# Patient Record
Sex: MALE | Race: WHITE | NOT HISPANIC OR LATINO | Employment: OTHER | URBAN - NONMETROPOLITAN AREA
[De-identification: names, ages, dates, MRNs, and addresses within clinical notes are randomized per-mention and may not be internally consistent; named-entity substitution may affect disease eponyms.]

---

## 2017-09-29 DIAGNOSIS — Z00.00 HEALTH CARE MAINTENANCE: ICD-10-CM

## 2017-09-29 DIAGNOSIS — E78.5 HYPERLIPIDEMIA WITH TARGET LDL LESS THAN 100: ICD-10-CM

## 2017-09-29 DIAGNOSIS — I10 ESSENTIAL HYPERTENSION WITH GOAL BLOOD PRESSURE LESS THAN 140/90: ICD-10-CM

## 2017-09-29 NOTE — TELEPHONE ENCOUNTER
Lisinopril      Last Written Prescription Date: 10/17/16  Last Fill Quantity: 90, # refills: 3  Last Office Visit with [facility], [facility] or  Health prescribing provider: 10/17/16       Potassium   Date Value Ref Range Status   10/17/2016 4.3 3.4 - 5.3 mmol/L Final     Creatinine   Date Value Ref Range Status   10/17/2016 1.12 0.66 - 1.25 mg/dL Final     BP Readings from Last 3 Encounters:   10/17/16 123/80   04/22/15 128/76   04/14/15 134/68     Floic acid      Last Written Prescription Date: 10/17/16  Last Fill Quantity: 90,  # refills: 3   Last Office Visit with [facility], [facility] or  Health prescribing provider: 10/17/16                                             Simvastatin     Last Written Prescription Date: 10/17/16  Last Fill Quantity: 90, # refills: 3  Last Office Visit with [facility], [facility] or  Health prescribing provider: 10/17/16       Lab Results   Component Value Date    CHOL 141 10/17/2016     Lab Results   Component Value Date    HDL 56 10/17/2016     Lab Results   Component Value Date    LDL 65 10/17/2016     Lab Results   Component Value Date    TRIG 99 10/17/2016     Lab Results   Component Value Date    CHOLHDLRATIO 3.2 03/05/2014     Metoprolol      Last Written Prescription Date: 10/17/16  Last Fill Quantity: 90, # refills: 3    Last Office Visit with [facility], [facility] or  Health prescribing provider:  10/17/16   Future Office Visit:        BP Readings from Last 3 Encounters:   10/17/16 123/80   04/22/15 128/76   04/14/15 134/68

## 2017-09-29 NOTE — LETTER
[address] [facility] [address]  Phone: [phone]    October 2, 2017       Kostas Yadiel  [address] [facility] [address]            Dear Kostas:    We are concerned about your health care.  We recently provided you with medication refills.    You are due for a follow-up with your primary care provider. This must be completed in order to continue refilling your medications. Please schedule an appointment. Scheduling telephone number: [phone].         Thank You,  NAVA Holguin

## 2017-10-02 RX ORDER — SIMVASTATIN 20 MG
TABLET ORAL
Qty: 30 TABLET | Refills: 0 | Status: SHIPPED | OUTPATIENT
Start: 2017-10-02 | End: 2017-10-02

## 2017-10-02 RX ORDER — FOLIC ACID 1 MG/1
TABLET ORAL
Qty: 30 TABLET | Refills: 0 | Status: SHIPPED | OUTPATIENT
Start: 2017-10-02 | End: 2017-10-02

## 2017-10-02 RX ORDER — LISINOPRIL 5 MG/1
TABLET ORAL
Qty: 30 TABLET | Refills: 0 | Status: SHIPPED | OUTPATIENT
Start: 2017-10-02 | End: 2017-10-02

## 2017-10-02 RX ORDER — LISINOPRIL 5 MG/1
TABLET ORAL
Qty: 30 TABLET | Refills: 0 | Status: SHIPPED | OUTPATIENT
Start: 2017-10-02 | End: 2017-11-05

## 2017-10-02 RX ORDER — METOPROLOL SUCCINATE 50 MG/1
TABLET, EXTENDED RELEASE ORAL
Qty: 30 TABLET | Refills: 0 | Status: SHIPPED | OUTPATIENT
Start: 2017-10-02 | End: 2017-11-05

## 2017-10-02 RX ORDER — SIMVASTATIN 20 MG
TABLET ORAL
Qty: 30 TABLET | Refills: 0 | Status: SHIPPED | OUTPATIENT
Start: 2017-10-02 | End: 2017-11-05

## 2017-10-02 RX ORDER — FOLIC ACID 1 MG/1
TABLET ORAL
Qty: 30 TABLET | Refills: 0 | Status: SHIPPED | OUTPATIENT
Start: 2017-10-02 | End: 2017-11-05

## 2017-10-02 NOTE — TELEPHONE ENCOUNTER
30 day supply of med refill request given. Letter sent to patient, pt due for follow-up with PCP.

## 2017-11-05 DIAGNOSIS — I10 ESSENTIAL HYPERTENSION WITH GOAL BLOOD PRESSURE LESS THAN 140/90: ICD-10-CM

## 2017-11-05 DIAGNOSIS — Z00.00 HEALTH CARE MAINTENANCE: ICD-10-CM

## 2017-11-05 DIAGNOSIS — E78.5 HYPERLIPIDEMIA WITH TARGET LDL LESS THAN 100: ICD-10-CM

## 2017-11-05 NOTE — LETTER
[address] [address]  Phone: [phone]    November 8, 2017       Kostas Cotto  [address] [facility] [address]            Dear Kostas:    We are concerned about your health care.  We recently provided you with medication refills.    You are due for a follow-up with your primary care provider and a lab tests. This must be completed to continue refilling your medications. Please schedule an appointment as soon as possible.         Thank You,  NAVA Holguin  [address] [id number] [phone]

## 2017-11-06 NOTE — TELEPHONE ENCOUNTER
metoprolol      Last Written Prescription Date: 10/02/2017  Last Fill Quantity: 30,  # refills: 0   Last Office Visit with [facility], [facility] or [facility] prescribing provider: 10/17/2016              Folic acid      Last Written Prescription Date: 10/02/2017  Last Fill Quantity: 30,  # refills: 0   Last Office Visit with [facility], [facility] or [facility] prescribing provider: 10/17/2016               Zocor      Last Written Prescription Date: 10/02/2017  Last Fill Quantity: 30,  # refills: 0   Last Office Visit with [facility], [facility] or  Health prescribing provider: 10/17/2016              Lisinopril       Last Written Prescription Date: 10/02/2017  Last Fill Quantity: 30,  # refills: 0   Last Office Visit with [facility], [facility] or  Health prescribing provider: 10/17/2016                              Next 5 appointments (look out 90 days)     Nov 16, 2017 10:45 AM CST   (Arrive by 10:30 AM)   SHORT with NAVA De León   [facility] ([facility] )    [facility] [place] [address]   [phone]

## 2017-11-08 RX ORDER — LISINOPRIL 5 MG/1
TABLET ORAL
Qty: 30 TABLET | Refills: 0 | Status: SHIPPED | OUTPATIENT
Start: 2017-11-08 | End: 2017-12-04

## 2017-11-08 RX ORDER — SIMVASTATIN 20 MG
TABLET ORAL
Qty: 30 TABLET | Refills: 0 | Status: SHIPPED | OUTPATIENT
Start: 2017-11-08 | End: 2017-12-04

## 2017-11-08 RX ORDER — FOLIC ACID 1 MG/1
TABLET ORAL
Qty: 30 TABLET | Refills: 0 | Status: SHIPPED | OUTPATIENT
Start: 2017-11-08 | End: 2017-12-04

## 2017-11-08 RX ORDER — METOPROLOL SUCCINATE 50 MG/1
TABLET, EXTENDED RELEASE ORAL
Qty: 30 TABLET | Refills: 0 | Status: SHIPPED | OUTPATIENT
Start: 2017-11-08 | End: 2017-12-04

## 2017-12-04 DIAGNOSIS — E78.5 HYPERLIPIDEMIA WITH TARGET LDL LESS THAN 100: ICD-10-CM

## 2017-12-04 DIAGNOSIS — I10 ESSENTIAL HYPERTENSION WITH GOAL BLOOD PRESSURE LESS THAN 140/90: ICD-10-CM

## 2017-12-04 DIAGNOSIS — Z00.00 HEALTH CARE MAINTENANCE: ICD-10-CM

## 2017-12-05 NOTE — TELEPHONE ENCOUNTER
simvistatin 20mg  Last office visit: 10/17/17  Last refill: 11/9/17 refills 0    metorolol succ 50mg ER  Last office visit: 10/17/17  Last refill: 11/9/17 refills 0    Lisinopril 5 mg   Last office visit: 10/17/17  Last refill: 11/9/17 refills 0    Folic acid 1mg  Last office visit: 10/17/17  Last refill: 11/9/17 refills 0    Thank you.

## 2017-12-06 NOTE — TELEPHONE ENCOUNTER
Last office visit 10/17/16.  Patient cancelled 11/16/17 appointment. Patient has appointment scheduled for December 2017. Please advise.

## 2017-12-07 RX ORDER — LISINOPRIL 5 MG/1
TABLET ORAL
Qty: 30 TABLET | Refills: 0 | Status: SHIPPED | OUTPATIENT
Start: 2017-12-07 | End: 2018-01-03

## 2017-12-07 RX ORDER — SIMVASTATIN 20 MG
TABLET ORAL
Qty: 30 TABLET | Refills: 0 | Status: SHIPPED | OUTPATIENT
Start: 2017-12-07 | End: 2018-01-03

## 2017-12-07 RX ORDER — METOPROLOL SUCCINATE 50 MG/1
TABLET, EXTENDED RELEASE ORAL
Qty: 30 TABLET | Refills: 0 | Status: SHIPPED | OUTPATIENT
Start: 2017-12-07 | End: 2018-01-03

## 2017-12-07 RX ORDER — FOLIC ACID 1 MG/1
TABLET ORAL
Qty: 30 TABLET | Refills: 0 | Status: SHIPPED | OUTPATIENT
Start: 2017-12-07 | End: 2018-01-03

## 2018-01-03 DIAGNOSIS — Z00.00 HEALTH CARE MAINTENANCE: ICD-10-CM

## 2018-01-03 DIAGNOSIS — I10 ESSENTIAL HYPERTENSION WITH GOAL BLOOD PRESSURE LESS THAN 140/90: ICD-10-CM

## 2018-01-03 DIAGNOSIS — E78.5 HYPERLIPIDEMIA WITH TARGET LDL LESS THAN 100: ICD-10-CM

## 2018-01-04 NOTE — TELEPHONE ENCOUNTER
zocor      Last Written Prescription Date: 12/7/17  Last Fill Quantity: 30,  # refills: 0   Last Office Visit with [facility], [facility] or [facility] prescribing provider: 10/17/16    metoprolol     Last Written Prescription Date: 12/7/17  Last Fill Quantity: 30,  # refills: 0   Last Office Visit with [facility], [facility] or [facility] prescribing provider: 10/17/16    Folic acid      Last Written Prescription Date: 12/7/17  Last Fill Quantity: 30,  # refills: 0   Last Office Visit with [facility], [facility] or [facility] prescribing provider: 10/17/16                                         Next 5 appointments (look out 90 days)     Jan 24, 2018 10:45 AM CST   (Arrive by 10:30 AM)   Office Visit with NAVA De León   [facility] [place] ([facility] [place] )    [address]  [place] [address]   [phone]                      lisinopril  Last Written Prescription Date: 12/7/17  Last Fill Quantity: 30,  # refills: 0   Last Office Visit with [facility], [facility] or [facility] prescribing provider: 10/17/16                                         Next 5 appointments (look out 90 days)     Jan 24, 2018 10:45 AM CST   (Arrive by 10:30 AM)   Office Visit with NAVA De León   [facility] [place] ([facility] [place] )    [address]  [place] [address]   [phone]                                                           Next 5 appointments (look out 90 days)     Jan 24, 2018 10:45 AM CST   (Arrive by 10:30 AM)   Office Visit with NAVA De León   [facility] [place] ([facility] [place] )    [address]  [place] [address]   [phone]                                                           Next 5 appointments (look out 90 days)     Jan 24, 2018 10:45 AM CST   (Arrive by 10:30 AM)   Office Visit with NAVA De León   [facility] [place] ([facility] [place] )    [address]  [place] [address]   [phone]

## 2018-01-05 RX ORDER — METOPROLOL SUCCINATE 50 MG/1
TABLET, EXTENDED RELEASE ORAL
Qty: 30 TABLET | Refills: 0 | Status: SHIPPED | OUTPATIENT
Start: 2018-01-05 | End: 2018-01-18

## 2018-01-05 RX ORDER — SIMVASTATIN 20 MG
TABLET ORAL
Qty: 30 TABLET | Refills: 0 | Status: SHIPPED | OUTPATIENT
Start: 2018-01-05 | End: 2018-01-18

## 2018-01-05 RX ORDER — FOLIC ACID 1 MG/1
TABLET ORAL
Qty: 30 TABLET | Refills: 0 | Status: SHIPPED | OUTPATIENT
Start: 2018-01-05 | End: 2018-01-18

## 2018-01-05 RX ORDER — LISINOPRIL 5 MG/1
TABLET ORAL
Qty: 30 TABLET | Refills: 0 | Status: SHIPPED | OUTPATIENT
Start: 2018-01-05 | End: 2018-02-02

## 2018-01-08 ENCOUNTER — APPOINTMENT (OUTPATIENT)
Dept: CT IMAGING | Facility: HOSPITAL | Age: 73
DRG: 069 | End: 2018-01-08
Attending: FAMILY MEDICINE
Payer: COMMERCIAL

## 2018-01-08 ENCOUNTER — APPOINTMENT (OUTPATIENT)
Dept: ULTRASOUND IMAGING | Facility: HOSPITAL | Age: 73
DRG: 069 | End: 2018-01-08
Attending: INTERNAL MEDICINE
Payer: COMMERCIAL

## 2018-01-08 ENCOUNTER — APPOINTMENT (OUTPATIENT)
Dept: MRI IMAGING | Facility: HOSPITAL | Age: 73
DRG: 069 | End: 2018-01-08
Attending: INTERNAL MEDICINE
Payer: COMMERCIAL

## 2018-01-08 ENCOUNTER — APPOINTMENT (OUTPATIENT)
Dept: SPEECH THERAPY | Facility: HOSPITAL | Age: 73
DRG: 069 | End: 2018-01-08
Attending: INTERNAL MEDICINE
Payer: COMMERCIAL

## 2018-01-08 ENCOUNTER — HOSPITAL ENCOUNTER (INPATIENT)
Facility: HOSPITAL | Age: 73
LOS: 3 days | Discharge: HOME HEALTH | DRG: 069 | End: 2018-01-11
Attending: FAMILY MEDICINE | Admitting: INTERNAL MEDICINE
Payer: COMMERCIAL

## 2018-01-08 DIAGNOSIS — I48.91 NEW ONSET ATRIAL FIBRILLATION (H): ICD-10-CM

## 2018-01-08 DIAGNOSIS — G45.9 TRANSIENT CEREBRAL ISCHEMIA, UNSPECIFIED TYPE: ICD-10-CM

## 2018-01-08 DIAGNOSIS — R33.8 BENIGN PROSTATIC HYPERPLASIA WITH URINARY RETENTION: Primary | ICD-10-CM

## 2018-01-08 DIAGNOSIS — N40.1 BENIGN PROSTATIC HYPERPLASIA WITH URINARY RETENTION: Primary | ICD-10-CM

## 2018-01-08 PROBLEM — I63.9 CVA (CEREBRAL VASCULAR ACCIDENT) (H): Status: ACTIVE | Noted: 2018-01-08

## 2018-01-08 LAB
ANION GAP SERPL CALCULATED.3IONS-SCNC: 10 MMOL/L (ref 3–14)
APTT PPP: 25 SEC (ref 24–37)
BASOPHILS # BLD AUTO: 0 10E9/L (ref 0–0.2)
BASOPHILS NFR BLD AUTO: 0.4 %
BUN SERPL-MCNC: 30 MG/DL (ref 7–30)
CALCIUM SERPL-MCNC: 9.2 MG/DL (ref 8.5–10.1)
CHLORIDE SERPL-SCNC: 101 MMOL/L (ref 94–109)
CO2 SERPL-SCNC: 26 MMOL/L (ref 20–32)
CREAT SERPL-MCNC: 1.17 MG/DL (ref 0.66–1.25)
DIFFERENTIAL METHOD BLD: NORMAL
EOSINOPHIL # BLD AUTO: 0.1 10E9/L (ref 0–0.7)
EOSINOPHIL NFR BLD AUTO: 0.5 %
ERYTHROCYTE [DISTWIDTH] IN BLOOD BY AUTOMATED COUNT: 12.3 % (ref 10–15)
EST. AVERAGE GLUCOSE BLD GHB EST-MCNC: 114 MG/DL
GFR SERPL CREATININE-BSD FRML MDRD: 62 ML/MIN/1.7M2
GLUCOSE BLDC GLUCOMTR-MCNC: 124 MG/DL (ref 70–99)
GLUCOSE BLDC GLUCOMTR-MCNC: 146 MG/DL (ref 70–99)
GLUCOSE SERPL-MCNC: 142 MG/DL (ref 70–99)
HBA1C MFR BLD: 5.6 % (ref 4.3–6)
HCT VFR BLD AUTO: 44.8 % (ref 40–53)
HGB BLD-MCNC: 15 G/DL (ref 13.3–17.7)
IMM GRANULOCYTES # BLD: 0 10E9/L (ref 0–0.4)
IMM GRANULOCYTES NFR BLD: 0.2 %
INR PPP: 1.12 (ref 0.8–1.2)
LYMPHOCYTES # BLD AUTO: 1.3 10E9/L (ref 0.8–5.3)
LYMPHOCYTES NFR BLD AUTO: 12.3 %
MCH RBC QN AUTO: 31.4 PG (ref 26.5–33)
MCHC RBC AUTO-ENTMCNC: 33.5 G/DL (ref 31.5–36.5)
MCV RBC AUTO: 94 FL (ref 78–100)
MONOCYTES # BLD AUTO: 0.8 10E9/L (ref 0–1.3)
MONOCYTES NFR BLD AUTO: 8 %
NEUTROPHILS # BLD AUTO: 8.3 10E9/L (ref 1.6–8.3)
NEUTROPHILS NFR BLD AUTO: 78.6 %
NRBC # BLD AUTO: 0 10*3/UL
NRBC BLD AUTO-RTO: 0 /100
PLATELET # BLD AUTO: 255 10E9/L (ref 150–450)
POTASSIUM SERPL-SCNC: 4.3 MMOL/L (ref 3.4–5.3)
RBC # BLD AUTO: 4.78 10E12/L (ref 4.4–5.9)
SODIUM SERPL-SCNC: 137 MMOL/L (ref 133–144)
TROPONIN I SERPL-MCNC: 0.04 UG/L (ref 0–0.04)
WBC # BLD AUTO: 10.5 10E9/L (ref 4–11)

## 2018-01-08 PROCEDURE — 25000128 H RX IP 250 OP 636: Performed by: FAMILY MEDICINE

## 2018-01-08 PROCEDURE — 99284 EMERGENCY DEPT VISIT MOD MDM: CPT | Performed by: FAMILY MEDICINE

## 2018-01-08 PROCEDURE — 93010 ELECTROCARDIOGRAM REPORT: CPT | Performed by: INTERNAL MEDICINE

## 2018-01-08 PROCEDURE — 25000128 H RX IP 250 OP 636: Performed by: RADIOLOGY

## 2018-01-08 PROCEDURE — 40000786 ZZHCL STATISTIC ACTIVE MRSA SURVEILLANCE CULTURE: Performed by: INTERNAL MEDICINE

## 2018-01-08 PROCEDURE — 00000146 ZZHCL STATISTIC GLUCOSE BY METER IP

## 2018-01-08 PROCEDURE — 70553 MRI BRAIN STEM W/O & W/DYE: CPT | Mod: TC

## 2018-01-08 PROCEDURE — 70450 CT HEAD/BRAIN W/O DYE: CPT | Mod: TC

## 2018-01-08 PROCEDURE — 99223 1ST HOSP IP/OBS HIGH 75: CPT | Performed by: INTERNAL MEDICINE

## 2018-01-08 PROCEDURE — 85730 THROMBOPLASTIN TIME PARTIAL: CPT | Performed by: FAMILY MEDICINE

## 2018-01-08 PROCEDURE — 93880 EXTRACRANIAL BILAT STUDY: CPT | Mod: TC

## 2018-01-08 PROCEDURE — 12000000 ZZH R&B MED SURG/OB

## 2018-01-08 PROCEDURE — A9585 GADOBUTROL INJECTION: HCPCS | Performed by: RADIOLOGY

## 2018-01-08 PROCEDURE — 40000225 ZZH STATISTIC SLP WARD VISIT

## 2018-01-08 PROCEDURE — 25000128 H RX IP 250 OP 636: Performed by: INTERNAL MEDICINE

## 2018-01-08 PROCEDURE — 92610 EVALUATE SWALLOWING FUNCTION: CPT | Mod: GN

## 2018-01-08 PROCEDURE — 84484 ASSAY OF TROPONIN QUANT: CPT | Performed by: FAMILY MEDICINE

## 2018-01-08 PROCEDURE — 83036 HEMOGLOBIN GLYCOSYLATED A1C: CPT | Performed by: INTERNAL MEDICINE

## 2018-01-08 PROCEDURE — 80048 BASIC METABOLIC PNL TOTAL CA: CPT | Performed by: FAMILY MEDICINE

## 2018-01-08 PROCEDURE — 85610 PROTHROMBIN TIME: CPT | Performed by: FAMILY MEDICINE

## 2018-01-08 PROCEDURE — 25000132 ZZH RX MED GY IP 250 OP 250 PS 637: Performed by: INTERNAL MEDICINE

## 2018-01-08 PROCEDURE — 85025 COMPLETE CBC W/AUTO DIFF WBC: CPT | Performed by: FAMILY MEDICINE

## 2018-01-08 PROCEDURE — 40000788 ZZHCL STATISTIC ESTIMATED AVERAGE GLUCOSE: Performed by: INTERNAL MEDICINE

## 2018-01-08 PROCEDURE — 99285 EMERGENCY DEPT VISIT HI MDM: CPT | Mod: 25

## 2018-01-08 PROCEDURE — 93005 ELECTROCARDIOGRAM TRACING: CPT

## 2018-01-08 RX ORDER — SODIUM CHLORIDE 9 MG/ML
INJECTION, SOLUTION INTRAVENOUS CONTINUOUS
Status: DISCONTINUED | OUTPATIENT
Start: 2018-01-08 | End: 2018-01-10

## 2018-01-08 RX ORDER — ACETAMINOPHEN 650 MG/1
650 SUPPOSITORY RECTAL EVERY 4 HOURS PRN
Status: DISCONTINUED | OUTPATIENT
Start: 2018-01-08 | End: 2018-01-11 | Stop reason: HOSPADM

## 2018-01-08 RX ORDER — SIMVASTATIN 40 MG
40 TABLET ORAL AT BEDTIME
Status: DISCONTINUED | OUTPATIENT
Start: 2018-01-08 | End: 2018-01-11 | Stop reason: HOSPADM

## 2018-01-08 RX ORDER — ASPIRIN 325 MG
325 TABLET ORAL DAILY
Status: DISCONTINUED | OUTPATIENT
Start: 2018-01-09 | End: 2018-01-11 | Stop reason: HOSPADM

## 2018-01-08 RX ORDER — BISACODYL 10 MG
10 SUPPOSITORY, RECTAL RECTAL DAILY PRN
Status: DISCONTINUED | OUTPATIENT
Start: 2018-01-08 | End: 2018-01-11 | Stop reason: HOSPADM

## 2018-01-08 RX ORDER — ACETAMINOPHEN 325 MG/1
650 TABLET ORAL EVERY 4 HOURS PRN
Status: DISCONTINUED | OUTPATIENT
Start: 2018-01-08 | End: 2018-01-11 | Stop reason: HOSPADM

## 2018-01-08 RX ORDER — GADOBUTROL 604.72 MG/ML
7.5 INJECTION INTRAVENOUS ONCE
Status: COMPLETED | OUTPATIENT
Start: 2018-01-08 | End: 2018-01-08

## 2018-01-08 RX ORDER — ONDANSETRON 4 MG/1
4 TABLET, ORALLY DISINTEGRATING ORAL EVERY 6 HOURS PRN
Status: DISCONTINUED | OUTPATIENT
Start: 2018-01-08 | End: 2018-01-11 | Stop reason: HOSPADM

## 2018-01-08 RX ORDER — ONDANSETRON 2 MG/ML
4 INJECTION INTRAMUSCULAR; INTRAVENOUS EVERY 6 HOURS PRN
Status: DISCONTINUED | OUTPATIENT
Start: 2018-01-08 | End: 2018-01-11 | Stop reason: HOSPADM

## 2018-01-08 RX ADMIN — SODIUM CHLORIDE, PRESERVATIVE FREE: 5 INJECTION INTRAVENOUS at 22:02

## 2018-01-08 RX ADMIN — ENOXAPARIN SODIUM 80 MG: 80 INJECTION SUBCUTANEOUS at 17:00

## 2018-01-08 RX ADMIN — GADOBUTROL 7.5 ML: 604.72 INJECTION INTRAVENOUS at 17:43

## 2018-01-08 RX ADMIN — SODIUM CHLORIDE 500 ML: 9 INJECTION, SOLUTION INTRAVENOUS at 13:07

## 2018-01-08 RX ADMIN — SODIUM CHLORIDE, PRESERVATIVE FREE: 5 INJECTION INTRAVENOUS at 15:17

## 2018-01-08 RX ADMIN — RANITIDINE 150 MG: 150 TABLET ORAL at 17:00

## 2018-01-08 ASSESSMENT — ENCOUNTER SYMPTOMS
FACIAL ASYMMETRY: 0
FEVER: 0
ABDOMINAL PAIN: 0
DYSURIA: 0
HEADACHES: 0
VOMITING: 0
DIAPHORESIS: 0
ACTIVITY CHANGE: 1
SHORTNESS OF BREATH: 0
CONSTIPATION: 0
PSYCHIATRIC NEGATIVE: 1
SPEECH DIFFICULTY: 1
FATIGUE: 0
WEAKNESS: 1
MUSCULOSKELETAL NEGATIVE: 1
DIARRHEA: 0
LIGHT-HEADEDNESS: 0
NAUSEA: 0

## 2018-01-08 ASSESSMENT — VISUAL ACUITY: OU: BASELINE

## 2018-01-08 NOTE — PLAN OF CARE
Face to face report given with opportunity to observe patient.    Report given to Zi Glass   1/8/2018  3:30 PM

## 2018-01-08 NOTE — IP AVS SNAPSHOT
MRN:[id number]                      After Visit Summary   1/8/2018    oKstas Cotto    MRN: [id number]           Thank you!     Thank you for choosing [facility] for your care. Our goal is always to provide you with excellent care. Hearing back from our patients is one way we can continue to improve our services. Please take a few minutes to complete the written survey that you may receive in the mail after you visit with us. Thank you!        Patient Information     Date Of Birth          1945        Designated Caregiver       Most Recent Value    Caregiver    Will someone help with your care after discharge? yes    Name of designated caregiver Pari    Phone number of caregiver [phone]    Caregiver address [address] [facility]       About your hospital stay     You were admitted on:  January 8, 2018 You last received care in the:  [facility]    You were discharged on:  January 11, 2018        Reason for your hospital stay       New onset atrial fibrillation                  Who to Call     For medical emergencies, please call 911.  For non-urgent questions about your medical care, please call your primary care provider or clinic, [phone]          Attending Provider     Provider Specialty    Ernestina Jon MD Emergency Medicine    [facility]    Titus Owens MD Internal Medicine       Primary Care Provider Office Phone # Fax #    NAVA De León [phone]      After Care Instructions     Activity       Your activity upon discharge: activity as tolerated            Diet       Follow this diet upon discharge: Orders Placed This Encounter      Regular Diet Adult Thin Liquids (water, ice chips, juice, milk, gelatin, ice cream, etc)                  Follow-up Appointments     Follow-up and recommended labs and tests        Follow up with primary care provider, Halley Hidalgo, within 5 days to evaluate  "medication change.  The following labs/tests are recommended: INR on 1/15/2018.                  Your next 10 appointments already scheduled     Jan 18, 2018 11:15 AM CST   (Arrive by 11:00 AM)   Office Visit with NAVA De León   [facility]    [address]  [place] [address]   [phone]            Jan 24, 2018 10:45 AM CST   (Arrive by 10:30 AM)   Office Visit with NAVA De León   [facility]    [address]  [place] [address]   [phone]              Additional Services     Physical Therapy Referral       *This therapy referral will be filtered to a centralized scheduling office at [facility] and the patient will receive a call to schedule an appointment at a [facility] location most convenient for them. *     [facility] provides Physical Therapy evaluation and treatment and many specialty services across the [facility] system.  If requesting a specialty program, please choose from the list below.    If you have not heard from the scheduling office within 2 business days, please call [phone] for all locations, with the exception of [place], please call [phone].  Treatment: Evaluation & Treatment  Special Instructions/Modalities: Mild gait instability. Use walker.  Special Programs: Balance/Vestibular    Please be aware that coverage of these services is subject to the terms and limitations of your health insurance plan.  Call member services at your health plan with any benefit or coverage questions.      **Note to Provider:  If you are referring outside of [facility] for the therapy appointment, please list the name of the location in the \"special instructions\" above, print the referral and give to the patient to schedule the appointment.                  Further instructions from your care team       Please stop into the [facility] to " have your INR checked on 1/15/18. An appointment to follow up with Halley Hidalgo has been made for 18. If unable to schedule please call [phone] to reschedule.    MRI Contrast Discharge Instructions    The IV contrast you received today will pass out of your body in your  urine. This will happen in the next 24 hours. You will not feel this process.  Your urine will not change color.    Drink at least 4 extra glasses of water or juice today (unless your doctor  has restricted your fluids). This reduces the stress on your kidneys.  You may take your regular medicines.    If you are on dialysis: It is best to have dialysis today.    If you have a reaction: Most reactions happen right away. If you have  any new symptoms after leaving the hospital (such as hives or swelling),  call your hospital at the correct number below. Or call your family doctor.  If you have breathing distress or wheezing, call 911.    Special instructions:     I have read and understand the above information.    Signature:______________________________________ Date:___[birth year]________    Staff:__________________________________________ Date:___________     Time:__________    [facility] Departments:    ___[facility]: [phone]  ___[facility]: [phone]  ___[place]: [phone] ___[facility]: [phone]  ___[facility]: [phone]  ___[facility]: [phone]  ___[phone]  ___[facility]: [phone]  ___[phone]    Warfarin Instruction     You have started taking a medicine called warfarin. This is a blood-thinning medicine (anticoagulant). It helps prevent and treat blood clots.      Before leaving the hospital, make sure you know how much to take and how long to take it.      You will need regular blood tests to make sure your blood is clotting safely. It is very important to see your doctor for regular blood tests.    Talk to your doctor before taking any new medicine (this includes  "over-the-counter drugs and herbal products). Many medicines can interact with warfarin. This may cause more bleeding or too much clotting.     Eating a lot of vitamin K--found in green, leafy vegetables--can change the way warfarin works in your body. Do NOT avoid these foods. Instead, try to eat the same amount each day.     Bleeding is the most common side-effect of warfarin. You may notice bleeding gums, a bloody nose, bruises and bleeding longer when you cut yourself. See a doctor at once if:   o You cough up blood  o You find blood in your stool (poop)  o You have a deep cut, or a cut that bleeds longer than 10 minutes   o You have a bad cut, hard fall, accident or hit your head (go to urgent care or the emergency room).    For women who can get pregnant: This medicine can harm an unborn baby. Be very careful not to get pregnant while taking this medicine. If you think you might be pregnant, call your doctor right away.    For more information, read \"Guide to Warfarin Therapy,  the booklet you received in the hospital.        Pending Results     Date and Time Order Name Status Description    1/8/2018 1405 Echocardiogram - bubble study In process             Statement of Approval     Ordered          01/11/18 1016  I have reviewed and agree with all the recommendations and orders detailed in this document.  EFFECTIVE NOW     Approved and electronically signed by:  Titus Owens MD             Admission Information     Date & Time Provider Department Dept. Phone    1/8/2018 Titus Owens MD [facility] [phone]      Your Vitals Were     Blood Pressure Pulse Temperature Respirations Height Weight    145/93 (BP Location: Right arm) 74 97.8  F (36.6  C) (Tympanic) 16 1.829 m (6') 82.4 kg (181 lb 10.5 oz)    Pulse Oximetry BMI (Body Mass Index)                95% 24.64 kg/m2          MyChart Information     [facility] lets you send messages to your doctor, view your test results, renew your " "prescriptions, schedule appointments and more. To sign up, go to [email or web address] . Click on \"Log in\" on the left side of the screen, which will take you to the Welcome page. Then click on \"Sign up Now\" on the right side of the page.     You will be asked to enter the access code listed below, as well as some personal information. Please follow the directions to create your username and password.     Your access code is: [email or web address]  Expires: 4/10/2018 12:47 PM     Your access code will  in 90 days. If you need help or a new code, please call your [facility] clinic or [phone].        Care EveryWhere ID     This is your Care EveryWhere ID. This could be used by other organizations to access your [facility] medical records  [id number]        Equal Access to Services     PEDRO WEAVER : Una Moctezuma, cipriano blount, yo sy, [place] guzman . So [facility] [phone].    ATENCIÓN: Si habla español, tiene a rahman disposición servicios gratuitos de asistencia lingüística. Llfranki al [phone].    We comply with applicable federal civil rights laws and [place] laws. We do not discriminate on the basis of race, color, national origin, age, disability, sex, sexual orientation, or gender identity.               Review of your medicines      START taking        Dose / Directions    enoxaparin 80 MG/0.8ML injection   Commonly known as:  LOVENOX   Used for:  New onset atrial fibrillation (H)        Dose:  1 mg/kg   Inject 0.82 mLs (82 mg) Subcutaneous every 12 hours for 5 days   Quantity:  8.2 mL   Refills:  0       finasteride 5 MG tablet   Commonly known as:  PROSCAR   Used for:  Benign prostatic hyperplasia with urinary retention        Dose:  5 mg   Take 1 tablet (5 mg) by mouth daily   Quantity:  30 tablet   Refills:  3       tamsulosin 0.4 MG capsule   Commonly known as:  FLOMAX   Used for:  Benign prostatic hyperplasia with urinary retention        " Dose:  0.4 mg   Take 1 capsule (0.4 mg) by mouth daily   Quantity:  30 capsule   Refills:  3       warfarin 5 MG tablet   Commonly known as:  COUMADIN   Used for:  New onset atrial fibrillation (H)        Dose:  5 mg   Take 1 tablet (5 mg) by mouth daily   Quantity:  30 tablet   Refills:  0         CONTINUE these medicines which have NOT CHANGED        Dose / Directions    aspirin 325 MG tablet        Dose:  1 tablet   Take 1 tablet by mouth daily   Refills:  0       calcium 500 + D 500-400 MG-UNIT Tabs per tablet   Generic drug:  calcium carbonate-vitamin D        2 times daily Take one tablet by oral route every day   Refills:  0       folic acid 1 MG tablet   Commonly known as:  FOLVITE   Used for:  Health care maintenance        TAKE 1 TABLET BY MOUTH DAILY   Quantity:  30 tablet   Refills:  0       lisinopril 5 MG tablet   Commonly known as:  PRINIVIL/ZESTRIL   Used for:  Essential hypertension with goal blood pressure less than 140/90        TAKE 1 TABLET BY MOUTH DAILY   Quantity:  30 tablet   Refills:  0       metoprolol succinate 50 MG 24 hr tablet   Commonly known as:  TOPROL-XL   Used for:  Essential hypertension with goal blood pressure less than 140/90        TAKE 1 TABLET (50 MG) BY MOUTH DAILY   Quantity:  30 tablet   Refills:  0       simvastatin 20 MG tablet   Commonly known as:  ZOCOR   Used for:  Hyperlipidemia with target LDL less than 100        TAKE 1 TABLET BY MOUTH EVERY EVENING - GENERIC FOR ZOCOR   Quantity:  30 tablet   Refills:  0       ZANTAC PO        Dose:  75 mg   Take 75 mg by mouth daily   Refills:  0            Where to get your medicines      These medications were sent to [id number] - [place] - [address]  [address] [place] [address]     Phone:  [phone]     enoxaparin 80 MG/0.8ML injection    finasteride 5 MG tablet    tamsulosin 0.4 MG capsule    warfarin 5 MG tablet                Protect others around you: Learn how to safely use, store and  throw away your medicines at www.disposemymeds.org.             Medication List: This is a list of all your medications and when to take them. Check marks below indicate your daily home schedule. Keep this list as a reference.      Medications           Morning Afternoon Evening Bedtime As Needed    aspirin 325 MG tablet   Take 1 tablet by mouth daily   Last time this was given:  325 mg on 1/11/2018  9:08 AM                                calcium 500 + D 500-400 MG-UNIT Tabs per tablet   2 times daily Take one tablet by oral route every day   Generic drug:  calcium carbonate-vitamin D                                enoxaparin 80 MG/0.8ML injection   Commonly known as:  LOVENOX   Inject 0.82 mLs (82 mg) Subcutaneous every 12 hours for 5 days   Last time this was given:  80 mg on 1/11/2018  5:29 AM                                finasteride 5 MG tablet   Commonly known as:  PROSCAR   Take 1 tablet (5 mg) by mouth daily   Last time this was given:  5 mg on 1/11/2018  9:08 AM                                folic acid 1 MG tablet   Commonly known as:  FOLVITE   TAKE 1 TABLET BY MOUTH DAILY                                lisinopril 5 MG tablet   Commonly known as:  PRINIVIL/ZESTRIL   TAKE 1 TABLET BY MOUTH DAILY                                metoprolol succinate 50 MG 24 hr tablet   Commonly known as:  TOPROL-XL   TAKE 1 TABLET (50 MG) BY MOUTH DAILY                                simvastatin 20 MG tablet   Commonly known as:  ZOCOR   TAKE 1 TABLET BY MOUTH EVERY EVENING - GENERIC FOR ZOCOR   Last time this was given:  40 mg on 1/10/2018  8:50 PM                                tamsulosin 0.4 MG capsule   Commonly known as:  FLOMAX   Take 1 capsule (0.4 mg) by mouth daily   Last time this was given:  0.4 mg on 1/11/2018  9:08 AM                                warfarin 5 MG tablet   Commonly known as:  COUMADIN   Take 1 tablet (5 mg) by mouth daily   Last time this was given:  5 mg on 1/10/2018  5:04 PM                                 ZANTAC PO   Take 75 mg by mouth daily                                          More Information        Enoxaparin injection  Brand Name: Lovenox  What is this medicine?  ENOXAPARIN (ee nox a PA rin) is used after knee, hip, or abdominal surgeries to prevent blood clotting. It is also used to treat existing blood clots in the lungs or in the veins.  How should I use this medicine?  This medicine is for injection under the skin. It is usually given by a health-care professional. You or a family member may be trained on how to give the injections. If you are to give yourself injections, make sure you understand how to use the syringe, measure the dose if necessary, and give the injection. To avoid bruising, do not rub the site where this medicine has been injected. Do not take your medicine more often than directed. Do not stop taking except on the advice of your doctor or health care professional.  Make sure you receive a puncture-resistant container to dispose of the needles and syringes once you have finished with them. Do not reuse these items. Return the container to your doctor or health care professional for proper disposal.  Talk to your pediatrician regarding the use of this medicine in children. Special care may be needed.  What side effects may I notice from receiving this medicine?  Side effects that you should report to your doctor or health care professional as soon as possible:    allergic reactions like skin rash, itching or hives, swelling of the face, lips, or tongue    feeling faint or lightheaded, falls    signs and symptoms of bleeding such as bloody or black, tarry stools; red or dark-brown urine; spitting up blood or brown material that looks like coffee grounds; red spots on the skin; unusual bruising or bleeding from the eye, gums, or nose  Side effects that usually do not require medical attention (report to your doctor or health care professional if they continue or are  bothersome):    pain, redness, or irritation at site where injected  What may interact with this medicine?    aspirin and aspirin-like medicines    certain medicines that treat or prevent blood clots    dipyridamole    NSAIDs, medicines for pain and inflammation, like ibuprofen or naproxen  What if I miss a dose?  If you miss a dose, take it as soon as you can. If it is almost time for your next dose, take only that dose. Do not take double or extra doses.  Where should I keep my medicine?  Keep out of the reach of children.  Store at room temperature between 15 and 30 degrees C (59 and 86 degrees F). Do not freeze. If your injections have been specially prepared, you may need to store them in the refrigerator. Ask your pharmacist. Throw away any unused medicine after the expiration date.  What should I tell my health care provider before I take this medicine?  They need to know if you have any of these conditions:    bleeding disorders, hemorrhage, or hemophilia    infection of the heart or heart valves    kidney or liver disease    previous stroke    prosthetic heart valve    recent surgery or delivery of a baby    ulcer in the stomach or intestine, diverticulitis, or other bowel disease    an unusual or allergic reaction to enoxaparin, heparin, pork or pork products, other medicines, foods, dyes, or preservatives    pregnant or trying to get pregnant    breast-feeding  What should I watch for while using this medicine?  Visit your doctor or health care professional for regular checks on your progress. Your condition will be monitored carefully while you are receiving this medicine.  Notify your doctor or health care professional and seek emergency treatment if you develop breathing problems; changes in vision; chest pain; severe, sudden headache; pain, swelling, warmth in the leg; trouble speaking; sudden numbness or weakness of the face, arm, or leg. These can be signs that your condition has gotten worse.  If  you are going to have surgery, tell your doctor or health care professional that you are taking this medicine.  Do not stop taking this medicine without first talking to your doctor. Be sure to refill your prescription before you run out of medicine.  Avoid sports and activities that might cause injury while you are using this medicine. Severe falls or injuries can cause unseen bleeding. Be careful when using sharp tools or knives. Consider using an electric razor. Take special care brushing or flossing your teeth. Report any injuries, bruising, or red spots on the skin to your doctor or health care professional.  NOTE:This sheet is a summary. It may not cover all possible information. If you have questions about this medicine, talk to your doctor, pharmacist, or health care provider. Copyright  2017 [employer]                Effects of a Stroke on the Brain and Body  When blood supply is cut off from the brain, cells start to die from lack of oxygen. Within minutes, skills such as reasoning, speech, and some degree of arm, leg, or facial movement may be lost. The type of skills and the amount of loss depend on which part of the brain was affected, and how much tissue was damaged.      The brain is the body s control center, which handles communication, motor, sensory, and processing functions.    The cerebellum controls the body s coordination and balance.    The brain stem links the brain and the spinal cord. It also handles basic body functions.    The spinal cord carries messages between the brain and the body.  A stroke causes lost skills  Each part of the brain has a role related to a function in the body. Damage to any part of the brain limits its ability to carry out its role. This results in lost skills or changes in function, and even in personality. Some parts of the brain and its correlating functions that may be affected by stroke are as follows:    The front of the brain houses reasoning and the ability  to control emotions. Personality resides here.    The left side of the brain controls the right side of the body. It also handles speech, language, reading, and writing.    The right side of the brain controls the left side of the body.    The back of the brain controls vision.    The brain stem handles breathing and swallowing.  Date Last Reviewed: 6/1/2017 2000-2017 [employer]. [address] [place] [address]. All rights reserved. This information is not intended as a substitute for professional medical care. Always follow your healthcare professional's instructions.                Finasteride (Proscar) tablets  Brand Name: Proscar  What is this medicine?  FINASTERIDE (fi SHARI laurence veronica) is used to treat benign prostatic hyperplasia (BPH) in men. This is a condition that causes you to have an enlarged prostate. This medicine helps to control your symptoms, decrease urinary retention, and reduces your risk of needing surgery. When used in combination with certain other medicines, this drug can slow down the progression of your disease.  How should I use this medicine?  Take this medicine by mouth with a glass of water. Follow the directions on the prescription label. You can take this medicine with or without food. Take your doses at regular intervals. Do not take your medicine more often than directed. Do not stop taking except on the advice of your doctor or health care professional.  Talk to your pediatrician regarding the use of this medicine in children. Special care may be needed.  What side effects may I notice from receiving this medicine?  Side effects that you should report to your doctor or health care professional as soon as possible:    any signs of an allergic reaction like rash, itching, hives or swelling of the lips or face    changes in breast like lumps, pain or fluids leaking from the nipple    pain in the testicles  Side effects that usually do not require medical  attention (report to your doctor or health care professional if they continue or are bothersome):    sexual difficulties like decreased sexual desire or ability to get an erection    small amount of semen released during sex  What may interact with this medicine?    saw palmetto or other dietary supplements  What if I miss a dose?  If you miss a dose, take it as soon as you can. If it is almost time for your next dose, take only that dose. Do not take double or extra doses.  Where should I keep my medicine?  Keep out of the reach of children.  Store at room temperature below 30 degrees C (86 degrees F). Protect from light. Keep container tightly closed. Throw away any unused medicine after the expiration date.  What should I tell my health care provider before I take this medicine?  They need to know if you have any of these conditions:    liver disease    an unusual or allergic reaction to finasteride, other medicines, foods, dyes, or preservatives    pregnant or trying to get pregnant    breast-feeding  What should I watch for while using this medicine?  Do not donate blood while you are taking this medicine. This will prevent giving this medicine to a pregnant female through a blood transfusion. Ask your doctor or health care professional when it is safe to donate blood after you stop taking this medicine.  Women who are pregnant or may get pregnant must not handle broken or crushed finasteride tablets. The active ingredient could harm the unborn baby. If a pregnant woman comes into contact with broken or crushed tablets she should check with her doctor or health care professional. Exposure to whole tablets is not expected to cause harm as long as they are not swallowed.  Contact your doctor or health care professional if your symptoms do not start to get better. You may need to take this medicine for 6 to 12 months to get the best results.  This medicine can interfere with PSA laboratory tests for prostate  cancer. If you are scheduled to have a lab test for prostate cancer, tell your doctor or health care professional that you are taking this medicine.  This medicine may increase your risk of getting some cancers, like breast cancer. Talk with your doctor.  NOTE:This sheet is a summary. It may not cover all possible information. If you have questions about this medicine, talk to your doctor, pharmacist, or health care provider. Copyright  2017 [employer]                Tamsulosin capsules  Brand Name: Flomax  What is this medicine?  TAMSULOSIN (ramirez KIMBERLI lisa sin) is used to treat enlargement of the prostate gland in men, a condition called benign prostatic hyperplasia or BPH. It is not for use in women. It works by relaxing muscles in the prostate and bladder neck. This improves urine flow and reduces BPH symptoms.  How should I use this medicine?  Take this medicine by mouth about 30 minutes after the same meal every day. Follow the directions on the prescription label. Swallow the capsules whole with a glass of water. Do not crush, chew, or open capsules. Do not take your medicine more often than directed. Do not stop taking your medicine unless your doctor tells you to.  Talk to your pediatrician regarding the use of this medicine in children. Special care may be needed.  What side effects may I notice from receiving this medicine?  Side effects that you should report to your doctor or health care professional as soon as possible:    allergic reactions like skin rash or itching, hives, swelling of the lips, mouth, tongue, or throat    breathing problems    change in vision    feeling faint or lightheaded    irregular heartbeat    prolonged or painful erection    weakness  Side effects that usually do not require medical attention (report to your doctor or health care professional if they continue or are bothersome):    back pain    change in sex drive or performance    constipation, nausea or  vomiting    cough    drowsy    runny or stuffy nose    trouble sleeping  What may interact with this medicine?    cimetidine    fluoxetine    ketoconazole    medicines for erectile disfunction like sildenafil, tadalafil, vardenafil    medicines for high blood pressure    other alpha-blockers like alfuzosin, doxazosin, phentolamine, phenoxybenzamine, prazosin, terazosin    warfarin  What if I miss a dose?  If you miss a dose, take it as soon as you can. If it is almost time for your next dose, take only that dose. Do not take double or extra doses. If you stop taking your medicine for several days or more, ask your doctor or health care professional what dose you should start back on.  Where should I keep my medicine?  Keep out of the reach of children.  Store at room temperature between 15 and 30 degrees C (59 and 86 degrees F). Throw away any unused medicine after the expiration date.  What should I tell my health care provider before I take this medicine?  They need to know if you have any of the following conditions:    advanced kidney disease    advanced liver disease    low blood pressure    prostate cancer    an unusual or allergic reaction to tamsulosin, sulfa drugs, other medicines, foods, dyes, or preservatives    pregnant or trying to get pregnant    breast-feeding  What should I watch for while using this medicine?  Visit your doctor or health care professional for regular check ups. You will need lab work done before you start this medicine and regularly while you are taking it. Check your blood pressure as directed. Ask your health care professional what your blood pressure should be, and when you should contact him or her.  This medicine may make you feel dizzy or lightheaded. This is more likely to happen after the first dose, after an increase in dose, or during hot weather or exercise. Drinking alcohol and taking some medicines can make this worse. Do not drive, use machinery, or do anything that  needs mental alertness until you know how this medicine affects you. Do not sit or stand up quickly. If you begin to feel dizzy, sit down until you feel better. These effects can decrease once your body adjusts to the medicine.  Contact your doctor or health care professional right away if you have an erection that lasts longer than 4 hours or if it becomes painful. This may be a sign of a serious problem and must be treated right away to prevent permanent damage.  If you are thinking of having cataract surgery, tell your eye surgeon that you have taken this medicine.  NOTE:This sheet is a summary. It may not cover all possible information. If you have questions about this medicine, talk to your doctor, pharmacist, or health care provider. Copyright  2017 [employer]                Warfarin tablets  Brand Names: Coumadin, Jantoven  What is this medicine?  WARFARIN (WAR far in) is an anticoagulant. It is used to treat or prevent clots in the veins, arteries, lungs, or heart.  How should I use this medicine?  Take this medicine by mouth with a glass of water. Follow the directions on the prescription label. You can take this medicine with or without food. Take your medicine at the same time each day. Do not take it more often than directed. Do not stop taking except on your doctor's advice. Stopping this medicine may increase your risk of a blood clot. Be sure to refill your prescription before you run out of medicine.  If your doctor or healthcare professional calls to change your dose, write down the dose and any other instructions. Always read the dose and instructions back to him or her to make sure you understand them. Tell your doctor or healthcare professional what strength of tablets you have on hand. Ask how many tablets you should take to equal your new dose. Write the date on the new instructions and keep them near your medicine. If you are told to stop taking your medicine until your next blood test, call  your doctor or healthcare professional if you do not hear anything within 24 hours of the test to find out your new dose or when to restart your prior dose.  A special MedGuide will be given to you by the pharmacist with each prescription and refill. Be sure to read this information carefully each time.  Talk to your pediatrician regarding the use of this medicine in children. Special care may be needed.  What side effects may I notice from receiving this medicine?  Side effects that you should report to your doctor or health care professional as soon as possible:    allergic reactions like skin rash, itching or hives, swelling of the face, lips, or tongue    breathing problems    chest pain    dizziness    headache    heavy menstrual bleeding or vaginal bleeding    pain in the lower back or side    painful, blue or purple toes    painful skin ulcers that do not go away    signs and symptoms of bleeding such as bloody or black, tarry stools; red or dark-brown urine; spitting up blood or brown material that looks like coffee grounds; red spots on the skin; unusual bruising or bleeding from the eye, gums, or nose    stomach pain    unusually weak or tired  Side effects that usually do not require medical attention (report to your doctor or health care professional if they continue or are bothersome):    diarrhea    hair loss  What may interact with this medicine?  Do not take this medicine with any of the following medications:    agents that prevent or dissolve blood clots    aspirin or other salicylates    danshen    dextrothyroxine    mifepristone    [place] Wort    red yeast rice  This medicine may also interact with the following medications:    acetaminophen    agents that lower cholesterol    alcohol    allopurinol    amiodarone    antibiotics or medicines for treating bacterial, fungal or viral infections    azathioprine    barbiturate medicines for inducing sleep or treating seizures    certain medicines  for diabetes    certain medicines for heart rhythm problems    certain medicines for high blood pressure    chloral hydrate    cisapride    disulfiram    female hormones, including contraceptive or birth control pills    general anesthetics    herbal or dietary products like garlic, ginkgo, ginseng, green tea, or kava kava    influenza virus vaccine    male hormones    medicines for mental depression or psychosis    medicines for some types of cancer    medicines for stomach problems    methylphenidate    NSAIDs, medicines for pain and inflammation, like ibuprofen or naproxen    propoxyphene    quinidine, quinine    raloxifene    seizure or epilepsy medicine like carbamazepine, phenytoin, and valproic acid    steroids like cortisone and prednisone    tamoxifen    thyroid medicine    tramadol    vitamin c, vitamin e, and vitamin K    zafirlukast    zileuton  What if I miss a dose?  It is important not to miss a dose. If you miss a dose, call your healthcare provider. Take the dose as soon as possible on the same day. If it is almost time for your next dose, take only that dose. Do not take double or extra doses to make up for a missed dose.  Where should I keep my medicine?  Keep out of the reach of children.  Store at room temperature between 15 and 30 degrees C (59 and 86 degrees F). Protect from light. Throw away any unused medicine after the expiration date. Do not flush down the toilet.  What should I tell my health care provider before I take this medicine?  They need to know if you have any of these conditions:    alcoholism    anemia    bleeding disorders    cancer    diabetes    heart disease    high blood pressure    history of bleeding in the gastrointestinal tract    history of stroke or other brain injury or disease    kidney or liver disease    protein C deficiency    protein S deficiency    psychosis or dementia    recent injury, recent or planned surgery or procedure    an unusual or allergic reaction  to warfarin, other medicines, foods, dyes, or preservatives    pregnant or trying to get pregnant    breast-feeding  What should I watch for while using this medicine?  Visit your doctor or health care professional for regular checks on your progress. You will need to have a blood test called a PT/INR regularly. The PT/INR blood test is done to make sure you are getting the right dose of this medicine. It is important to not miss your appointment for the blood tests. When you first start taking this medicine, these tests are done often. Once the correct dose is determined and you take your medicine properly, these tests can be done less often.  Notify your doctor or health care professional and seek emergency treatment if you develop breathing problems; changes in vision; chest pain; severe, sudden headache; pain, swelling, warmth in the leg; trouble speaking; sudden numbness or weakness of the face, arm or leg. These can be signs that your condition has gotten worse.  While you are taking this medicine, carry an identification card with your name, the name and dose of medicine(s) being used, and the name and phone number of your doctor or health care professional or person to contact in an emergency.  Do not start taking or stop taking any medicines or over-the-counter medicines except on the advice of your doctor or health care professional.  You should discuss your diet with your doctor or health care professional. Do not make major changes in your diet. Vitamin K can affect how well this medicine works. Many foods contain vitamin K. It is important to eat a consistent amount of foods with vitamin K. Other foods with vitamin K that you should eat in consistent amounts are asparagus, basil, beef or pork liver, black eyed peas, broccoli, brussel sprouts, cabbage, chickpeas, cucumber with peel, green onions, green tea, okra, parsley, peas, thyme, and green leafy vegetables like beet greens, vicenta greens, endive,  kale, mustard greens, spinach, turnip greens, watercress, or certain lettuces like green leaf or nanda.  This medicine can cause birth defects or bleeding in an unborn child. Women of childbearing age should use effective birth control while taking this medicine. If a woman becomes pregnant while taking this medicine, she should discuss the potential risks and her options with her health care professional.  Avoid sports and activities that might cause injury while you are using this medicine. Severe falls or injuries can cause unseen bleeding. Be careful when using sharp tools or knives. Consider using an electric razor. Take special care brushing or flossing your teeth. Report any injuries, bruising, or red spots on the skin to your doctor or health care professional.  If you have an illness that causes vomiting, diarrhea, or fever for more than a few days, contact your doctor. Also check with your doctor if you are unable to eat for several days. These problems can change the effect of this medicine.  Even after you stop taking this medicine, it takes several days before your body recovers its normal ability to clot blood. Ask your doctor or health care professional how long you need to be careful. If you are going to have surgery or dental work, tell your doctor or health care professional that you have been taking this medicine.  NOTE:This sheet is a summary. It may not cover all possible information. If you have questions about this medicine, talk to your doctor, pharmacist, or health care provider. Copyright  2017 [employer]                Discharge Instructions for Atrial Fibrillation  You have been diagnosed with an abnormal heart rhythm called atrial fibrillation. With this condition, your heart s 2 upper chambers quiver rather than squeeze the blood out in a normal pattern. This leads to an irregular and sometimes rapid heartbeat. Some people will develop associated symptoms such as a flip-flopping  heartbeat, chest pain, lightheadedness, or shortness of breath. Other people may have no symptoms at all. Atrial fibrillation is serious because it affects the heart s ability to fill with blood as it should. Blood clots may form. This increases the risk for stroke. Untreated atrial fibrillation can also lead to heart failure. Atrial fibrillation can be controlled. With treatment, most people with atrial fibrillation lead normal lives.  Treatment options  Recommended treatment for atrial fibrillation depends on your age, symptoms, how long you have had atrial fibrillation, and other factors. You will have a complete evaluation to find out if you have any abnormalities that caused your heart to go into atrial fibrillation. This might be blocked heart arteries or a thyroid problem. Your doctor will assess your particular case and discuss choices with you.  Treatment choices may include:    Treating an underlying disorder that puts you at risk for atrial fibrillation. For example, correcting an abnormal thyroid or electrolyte problem, or treating a blocked heart artery.    Restoring a normal heart rhythm with an electrical shock (cardioversion) or with an antiarrhythmic medicine (chemical cardioversion)    Using medicine to control your heart rate in atrial fibrillation.    Preventing the risk for blood clot and stroke using blood-thinning medicines. Your doctor will tell you what he or she recommends. Choices may include aspirin, clopidogrel, warfarin, dabigatran, rivaroxaban, apixaban, and edoxaban.    Doing catheter ablation or a surgical maze procedure. These use different methods to destroy certain areas of heart tissue. This interrupts the electrical signals causing atrial fibrillation. One of these procedures may be a choice when medicines do not work, or as an alternative to long-term medicine.    Other treatment choices may be recommended for you by your doctor.  Managing risk factors for stroke and preventing  heart failure are important parts of any treatment plan for atrial fibrillation.  Home care    Take your medicines exactly as directed. Don t skip doses.    Work with your doctor to find the right medicines and doses for you.    Learn to take your own pulse. Keep a record of your results. Ask your doctor which pulse rates mean that you need medical attention. Slowing your pulse is often the goal of treatment. Ask your doctor if it s OK for you to use an automatic machine to check your pulse at home. Sometimes these machines don t count the pulse correctly when you have atrial fibrillation.    Limit your intake of coffee, tea, cola, and other beverages with caffeine. Talk with your doctor about whether you should eliminate caffeine.    Avoid over-the-counter medicines that have caffeine in them.    Let your doctor know what medicines you take, including prescription and over-the-counter medicines, as well as any supplements. They interfere with some medicines given for atrial fibrillation.    Ask your doctor about whether you can drink alcohol. Some people need to avoid alcohol to better treat atrial fibrillation. If you are taking blood-thinner medicines, alcohol may interfere with them by increasing their effect.    Never take stimulants such as amphetamines or cocaine. These drugs can speed up your heart rate and trigger atrial fibrillation.  Follow-up care  Follow up with your doctor, or as advised.     When should I call my healthcare provider  Call your healthcare provider right away if you have any of the following:    Weakness    Dizziness    Fainting    Fatigue    Shortness of breath    Chest pain with increased activity    A change in the usual regularity of your heartbeat, or an unusually fast heartbeat   Date Last Reviewed: 4/23/2016 2000-2017 [employer]. [address] [place] [address], [place] [address]. All rights reserved. This information is not intended as a substitute for professional  medical care. Always follow your healthcare professional's instructions.                Understanding Atrial Fibrillation    An arrhythmia is any problem with the speed or pattern of the heartbeat. Atrial fibrillation (AFib) is a common type of arrhythmia. It causes fast, chaotic electrical signals in the atria. This leads to poor functioning of the heart. It also affects how much blood your heart can pump out to the body.  Afib may occur once in a while and go away on its own. Or it may continue for longer periods and need treatment.  AFib can lead to serious problems, such as stroke. Your healthcare provider will need to monitor and manage it.  What happens during atrial fibrillation?   The heart has an electrical system that sends signals to control the heartbeat. As signals move through the heart, they tell the heart s upper chambers (atria) and lower chambers (ventricles) when to squeeze (contract) and relax. This lets blood move through the heart and out to the body and lungs.  With AFib, the atria receive abnormal signals. This causes them to contract in a fast and irregular way, and out of sync with the ventricles. When this happens, the atria also have a harder time moving blood into the ventricles. Blood may then pool in the atria, which increases the risk for blood clots and stroke. The ventricles also may contract too quickly and irregularly. As a result, they may not pump blood to the body and lungs as well as they should. This can weaken the heart muscle over time and cause heart failure.  What causes atrial fibrillation?  AFib is more common in older adults. It has many possible causes including:    Coronary artery disease    Heart valve disease    Heart attack    Heart surgery    High blood pressure    Thyroid disease    Diabetes    Lung disease    Sleep apnea    Heavy alcohol use  In some cases of AFib, doctors do not know the cause.  What are the symptoms of atrial fibrillation?  AFib may or may  not cause symptoms. If symptoms do occur, they may include:    A fast, pounding, irregular heartbeat    Shortness of breath    Tiredness    Dizziness or fainting    Chest pain  How is atrial fibrillation treated?  Treatments for AFib can include any of the options below.    Medicines. You may be prescribed:    Heart rate medicines to help slow down the heartbeat    Heart rhythm medicines to help the heart beat more regularly    Anti-clotting medicines to help reduce the risk for blood clots and stroke    Electrical cardioversion. Your healthcare provider uses special pads or paddles to send one or more brief electrical shocks to the heart. This can help reset the heartbeat to normal.    Ablation. Long, thin tubes called catheters are threaded through a blood vessel to the heart. There, the catheters send out hot or cold energy to the areas causing the abnormal signals. This energy destroys the problem tissue or cells. This improves the chances that your heart will stay in normal rhythm without using medicines. If your heart rate and rhythm can t be controlled, you may need ablation and a pacemaker. These will help control the heart rate and regularity of the heartbeat.    Surgery. During surgery, your healthcare provider may use different methods to create scar tissue in the areas of the heart causing the abnormal signals. The scar tissue disrupts the abnormal signals and may stop AFib from occurring.  What are the complications of atrial fibrillation?  These can include:    Blood clots    Stroke    Heart failure. This problem occurs when the heart muscle weakens so much that it can no longer pump blood well.  When should I call my healthcare provider?  Call your healthcare provider right away if you have any of these:    Symptoms that don t get better with treatment, or get worse    New symptoms   Date Last Reviewed: 5/1/2016 2000-2017 [employer]. [address] [place] [id number]. All  rights reserved. This information is not intended as a substitute for professional medical care. Always follow your healthcare professional's instructions.                What Is Atrial Flutter/Atrial Fibrillation?      The heart has its own electrical system. This system makes the signals that start each heartbeat. The heartbeat begins in 1 of the 2 upper chambers of the heart (atria). A problem can make the atria beat faster than normal. The atria may beat fast but still evenly. This problem is called atrial flutter. If the atria beat very fast and also unevenly, it is called atrial fibrillation (AFib).  Causes of Atrial Flutter and Atrial Fibrillation  Causes of these problems can include:    Previous heart attack    High blood pressure    Thyroid problems  In many cases, the cause is unknown.  When the Atria Beat Too Fast  The atria may beat fast only once in a while. This is called a paroxysmal heart rhythm problem. If they beat fast all the time, it is a chronic problem.  Atrial Flutter  With atrial flutter, electrical signals travel around and around inside the atria. These circling signals make the atria beat too fast:    Atrial flutter can cause symptoms similar to AFib. It can also lead to the even faster, uneven rhythms of AFib.  Atrial Fibrillation (AFib)  With AFib, cells in the atria send extra electrical signals. These extra signals make the atria beat very fast. They also beat unevenly:    The atria beat so fast and unevenly that they may quiver instead of alma. If the atria don t contract, they don t move enough blood into the 2 lower chambers of the heart (ventricles). This can cause you to feel dizzy or weak.    Blood that doesn t keep moving can pool and form clots in the atria. These clots can move into other parts of the body and cause serious problems such as a stroke.   Symptoms of Atrial Flutter and AFib  These symptoms include the following:    Palpitations (a fluttering, fast  heartbeat)    Weakness or tiredness    Shortness of breath    Chest pain or tightness    Dizziness or lightheadedness    Fainting spells   Date Last Reviewed: 2/26/2014 2000-2017 [employer]. [address] [place] [address], [place] [address]. All rights reserved. This information is not intended as a substitute for professional medical care. Always follow your healthcare professional's instructions.

## 2018-01-08 NOTE — ED NOTES
Code stroke called at 1016.  Patient arrived via EMS 1017.  MD assess patient at door, patient sent directly to CT 1020.  Patient back to ED 1025

## 2018-01-08 NOTE — ED NOTES
"Patient being evaluated today for left-sided weakness, confusion, and slurred speech. Patient arrives by EMS and is accompanied by his wife. Patient's wife states that he had difficulty ambulating this morning. EMS noted slurred speech and left-sided weakness, although they felt he had made some cognitive improvements since arriving on scene. Patient is disoriented to time and situation. He denies any pain. EMS reports atrial fibrillation on the cardiac monitor, this is presumed to be a new finding. Patient resting on ED cart. Patient last seen \"normal\" at 2200 last night.    "

## 2018-01-08 NOTE — IP AVS SNAPSHOT
[facility]    [address] [place] [id number]    Phone:  [phone]    Fax:  [phone]                                       After Visit Summary   1/8/2018    Kostas Cotto    MRN: [id number]           After Visit Summary Signature Page     I have received my discharge instructions, and my questions have been answered. I have discussed any challenges I see with this plan with the nurse or doctor.    ..........................................................................................................................................  Patient/Patient Representative Signature      ..........................................................................................................................................  Patient Representative Print Name and Relationship to Patient    ..................................................               ................................................  Date                                            Time    ..........................................................................................................................................  Reviewed by Signature/Title    ...................................................              ..............................................  Date                                                            Time

## 2018-01-08 NOTE — PLAN OF CARE
Problem: Patient Care Overview  Goal: Plan of Care/Patient Progress Review  COMMUNICATION: Patient's communication will be assessed tomorrow, pt's wife was eager to answer questions for patient, patient had minimal difficulty completing directions.  DIET: National Dysphagia Diet 3; Regular texture, avoiding sticky, crunchy foods. Thin liquids  STRATEGIES FOR SAFE SWALLOW: Ensure patient finishes one bite before taking the next, small bites/sips, and frequent oral hygiene  COGNITION: Not formally assessed at this time.   COMMENTS: Patient coughed on initial thin trial; however multiple trials were presented after and no other s/sx of aspiration/penetration were noted with thin; speech will follow up with swallowing tomorrow to ensure safety and will formally assess pt's speech and language skills.

## 2018-01-08 NOTE — ED PROVIDER NOTES
History     Chief Complaint   Patient presents with     One-sided Weakness     shelley sided when woke up at 0830. LKW last night      HPI  Kostas Cotto is a 72 year old male who woke at 0830 with left sided weakness.  He had no ability to stand and his left arm was also weak.  Facial drooping was not noted by family or EMS.  Patient brought to the ED by EMS, went directly to the CT scanner.  First contact testing revealed no weakness on the left side just prior to CT, but patient was confused and speech was slurred.  We later learned that the slurred speech was his normal since a 2003 hypoxic-ischemic encephalopathy.  Patient does have a history of cardiac disease, but has not previously had a stroke.      Problem List:    Patient Active Problem List    Diagnosis Date Noted     CVA (cerebral vascular accident) (H) 01/08/2018     Priority: Medium     Advanced directives, counseling/discussion 03/11/2015     Priority: Medium     CAD (coronary artery disease) 03/05/2014     Priority: Medium     Hyperlipidemia with target LDL less than 100 03/05/2014     Priority: Medium     Diagnosis updated by automated process. Provider to review and confirm.       HTN (hypertension) 03/05/2014     Priority: Medium        Past Medical History:    Past Medical History:   Diagnosis Date     Hypoxic-ischemic encephalopathy (HIE) 11/25/2003     Mixed hyperlipidemia 01/01/2011     Rheumatoid arthritis(714.0) 05/03/2005     S/P CABG 01/06/2005     S/P inferior MI 11/25/2003     Unspecified essential hypertension 07/16/2001       Past Surgical History:    Past Surgical History:   Procedure Laterality Date     AAA REPAIR       ANGIOGRAM  1/2004    Coronary Angiogram, LAD> long mid 40-50%, D2 90% ostial,Prox % with collateal flow,Circ had restenosisand was restented x 3 again.  EF now 55% with mild lateral wall hypokineseis and inferior also.     CARDIOVASCULAR SURGERY  1/2005    Syncopal episode, Had repeat angiogam showing now circ  100% along with RCA. LAD 80-90% with EF 35%.  Sent for CABG     CARDIOVASCULAR SURGERY  1/2005    S/P 3 vess CABG, Dr Wilfredo SORIANO>LAD, SVG>OM1, SVG>RCA.  Post op pneumothorax with Chest Tube placement.     CARDIOVASCULAR SURGERY  11/2003    Acute Inferior MI, Had VT arrest resuscitated. Not given lytics and was sent to [facility].  IABP placed and had angiogram stented Circ x 5. EF 30%. Lad had 70%, % with contralateral collateral flow.     HC US CHEST      left     HEART/LUNG RESUSCITATION (CPR)  11/2003    S/P Cardiac Arrest V-Tach, Out of hospital arrest with no bystaner cpr.  Has polymorphic VT on arrival.  Has a prolonged resuscitation which obviously was successful.  This was due to an acute Inferior MI at the time.     OPEN REDUCTION INTERNAL FIXATION HIP  1965    ORIF frac/dislocation L hip, Was a teenager and had orif done after fracture dislocation of left hip. Occurred playing softball.     PHACOEMULSIFICATION WITH STANDARD INTRAOCULAR LENS IMPLANT  3/10/2014    Procedure: PHACOEMULSIFICATION WITH STANDARD INTRAOCULAR LENS IMPLANT;  CATARACT EXTRACTION WITH INTRA OCULAR LENS LEFT(10% SERENE/POSSIBLE STRETCH);  Surgeon: Isael Acuna MD;  Location: [facility]     PHACOEMULSIFICATION WITH STANDARD INTRAOCULAR LENS IMPLANT  3/25/2014    Procedure: PHACOEMULSIFICATION WITH STANDARD INTRAOCULAR LENS IMPLANT;  CATARACT EXTRACTION WITH INTRA OCULAR LENS RIGHT/POSSIBLE PUPIL STRETCH;  Surgeon: Isael Acuna MD;  Location: [facility]       Family History:    Family History   Problem Relation Age of Onset     CANCER Father      leukemia     DIABETES Mother      CEREBROVASCULAR DISEASE Mother      cause of death       Social History:  Marital Status:   [2]  Social History   Substance Use Topics     Smoking status: Former Smoker     Smokeless tobacco: Never Used     Alcohol use No        Medications:      No current outpatient prescriptions on file.      Review of Systems   Constitutional: Positive for activity  change. Negative for diaphoresis, fatigue and fever.   HENT: Negative.    Respiratory: Negative for shortness of breath.    Cardiovascular: Negative for chest pain.   Gastrointestinal: Negative for abdominal pain, constipation, diarrhea, nausea and vomiting.   Genitourinary: Negative for dysuria.   Musculoskeletal: Negative.    Skin: Positive for pallor.   Neurological: Positive for speech difficulty and weakness. Negative for facial asymmetry, light-headedness and headaches.   Psychiatric/Behavioral: Negative.        Physical Exam   BP: 151/100  Pulse: 79  Heart Rate: 77  Temp: 98.6  F (37  C)  Resp: 20  Height: 182.9 cm (6')  Weight: 90.7 kg (200 lb)  SpO2: 96 %      Physical Exam   Constitutional: He is oriented to person, place, and time. He appears well-developed and well-nourished. No distress.   HENT:   Head: Normocephalic and atraumatic.   Mouth/Throat: Oropharynx is clear and moist.   Eyes: EOM are normal. Pupils are equal, round, and reactive to light.   Neck: Normal range of motion. Neck supple.   Cardiovascular: Normal rate, regular rhythm, normal heart sounds and intact distal pulses.    No murmur heard.  Pulmonary/Chest: Effort normal and breath sounds normal. No respiratory distress. He has no wheezes.   Abdominal: Soft. Bowel sounds are normal. He exhibits no distension. There is no tenderness.   Musculoskeletal: Normal range of motion. He exhibits no edema or tenderness.   Neurological: He is alert and oriented to person, place, and time. No cranial nerve deficit.   Skin: Skin is warm and dry.   Psychiatric: He has a normal mood and affect. His behavior is normal. Judgment normal. His speech is slurred. Cognition and memory are impaired.   Nursing note and vitals reviewed.      ED Course     ED Course     Procedures         EKG Interpretation:      Interpreted by Ernestina العلي  Time reviewed: 1040  Symptoms at time of EKG: stroke symptoms   Rhythm: atrial fibrillation - controlled  Rate:  Normal  Axis: Normal  Ectopy: none  Conduction: RBBB  ST Segments/ T Waves: No ST-T wave changes  Q Waves: none  Comparison to prior: Unchanged    Clinical Impression: atrial fibrillation (new onset)    The patient has stroke symptoms:           ED Stroke specific documentation           NIHSS PDF          Protocol PDF     Patient last known well time: last night  ED Provider first to bedside on arrival  CT Results received at: 1032  Patient was not treated with TPA due to the following reason(s):  Out of the treatment time window  Rapidly improving symptoms      National Institutes of Health Stroke Scale (Baseline)  Time Performed: 1028      Score    Level of consciousness: (0)   Alert, keenly responsive    LOC questions: (2)   Answers neither question correctly    LOC commands: (0)   Performs both tasks correctly    Best gaze: (0)   Normal    Visual: (0)   No visual loss    Facial palsy: (0)   Normal symmetrical movements    Motor arm (left): (0)   No drift    Motor arm (right): (0)   No drift    Motor leg (left): (0)   No drift    Motor leg (right): (0)   No drift    Limb ataxia: (0)   Absent    Sensory: (0)   Normal- no sensory loss    Best language: (0)   Normal- no aphasia    Dysarthria: (1)   Mild to moderate dysarthria    Extinction and inattention: (0)   No abnormality        Total Score:  3        Stroke Mimics were considered (including migraine headache, seizure disorder, hypoglycemia (or hyperglycemia), head or spinal trauma, CNS infection, Toxin ingestion and shock state (e.g. sepsis) .      National Institutes of Health Stroke Scale  Time Performed: 1315      Score    Level of consciousness: (0)   Alert, keenly responsive    LOC questions: (1)   Answers one question correctly    LOC commands: (0)   Performs both tasks correctly    Best gaze: (0)   Normal    Visual: (0)   No visual loss    Facial palsy: (0)   Normal symmetrical movements    Motor arm (left): (0)   No drift    Motor arm (right): (0)    No drift    Motor leg (left): (0)   No drift    Motor leg (right): (0)   No drift    Limb ataxia: (0)   Absent    Sensory: (0)   Normal- no sensory loss    Best language: (0)   Normal- no aphasia    Dysarthria: (1)   Mild to moderate dysarthria    Extinction and inattention: (0)   No abnormality        Total Score:  2          Labs Ordered and Resulted from Time of ED Arrival Up to the Time of Departure from the ED   GLUCOSE BY METER - Abnormal; Notable for the following:        Result Value    Glucose 146 (*)     All other components within normal limits   BASIC METABOLIC PANEL - Abnormal; Notable for the following:     Glucose 142 (*)     All other components within normal limits   CBC WITH PLATELETS DIFFERENTIAL   INR   PARTIAL THROMBOPLASTIN TIME   TROPONIN I   VITAL SIGNS AND NEURO CHECKS   ACTIVITY   PULSE OXIMETRY NURSING   GLUCOSE MONITOR NURSING POCT   ASSESSMENT   NOTIFY       Assessments & Plan (with Medical Decision Making)   Patient had TIA, no evidence of ongoing deficits excepting mild confusion.  His speech is a confounder as it is slurred at baseline.  Patient will be admitted to medicine for further evaluation and treatment.    I have reviewed the nursing notes.    I have reviewed the findings, diagnosis, plan and need for follow up with the patient.    Current Discharge Medication List          Final diagnoses:   New onset atrial fibrillation (H)   Transient cerebral ischemia, unspecified type       1/8/2018   [facility]     Ernestina oJn MD  01/08/18 8934

## 2018-01-08 NOTE — ED NOTES
Provider face to face immediately on arrival, Pt taken to CT immediatly on arrival on EMS stretcher.

## 2018-01-08 NOTE — H&P
DATE OF SERVICE:  01/08/2018      PRIMARY COMPLAINT:  Left-sided weakness.      HISTORY OF PRESENT ILLNESS:  Mr. Kostas Cotto is a 72-year-old man.  He has a history of coronary artery disease, status post stenting and coronary artery bypass grafts.  Also, history of CHF with chronic systolic failure, EF of 35%, and history also subsequent to that of abdominal aortic aneurysm repair with intravascular procedure.  The patient does not have a known history of atrial fibrillation.  The wife and family state that the patient has always had slurred speech since his coronary artery bypass graft in 2003.  The patient reports he last felt well the night prior to presentation when he went to bed around 10:00, but this morning when he woke up, he had left-sided weakness to the point where he had difficulty ambulating.  The family thinks that his speech may have been worse.  EMS was called to the house and arrived around 8:30.  The patient came to the emergency room.  The patient had a CT scan of the head expeditiously.  This did not demonstrate an acute CVA.  Please see imaging dated 01/08/2018 and showed no other issues.  No mass or intracranial hemorrhage.  The patient's family states that the patient's left-sided weakness seems to have gotten much better to the point where it is almost normal since he came to the emergency room.  The patient does take daily aspirin, but no other anticoagulate medications.  The patient was admitted for further management.      PAST MEDICAL HISTORY:   1.  Coronary artery disease.   2.  Cardiac arrest outside of hospital 11/2003 due to polymorphic VT (ventricular tachycardia).   3.  Acute inferior MI causing immediately above on 11/2003.   4.  Coronary angiogram 01/2004 showing LAD D2 proximal RCA lesions, status post 3 stents.   5.  Coronary artery 3-vessel bypass graft 01/2005.   6.  Dysarthria related to coronary artery bypass graft surgery starting in 2005.   7.  ORIF of left hip.   8.   Hypertension.   9.  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   10.  RA.   11.  Hypercholesterolemia.      MEDICATIONS ON PRESENTATION:   1.  Full-strength aspirin, calcium and vitamin D, folic acid.   2.  Lisinopril 5 mg p.o. daily.   3.  Metoprolol 50 XL daily.   4.  Zantac 75 mg twice a day.   5.  Zocor 20 mg p.o. each day at bedtime.      ALLERGIES:  Oxycodone.      SOCIAL HISTORY:  The patient is currently a nonsmoker.  The patient used to work as a .      FAMILY HISTORY:  Stroke with his mother, complicating mesh surgery.      REVIEW OF SYSTEMS:  Other than discussed in the HPI above are normal or noncontributory on comprehensive review.      PHYSICAL EXAMINATION:   VITAL SIGNS:  Blood pressure is 120s over 70s, heart rate is in the 70s and irregular.   PSYCHIATRIC:  Very pleasant affect.  He seems somewhat anxious.   NEUROLOGIC:  The patient alert to month, day, location.  The patient does have some noticeable dysarthria.  Family comments that this is his baseline.  The patient has only subtle weakness in the left arm and left hand.  The patient's leg strength seems to be intact and symmetrical.  Babinskis downgoing bilaterally.   PRECORDIUM:  Rhythm is irregular, S1, S2, both present and normal in character.   LUNGS:  Clear.   ABDOMEN:  Soft, nontender.   EXTREMITIES:  The patient is of slender build.   SKIN:  Warm, nondiaphoretic.      EKG shows atrial fibrillation with a rate approximately 80 with a right bundle branch block.  Laboratory data included BMP and CBC which are unremarkable.  INR is 1.12.  A CT scan of the brain did not show any acute pathology.      IMPRESSION:   1.  Transient ischemic attack like incident versus mild CVA not noted on CT, thought to be due to new onset atrial fibrillation.  Plan will be to control his rate and start on Lovenox.  Would consider starting warfarin tomorrow.   2.  New onset atrial fib rate is controlled.  We will start him on Lovenox.  We would like to  start warfarin, possibly tomorrow.   3.  Coronary artery disease and ischemic cardiomyopathy seems compensated.  We will keep him on aspirin and we will be forced to hold the metoprolol and ACE inhibitor to allow for the passive hypertension due to #1 above.   4.  Hypercholesterolemia.  We will increase his statin.   5.  CODE STATUS:  FULL CODE.      The patient is on full dose Lovenox.  This should suffice for prophylaxis for DVT.         CRISSY SANTACRUZ MD             D: 2018 13:02   T: 2018 13:25   MT:       Name:     BEN REAL   MRN:      [id number]        Account:      [id number]   :      1945           Admitted:     [id number]      Document: [id number]

## 2018-01-09 ENCOUNTER — APPOINTMENT (OUTPATIENT)
Dept: SPEECH THERAPY | Facility: HOSPITAL | Age: 73
DRG: 069 | End: 2018-01-09
Attending: INTERNAL MEDICINE
Payer: COMMERCIAL

## 2018-01-09 ENCOUNTER — APPOINTMENT (OUTPATIENT)
Dept: PHYSICAL THERAPY | Facility: HOSPITAL | Age: 73
DRG: 069 | End: 2018-01-09
Attending: INTERNAL MEDICINE
Payer: COMMERCIAL

## 2018-01-09 ENCOUNTER — APPOINTMENT (OUTPATIENT)
Dept: SPEECH THERAPY | Facility: HOSPITAL | Age: 73
DRG: 069 | End: 2018-01-09
Payer: COMMERCIAL

## 2018-01-09 ENCOUNTER — APPOINTMENT (OUTPATIENT)
Dept: OCCUPATIONAL THERAPY | Facility: HOSPITAL | Age: 73
DRG: 069 | End: 2018-01-09
Attending: INTERNAL MEDICINE
Payer: COMMERCIAL

## 2018-01-09 ENCOUNTER — HOSPITAL ENCOUNTER (INPATIENT)
Dept: CARDIOLOGY | Facility: HOSPITAL | Age: 73
DRG: 069 | End: 2018-01-09
Attending: INTERNAL MEDICINE
Payer: COMMERCIAL

## 2018-01-09 LAB
ANION GAP SERPL CALCULATED.3IONS-SCNC: 6 MMOL/L (ref 3–14)
BACTERIA SPEC CULT: NORMAL
BUN SERPL-MCNC: 26 MG/DL (ref 7–30)
CALCIUM SERPL-MCNC: 8.2 MG/DL (ref 8.5–10.1)
CHLORIDE SERPL-SCNC: 104 MMOL/L (ref 94–109)
CHOLEST SERPL-MCNC: 109 MG/DL
CO2 SERPL-SCNC: 29 MMOL/L (ref 20–32)
CREAT SERPL-MCNC: 1.05 MG/DL (ref 0.66–1.25)
ERYTHROCYTE [DISTWIDTH] IN BLOOD BY AUTOMATED COUNT: 12.2 % (ref 10–15)
GFR SERPL CREATININE-BSD FRML MDRD: 70 ML/MIN/1.7M2
GLUCOSE BLDC GLUCOMTR-MCNC: 90 MG/DL (ref 70–99)
GLUCOSE SERPL-MCNC: 83 MG/DL (ref 70–99)
HCT VFR BLD AUTO: 37 % (ref 40–53)
HDLC SERPL-MCNC: 41 MG/DL
HGB BLD-MCNC: 12.3 G/DL (ref 13.3–17.7)
INR PPP: 1.13 (ref 0.8–1.2)
LDLC SERPL CALC-MCNC: 52 MG/DL
MCH RBC QN AUTO: 31.1 PG (ref 26.5–33)
MCHC RBC AUTO-ENTMCNC: 33.2 G/DL (ref 31.5–36.5)
MCV RBC AUTO: 94 FL (ref 78–100)
NONHDLC SERPL-MCNC: 68 MG/DL
PLATELET # BLD AUTO: 229 10E9/L (ref 150–450)
POTASSIUM SERPL-SCNC: 3.7 MMOL/L (ref 3.4–5.3)
RBC # BLD AUTO: 3.95 10E12/L (ref 4.4–5.9)
SODIUM SERPL-SCNC: 139 MMOL/L (ref 133–144)
SPECIMEN SOURCE: NORMAL
TRIGL SERPL-MCNC: 78 MG/DL
WBC # BLD AUTO: 8.3 10E9/L (ref 4–11)

## 2018-01-09 PROCEDURE — 97161 PT EVAL LOW COMPLEX 20 MIN: CPT | Mod: GP | Performed by: PHYSICAL THERAPIST

## 2018-01-09 PROCEDURE — 40000193 ZZH STATISTIC PT WARD VISIT: Performed by: PHYSICAL THERAPIST

## 2018-01-09 PROCEDURE — 25000128 H RX IP 250 OP 636: Performed by: INTERNAL MEDICINE

## 2018-01-09 PROCEDURE — 97165 OT EVAL LOW COMPLEX 30 MIN: CPT | Mod: GO

## 2018-01-09 PROCEDURE — 25000132 ZZH RX MED GY IP 250 OP 250 PS 637: Performed by: INTERNAL MEDICINE

## 2018-01-09 PROCEDURE — 40000225 ZZH STATISTIC SLP WARD VISIT

## 2018-01-09 PROCEDURE — 97116 GAIT TRAINING THERAPY: CPT | Mod: GP | Performed by: PHYSICAL THERAPIST

## 2018-01-09 PROCEDURE — 93306 TTE W/DOPPLER COMPLETE: CPT | Mod: TC

## 2018-01-09 PROCEDURE — 85027 COMPLETE CBC AUTOMATED: CPT | Performed by: INTERNAL MEDICINE

## 2018-01-09 PROCEDURE — 80048 BASIC METABOLIC PNL TOTAL CA: CPT | Performed by: INTERNAL MEDICINE

## 2018-01-09 PROCEDURE — 36415 COLL VENOUS BLD VENIPUNCTURE: CPT | Performed by: INTERNAL MEDICINE

## 2018-01-09 PROCEDURE — 80061 LIPID PANEL: CPT | Performed by: INTERNAL MEDICINE

## 2018-01-09 PROCEDURE — 99232 SBSQ HOSP IP/OBS MODERATE 35: CPT | Performed by: INTERNAL MEDICINE

## 2018-01-09 PROCEDURE — 90662 IIV NO PRSV INCREASED AG IM: CPT | Performed by: INTERNAL MEDICINE

## 2018-01-09 PROCEDURE — 40000133 ZZH STATISTIC OT WARD VISIT

## 2018-01-09 PROCEDURE — 85610 PROTHROMBIN TIME: CPT | Performed by: INTERNAL MEDICINE

## 2018-01-09 PROCEDURE — 97112 NEUROMUSCULAR REEDUCATION: CPT | Mod: GP | Performed by: PHYSICAL THERAPIST

## 2018-01-09 PROCEDURE — 92526 ORAL FUNCTION THERAPY: CPT | Mod: GN

## 2018-01-09 PROCEDURE — 00000146 ZZHCL STATISTIC GLUCOSE BY METER IP

## 2018-01-09 PROCEDURE — 12000000 ZZH R&B MED SURG/OB

## 2018-01-09 PROCEDURE — 92523 SPEECH SOUND LANG COMPREHEN: CPT | Mod: GN

## 2018-01-09 RX ORDER — POTASSIUM CL/LIDO/0.9 % NACL 10MEQ/0.1L
10 INTRAVENOUS SOLUTION, PIGGYBACK (ML) INTRAVENOUS
Status: DISCONTINUED | OUTPATIENT
Start: 2018-01-09 | End: 2018-01-11 | Stop reason: HOSPADM

## 2018-01-09 RX ORDER — WARFARIN SODIUM 5 MG/1
5 TABLET ORAL
Status: COMPLETED | OUTPATIENT
Start: 2018-01-09 | End: 2018-01-09

## 2018-01-09 RX ORDER — POTASSIUM CHLORIDE 1.5 G/1.58G
20-40 POWDER, FOR SOLUTION ORAL
Status: DISCONTINUED | OUTPATIENT
Start: 2018-01-09 | End: 2018-01-11 | Stop reason: HOSPADM

## 2018-01-09 RX ORDER — POTASSIUM CHLORIDE 1500 MG/1
20-40 TABLET, EXTENDED RELEASE ORAL
Status: DISCONTINUED | OUTPATIENT
Start: 2018-01-09 | End: 2018-01-11 | Stop reason: HOSPADM

## 2018-01-09 RX ORDER — MAGNESIUM SULFATE HEPTAHYDRATE 40 MG/ML
4 INJECTION, SOLUTION INTRAVENOUS EVERY 4 HOURS PRN
Status: DISCONTINUED | OUTPATIENT
Start: 2018-01-09 | End: 2018-01-11 | Stop reason: HOSPADM

## 2018-01-09 RX ORDER — POTASSIUM CHLORIDE 7.45 MG/ML
10 INJECTION INTRAVENOUS
Status: DISCONTINUED | OUTPATIENT
Start: 2018-01-09 | End: 2018-01-11 | Stop reason: HOSPADM

## 2018-01-09 RX ORDER — MAGNESIUM SULFATE HEPTAHYDRATE 40 MG/ML
2 INJECTION, SOLUTION INTRAVENOUS DAILY PRN
Status: DISCONTINUED | OUTPATIENT
Start: 2018-01-09 | End: 2018-01-11 | Stop reason: HOSPADM

## 2018-01-09 RX ADMIN — ACETAMINOPHEN 650 MG: 325 TABLET, FILM COATED ORAL at 23:21

## 2018-01-09 RX ADMIN — ENOXAPARIN SODIUM 80 MG: 80 INJECTION SUBCUTANEOUS at 06:03

## 2018-01-09 RX ADMIN — SIMVASTATIN 40 MG: 40 TABLET, FILM COATED ORAL at 22:52

## 2018-01-09 RX ADMIN — RANITIDINE 150 MG: 150 TABLET ORAL at 09:30

## 2018-01-09 RX ADMIN — SODIUM CHLORIDE, PRESERVATIVE FREE: 5 INJECTION INTRAVENOUS at 11:16

## 2018-01-09 RX ADMIN — ASPIRIN 325 MG ORAL TABLET 325 MG: 325 PILL ORAL at 09:30

## 2018-01-09 RX ADMIN — ACETAMINOPHEN 650 MG: 325 TABLET, FILM COATED ORAL at 01:39

## 2018-01-09 RX ADMIN — ENOXAPARIN SODIUM 80 MG: 80 INJECTION SUBCUTANEOUS at 18:52

## 2018-01-09 RX ADMIN — WARFARIN SODIUM 5 MG: 5 TABLET ORAL at 18:51

## 2018-01-09 RX ADMIN — SIMVASTATIN 40 MG: 40 TABLET, FILM COATED ORAL at 01:18

## 2018-01-09 RX ADMIN — INFLUENZA A VIRUSA/MICHIGAN/45/2015 X-275 (H1N1) ANTIGEN (FORMALDEHYDE INACTIVATED), INFLUENZA A VIRUS A/HONG KONG/4801/2014 X-263B (H3N2) ANTIGEN (FORMALDEHYDE INACTIVATED), AND INFLUENZA B VIRUS B/BRISBANE/60/2008 ANTIGEN (FORMALDEHYDE INACTIVATED) 0.5 ML: 60; 60; 60 INJECTION, SUSPENSION INTRAMUSCULAR at 09:34

## 2018-01-09 ASSESSMENT — ACTIVITIES OF DAILY LIVING (ADL): PREVIOUS_RESPONSIBILITIES: DRIVING

## 2018-01-09 ASSESSMENT — VISUAL ACUITY
OU: BASELINE
OU: BASELINE

## 2018-01-09 NOTE — PLAN OF CARE
"Problem: Patient Care Overview  Goal: Plan of Care/Patient Progress Review  Outcome: No Change  Alert and oriented. VSS. Denies pain. Lung sounds diminished but clear. Speech slightly slurred, per wife this is \"kind of normal\". Hand grasps strong bilaterally, slight facial droop on L side. To MRI and carotid US this shift. Poor appetite with small bites and thin liquids per speech therapist. Up with assist of 1 and walker/gaitbelt. Attempted to void x3 with no results or urge to void. Bladder scan volume 403- will straight cath on following shift. Bowel sounds normoactive. IV- NS 75ml/hr. Call light within reach, bed alarms on.      "

## 2018-01-09 NOTE — PLAN OF CARE
Problem: Patient Care Overview  Goal: Plan of Care/Patient Progress Review  Outcome: No Change  Discharge Planner OT   Patient plan for discharge: home  Current status: independent with ADLs, no deficits in UEs, memory is at baseline per family report  Barriers to return to prior living situation: none  Recommendations for discharge: home   Rationale for recommendations:  Pt is at baseline no further needs identified at this time.       Entered by: Sravanthi Barrera 01/09/2018 1:36 PM

## 2018-01-09 NOTE — PLAN OF CARE
Problem: Patient Care Overview  Goal: Plan of Care/Patient Progress Review  Outcome: No Change  Discharge Planner PT   Patient plan for discharge: home  Current status: 220' without AD SBA, 4 steps with rail SBA, 4 steps no rail with hand hold assist, 20/24 on dynamic gait index  Barriers to return to prior living situation: stairs to basement, limited activity tolerance  Recommendations for discharge: home with outpatient PT  Rationale for recommendations: Pt does have mild balance deficits that may benefit from outpatient PT however pt and family feel that pt is at baseline.         Entered by: Laura Cabello PT 01/09/2018 11:43 AM

## 2018-01-09 NOTE — PLAN OF CARE
Problem: Patient Care Overview  Goal: Plan of Care/Patient Progress Review  Outcome: No Change  Alert and oriented to place, time and self, confused as to the entire story of admission. VSS. Tele monitor occasional bradycardia, a fib, a flutter and PVCs. Denies pain. Straight cath amount of 400ml. Blood sugar 90. IV- NS at 75ml/hr. Call light within reach and bed alarms on.    Face to face report given with opportunity to observe patient.    Report given to LILY Grissom   1/9/2018  7:28 AM

## 2018-01-09 NOTE — PLAN OF CARE
Problem: Patient Care Overview  Goal: Plan of Care/Patient Progress Review  Outcome: Improving  Reason for hospital stay:  CVA    Most recent vitals: /77  Pulse 74  Temp 97.5  F (36.4  C) (Tympanic)  Resp 16  Ht 1.829 m (6')  Wt 81.6 kg (179 lb 14.3 oz)  SpO2 95%  BMI 24.4 kg/m2  Pain Management:  Denies pain  Orientation:  A&O with slurred speech, which Pt states is his baseline  Cardiac:  HR irr  Respiratory:  LS clear. Denies sob.  GI:  BS active. Denies nausea.  :  Due to void. Reports no urge. Bladder scanned for <200.  Diet: Reg diet, small bites with thin liquids  Skin Issues:  Abrasion to left shin and scab to left elbow  IVF:  NS at 75 mL/hr  ABX:  None  Mobility:  Stand by assist with use of walker and gaitbelt  Safety:  Call light within reach and bed alarm active    Comments:    1/9/2018  1:35 PM  Praveena Gaines

## 2018-01-09 NOTE — PROGRESS NOTES
01/09/18 1136   General Information, SLP   Type of Evaluation Speech and Language;Cognitive-Linguistic   Type of Visit Initial   Start of Care Date 01/09/18   Onset of Illness/Injury or Date of Surgery - Date 01/08/18   Referring Physician Dr. Owens   Patient/Family Goals Statement Patient reports that speech is at baseline, family confirms.    Pertinent History of Current Problem Patient is a 72 year old male with a past medical history significant for a cardiac event. Patient recently had a mild CVA and has been seen by speech for swallowing.    Precautions/Limitations fall precautions;swallowing precautions   General Observations Patient is pleasant and cooperative.    Oral Motor Sensory Function   Completed on Swallow Evaluation Completed Clinical Bedside Swallow Evaluation   Speech   Deficits in Speech Respiration Clavicular   Deficits in Articulation Flaccid dysarthria  (baseline per family)   AIDS-words, % intelligible 90   AIDS-sentences, % intelligible 80   Language: Auditory Comprehension (understanding of spoken language)   Comments (Auditory Comprehension) Patient's auditory comprehension was informally assessed. Patient was able to follow complex conversation and follow directions with no difficulties.   Language: Verbal Expression (use of spoken language to express information)   Comments (Verbal Expression) Patient is able to express wants and needs with no difficulites. He is dysarthric but is able to repeat when asked and clear his speech.    Reading Comprehension (understanding of written language)   Comments (Reading Comprehension) Not administered due to no glasses.    Cognitive Status Examination   Attention intact   Behavioral Observations WFL   Orientation Oriented to person, place and some difficulty with time    Short Term Memory intact   Long Term Memory intact   Reasoning intact   Organization intact   Additional cognitive-linguistic evaluation indicated  no   Cognitive Status Exam  Comments Patient did well. Per wife, patient always had some mild memory issues.    Clinical Impression, SLP Eval   Criteria for Skilled Therapeutic Interventions Met No problems identified which require skilled intervention   Predicted Duration of Therapy Intervention (days/wks) eval only    Total Evaluation Time      Total Evaluation Time (Minutes) 20

## 2018-01-09 NOTE — PLAN OF CARE
Problem: Patient Care Overview  Goal: Plan of Care/Patient Progress Review  Outcome: No Change  Attempted to see patient this AM for speech eval and follow up swallow treatment. Patient was sound asleep with all lights off and door closed. SLP will follow up later today to attempt to complete.

## 2018-01-09 NOTE — PLAN OF CARE
Problem: Patient Care Overview  Goal: Plan of Care/Patient Progress Review  Outcome: No Change  Family reports that patient's speech is at baseline. No further follow up needed.

## 2018-01-09 NOTE — PLAN OF CARE
Face to face report given with opportunity to observe patient.    Report given to LILY Ricci   1/9/2018  3:28 PM

## 2018-01-09 NOTE — PHARMACY-ANTICOAGULATION SERVICE
Clinical Pharmacy - Warfarin Dosing Consult     Pharmacy has been consulted to manage this patient s warfarin therapy.  Indication: Atrial Fibrillation  Therapy Goal: INR 2-3  OP Anticoag Clinic: None-new start.  Warfarin Prior to Admission: No  Recent documented change in oral intake/nutrition: Unknown    INR   Date Value Ref Range Status   01/09/2018 1.13 0.80 - 1.20 Final   01/08/2018 1.12 0.80 - 1.20 Final       Recommend warfarin 5 mg today.  Pharmacy will monitor Kostas Cotto daily and order warfarin doses to achieve specified goal.      Please contact pharmacy as soon as possible if the warfarin needs to be held for a procedure or if the warfarin goals change.

## 2018-01-09 NOTE — PROGRESS NOTES
01/09/18 1325   Quick Adds   Type of Visit Initial Occupational Therapy Evaluation   Living Environment   Lives With spouse   Living Arrangements house   Home Accessibility stairs to enter home;stairs within home   Number of Stairs to Enter Home 3   Number of Stairs Within Home 12   Stair Railings at Home none   Transportation Available car;family or friend will provide   Living Environment Comment Pt's bedroom and walk-in shower are in the basement, son reported they would be putting up railings   Self-Care   Dominant Hand right   Usual Activity Tolerance good   Current Activity Tolerance moderate   Regular Exercise no   Equipment Currently Used at Home none   Functional Level Prior   Ambulation 0-->independent   Transferring 0-->independent   Toileting 0-->independent   Bathing 0-->independent   Dressing 0-->independent   Eating 0-->independent   Communication 0-->understands/communicates without difficulty   Swallowing 0-->swallows foods/liquids without difficulty   Cognition 1 - attention or memory deficits   Fall history within last six months no       Present no   General Information   Onset of Illness/Injury or Date of Surgery - Date 01/08/18   Referring Physician Sky Schroeder MD   Patient/Family Goals Statement get home   Additional Occupational Profile Info/Pertinent History of Current Problem Pt presented with dysarthria. no acute infact on MRI, possible TIA. Pt has PMH of CAD and CHF   Precautions/Limitations no known precautions/limitations   Weight-Bearing Status - LUE full weight-bearing   Weight-Bearing Status - RUE full weight-bearing   Weight-Bearing Status - LLE full weight-bearing   Weight-Bearing Status - RLE full weight-bearing   Heart Disease Risk Factors Medical history   Cognitive Status Examination   Orientation person;place  (did not know year)   Level of Consciousness alert   Able to Follow Commands WNL/WFL   Personal Safety (Cognitive) WNL/WFL   Memory impaired    Attention No deficits were identified   Organization/Problem Solving No deficits were identified   Executive Function Working memory impaired, decreased storage of information for performing tasks   Visual Perception   Visual Perception No deficits were identified   Sensory Examination   Sensory Quick Adds No deficits were identified   Pain Assessment   Patient Currently in Pain No   Range of Motion (ROM)   ROM Comment B UE WNL   Strength   Strength Comments B UE grossly 4/5   Muscle Tone Assessment   Muscle Tone Quick Adds No deficits were identified   Coordination   Upper Extremity Coordination No deficits were identified   Mobility   Bed Mobility Bed mobility skill: Supine to sit   Bed Mobility Skill: Supine to Sit   Level of [place]: Supine/Sit independent   Transfer Skills   Transfer Transfer Safety Analysis Sit/Stand   Transfer Skill: Sit to Stand   Level of [place]: Sit/Stand stand-by assist   Physical Assist/Nonphysical Assist: Sit/Stand supervision   Upper Body Dressing   Level of [place]: Dress Upper Body independent   Lower Body Dressing   Level of [place]: Dress Lower Body independent   Grooming   Level of [place]: Grooming independent   Instrumental Activities of Daily Living (IADL)   Previous Responsibilities driving   IADL Comments reports spouse sets up meds and does household IADLs   Activities of Daily Living Analysis   Impairments Contributing to Impaired Activities of Daily Living cognition impaired   ADL Comments family reports pt is at baseline   Clinical Impression   Criteria for Skilled Therapeutic Interventions Met evaluation only;no problems identified which require skilled intervention   Assessment of Occupational Performance 1-3 Performance Deficits   Identified Performance Deficits cognition   Clinical Decision Making (Complexity) Low complexity   Anticipated Discharge Disposition Home   Risks and Benefits of Treatment have been explained. Yes   Patient, Family  "& other staff in agreement with plan of care Yes   Clinical Impression Comments Family reports pt is at baseline for function. Pt was independent with ADLs and had no deficits in vision or UE strength, coordination or sensation. No further OT needed at this time.   [facility] TM \"6 Clicks\"   2016, Trustees of [facility], under license to [employer].  All rights reserved.   6 Clicks Short Forms Daily Activity Inpatient Short Form   [facility]  \"6 Clicks\" Daily Activity Inpatient Short Form   1. Putting on and taking off regular lower body clothing? 4 - None   2. Bathing (including washing, rinsing, drying)? 4 - None   3. Toileting, which includes using toilet, bedpan or urinal? 4 - None   4. Putting on and taking off regular upper body clothing? 4 - None   5. Taking care of personal grooming such as brushing teeth? 4 - None   6. Eating meals? 4 - None   Daily Activity Raw Score (Score out of 24.Lower scores equate to lower levels of function) 24   Total Evaluation Time   Total Evaluation Time (Minutes) 24     "

## 2018-01-09 NOTE — PROGRESS NOTES
[facility]    Hospitalist Progress Note  Mr. Kostas Cotto is a 72-year-old man.  He has a history of coronary artery disease, status post stenting and coronary artery bypass grafts.  Also, history of CHF with chronic systolic failure, EF of 35%, and history also subsequent to that of abdominal aortic aneurysm repair with intravascular procedure.  The patient does not have a known history of atrial fibrillation. Came in with dysarthria and admitted for suspicion of stroke.     Assessment & Plan     1.  Transient ischemic attack like incident versus mild CVA not noted on CT, thought to be due to new onset atrial fibrillation.  Plan will be to control his rate and start on Lovenox.  Will start Coumadin today. MRI brain pending. US carotid negative. Neuro symptoms resolved. Speech therapy noticed mild OP dysphagia- All therapies to see him today. Echo pending. Reviewed again- MRI negative for stroke. Did well with all therapies- mild minimal baseline instability but not fall risk- PT recs to consider outpt PT.   2.  New onset atrial fib rate is controlled.  On Lovenox.  Warfarin today. Pharmacy to help with this. Also do Lovenox self-teaching. Normal TSH.   3.  Coronary artery disease and ischemic cardiomyopathy seems compensated.  We will keep him on aspirin and we will be forced to hold the metoprolol and ACE inhibitor to allow for the passive hypertension due to #1 above.   4.  Hypercholesterolemia.  Did increase his statin.   5. 8 beat run of V.Tach, non-sustained and asymptomatic. Echo done but still awaiting report. Following lytes. Will place on K/Mg replacement protocol.     DVT Prophylaxis: Enoxaparin (Lovenox) SQ  Code Status: Full Code    Disposition: Expected discharge in 1-2 days once results in and doing okay.    Titus Owens    Interval History   States all his symptoms have resolved.     -Data reviewed today: I reviewed all new labs and imaging results over the last 24 hours.    Physical Exam    Temp: 97.2  F (36.2  C) Temp src: Tympanic BP: 105/69 Pulse: 79 Heart Rate: 74 Resp: 18 SpO2: 93 % O2 Device: None (Room air)    Vitals:    01/08/18 1032 01/08/18 1410 01/09/18 0615   Weight: 90.7 kg (200 lb) 81.5 kg (179 lb 10.8 oz) 81.6 kg (179 lb 14.3 oz)     Vital Signs with Ranges  Temp:  [97.2  F (36.2  C)-98.6  F (37  C)] 97.2  F (36.2  C)  Pulse:  [79] 79  Heart Rate:  [68-83] 74  Resp:  [16-23] 18  BP: (105-151)/() 105/69  SpO2:  [93 %-97 %] 93 %  I/O last 3 completed shifts:  In: 1229 [P.O.:200; I.V.:1029]  Out: 400 [Urine:400]    Peripheral IV 01/08/18 Right (Active)   Site Assessment WDL 1/9/2018  1:42 AM   Line Status Infusing 1/9/2018  1:42 AM   Phlebitis Scale 0-->no symptoms 1/9/2018  1:42 AM   Infiltration Scale 0 1/9/2018  1:42 AM   Number of days:1       Incision/Surgical Site 03/10/14 Left Eye (Active)   Number of days:1401       Incision/Surgical Site Right Eye (Active)   Number of days:     PSYCHIATRIC:  Very pleasant affect.    NEUROLOGIC:  The patient alert to month, day, location.  The patient does have some noticeable dysarthria. Pt states this is normal for him. Strength 5/5 and symmetrical in all extr, no sensory loss, CNs intact.   PRECORDIUM:  Rhythm is irregular, S1, S2, both present and normal in character.   LUNGS:  Clear.   ABDOMEN:  Soft, nontender.   EXTREMITIES:  The patient is of slender build.   SKIN:  Warm, nondiaphoretic.     Medications     - MEDICATION INSTRUCTIONS -       - MEDICATION INSTRUCTIONS -       NaCl 75 mL/hr at 01/08/18 2202       aspirin  325 mg Oral Daily     simvastatin  40 mg Oral At Bedtime     enoxaparin  1 mg/kg Subcutaneous Q12H     ranitidine  150 mg Oral Daily       Data     Recent Labs  Lab 01/09/18  0555 01/08/18  1033   WBC 8.3 10.5   HGB 12.3* 15.0   MCV 94 94    255   INR  --  1.12    137   POTASSIUM 3.7 4.3   CHLORIDE 104 101   CO2 29 26   BUN 26 30   CR 1.05 1.17   ANIONGAP 6 10   KAYE 8.2* 9.2   GLC 83 142*   TROPI  --   0.041       Recent Results (from the past 24 hour(s))   Head CT w/o contrast    Narrative    Exam: CT HEAD W/O CONTRAST    Clinical history:72 years Male CODE stroke, left sided weakness;     Comparisons:None    Technique: Axial CT imaging of the head was performed Without  intervenous contrast.    FINDINGS: There is generalized cerebral and cerebellar volume loss is  appropriate for age.   Ventricles and sulci are symmetric. The gray-white matter  differentiation throughout the brain is well maintained. There is no  evidence of intracranial mass or hemorrhage. Visualized portions of  the paranasal sinuses and mastoid air cells are well aerated. There is  no evidence of skull fracture.      Impression    IMPRESSION: No acute changes. There is no evidence of intracranial  hemorrhage or mass.    RADHA ZHOU MD   US Carotid Bilateral    Narrative    PROCEDURE: US CAROTID BILATERAL 1/8/2018 4:05 PM    HISTORY: Stroke;     COMPARISONS: None.    TECHNIQUE: Duplex ultrasound of the carotids    FINDINGS: On the right: There Is some mild plaquing at the carotid  bifurcations. On the right in the common carotid artery peak stalk  velocity measured 45 cm/s and diastolic velocity measured 13 cm second  in the internal carotid artery peak stalk velocity measured 64 cm/s  and diastolic velocity measured 21 cm/s. The right vertebral was not  visualized.    On the left: There is some mild plaquing at the carotid bifurcation.  In the common carotid artery peak stalk velocity measured 60 cm/s and  diastolic velocity measured 13 cm second in the internal carotid  artery peak systolic velocity measured 62 cm/s and diastolic velocity  measured 20 cm/s forward flow is seen in the right vertebral.           Impression    IMPRESSION:   1. Nonvisualized right vertebral artery  2. No carotid stenoses    MD Titus CAM MD

## 2018-01-09 NOTE — PROGRESS NOTES
01/09/18 1000   Quick Adds   Type of Visit Initial PT Evaluation   Living Environment   Lives With spouse   Living Arrangements house   Home Accessibility stairs to enter home;stairs within home   Number of Stairs to Enter Home 3   Number of Stairs Within Home 12   Stair Railings at Home inside, present at both sides;other (see comments)  (none outside, none with 2 steps up to living area)   Transportation Available car   Living Environment Comment 3 steps to enter home from outside, 2 steps up to main living area, flight of stairs to basement where he likes to watch TV   Self-Care   Usual Activity Tolerance good   Current Activity Tolerance moderate   Regular Exercise no   Equipment Currently Used at Home none   Functional Level Prior   Ambulation 0-->independent   Transferring 0-->independent   Toileting 0-->independent   Bathing 0-->independent   Dressing 0-->independent   Eating 0-->independent   Communication 0-->understands/communicates without difficulty   Swallowing 0-->swallows foods/liquids without difficulty   Cognition 0 - no cognition issues reported   Fall history within last six months no   Which of the above functional risks had a recent onset or change? none   Prior Functional Level Comment Pt reports he was independent with all functional mobility at home as well as ADLs.  Pt does state that his wife had taken over snow removal this winter.     General Information   Onset of Illness/Injury or Date of Surgery - Date 01/08/18   Referring Physician Dr. Schroeder   Patient/Family Goals Statement to go home   Pertinent History of Current Problem (include personal factors and/or comorbidities that impact the POC) pt admitted with possible CVA with reports of weakness and slurred speech at home.     Precautions/Limitations fall precautions   Weight-Bearing Status - LLE full weight-bearing   Weight-Bearing Status - RLE full weight-bearing   General Observations up in chair, wife and son present    Cognitive Status Examination   Orientation person;place  (unable to verbalize year)   Level of Consciousness alert   Follows Commands and Answers Questions 100% of the time   Personal Safety and Judgment intact   Memory impaired   Cognitive Comment difficulty recalling year despite 3 attempts   Pain Assessment   Patient Currently in Pain No   Integumentary/Edema   Integumentary/Edema no deficits were identifed   Posture    Posture Not impaired   Range of Motion (ROM)   ROM Comment Bilateral LE AROM WFL throughout   Strength   Strength Comments Bilateral hip flex 5-/5, bilateral hip abd 4+/5, bilateral hip add 4+/5, bilateral knee ext 5/5, bilateral knee flex 4+/5, bilaterall ankle DF 5-/5.  No detectable difference L<>R in terms of strength   Bed Mobility   Bed Mobility Comments NT, up in chair but pt reports he was independent   Transfer Skills   Transfer Comments sit<>stand SBA   Gait   Gait Comments Ambulated 220' without AD SBA, slower pace but no LOB noted   Sensory Examination   Sensory Perception no deficits were identified   General Therapy Interventions   Planned Therapy Interventions balance training;gait training;risk factor education;progressive activity/exercise   Clinical Impression   Criteria for Skilled Therapeutic Intervention yes, treatment indicated   PT Diagnosis impaired strength and tolerance for functional mobility   Influenced by the following impairments decreased strength, decreased balance, decreased activity tolerance   Functional limitations due to impairments decreased safety with stairs    Clinical Presentation Stable/Uncomplicated   Clinical Presentation Rationale symptoms resolving and improving   Clinical Decision Making (Complexity) Low complexity   Therapy Frequency` 3 times/week   Predicted Duration of Therapy Intervention (days/wks) 3 days   Anticipated Discharge Disposition Home with Outpatient Therapy   Risk & Benefits of therapy have been explained Yes   Patient, Family &  "other staff in agreement with plan of care Yes   Clinical Impression Comments Pt presented to ED with weakness and slurred speech.  At time of PT evaluation, no significant strength deficits noted L<>R.  Pt tolerating ambulation without AD with SBA.  Pt may benefit from outpatient PT for further work on balance and strengthening    [facility] TM \"6 Clicks\"   2016, Trustees of [facility], under license to [facility].  All rights reserved.   6 Clicks Short Forms Basic Mobility Inpatient Short Form   [facility]  \"6 Clicks\" V.2 Basic Mobility Inpatient Short Form   1. Turning from your back to your side while in a flat bed without using bedrails? 4 - None   2. Moving from lying on your back to sitting on the side of a flat bed without using bedrails? 4 - None   3. Moving to and from a bed to a chair (including a wheelchair)? 4 - None   4. Standing up from a chair using your arms (e.g., wheelchair, or bedside chair)? 4 - None   5. To walk in hospital room? 3 - A Little   6. Climbing 3-5 steps with a railing? 3 - A Little   Basic Mobility Raw Score (Score out of 24.Lower scores equate to lower levels of function) 22   Total Evaluation Time   Total Evaluation Time (Minutes) 21     "

## 2018-01-09 NOTE — PROVIDER NOTIFICATION
Dr. Owens notified of pt's HR PVC c 7 run beat of v tach at approx 1515 per ICU tele.   Pt's vitals stable, currently a-fib 60-70, no c/o chest pain, neuros intact.   No new orders at this time.

## 2018-01-09 NOTE — PROGRESS NOTES
Assessment completed see flow sheet.    Kostas is sitting in a chair. His PCP is Halley Hidalgo, his dentist is Dr Torres and his eye doctor is Dr Nicholas. He does not have a POA or healthcare directive and does not want information. He uses [facility]. He is not a .     Kostas lives at home with his wife and cat. He states he feels safe and that his home is well maintained. He performs his own self cares, manages his medication and appointment schedule. He walks without any devices. His wife Pari does the shopping and the food prep. He drives and has reliable transportation. He retired from [employer].    He sleeps well and has no mood concerns. He quit smoking 30 years ago and quit drinking 20 years ago. His sunshine is not important to him. He denies stressors. His support system is his wife Pari, 3 son's and a daughter. He enjoys watching TV and family time.     His plan is to discharge home and would like outpatient PT scheduled with [place] as he lives very close. No other needs identified. CM will remain available.   LOC: alert    Others present: Patient    Dx: CVA    Lives with: Lives With: spouse (cat)    Living at: Living Arrangements: house    Equipment used:  none    Support System: Description of Support System: Supportive, Involved    Primary PCP: Halley Hidalgo    POA/Guardian: No     Health Care Directive: NO     Pharmacy: [facility]    :  no    Homecare/County Services:   no    Adequate Resources for needs (housing, utilities, food/med): YES    Meds and appointments management: YES    Work: NO    Transportation: YES     ADLs: independent    Falls: No    Able to Return to Prior Living Arrangements: YES    [place]: NO    Goals: unable to state a goal    Barriers: unknown    Needs: Demonstrates Competency    LUIGI: low    Discharge Plan: home with outpatient PT

## 2018-01-09 NOTE — PLAN OF CARE
Problem: Patient Care Overview  Goal: Plan of Care/Patient Progress Review  Outcome: Improving  Discharge Planner SLP   Patient plan for discharge: Home with home care  Current status: Patient tolerating thin liquids and mechanical soft diet.  Barriers to return to prior living situation: Patient may have some physical needs but is doing well with swallowing.   Recommendations for discharge: Patient may need some swallowing follow up depending on how he does with treatment over the next day.   Rationale for recommendations: Will see how patient progresses.        Entered by: Araceli Crump 01/09/2018 11:34 AM

## 2018-01-10 ENCOUNTER — APPOINTMENT (OUTPATIENT)
Dept: SPEECH THERAPY | Facility: HOSPITAL | Age: 73
DRG: 069 | End: 2018-01-10
Payer: COMMERCIAL

## 2018-01-10 ENCOUNTER — APPOINTMENT (OUTPATIENT)
Dept: PHYSICAL THERAPY | Facility: HOSPITAL | Age: 73
DRG: 069 | End: 2018-01-10
Payer: COMMERCIAL

## 2018-01-10 LAB
ALBUMIN UR-MCNC: NEGATIVE MG/DL
APPEARANCE UR: CLEAR
BILIRUB UR QL STRIP: NEGATIVE
COLOR UR AUTO: YELLOW
GLUCOSE UR STRIP-MCNC: NEGATIVE MG/DL
HGB UR QL STRIP: NEGATIVE
INR PPP: 1.23 (ref 0.8–1.2)
KETONES UR STRIP-MCNC: 10 MG/DL
LEUKOCYTE ESTERASE UR QL STRIP: NEGATIVE
MAGNESIUM SERPL-MCNC: 2.3 MG/DL (ref 1.6–2.3)
NITRATE UR QL: NEGATIVE
PH UR STRIP: 5 PH (ref 4.7–8)
SOURCE: ABNORMAL
SP GR UR STRIP: 1.01 (ref 1–1.03)
UROBILINOGEN UR STRIP-MCNC: NORMAL MG/DL (ref 0–2)

## 2018-01-10 PROCEDURE — 36415 COLL VENOUS BLD VENIPUNCTURE: CPT | Performed by: INTERNAL MEDICINE

## 2018-01-10 PROCEDURE — 85610 PROTHROMBIN TIME: CPT | Performed by: INTERNAL MEDICINE

## 2018-01-10 PROCEDURE — 25000132 ZZH RX MED GY IP 250 OP 250 PS 637: Performed by: INTERNAL MEDICINE

## 2018-01-10 PROCEDURE — 97530 THERAPEUTIC ACTIVITIES: CPT | Mod: GP

## 2018-01-10 PROCEDURE — 40000225 ZZH STATISTIC SLP WARD VISIT

## 2018-01-10 PROCEDURE — 25000128 H RX IP 250 OP 636: Performed by: INTERNAL MEDICINE

## 2018-01-10 PROCEDURE — 83735 ASSAY OF MAGNESIUM: CPT | Performed by: INTERNAL MEDICINE

## 2018-01-10 PROCEDURE — 99232 SBSQ HOSP IP/OBS MODERATE 35: CPT | Performed by: INTERNAL MEDICINE

## 2018-01-10 PROCEDURE — 40000193 ZZH STATISTIC PT WARD VISIT

## 2018-01-10 PROCEDURE — 12000000 ZZH R&B MED SURG/OB

## 2018-01-10 PROCEDURE — 92526 ORAL FUNCTION THERAPY: CPT | Mod: GN

## 2018-01-10 PROCEDURE — 81003 URINALYSIS AUTO W/O SCOPE: CPT | Performed by: INTERNAL MEDICINE

## 2018-01-10 RX ORDER — FINASTERIDE 5 MG/1
5 TABLET, FILM COATED ORAL DAILY
Status: DISCONTINUED | OUTPATIENT
Start: 2018-01-10 | End: 2018-01-11 | Stop reason: HOSPADM

## 2018-01-10 RX ORDER — TAMSULOSIN HYDROCHLORIDE 0.4 MG/1
0.4 CAPSULE ORAL DAILY
Status: DISCONTINUED | OUTPATIENT
Start: 2018-01-10 | End: 2018-01-11 | Stop reason: HOSPADM

## 2018-01-10 RX ORDER — WARFARIN SODIUM 5 MG/1
5 TABLET ORAL
Status: COMPLETED | OUTPATIENT
Start: 2018-01-10 | End: 2018-01-10

## 2018-01-10 RX ADMIN — FINASTERIDE 5 MG: 5 TABLET, FILM COATED ORAL at 11:28

## 2018-01-10 RX ADMIN — TAMSULOSIN HYDROCHLORIDE 0.4 MG: 0.4 CAPSULE ORAL at 11:28

## 2018-01-10 RX ADMIN — ASPIRIN 325 MG ORAL TABLET 325 MG: 325 PILL ORAL at 08:11

## 2018-01-10 RX ADMIN — ENOXAPARIN SODIUM 80 MG: 80 INJECTION SUBCUTANEOUS at 17:04

## 2018-01-10 RX ADMIN — WARFARIN SODIUM 5 MG: 5 TABLET ORAL at 17:04

## 2018-01-10 RX ADMIN — ENOXAPARIN SODIUM 80 MG: 80 INJECTION SUBCUTANEOUS at 04:42

## 2018-01-10 RX ADMIN — RANITIDINE 150 MG: 150 TABLET ORAL at 08:11

## 2018-01-10 RX ADMIN — SIMVASTATIN 40 MG: 40 TABLET, FILM COATED ORAL at 20:50

## 2018-01-10 ASSESSMENT — VISUAL ACUITY
OU: BASELINE

## 2018-01-10 NOTE — PLAN OF CARE
Problem: Patient Care Overview  Goal: Plan of Care/Patient Progress Review  Discharge Planner PT   Patient plan for discharge: Home c OP PT  Current status: Pt ambulated 100ft c no AD. Needed min A for safety/steadying , ambulated c slow adri and short step length. Then attempted with FWW 100ft and pt was much more steady and confident needing only supervision. Educated family recommendation for using walker at all times when d/c home and recommendation for continued PT for balance.  Barriers to return to prior living situation: Recommend supervision due to balance issues  Recommendations for discharge: Home c walker, assist, and continued PT         Entered by: Angy Hutchinson 01/10/2018 10:44 AM

## 2018-01-10 NOTE — PLAN OF CARE
Problem: Stroke (Ischemic) (Adult)  Intervention: Monitor/Manage Eating/Swallowing Impairment   01/10/18 0505   Safety Interventions   Aspiration Precautions respiratory status monitored     Patient alert/oriented, unable to void bladder scanned and straight cathed this shift. Lungs clear, on room air. Speech slurred, neuro's intact. Heart rhythm a fib/flutter per tele. Lovenox injection given.

## 2018-01-10 NOTE — PROGRESS NOTES
[facility]    Hospitalist Progress Note  Mr. Kostas Cotto is a 72-year-old man.  He has a history of coronary artery disease, status post stenting and coronary artery bypass grafts.  Also, history of CHF with chronic systolic failure, EF of 35%, and history also subsequent to that of abdominal aortic aneurysm repair with intravascular procedure.  The patient does not have a known history of atrial fibrillation. Came in with dysarthria and admitted for suspicion of stroke.     Assessment & Plan     1.  Transient ischemic attack like incident versus mild CVA not noted on CT, thought to be due to new onset atrial fibrillation.  Plan will be to control his rate and start on Lovenox.  Will start Coumadin today. MRI brain pending. US carotid negative. Neuro symptoms resolved. Speech therapy noticed mild OP dysphagia- All therapies to see him today. Echo pending. Reviewed again- MRI negative for stroke. Did well with all therapies- mild minimal baseline instability but not fall risk- PT recs to consider outpt PT. Will repeat PT though.   2.  New onset atrial fib rate is controlled.  On Lovenox.  Warfarin today. Pharmacy to help with this. Also do Lovenox self-teaching. Normal TSH. Lovenox self-teaching not done yet.   3.  Coronary artery disease and ischemic cardiomyopathy seems compensated.  We will keep him on aspirin and we will be forced to hold the metoprolol and ACE inhibitor to allow for the passive hypertension due to #1 above.   4.  Hypercholesterolemia.  Did increase his statin.   5. 8 beat run of V.Tach, non-sustained and asymptomatic. Echo done but still awaiting report. Following lytes. Will place on K/Mg replacement protocol.   6. New issue with urinary retention- Needing straight cath's. Does state he has dribbling, nocturia and polyuria chronically. Likely has BPH. Will start Flomax and Proscar and get a UA. If UA negative and issue remains, may need to discharge on [place] with Urology outpt f/u.      DVT Prophylaxis: Enoxaparin (Lovenox) SQ  Code Status: Full Code    Disposition: Expected discharge in 1-2 days once results in and doing okay.    Titus Owens    Interval History   Issues with urinary retention.      -Data reviewed today: I reviewed all new labs and imaging results over the last 24 hours.    Physical Exam   Temp: 98.2  F (36.8  C) Temp src: Tympanic BP: 126/88 Pulse: 74 Heart Rate: 70 Resp: 18 SpO2: 95 % O2 Device: None (Room air)    Vitals:    01/08/18 1410 01/09/18 0615 01/10/18 0503   Weight: 81.5 kg (179 lb 10.8 oz) 81.6 kg (179 lb 14.3 oz) 82 kg (180 lb 12.4 oz)     Vital Signs with Ranges  Temp:  [97.5  F (36.4  C)-98.2  F (36.8  C)] 98.2  F (36.8  C)  Pulse:  [74] 74  Heart Rate:  [59-98] 70  Resp:  [16-18] 18  BP: (124-143)/(56-88) 126/88  SpO2:  [95 %-96 %] 95 %  I/O last 3 completed shifts:  In: 2006 [P.O.:240; I.V.:1766]  Out: 850 [Urine:850]    Peripheral IV 01/08/18 Right (Active)   Site Assessment WDL 1/9/2018 11:25 PM   Line Status Infusing;Checked every 1-2 hour 1/9/2018 11:25 PM   Phlebitis Scale 0-->no symptoms 1/9/2018 11:25 PM   Infiltration Scale 0 1/9/2018 11:25 PM   Number of days:2       Incision/Surgical Site 03/10/14 Left Eye (Active)   Number of days:1402       Incision/Surgical Site Right Eye (Active)   Number of days:     PSYCHIATRIC:  Very pleasant affect.    NEUROLOGIC:  The patient alert to month, day, location.  The patient does have some noticeable dysarthria. Pt states this is normal for him. Strength 5/5 and symmetrical in all extr, no sensory loss, CNs intact.   PRECORDIUM:  Rhythm is irregular, S1, S2, both present and normal in character.   LUNGS:  Clear.   ABDOMEN:  Soft, nontender.   EXTREMITIES:  The patient is of slender build.   SKIN:  Warm, nondiaphoretic.     Medications     Warfarin Therapy Reminder       - MEDICATION INSTRUCTIONS -       - MEDICATION INSTRUCTIONS -       NaCl 75 mL/hr at 01/09/18 1116       warfarin  5 mg Oral ONCE at 18:00      aspirin  325 mg Oral Daily     simvastatin  40 mg Oral At Bedtime     enoxaparin  1 mg/kg Subcutaneous Q12H     ranitidine  150 mg Oral Daily       Data     Recent Labs  Lab 01/10/18  0540 01/09/18  1224 01/09/18  0555 01/08/18  1033   WBC  --   --  8.3 10.5   HGB  --   --  12.3* 15.0   MCV  --   --  94 94   PLT  --   --  229 255   INR 1.23* 1.13  --  1.12   NA  --   --  139 137   POTASSIUM  --   --  3.7 4.3   CHLORIDE  --   --  104 101   CO2  --   --  29 26   BUN  --   --  26 30   CR  --   --  1.05 1.17   ANIONGAP  --   --  6 10   KAYE  --   --  8.2* 9.2   GLC  --   --  83 142*   TROPI  --   --   --  0.041       No results found for this or any previous visit (from the past 24 hour(s)).      Titus Owens MD

## 2018-01-10 NOTE — PLAN OF CARE
Problem: Patient Care Overview  Goal: Plan of Care/Patient Progress Review  Outcome: Therapy, progress toward functional goals as expected  Discharge Planner SLP   Patient plan for discharge: No needs for speech  Current status: Regular diet with thin liquids.  Barriers to return to prior living situation: None  Recommendations for discharge: Continue with aspiration precautions  Rationale for recommendations: Patient still demonstrates mild muscle weakness but does well with strategies.        Entered by: Araceli Crump 01/10/2018 10:36 AM

## 2018-01-10 NOTE — PLAN OF CARE
Face to face report given with opportunity to observe patient.    Report given to Loly Haas   1/10/2018  7:17 AM

## 2018-01-10 NOTE — PROGRESS NOTES
Met with Kostas and his family, they are all agreeable that some outpatient PT would be beneficial. Kostas would prefer [facility]. CM will remain available.

## 2018-01-10 NOTE — PLAN OF CARE
Problem: Patient Care Overview  Goal: Plan of Care/Patient Progress Review  Outcome: Improving  VSS. Walked in samuel with FWW and GB. Up to chair for meals. Educated pt and family on lovenox and coumadin. Urinary retention. Bladder scanned for 480, straight cath for 500cc. Order to straight cath if PVR >250cc. Voided tonight in toilet, PV scan 156cc. Neuro checks qshift, unchanged.

## 2018-01-10 NOTE — PLAN OF CARE
Problem: Patient Care Overview  Goal: Plan of Care/Patient Progress Review  Outcome: No Change  VSS. Alert and oriented, speech slurred but pt states it is his baseline. Neuros intact. Pt unable to void, straight cath for 400 ml sandie urine. Lungs clear. Up with assist of one with walker and gait belt. HR a flutter/a fib 50-60 per ICU tele report. Demonstrated to pt and son how to perform lovenox injections for possible home med discharge.   Face to face report given with opportunity to observe patient.    Report given to LILY Whelan   1/10/2018  12:33 AM      Problem: Stroke (Ischemic) (Adult)  Intervention: Monitor/Assist with Self Care   01/09/18 0034 01/09/18 1600   OTHER   Activity Assistance Provided --  assistance, 1 person   Daily Care Interventions   Self-Care Promotion BADL personal objects within reach;meal setup provided --    Functional Status Current   Ambulation 3 - assistive equipment and person --    Toileting 3 - assistive equipment and person --    Bathing 1 - assistive equipment --    Dressing 2 - assistive person --    Eating 0 - independent --    Communication 2 - difficulty speaking (not related to language barrier) --    Swallowing 0 - swallows foods/liquids without difficulty --

## 2018-01-11 VITALS
SYSTOLIC BLOOD PRESSURE: 145 MMHG | OXYGEN SATURATION: 95 % | HEIGHT: 72 IN | WEIGHT: 181.66 LBS | DIASTOLIC BLOOD PRESSURE: 93 MMHG | TEMPERATURE: 97.8 F | RESPIRATION RATE: 16 BRPM | HEART RATE: 74 BPM | BODY MASS INDEX: 24.61 KG/M2

## 2018-01-11 LAB — INR PPP: 2.34 (ref 0.8–1.2)

## 2018-01-11 PROCEDURE — 99238 HOSP IP/OBS DSCHRG MGMT 30/<: CPT | Performed by: INTERNAL MEDICINE

## 2018-01-11 PROCEDURE — 36415 COLL VENOUS BLD VENIPUNCTURE: CPT | Performed by: INTERNAL MEDICINE

## 2018-01-11 PROCEDURE — 85610 PROTHROMBIN TIME: CPT | Performed by: INTERNAL MEDICINE

## 2018-01-11 PROCEDURE — 25000132 ZZH RX MED GY IP 250 OP 250 PS 637: Performed by: INTERNAL MEDICINE

## 2018-01-11 PROCEDURE — 25000128 H RX IP 250 OP 636: Performed by: INTERNAL MEDICINE

## 2018-01-11 RX ORDER — TAMSULOSIN HYDROCHLORIDE 0.4 MG/1
0.4 CAPSULE ORAL DAILY
Qty: 30 CAPSULE | Refills: 3 | Status: SHIPPED | OUTPATIENT
Start: 2018-01-11 | End: 2018-01-18

## 2018-01-11 RX ORDER — WARFARIN SODIUM 2 MG/1
2 TABLET ORAL
Status: DISCONTINUED | OUTPATIENT
Start: 2018-01-11 | End: 2018-01-11 | Stop reason: HOSPADM

## 2018-01-11 RX ORDER — WARFARIN SODIUM 5 MG/1
5 TABLET ORAL DAILY
Qty: 30 TABLET | Refills: 0 | Status: SHIPPED | OUTPATIENT
Start: 2018-01-11 | End: 2018-01-18 | Stop reason: SINTOL

## 2018-01-11 RX ORDER — FINASTERIDE 5 MG/1
5 TABLET, FILM COATED ORAL DAILY
Qty: 30 TABLET | Refills: 3 | Status: SHIPPED | OUTPATIENT
Start: 2018-01-11 | End: 2018-04-16

## 2018-01-11 RX ADMIN — ASPIRIN 325 MG ORAL TABLET 325 MG: 325 PILL ORAL at 09:08

## 2018-01-11 RX ADMIN — ENOXAPARIN SODIUM 80 MG: 80 INJECTION SUBCUTANEOUS at 05:29

## 2018-01-11 RX ADMIN — RANITIDINE 150 MG: 150 TABLET ORAL at 09:08

## 2018-01-11 RX ADMIN — FINASTERIDE 5 MG: 5 TABLET, FILM COATED ORAL at 09:08

## 2018-01-11 RX ADMIN — TAMSULOSIN HYDROCHLORIDE 0.4 MG: 0.4 CAPSULE ORAL at 09:08

## 2018-01-11 ASSESSMENT — VISUAL ACUITY
OU: BASELINE
OU: BASELINE

## 2018-01-11 NOTE — DISCHARGE INSTRUCTIONS
Please stop into the [facility] to have your INR checked on 1/15/18. An appointment to follow up with Halley Hidalgo has been made for 18. If unable to schedule please call [phone] to reschedule.    MRI Contrast Discharge Instructions    The IV contrast you received today will pass out of your body in your  urine. This will happen in the next 24 hours. You will not feel this process.  Your urine will not change color.    Drink at least 4 extra glasses of water or juice today (unless your doctor  has restricted your fluids). This reduces the stress on your kidneys.  You may take your regular medicines.    If you are on dialysis: It is best to have dialysis today.    If you have a reaction: Most reactions happen right away. If you have  any new symptoms after leaving the hospital (such as hives or swelling),  call your hospital at the correct number below. Or call your family doctor.  If you have breathing distress or wheezing, call 911.    Special instructions:     I have read and understand the above information.    Signature:______________________________________ Date:___0-1[birth year]________    Staff:__________________________________________ Date:___________     Time:__________    [place] [facility] Departments:    ___[place]: [phone]  ___[place]: [phone]  ___[place]: [phone] ___[place]: [phone]  ___[facility]: [phone]  ___[facility]: [phone]  ___[phone]  ___[facility]: [phone]  ___[phone]

## 2018-01-11 NOTE — DISCHARGE SUMMARY
[facility]    Discharge Summary  Hospitalist    Date of Admission:  1/8/2018  Date of Discharge:  1/11/2018  Discharging Provider: Titus Owens  Date of Service (when I saw the patient): 01/11/18    Discharge Diagnoses   TIA and new onset A.fib    History of Present Illness   Kostas Cotto is an 72 year old male who presented with dysarthria that has resolved.    Hospital Course   Kostas Cotto was admitted on 1/8/2018.  The following problems were addressed during his hospitalization:    1.  Transient ischemic attack like incident versus mild CVA not noted on CT, thought to be due to new onset atrial fibrillation. MRI brain negative for stroke. US carotid negative. Neuro symptoms resolved. Speech therapy noticed mild OP dysphagia- subsequently resolved. All therapies saw him. Echo pending- f/u by PMD. Did well with all therapies- mild minimal baseline instability but not fall risk- PT recs to consider outpt PT and walker. Ordered both.   2.  New onset atrial fib rate is controlled.  On Lovenox.  Warfarin started- outpt INR check. TSH normal. Son instructed on Lovenox administration.  3.  Coronary artery disease and ischemic cardiomyopathy seems compensated.  We will keep him on aspirin and BB plus ACE-I that he is already on. Hemodynamics are fine.   4.  Hypercholesterolemia.  Did increase his statin.   5. 8 beat run of V.Tach, non-sustained and asymptomatic on 1/9- no subsequent event. Echo done but still awaiting report.    6. New issue with urinary retention- Needed straight cath's. Does state he has dribbling, nocturia and polyuria chronically. Likely has BPH. Did start Flomax and Proscar with excellent results. Voiding fine. D/C with these meds. UA was neg.     Stable exam on D/C.     Active Problems:    CVA (cerebral vascular accident) (H)      Titus Owens    Significant Results and Procedures   As above.     Pending Results   These results will be followed up by the pt's PCP- Echo and  INR.  Unresulted Labs Ordered in the Past 30 Days of this Admission     No orders found from 11/9/2017 to 1/9/2018.          Code Status   Full Code       Primary Care Physician   Halley Hidalgo        Discharge Disposition   Discharged to home  Condition at discharge: Stable    Consultations This Hospital Stay   SWALLOW EVAL SPEECH PATH AT BEDSIDE IP CONSULT  SPEECH LANGUAGE PATH ADULT IP CONSULT  PHARMACY IP CONSULT  PHYSICAL THERAPY ADULT IP CONSULT  OCCUPATIONAL THERAPY ADULT IP CONSULT  SOCIAL WORK IP CONSULT  PHARMACY IP CONSULT  SMOKING CESSATION PROGRAM IP CONSULT    Time Spent on this Encounter   ITitus, personally saw the patient today and spent less than or equal to 30 minutes discharging this patient.    Discharge Orders     Physical Therapy Referral     Reason for your hospital stay   New onset atrial fibrillation     Follow-up and recommended labs and tests    Follow up with primary care provider, Halley Hidalgo, within 5 days to evaluate medication change.  The following labs/tests are recommended: INR on 1/15/2018.     Activity   Your activity upon discharge: activity as tolerated     Full Code     Diet   Follow this diet upon discharge: Orders Placed This Encounter     Regular Diet Adult Thin Liquids (water, ice chips, juice, milk, gelatin, ice cream, etc)       Discharge Medications   Current Discharge Medication List      START taking these medications    Details   enoxaparin (LOVENOX) 80 MG/0.8ML injection Inject 0.82 mLs (82 mg) Subcutaneous every 12 hours for 5 days  Qty: 8.2 mL, Refills: 0    Associated Diagnoses: New onset atrial fibrillation (H)      warfarin (COUMADIN) 5 MG tablet Take 1 tablet (5 mg) by mouth daily  Qty: 30 tablet, Refills: 0    Associated Diagnoses: New onset atrial fibrillation (H)      finasteride (PROSCAR) 5 MG tablet Take 1 tablet (5 mg) by mouth daily  Qty: 30 tablet, Refills: 3    Associated Diagnoses: Benign prostatic hyperplasia with urinary  retention      tamsulosin (FLOMAX) 0.4 MG capsule Take 1 capsule (0.4 mg) by mouth daily  Qty: 30 capsule, Refills: 3    Associated Diagnoses: Benign prostatic hyperplasia with urinary retention         CONTINUE these medications which have NOT CHANGED    Details   RaNITidine HCl (ZANTAC PO) Take 75 mg by mouth daily      lisinopril (PRINIVIL/ZESTRIL) 5 MG tablet TAKE 1 TABLET BY MOUTH DAILY  Qty: 30 tablet, Refills: 0    Comments: Patient enrolled in our Rx Med Sync service to improveadherence. We are requesting a refill authorization inadvance to ensure an active prescription is on file.  Associated Diagnoses: Essential hypertension with goal blood pressure less than 140/90      metoprolol (TOPROL-XL) 50 MG 24 hr tablet TAKE 1 TABLET (50 MG) BY MOUTH DAILY  Qty: 30 tablet, Refills: 0    Comments: Patient enrolled in our Rx Med Sync service to improveadherence. We are requesting a refill authorization inadvance to ensure an active prescription is on file.  Associated Diagnoses: Essential hypertension with goal blood pressure less than 140/90      simvastatin (ZOCOR) 20 MG tablet TAKE 1 TABLET BY MOUTH EVERY EVENING - GENERIC FOR ZOCOR  Qty: 30 tablet, Refills: 0    Comments: Patient enrolled in our Rx Med Sync service to improveadherence. We are requesting a refill authorization inadvance to ensure an active prescription is on file.  Associated Diagnoses: Hyperlipidemia with target LDL less than 100      folic acid (FOLVITE) 1 MG tablet TAKE 1 TABLET BY MOUTH DAILY  Qty: 30 tablet, Refills: 0    Comments: Patient enrolled in our Rx Med Sync service to improveadherence. We are requesting a refill authorization inadvance to ensure an active prescription is on file.  Associated Diagnoses: Health care maintenance      aspirin 325 MG tablet Take 1 tablet by mouth daily      calcium carbonate-vitamin D (CALCIUM 500 + D) 500-400 MG-UNIT TABS tablt 2 times daily Take one tablet by oral route every day             Allergies    Allergies   Allergen Reactions     Oxycodone      Caused him delirium      Data   Most Recent 3 CBC's:  Recent Labs   Lab Test  01/09/18   0555  01/08/18   1033  10/17/16   1234   WBC  8.3  10.5  7.1   HGB  12.3*  15.0  13.7   MCV  94  94  96   PLT  229  255  161      Most Recent 3 BMP's:  Recent Labs   Lab Test  01/09/18   0555  01/08/18   1033  10/17/16   1234   NA  139  137  138   POTASSIUM  3.7  4.3  4.3   CHLORIDE  104  101  103   CO2  29  26  28   BUN  26  30  21   CR  1.05  1.17  1.12   ANIONGAP  6  10  7   KAYE  8.2*  9.2  9.1   GLC  83  142*  95     Most Recent 2 LFT's:  Recent Labs   Lab Test  10/17/16   1234  04/14/15   1417   AST  21  20   ALT  9  9   ALKPHOS  96  126   BILITOTAL  0.5  0.4     Most Recent INR's and Anticoagulation Dosing History:  Anticoagulation Dose History     Recent Dosing and Labs Latest Ref Rng & Units 1/8/2018 1/9/2018 1/10/2018 1/11/2018    Warfarin 5 mg - - 5 mg 5 mg -    INR 0.80 - 1.20 1.12 1.13 1.23(H) 2.34(H)        Most Recent 3 Troponin's:  Recent Labs   Lab Test  01/08/18   1033   TROPI  0.041     Most Recent Cholesterol Panel:  Recent Labs   Lab Test  01/09/18   0555   CHOL  109   LDL  52   HDL  41   TRIG  78     Most Recent 6 Bacteria Isolates From Any Culture (See EPIC Reports for Culture Details):  Recent Labs   Lab Test  01/08/18   1558   CULT  No MRSA isolated     Most Recent TSH, T4 and A1c Labs:  Recent Labs   Lab Test  01/08/18   1033  10/17/16   1234   TSH   --   3.18   A1C  5.6   --      Results for orders placed or performed during the hospital encounter of 01/08/18   Head CT w/o contrast    Narrative    Exam: CT HEAD W/O CONTRAST    Clinical history:72 years Male CODE stroke, left sided weakness;     Comparisons:None    Technique: Axial CT imaging of the head was performed Without  intervenous contrast.    FINDINGS: There is generalized cerebral and cerebellar volume loss is  appropriate for age.   Ventricles and sulci are symmetric. The gray-white  matter  differentiation throughout the brain is well maintained. There is no  evidence of intracranial mass or hemorrhage. Visualized portions of  the paranasal sinuses and mastoid air cells are well aerated. There is  no evidence of skull fracture.      Impression    IMPRESSION: No acute changes. There is no evidence of intracranial  hemorrhage or mass.    RADHA ZHOU MD   MR Brain w/o & w Contrast    Narrative    PROCEDURE: MR BRAIN W/O & W CONTRAST 1/8/2018 5:50 PM    HISTORY: TIA;     COMPARISONS: None.    Meds/Dose Given: Gadavist 7.5 mL    TECHNIQUE: Images were obtained sagittally T1 weighted images were  obtained axially diffusion gradient echo FLAIR T1 and T2-weighted  images were obtained axially sagittally coronally T1 weighted  following gadolinium administration    FINDINGS: Diffusion-weighted images reveal no acute infarcts. There  are some enlarged ventricular system and cortical sulci most likely on  the basis of atrophy. There multiple foci overflow white matter bright  signal in both hemispheres consistent with small vessel disease. The  brainstem and cerebellum appear normal. On the enhanced scans no areas  of pathologic enhancement are seen. Cranial vault is intact. There is  a polyp or retention cyst seen in the floor the left maxillary sinus.         Impression    IMPRESSION: Atrophy. No acute brain infarcts. No masses or ventricular  shifts    CHANELL MARCUS MD   US Carotid Bilateral    Narrative    PROCEDURE: US CAROTID BILATERAL 1/8/2018 4:05 PM    HISTORY: Stroke;     COMPARISONS: None.    TECHNIQUE: Duplex ultrasound of the carotids    FINDINGS: On the right: There Is some mild plaquing at the carotid  bifurcations. On the right in the common carotid artery peak stalk  velocity measured 45 cm/s and diastolic velocity measured 13 cm second  in the internal carotid artery peak stalk velocity measured 64 cm/s  and diastolic velocity measured 21 cm/s. The right vertebral was  not  visualized.    On the left: There is some mild plaquing at the carotid bifurcation.  In the common carotid artery peak stalk velocity measured 60 cm/s and  diastolic velocity measured 13 cm second in the internal carotid  artery peak systolic velocity measured 62 cm/s and diastolic velocity  measured 20 cm/s forward flow is seen in the right vertebral.           Impression    IMPRESSION:   1. Nonvisualized right vertebral artery  2. No carotid stenoses    CHANELL MARCUS MD

## 2018-01-11 NOTE — PROGRESS NOTES
SHRUTI For Canes, Crutches, Walkers & Wheelchairs    Ordering Provider: Dr. Owens    Patient has mobility limitation, which impairs ability to perform ADLs? __x_Yes ___No   Patient/caregiver can use the equipment safely? _x__Yes ___No  Limitation can be resolved by prescribed equipment? _x__Yes ___No   Patient's height _____  Weight _____    Additional questions for wheelchair only:   How many hours a day does the patient usually spend in the wheelchair? ______  Mobility limitations cannot be resolved with use of can or walker? ____Yes ____No  Use of wheelchair will improve ability to perform ADLs with regular use in the home? ___Yes ___No   Patient can self-propel wheelchair in the home or has caregiver to assist? __Yes __No     Canes:   __Single   __Quad Small Base   __Quad Large Base     Crutches:   __Adult  __Youth   __Child   __Bariatric     Walkers:   _x_Adult   __Junior   __Bariatric   __Platform left or right  __4 Wheeled Walker    Wheelchairs:   __Standard   __Lightweight  __Elevated Leg Rests   __Basic Cushion   __Hemi Height   __Reclining   __Other Additions     I certify the need for these services furnished under this plan of treatment and while under my care. (Physician co-signature of this document indicates review and certification of the therapy plan).

## 2018-01-11 NOTE — PLAN OF CARE
Face to face report given with opportunity to observe patient.    Report given to Praveena Haas   1/11/2018  7:05 AM

## 2018-01-11 NOTE — PLAN OF CARE
Pt has discharge orders. Wife is stuck at home awaiting someone to plow her driveway to come  her . Reviewed printed discharge paperwork with Pt and his son at 1300. Telemetry has been removed. When Wife is present IV can be removed and Pt is able to leave.

## 2018-01-11 NOTE — PLAN OF CARE
Problem: Stroke (Ischemic) (Adult)  Intervention: Monitor/Assist with Self Care   01/11/18 0451   OTHER   Activity Assistance Provided assistance, 1 person     Patient alert/oriented, Neuro checks in place. Up to bathroom assist of one with walker and gait belt to void. Slurred speech unchanged.

## 2018-01-11 NOTE — PLAN OF CARE
Problem: Patient Care Overview  Goal: Plan of Care/Patient Progress Review  Physical Therapy Discharge Summary    Reason for therapy discharge:    Discharged to home with outpatient therapy.    Progress towards therapy goal(s). See goals on Care Plan in [facility] electronic health record for goal details.  Goals partially met.  Barriers to achieving goals:   discharge from facility.    Therapy recommendation(s):    Continued therapy is recommended.  Rationale/Recommendations:  Continued PT recommended for balance, gait training.

## 2018-01-11 NOTE — PLAN OF CARE
Problem: Patient Care Overview  Goal: Plan of Care/Patient Progress Review  Outcome: Adequate for Discharge Date Met: 01/11/18  Reason for hospital stay:  NEw onset A-fib    Most recent vitals: /93 (BP Location: Right arm)  Pulse 74  Temp 97.8  F (36.6  C) (Tympanic)  Resp 16  Ht 1.829 m (6')  Wt 82.4 kg (181 lb 10.5 oz)  SpO2 95%  BMI 24.64 kg/m2  Pain Management:  Denies pain  Orientation:  A&O  Cardiac:  HR irr. Telemetry reading A-fib 70's. Telemetry removed to prepare for discharge  Respiratory:  LS clear. Denies sob.  GI:  BS active. C/o constipation, ordered prune juice.  :  Voiding on own x1 this shift  Diet: Reg, small bites  Skin Issues:  Abration to left shin  IVF:  Saline locked  ABX:  NOne  Mobility:  A1 with walker and gaitbelt  Safety:  Call light within reach. Bed alarm active    Comments:    1/11/2018  2:25 PM  Praveena Gaines    Face to face report given with opportunity to observe patient.    Report given to LILY Crowell   1/11/2018  3:20 PM

## 2018-01-11 NOTE — PLAN OF CARE
Problem: Patient Care Overview  Goal: Discharge Needs Assessment  Outcome: Adequate for Discharge Date Met: 01/11/18  Patient discharged at 3:32 PM via wheel chair accompanied by spouse and son and staff. Prescriptions sent to patients preferred pharmacy. All belongings sent with patient.     Discharge instructions reviewed with Pt and sonZack. All belongings gathered and returned to patient.     Patient discharged to home with home PT  Report called to N/A. PT will call pt to set up home appointments    Core Measures and Vaccines  Core Measures applicable during stay: Yes. If yes, state diagnosis: Stroke  Pneumonia and Influenza given prior to discharge, if indicated: N/A    Surgical Patient   Surgical Procedures during stay: no  Did patient receive discharge instruction on wound care and recognition of infection symptoms? N/A    MISC  Follow up appointment made:  Yes  Home and hospital aquired medications returned to patient: Yes  Patient reports pain was well managed at discharge: Yes

## 2018-01-12 ENCOUNTER — TELEPHONE (OUTPATIENT)
Dept: CASE MANAGEMENT | Facility: HOSPITAL | Age: 73
End: 2018-01-12

## 2018-01-12 NOTE — PHARMACY-ANTICOAGULATION SERVICE
Clinical Pharmacy- Warfarin Discharge Note  This patient is currently on warfarin for the treatment of Atrial fibrillation.  INR Goal= 2-3    Anticoagulation Dose History     Recent Dosing and Labs Latest Ref Rng & Units 1/8/2018 1/9/2018 1/10/2018 1/11/2018    Warfarin 5 mg - - 5 mg 5 mg -    INR 0.80 - 1.20 1.12 1.13 1.23(H) 2.34(H)      Vitamin K doses administered during the last 7 days: 0  FFP administered during the last 7 days: 0    When the patient was discharged on 1/11/18, the discharging provider did not place a referral for anticoagulation services at the [facility] but instead had scheduled the patient to follow up with the [facility] lab for an INR check. Called Katey from the Anticoagulation Clinic and she needed a referral in order to see the patient. Spoke with a nurse from Halley's office who said she would relay this information to Halley so a referral could be placed. Called Katey back and she is aware and knows this information and referral will be coming to her shortly.

## 2018-01-12 NOTE — PROGRESS NOTES
Name: Kostas Cotto    MRN#: [id number]    Reason for Hospitalization: New onset atrial fibrillation (H) [I48.91]    Discharge Date: 01/11/18    Patient / Family response to discharge plan: in agreement    Follow-Up Appt: Future Appointments  Date Time Provider Department Center   1/18/2018 11:15 AM Halley Hidalgo PA [facility] [place]   1/24/2018 10:45 AM [facility]       Other Providers (Care Coordinator, County Services, PCA services etc): Karoline PT    Discharge Disposition: Home transported by his son    Halley Jade

## 2018-01-12 NOTE — TELEPHONE ENCOUNTER
Kostas Cotto, was discharged to home on 1/11/18   from [facility]. I spoke today with his wife Pari  regarding the patient's discharge.   She indicates she has receive(d) sufficient information upon discharge. Medications were reviewed in full on discharge, including: Medications to be started;  medications to be continued from preadmission and any side effects. Prescriptions were received at discharge and were able to be filled. Medications are being taken as prescribed.   She indicates they have an appointment scheduled for Jan 18  and have transportation available. She was  able to confirm their appointment time and has  it written down.   She did not have any questions regarding discharge instructions or condition.  Per their request, the following employee (s) can be recognized for their outstanding services delivered:  Care was very good.  Suggestions to improve service: Nothing indicated.  She was informed they may receive a survey in the mail from the hospital. Asked if they would kindly complete the survey in order for [facility] to know if services fully met patient needs.

## 2018-01-15 ENCOUNTER — TELEPHONE (OUTPATIENT)
Dept: FAMILY MEDICINE | Facility: OTHER | Age: 73
End: 2018-01-15

## 2018-01-15 ENCOUNTER — ANTICOAGULATION THERAPY VISIT (OUTPATIENT)
Dept: ANTICOAGULATION | Facility: OTHER | Age: 73
End: 2018-01-15
Payer: COMMERCIAL

## 2018-01-15 ENCOUNTER — HOSPITAL ENCOUNTER (EMERGENCY)
Facility: HOSPITAL | Age: 73
Discharge: HOME | End: 2018-01-15
Attending: EMERGENCY MEDICINE | Admitting: EMERGENCY MEDICINE
Payer: COMMERCIAL

## 2018-01-15 VITALS
TEMPERATURE: 96 F | OXYGEN SATURATION: 95 % | HEART RATE: 65 BPM | SYSTOLIC BLOOD PRESSURE: 122 MMHG | RESPIRATION RATE: 18 BRPM | DIASTOLIC BLOOD PRESSURE: 67 MMHG

## 2018-01-15 DIAGNOSIS — Z79.01 LONG-TERM (CURRENT) USE OF ANTICOAGULANTS: ICD-10-CM

## 2018-01-15 DIAGNOSIS — Z79.01 LONG TERM CURRENT USE OF ANTICOAGULANT THERAPY: Primary | ICD-10-CM

## 2018-01-15 DIAGNOSIS — R79.1 ELEVATED INR (INTERNATIONAL NORMALIZED RATIO): ICD-10-CM

## 2018-01-15 DIAGNOSIS — I63.9 CEREBROVASCULAR ACCIDENT (CVA), UNSPECIFIED MECHANISM (H): Primary | ICD-10-CM

## 2018-01-15 DIAGNOSIS — I48.20 CHRONIC ATRIAL FIBRILLATION (H): ICD-10-CM

## 2018-01-15 LAB
APTT PPP: 69 SEC (ref 24–37)
BASOPHILS # BLD AUTO: 0 10E9/L (ref 0–0.2)
BASOPHILS NFR BLD AUTO: 0.5 %
DIFFERENTIAL METHOD BLD: ABNORMAL
EOSINOPHIL # BLD AUTO: 0.1 10E9/L (ref 0–0.7)
EOSINOPHIL NFR BLD AUTO: 2.1 %
ERYTHROCYTE [DISTWIDTH] IN BLOOD BY AUTOMATED COUNT: 12.8 % (ref 10–15)
HCT VFR BLD AUTO: 38.4 % (ref 40–53)
HGB BLD-MCNC: 12.9 G/DL (ref 13.3–17.7)
IMM GRANULOCYTES # BLD: 0 10E9/L (ref 0–0.4)
IMM GRANULOCYTES NFR BLD: 0.3 %
INR PPP: 14.14 (ref 0.8–1.2)
INR PPP: 16.4 (ref 0.8–1.2)
LYMPHOCYTES # BLD AUTO: 1.9 10E9/L (ref 0.8–5.3)
LYMPHOCYTES NFR BLD AUTO: 28.4 %
MCH RBC QN AUTO: 31.7 PG (ref 26.5–33)
MCHC RBC AUTO-ENTMCNC: 33.6 G/DL (ref 31.5–36.5)
MCV RBC AUTO: 94 FL (ref 78–100)
MONOCYTES # BLD AUTO: 0.8 10E9/L (ref 0–1.3)
MONOCYTES NFR BLD AUTO: 12.6 %
NEUTROPHILS # BLD AUTO: 3.7 10E9/L (ref 1.6–8.3)
NEUTROPHILS NFR BLD AUTO: 56.1 %
NRBC # BLD AUTO: 0 10*3/UL
NRBC BLD AUTO-RTO: 0 /100
PLATELET # BLD AUTO: 245 10E9/L (ref 150–450)
RBC # BLD AUTO: 4.07 10E12/L (ref 4.4–5.9)
WBC # BLD AUTO: 6.5 10E9/L (ref 4–11)

## 2018-01-15 PROCEDURE — 85025 COMPLETE CBC W/AUTO DIFF WBC: CPT | Performed by: EMERGENCY MEDICINE

## 2018-01-15 PROCEDURE — 85610 PROTHROMBIN TIME: CPT | Performed by: EMERGENCY MEDICINE

## 2018-01-15 PROCEDURE — 85730 THROMBOPLASTIN TIME PARTIAL: CPT | Performed by: EMERGENCY MEDICINE

## 2018-01-15 PROCEDURE — 99284 EMERGENCY DEPT VISIT MOD MDM: CPT | Performed by: EMERGENCY MEDICINE

## 2018-01-15 PROCEDURE — 25000128 H RX IP 250 OP 636: Performed by: EMERGENCY MEDICINE

## 2018-01-15 PROCEDURE — 93005 ELECTROCARDIOGRAM TRACING: CPT

## 2018-01-15 PROCEDURE — 85610 PROTHROMBIN TIME: CPT | Mod: ZL | Performed by: PHYSICIAN ASSISTANT

## 2018-01-15 PROCEDURE — 93010 ELECTROCARDIOGRAM REPORT: CPT | Performed by: INTERNAL MEDICINE

## 2018-01-15 PROCEDURE — 36415 COLL VENOUS BLD VENIPUNCTURE: CPT | Mod: ZL | Performed by: PHYSICIAN ASSISTANT

## 2018-01-15 PROCEDURE — 25000132 ZZH RX MED GY IP 250 OP 250 PS 637: Performed by: EMERGENCY MEDICINE

## 2018-01-15 PROCEDURE — 99284 EMERGENCY DEPT VISIT MOD MDM: CPT

## 2018-01-15 RX ORDER — LIDOCAINE 40 MG/G
CREAM TOPICAL
Status: DISCONTINUED | OUTPATIENT
Start: 2018-01-15 | End: 2018-01-15 | Stop reason: HOSPADM

## 2018-01-15 RX ORDER — PHYTONADIONE 5 MG/1
5 TABLET ORAL ONCE
Status: COMPLETED | OUTPATIENT
Start: 2018-01-15 | End: 2018-01-15

## 2018-01-15 RX ORDER — SODIUM CHLORIDE 9 MG/ML
1000 INJECTION, SOLUTION INTRAVENOUS CONTINUOUS
Status: DISCONTINUED | OUTPATIENT
Start: 2018-01-15 | End: 2018-01-15 | Stop reason: HOSPADM

## 2018-01-15 RX ADMIN — SODIUM CHLORIDE 500 ML: 9 INJECTION, SOLUTION INTRAVENOUS at 16:10

## 2018-01-15 RX ADMIN — PHYTONADIONE 5 MG: 5 TABLET ORAL at 17:22

## 2018-01-15 ASSESSMENT — ENCOUNTER SYMPTOMS
CONFUSION: 1
EYE REDNESS: 1
RESPIRATORY NEGATIVE: 1
MUSCULOSKELETAL NEGATIVE: 1
CONSTITUTIONAL NEGATIVE: 1
GASTROINTESTINAL NEGATIVE: 1
PALPITATIONS: 1

## 2018-01-15 NOTE — PROGRESS NOTES
ANTICOAGULATION FOLLOW-UP CLINIC VISIT    Patient Name:  Kostsa Cotto  Date:  1/15/2018  Contact Type:  Telephone/ spoke to  at [facility] and also spoke to patient son    SUBJECTIVE:     Patient Findings     Positives Other complaints    Comments Referral received from Adalberto on patient today.  He was discharged from hospital on 1/11/18 and was told to take lovenox 70mg q12h and warfarin 5 mg daily. Discharge INR was therapeutic.  I called MANDY Hidalgo at the [facility] but unable to reach her so called the  in [place] and told her patient INR result and requested she let MANDY Hidalgo know.  MANDY Hidalgo left me a message letting me know she was going to try to reach patient via phone to tell them to take patient to ER to be evaluated.  I did reach patient son at the patient's home and he states he did receive a call from MANDY Hidalgo and they were getting patient ready to go to ER. Per pt. Son, patient abdomen is very bruised from the lovenox shots. No bleeding.  Reiterated to patient son to go to ER as Halley had instructed them to do.            OBJECTIVE    INR   Date Value Ref Range Status   01/15/2018 16.40 (HH) 0.80 - 1.20 Final     Comment:     Critical Value called to and read back by  BHUMI TAVERAS AT 1452 01/15/18 KT         ASSESSMENT / PLAN  INR assessment SUPRA    Recheck INR In: 2 DAYS    INR Location Clinic      Anticoagulation Summary as of 1/15/2018     INR goal 2.0-3.0   Today's INR 16.40!   Maintenance plan No maintenance plan   Full instructions 1/15: Hold; 1/16: Hold; Otherwise No maintenance plan   Next INR check 1/17/2018   Target end date     Indications   Long-term (current) use of anticoagulants [Z79.01] [Z79.01]  CVA (cerebral vascular accident) (H) [I63.9]         Anticoagulation Episode Summary     INR check location     Preferred lab     Send INR reminders to HC ANTICOAG POOL    Comments       Anticoagulation Care Providers     Provider Role Specialty Phone number     Halley Hidalgo PA [facility] [phone]            See the Encounter Report to view Anticoagulation Flowsheet and Dosing Calendar (Go to Encounters tab in chart review, and find the Anticoagulation Therapy Visit)        Katey Tong RN

## 2018-01-15 NOTE — ED NOTES
Pt arrives to ER with report of abnormal INR.  Pt recently started on coumadin and lovenox injections.  Pt has noted redness to L eye, wife also reports increased bruising to abdomen, where lovenox shots being given.  Pt is unsure why he was started on blood thinners.  Pt placed on monitors, iv placed, unable to obtain labs.

## 2018-01-15 NOTE — TELEPHONE ENCOUNTER
Called pt's home phone.  Son, Zak, answered.  Put his wife, Pari, on the phone.  Explained to her the need for Kostas to report to the ED.  She did have questions; if this was related to the shots that she was instructed to give after he was discharged from the hospital.  Julio spoke with her and the urgency to carefully get him to the hospital.  She called the ED and spoke with Dr Olmedo.  Jerri Moreira

## 2018-01-15 NOTE — ED AVS SNAPSHOT
[facility]    [address]    [place] [address]    Phone:  [phone]                                       Kostas Cotto   MRN: [id number]    Department:  [facility]   Date of Visit:  1/15/2018           Patient Information     Date Of Birth          1945        Your diagnoses for this visit were:     Elevated INR (international normalized ratio)     Long-term (current) use of anticoagulants [Z79.01]     Chronic atrial fibrillation (H)        You were seen by Abe Olmedo MD.      Follow-up Information     Follow up with Halley Hidalgo PA.    Specialty:  Family Practice    Why:  As needed    Contact information:    [facility]  [address]  [place] [address]  [phone]          Discharge Instructions         Discharge Instructions for Atrial Fibrillation  You have been diagnosed with an abnormal heart rhythm called atrial fibrillation. With this condition, your heart s 2 upper chambers quiver rather than squeeze the blood out in a normal pattern. This leads to an irregular and sometimes rapid heartbeat. Some people will develop associated symptoms such as a flip-flopping heartbeat, chest pain, lightheadedness, or shortness of breath. Other people may have no symptoms at all. Atrial fibrillation is serious because it affects the heart s ability to fill with blood as it should. Blood clots may form. This increases the risk for stroke. Untreated atrial fibrillation can also lead to heart failure. Atrial fibrillation can be controlled. With treatment, most people with atrial fibrillation lead normal lives.  Treatment options  Recommended treatment for atrial fibrillation depends on your age, symptoms, how long you have had atrial fibrillation, and other factors. You will have a complete evaluation to find out if you have any abnormalities that caused your heart to go into atrial fibrillation. This might be blocked heart arteries or a thyroid  problem. Your doctor will assess your particular case and discuss choices with you.  Treatment choices may include:    Treating an underlying disorder that puts you at risk for atrial fibrillation. For example, correcting an abnormal thyroid or electrolyte problem, or treating a blocked heart artery.    Restoring a normal heart rhythm with an electrical shock (cardioversion) or with an antiarrhythmic medicine (chemical cardioversion)    Using medicine to control your heart rate in atrial fibrillation.    Preventing the risk for blood clot and stroke using blood-thinning medicines. Your doctor will tell you what he or she recommends. Choices may include aspirin, clopidogrel, warfarin, dabigatran, rivaroxaban, apixaban, and edoxaban.    Doing catheter ablation or a surgical maze procedure. These use different methods to destroy certain areas of heart tissue. This interrupts the electrical signals causing atrial fibrillation. One of these procedures may be a choice when medicines do not work, or as an alternative to long-term medicine.    Other treatment choices may be recommended for you by your doctor.  Managing risk factors for stroke and preventing heart failure are important parts of any treatment plan for atrial fibrillation.  Home care    Take your medicines exactly as directed. Don t skip doses.    Work with your doctor to find the right medicines and doses for you.    Learn to take your own pulse. Keep a record of your results. Ask your doctor which pulse rates mean that you need medical attention. Slowing your pulse is often the goal of treatment. Ask your doctor if it s OK for you to use an automatic machine to check your pulse at home. Sometimes these machines don t count the pulse correctly when you have atrial fibrillation.    Limit your intake of coffee, tea, cola, and other beverages with caffeine. Talk with your doctor about whether you should eliminate caffeine.    Avoid over-the-counter medicines  that have caffeine in them.    Let your doctor know what medicines you take, including prescription and over-the-counter medicines, as well as any supplements. They interfere with some medicines given for atrial fibrillation.    Ask your doctor about whether you can drink alcohol. Some people need to avoid alcohol to better treat atrial fibrillation. If you are taking blood-thinner medicines, alcohol may interfere with them by increasing their effect.    Never take stimulants such as amphetamines or cocaine. These drugs can speed up your heart rate and trigger atrial fibrillation.  Follow-up care  Follow up with your doctor, or as advised.     When should I call my healthcare provider  Call your healthcare provider right away if you have any of the following:    Weakness    Dizziness    Fainting    Fatigue    Shortness of breath    Chest pain with increased activity    A change in the usual regularity of your heartbeat, or an unusually fast heartbeat   Date Last Reviewed: 4/23/2016 2000-2017 [employer]. [address] [place] [address]. All rights reserved. This information is not intended as a substitute for professional medical care. Always follow your healthcare professional's instructions.      Kostas,  Your INR (coumadin effect) was confirmed as profoundly high.  Fortunately, because you are not bleeding, this can be reversed with you holding your coumadin pill for 2 days and stopping the lovenox and getting the INR checked again on Thursday morning at the coumadin clinic or with Halley Hidalgo.  If anything else unexpectedly occurs, get back in to the ER or clinic.  Good luck getting this adjusted.      Future Appointments        Provider Department Dept Phone Center    1/18/2018 10:00 AM  ANTI COAGULATION [facility] [place] [phone] [facility] [place]    1/18/2018 11:15 AM NAVA [facility] [place] [phone] [facility] [place]    1/26/2018 11:00 AM Stefani  Aston, PT [facility] [phone] [facility]         Review of your medicines      Our records show that you are taking the medicines listed below. If these are incorrect, please call your family doctor or clinic.        Dose / Directions Last dose taken    aspirin 325 MG tablet   Dose:  1 tablet        Take 1 tablet by mouth daily   Refills:  0        calcium 500 + D 500-400 MG-UNIT Tabs per tablet   Generic drug:  calcium carbonate-vitamin D        2 times daily Take one tablet by oral route every day   Refills:  0        enoxaparin 80 MG/0.8ML injection   Commonly known as:  LOVENOX   Dose:  1 mg/kg   Quantity:  8.2 mL        Inject 0.82 mLs (82 mg) Subcutaneous every 12 hours for 5 days   Refills:  0        finasteride 5 MG tablet   Commonly known as:  PROSCAR   Dose:  5 mg   Quantity:  30 tablet        Take 1 tablet (5 mg) by mouth daily   Refills:  3        folic acid 1 MG tablet   Commonly known as:  FOLVITE   Quantity:  30 tablet        TAKE 1 TABLET BY MOUTH DAILY   Refills:  0        lisinopril 5 MG tablet   Commonly known as:  PRINIVIL/ZESTRIL   Quantity:  30 tablet        TAKE 1 TABLET BY MOUTH DAILY   Refills:  0        metoprolol succinate 50 MG 24 hr tablet   Commonly known as:  TOPROL-XL   Quantity:  30 tablet        TAKE 1 TABLET (50 MG) BY MOUTH DAILY   Refills:  0        simvastatin 20 MG tablet   Commonly known as:  ZOCOR   Quantity:  30 tablet        TAKE 1 TABLET BY MOUTH EVERY EVENING - GENERIC FOR ZOCOR   Refills:  0        tamsulosin 0.4 MG capsule   Commonly known as:  FLOMAX   Dose:  0.4 mg   Quantity:  30 capsule        Take 1 capsule (0.4 mg) by mouth daily   Refills:  3        warfarin 5 MG tablet   Commonly known as:  COUMADIN   Dose:  5 mg   Quantity:  30 tablet        Take 1 tablet (5 mg) by mouth daily   Refills:  0        ZANTAC PO   Dose:  75 mg        Take 75 mg by mouth daily   Refills:  0                Procedures and tests performed during your visit      "CBC with platelets differential    EKG 12-lead    INR    Partial thromboplastin time    Peripheral IV catheter      Orders Needing Specimen Collection     None      Pending Results     No orders found from 2018 to 2018.            Pending Culture Results     No orders found from 2018 to 2018.            Thank you for choosing [facility]       Thank you for choosing [facility] for your care. Our goal is always to provide you with excellent care. Hearing back from our patients is one way we can continue to improve our services. Please take a few minutes to complete the written survey that you may receive in the mail after you visit with us. Thank you!        [employer] Information     [employer] lets you send messages to your doctor, view your test results, renew your prescriptions, schedule appointments and more. To sign up, go to [email or web address] . Click on \"Log in\" on the left side of the screen, which will take you to the Welcome page. Then click on \"Sign up Now\" on the right side of the page.     You will be asked to enter the access code listed below, as well as some personal information. Please follow the directions to create your username and password.     Your access code is: [email or web address]  Expires: 4/10/2018 12:47 PM     Your access code will  in 90 days. If you need help or a new code, please call your [facility] clinic or [phone].        Care EveryWhere ID     This is your Care EveryWhere ID. This could be used by other organizations to access your [facility] medical records  [id number]        Equal Access to Services     AMARJIT WEAVER : Hadii ruchi lynn hadasho Sotimali, waaxda luqadaha, qaybta kaalmada adeegyada, london guzman . So [facility] [phone].    ATENCIÓN: Si habla español, tiene a rahman disposición servicios gratuitos de asistencia lingüística. Llame al [phone].    We comply with applicable federal civil rights laws and [place] laws. We do not " discriminate on the basis of race, color, national origin, age, disability, sex, sexual orientation, or gender identity.            After Visit Summary       This is your record. Keep this with you and show to your community pharmacist(s) and doctor(s) at your next visit.

## 2018-01-15 NOTE — ED PROVIDER NOTES
History     Chief Complaint   Patient presents with     Coagulation Disorder     PCP called, patient's INR >15     HPI  Kostas Cotto is a 72 year old male who was started on coumadin 5 days ago with bridging lovenox until theraeutic INR because of newly dx'd atrial fibrillation.  This was to be checked today with his primary MANDY Hidalgo who found ~15 INR.  No bleeding other than minor left eye subconjunctival hemorrhage.  So referred to the ED.      Problem List:    Patient Active Problem List    Diagnosis Date Noted     Long-term (current) use of anticoagulants [Z79.01] 01/15/2018     Priority: Medium     CVA (cerebral vascular accident) (H) 01/08/2018     Priority: Medium     Advanced directives, counseling/discussion 03/11/2015     Priority: Medium     CAD (coronary artery disease) 03/05/2014     Priority: Medium     Hyperlipidemia with target LDL less than 100 03/05/2014     Priority: Medium     Diagnosis updated by automated process. Provider to review and confirm.       HTN (hypertension) 03/05/2014     Priority: Medium        Past Medical History:    Past Medical History:   Diagnosis Date     Hypoxic-ischemic encephalopathy (HIE) 11/25/2003     Mixed hyperlipidemia 01/01/2011     Rheumatoid arthritis(714.0) 05/03/2005     S/P CABG 01/06/2005     S/P inferior MI 11/25/2003     Unspecified essential hypertension 07/16/2001       Past Surgical History:    Past Surgical History:   Procedure Laterality Date     AAA REPAIR       ANGIOGRAM  1/2004    Coronary Angiogram, LAD> long mid 40-50%, D2 90% ostial,Prox % with collateal flow,Circ had restenosisand was restented x 3 again.  EF now 55% with mild lateral wall hypokineseis and inferior also.     CARDIOVASCULAR SURGERY  1/2005    Syncopal episode, Had repeat angiogam showing now circ 100% along with RCA. LAD 80-90% with EF 35%.  Sent for CABG     CARDIOVASCULAR SURGERY  1/2005    S/P 3 vess CABG, Dr Wilfredo SORIANO>LAD, SVG>OM1, SVG>RCA.  Post op pneumothorax  with Chest Tube placement.     CARDIOVASCULAR SURGERY  11/2003    Acute Inferior MI, Had VT arrest resuscitated. Not given lytics and was sent to [facility].  IABP placed and had angiogram stented Circ x 5. EF 30%. Lad had 70%, % with contralateral collateral flow.     HC US CHEST      left     HEART/LUNG RESUSCITATION (CPR)  11/2003    S/P Cardiac Arrest V-Tach, Out of hospital arrest with no bystaner cpr.  Has polymorphic VT on arrival.  Has a prolonged resuscitation which obviously was successful.  This was due to an acute Inferior MI at the time.     OPEN REDUCTION INTERNAL FIXATION HIP  1965    ORIF frac/dislocation L hip, Was a teenager and had orif done after fracture dislocation of left hip. Occurred playing softball.     PHACOEMULSIFICATION WITH STANDARD INTRAOCULAR LENS IMPLANT  3/10/2014    Procedure: PHACOEMULSIFICATION WITH STANDARD INTRAOCULAR LENS IMPLANT;  CATARACT EXTRACTION WITH INTRA OCULAR LENS LEFT(10% SERENE/POSSIBLE STRETCH);  Surgeon: Isael Acuna MD;  Location: [facility]     PHACOEMULSIFICATION WITH STANDARD INTRAOCULAR LENS IMPLANT  3/25/2014    Procedure: PHACOEMULSIFICATION WITH STANDARD INTRAOCULAR LENS IMPLANT;  CATARACT EXTRACTION WITH INTRA OCULAR LENS RIGHT/POSSIBLE PUPIL STRETCH;  Surgeon: Isael Acuna MD;  Location: [facility] OR       Family History:    Family History   Problem Relation Age of Onset     CANCER Father      leukemia     DIABETES Mother      CEREBROVASCULAR DISEASE Mother      cause of death       Social History:  Marital Status:   [2]  Social History   Substance Use Topics     Smoking status: Former Smoker     Smokeless tobacco: Never Used     Alcohol use No        Medications:      enoxaparin (LOVENOX) 80 MG/0.8ML injection   warfarin (COUMADIN) 5 MG tablet   finasteride (PROSCAR) 5 MG tablet   tamsulosin (FLOMAX) 0.4 MG capsule   RaNITidine HCl (ZANTAC PO)   lisinopril (PRINIVIL/ZESTRIL) 5 MG tablet   metoprolol (TOPROL-XL) 50 MG 24 hr tablet   simvastatin  (ZOCOR) 20 MG tablet   folic acid (FOLVITE) 1 MG tablet   aspirin 325 MG tablet   calcium carbonate-vitamin D (CALCIUM 500 + D) 500-400 MG-UNIT TABS tablt         Review of Systems   Constitutional: Negative.    HENT: Negative.    Eyes: Positive for redness.   Respiratory: Negative.    Cardiovascular: Positive for palpitations.   Gastrointestinal: Negative.    Musculoskeletal: Negative.    Psychiatric/Behavioral: Positive for confusion.     Physical Exam   BP: 153/77  Pulse: 67  Temp: 96  F (35.6  C)  Resp: 16  SpO2: 93 %    Physical Exam   Constitutional: He is oriented to person, place, and time. He appears well-developed and well-nourished. No distress.   HENT:   Head: Normocephalic and atraumatic.   Eyes: Conjunctivae and EOM are normal. Pupils are equal, round, and reactive to light.   Left medical epicanthus subconjunctival mild subconjunctival hemorrhage.    Neck: Normal range of motion. Neck supple. No thyromegaly present.   Cardiovascular: Normal rate and regular rhythm.    Irregularly irregular rhythm   Pulmonary/Chest: Effort normal and breath sounds normal.   Abdominal: Soft. Bowel sounds are normal.   Musculoskeletal: Normal range of motion. He exhibits no edema or deformity.   Neurological: He is alert and oriented to person, place, and time.   Skin: He is not diaphoretic.     ED Course     ED Course     Procedures  ECG  Atrial fibrillation, RBBB, cannot rule out inferior or anterolateral infarct of indeterminate age, QTc 506 ms    Critical Care time:  none      Labs Ordered and Resulted from Time of ED Arrival Up to the Time of Departure from the ED   CBC WITH PLATELETS DIFFERENTIAL - Abnormal; Notable for the following:        Result Value    RBC Count 4.07 (*)     Hemoglobin 12.9 (*)     Hematocrit 38.4 (*)     All other components within normal limits   INR - Abnormal; Notable for the following:     INR 14.14 (*)     All other components within normal limits   PARTIAL THROMBOPLASTIN TIME -  Abnormal; Notable for the following:     PTT 69 (*)     All other components within normal limits   PERIPHERAL IV CATHETER     Assessments & Plan (with Medical Decision Making)   Kostas was found to inexplicably have an INR greater than 10 with no active bleeding so followed Up to Date Pharmacy Services resource center [facility] guidelines.  Oral vit K 5mg po given along with recommendations to hold the coumadin for 2 days and stop the lovenox and f/u at either the coag clinic in 2 days or with his primary C Gwen.  I have reviewed the nursing notes.    I have reviewed the findings, diagnosis, plan and need for follow up with the patient.       New Prescriptions    No medications on file       Final diagnoses:   Elevated INR (international normalized ratio)   Long-term (current) use of anticoagulants [Z79.01]   Chronic atrial fibrillation (H)       1/15/2018   [facility]     Abe Olmedo MD  01/15/18 [id number]

## 2018-01-15 NOTE — ED NOTES
DATE:  1/15/2018   TIME OF RECEIPT FROM LAB:  0946  LAB TEST:  inr  LAB VALUE:  14.14  RESULTS GIVEN WITH READ-BACK TO (PROVIDER):  Abe Olmedo MD  TIME LAB VALUE REPORTED TO PROVIDER:   0435

## 2018-01-15 NOTE — DISCHARGE INSTRUCTIONS
Discharge Instructions for Atrial Fibrillation  You have been diagnosed with an abnormal heart rhythm called atrial fibrillation. With this condition, your heart s 2 upper chambers quiver rather than squeeze the blood out in a normal pattern. This leads to an irregular and sometimes rapid heartbeat. Some people will develop associated symptoms such as a flip-flopping heartbeat, chest pain, lightheadedness, or shortness of breath. Other people may have no symptoms at all. Atrial fibrillation is serious because it affects the heart s ability to fill with blood as it should. Blood clots may form. This increases the risk for stroke. Untreated atrial fibrillation can also lead to heart failure. Atrial fibrillation can be controlled. With treatment, most people with atrial fibrillation lead normal lives.  Treatment options  Recommended treatment for atrial fibrillation depends on your age, symptoms, how long you have had atrial fibrillation, and other factors. You will have a complete evaluation to find out if you have any abnormalities that caused your heart to go into atrial fibrillation. This might be blocked heart arteries or a thyroid problem. Your doctor will assess your particular case and discuss choices with you.  Treatment choices may include:    Treating an underlying disorder that puts you at risk for atrial fibrillation. For example, correcting an abnormal thyroid or electrolyte problem, or treating a blocked heart artery.    Restoring a normal heart rhythm with an electrical shock (cardioversion) or with an antiarrhythmic medicine (chemical cardioversion)    Using medicine to control your heart rate in atrial fibrillation.    Preventing the risk for blood clot and stroke using blood-thinning medicines. Your doctor will tell you what he or she recommends. Choices may include aspirin, clopidogrel, warfarin, dabigatran, rivaroxaban, apixaban, and edoxaban.    Doing catheter ablation or a surgical maze  procedure. These use different methods to destroy certain areas of heart tissue. This interrupts the electrical signals causing atrial fibrillation. One of these procedures may be a choice when medicines do not work, or as an alternative to long-term medicine.    Other treatment choices may be recommended for you by your doctor.  Managing risk factors for stroke and preventing heart failure are important parts of any treatment plan for atrial fibrillation.  Home care    Take your medicines exactly as directed. Don t skip doses.    Work with your doctor to find the right medicines and doses for you.    Learn to take your own pulse. Keep a record of your results. Ask your doctor which pulse rates mean that you need medical attention. Slowing your pulse is often the goal of treatment. Ask your doctor if it s OK for you to use an automatic machine to check your pulse at home. Sometimes these machines don t count the pulse correctly when you have atrial fibrillation.    Limit your intake of coffee, tea, cola, and other beverages with caffeine. Talk with your doctor about whether you should eliminate caffeine.    Avoid over-the-counter medicines that have caffeine in them.    Let your doctor know what medicines you take, including prescription and over-the-counter medicines, as well as any supplements. They interfere with some medicines given for atrial fibrillation.    Ask your doctor about whether you can drink alcohol. Some people need to avoid alcohol to better treat atrial fibrillation. If you are taking blood-thinner medicines, alcohol may interfere with them by increasing their effect.    Never take stimulants such as amphetamines or cocaine. These drugs can speed up your heart rate and trigger atrial fibrillation.  Follow-up care  Follow up with your doctor, or as advised.     When should I call my healthcare provider  Call your healthcare provider right away if you have any of the  following:    Weakness    Dizziness    Fainting    Fatigue    Shortness of breath    Chest pain with increased activity    A change in the usual regularity of your heartbeat, or an unusually fast heartbeat   Date Last Reviewed: 4/23/2016 2000-2017 [employer]. [address] [place] [address], [place] [address]. All rights reserved. This information is not intended as a substitute for professional medical care. Always follow your healthcare professional's instructions.      Kostas,  Your INR (coumadin effect) was confirmed as profoundly high.  Fortunately, because you are not bleeding, this can be reversed with you holding your coumadin pill for 2 days and stopping the lovenox and getting the INR checked again on Thursday morning at the coumadin clinic or with Halley Hidalgo.  If anything else unexpectedly occurs, get back in to the ER or clinic.  Good luck getting this adjusted.

## 2018-01-15 NOTE — ED NOTES
Pt and wife verbalized understanding of all discharge instructions. Wife had no questions.  Follow up appt already scheduled.

## 2018-01-15 NOTE — TELEPHONE ENCOUNTER
DATE:  1/15/2018   TIME OF RECEIPT FROM LAB:  2:51 pm  LAB TEST:  INR  LAB VALUE:  >16.4  RESULTS GIVEN WITH READ-BACK TO (PROVIDER):  DIMA Hidalgo  TIME LAB VALUE REPORTED TO PROVIDER:   2:55 pm

## 2018-01-15 NOTE — TELEPHONE ENCOUNTER
Attempt to reach this patient at home and his wife.  Left message with coumadin clinic patient should go right to ER.  I have never seen one this high. Hold meds.

## 2018-01-15 NOTE — ED AVS SNAPSHOT
[facility]    [address]    [place] [address]    Phone:  [phone]                                       Kostas Cotto   MRN: [id number]    Department:  [facility]   Date of Visit:  1/15/2018           After Visit Summary Signature Page     I have received my discharge instructions, and my questions have been answered. I have discussed any challenges I see with this plan with the nurse or doctor.    ..........................................................................................................................................  Patient/Patient Representative Signature      ..........................................................................................................................................  Patient Representative Print Name and Relationship to Patient    ..................................................               ................................................  Date                                            Time    ..........................................................................................................................................  Reviewed by Signature/Title    ...................................................              ..............................................  Date                                                            Time

## 2018-01-15 NOTE — MR AVS SNAPSHOT
Kostas Cotto   1/15/2018   Anticoagulation Therapy Visit    Description:  72 year old male   Provider:  Halley Hidalgo PA   Department:  [facility] Anti Coagulation           INR as of 1/15/2018     Today's INR 16.40!      Anticoagulation Summary as of 1/15/2018     INR goal 2.0-3.0   Today's INR 16.40!   Full instructions 1/15: Hold; 1/16: Hold; Otherwise No maintenance plan   Next INR check 1/17/2018    Indications   Long-term (current) use of anticoagulants [Z79.01] [Z79.01]  CVA (cerebral vascular accident) (H) [I63.9]         Your next Anticoagulation Clinic appointment(s)     Jan 18, 2018 10:00 AM CST   Anticoagulation Visit with HC ANTI COAGULATION   [facility] [place] ([facility] [place] )    [address]  [place] [address]   [phone]              January 2018 Details    Sun Mon Tue Wed Thu Fri Sat      1               2               3               4               5               6                 7               8               9               10               11               12               13                 14               15      Hold   See details      16      Hold         17            18               19               20                 21               22               23               24               25               26               27                 28               29               30               31                   Date Details   01/15 This INR check       Date of next INR:  1/17/2018         How to take your warfarin dose     Hold Do not take your warfarin dose. See the Details table to the right for additional instructions.

## 2018-01-18 ENCOUNTER — OFFICE VISIT (OUTPATIENT)
Dept: FAMILY MEDICINE | Facility: OTHER | Age: 73
End: 2018-01-18
Attending: PHYSICIAN ASSISTANT
Payer: COMMERCIAL

## 2018-01-18 ENCOUNTER — ANTICOAGULATION THERAPY VISIT (OUTPATIENT)
Dept: ANTICOAGULATION | Facility: OTHER | Age: 73
End: 2018-01-18
Attending: PHYSICIAN ASSISTANT
Payer: COMMERCIAL

## 2018-01-18 VITALS
TEMPERATURE: 98.5 F | HEART RATE: 74 BPM | DIASTOLIC BLOOD PRESSURE: 66 MMHG | OXYGEN SATURATION: 91 % | HEIGHT: 72 IN | SYSTOLIC BLOOD PRESSURE: 114 MMHG

## 2018-01-18 DIAGNOSIS — N40.1 BENIGN PROSTATIC HYPERPLASIA WITH URINARY RETENTION: ICD-10-CM

## 2018-01-18 DIAGNOSIS — Z00.00 HEALTH CARE MAINTENANCE: ICD-10-CM

## 2018-01-18 DIAGNOSIS — Z09 HOSPITAL DISCHARGE FOLLOW-UP: Primary | ICD-10-CM

## 2018-01-18 DIAGNOSIS — I10 ESSENTIAL HYPERTENSION WITH GOAL BLOOD PRESSURE LESS THAN 140/90: ICD-10-CM

## 2018-01-18 DIAGNOSIS — M79.605 LEFT LEG PAIN: ICD-10-CM

## 2018-01-18 DIAGNOSIS — E78.5 HYPERLIPIDEMIA WITH TARGET LDL LESS THAN 100: ICD-10-CM

## 2018-01-18 DIAGNOSIS — I48.91 NEW ONSET ATRIAL FIBRILLATION (H): ICD-10-CM

## 2018-01-18 DIAGNOSIS — I25.810 CORONARY ARTERY DISEASE INVOLVING OTHER CORONARY ARTERY BYPASS GRAFT, ANGINA PRESENCE UNSPECIFIED: ICD-10-CM

## 2018-01-18 DIAGNOSIS — Z86.73 HISTORY OF CVA (CEREBROVASCULAR ACCIDENT): ICD-10-CM

## 2018-01-18 DIAGNOSIS — Z79.01 LONG TERM CURRENT USE OF ANTICOAGULANT THERAPY: Primary | ICD-10-CM

## 2018-01-18 DIAGNOSIS — R33.8 BENIGN PROSTATIC HYPERPLASIA WITH URINARY RETENTION: ICD-10-CM

## 2018-01-18 DIAGNOSIS — K21.00 GASTROESOPHAGEAL REFLUX DISEASE WITH ESOPHAGITIS: ICD-10-CM

## 2018-01-18 LAB — INR POINT OF CARE: 2.1 (ref 0.86–1.14)

## 2018-01-18 PROCEDURE — G0463 HOSPITAL OUTPT CLINIC VISIT: HCPCS

## 2018-01-18 PROCEDURE — 99214 OFFICE O/P EST MOD 30 MIN: CPT | Performed by: PHYSICIAN ASSISTANT

## 2018-01-18 PROCEDURE — 36416 COLLJ CAPILLARY BLOOD SPEC: CPT | Mod: ZL

## 2018-01-18 RX ORDER — TAMSULOSIN HYDROCHLORIDE 0.4 MG/1
0.4 CAPSULE ORAL DAILY
Qty: 90 CAPSULE | Refills: 3 | Status: SHIPPED | OUTPATIENT
Start: 2018-01-18 | End: 2018-11-22

## 2018-01-18 RX ORDER — SIMVASTATIN 20 MG
TABLET ORAL
Qty: 90 TABLET | Refills: 3 | Status: SHIPPED | OUTPATIENT
Start: 2018-01-18 | End: 2018-11-22

## 2018-01-18 RX ORDER — WARFARIN SODIUM 2.5 MG/1
TABLET ORAL
Qty: 30 TABLET | Refills: 0 | Status: SHIPPED | OUTPATIENT
Start: 2018-01-18 | End: 2018-01-24 | Stop reason: ALTCHOICE

## 2018-01-18 RX ORDER — TRAMADOL HYDROCHLORIDE 50 MG/1
50-100 TABLET ORAL EVERY 6 HOURS PRN
Qty: 30 TABLET | Refills: 1 | Status: SHIPPED | OUTPATIENT
Start: 2018-01-18 | End: 2019-01-14

## 2018-01-18 RX ORDER — METOPROLOL SUCCINATE 50 MG/1
TABLET, EXTENDED RELEASE ORAL
Qty: 90 TABLET | Refills: 3 | Status: SHIPPED | OUTPATIENT
Start: 2018-01-18 | End: 2018-11-22

## 2018-01-18 RX ORDER — FOLIC ACID 1 MG/1
1000 TABLET ORAL DAILY
Qty: 90 TABLET | Refills: 3 | Status: SHIPPED | OUTPATIENT
Start: 2018-01-18 | End: 2019-01-31

## 2018-01-18 ASSESSMENT — ANXIETY QUESTIONNAIRES
5. BEING SO RESTLESS THAT IT IS HARD TO SIT STILL: NOT AT ALL
3. WORRYING TOO MUCH ABOUT DIFFERENT THINGS: NOT AT ALL
2. NOT BEING ABLE TO STOP OR CONTROL WORRYING: NOT AT ALL
7. FEELING AFRAID AS IF SOMETHING AWFUL MIGHT HAPPEN: NOT AT ALL
1. FEELING NERVOUS, ANXIOUS, OR ON EDGE: NOT AT ALL
4. TROUBLE RELAXING: NOT AT ALL
6. BECOMING EASILY ANNOYED OR IRRITABLE: NOT AT ALL
GAD7 TOTAL SCORE: 0

## 2018-01-18 ASSESSMENT — PATIENT HEALTH QUESTIONNAIRE - PHQ9: SUM OF ALL RESPONSES TO PHQ QUESTIONS 1-9: 1

## 2018-01-18 ASSESSMENT — PAIN SCALES - GENERAL: PAINLEVEL: NO PAIN (0)
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
## 2018-01-18 NOTE — MR AVS SNAPSHOT
Kostas Cotto   1/18/2018 10:00 AM   Anticoagulation Therapy Visit    Description:  72 year old male   Provider:  HC ANTI COAGULATION   Department:  [facility] Anti Coagulation           INR as of 1/18/2018     Today's INR 2.1      Anticoagulation Summary as of 1/18/2018     INR goal 2.0-3.0   Today's INR 2.1   Full instructions 1/18: 2.5 mg; 1/19: 2.5 mg; 1/20: 1.25 mg; 1/21: 2.5 mg   Next INR check 1/22/2018    Indications   Long-term (current) use of anticoagulants [Z79.01] [Z79.01]  CVA (cerebral vascular accident) (H) [I63.9]         Your next Anticoagulation Clinic appointment(s)     Jan 22, 2018  9:30 AM CST   Anticoagulation Visit with  ANTI COAGULATION   [facility] [place] ([facility] [place] )    [address]  [place] [address]   [phone]              January 2018 Details    Sun Mon Tue Wed Thu Fri Sat      1               2               3               4               5               6                 7               8               9               10               11               12               13                 14               15               16               17               18      2.5 mg   See details      19      2.5 mg         20      1.25 mg           21      2.5 mg         22            23               24               25               26               27                 28               29               30               31                   Date Details   01/18 This INR check       Date of next INR:  1/22/2018         How to take your warfarin dose     To take:  1.25 mg Take 0.5 of a 2.5 mg tablet.    To take:  2.5 mg Take 0.5 of a 5 mg tablet.

## 2018-01-18 NOTE — MR AVS SNAPSHOT
After Visit Summary   1/18/2018    Kostas Cotto    MRN: [id number]           Patient Information     Date Of Birth          1945        Visit Information        Provider Department      1/18/2018 11:15 AM Halley Hidalgo PA [facility]        Today's Diagnoses     Hospital discharge follow-up    -  1    New onset atrial fibrillation (H)        Left leg pain        Benign prostatic hyperplasia with urinary retention        Hyperlipidemia with target LDL less than 100        Essential hypertension with goal blood pressure less than 140/90        Health care maintenance        Gastroesophageal reflux disease with esophagitis        Coronary artery disease involving other coronary artery bypass graft, angina presence unspecified          Care Instructions      Thank you for choosing [facility].   I have office hours 8:00 am to 4:30 pm on Monday's, Wednesday's, Thursday's and Friday's. My nurse and I are out of the office every Tuesday.    Following your visit, when your labs and diagnostic testing have returned, I will review then and you will be contacted by my nurse.  If you are on My Chart, you can also view results there.    For refills, notify your pharmacy regarding what you need and the pharmacy will generate a refill request. Do not call my nurse as she is unable to process refill request. Please plan ahead and allow 3-5 days for refill requests.    You will generally receive a reminder call the day prior to your appointment.  If you cannot attend your appointment, please cancel your appointment with as much notice as possible.  If there is a pattern of failure to present for your appointments, I cannot provide consistent, meaningful, ongoing care for you. It is very important to me that you come in for your care, so we can best assist you with your health care needs.    IMPORTANT:  Please note that it is my standard of practice to NOT participate in prescribing  ongoing requested Narcotic Analgesic therapy, and/or participate in the prescribing of other controlled substances.  My nurse and I am happy to assist you with the process of referral for alternative pain management as needed, and other treatment modalities including but not limited to:  Physical Therapy, Physical Medicine and Rehab, Counseling, Chiropractic Care, Orthopedic Care, and non-narcotic medication management.     In the event that you need to be seen for emergent concerns and I am out of office,  please see one of my colleagues for acute concerns.  You may also present to UC or ER.  I appreciate the opportunity to serve you and look forward to supporting your healthcare needs in the future. Please contact me with any questions or concerns that you may have.    Sincerely,      Halley Hidalgo RN, PA-C               Follow-ups after your visit        Additional Services     CARDIOLOGY EVAL ADULT REFERRAL       Your provider has referred you to:  bc  OTHER PROVIDERS:    Please be aware that coverage of these services is subject to the terms and limitations of your health insurance plan.  Call member services at your health plan with any benefit or coverage questions.      Type of Referral:  New Cardiology Consult    Timeframe requested:  Within 1 month    Please bring the following to your appointment:  >>   Any x-rays, CTs or MRIs which have been performed.  Contact the facility where they were done to arrange for  prior to your scheduled appointment.    >>   List of current medications  >>   This referral request   >>   Any documents/labs given to you for this referral                  Your next 10 appointments already scheduled     Jan 22, 2018  9:30 AM CST   Anticoagulation Visit with  ANTI COAGULATION   [facility] ([facility] )    [address] [place] [address]   [phone]            Jan 26, 2018 11:00 AM CST   Evaluation with Stefani Clancy  "PT   [facility] ([facility] )    [address] [address]   [phone]              Who to contact     If you have questions or need follow up information about today's clinic visit or your schedule please contact [facility] directly at [phone].  Normal or non-critical lab and imaging results will be communicated to you by MyChart, letter or phone within 4 business days after the clinic has received the results. If you do not hear from us within 7 days, please contact the clinic through MyChart or phone. If you have a critical or abnormal lab result, we will notify you by phone as soon as possible.  Submit refill requests through [employer] or call your pharmacy and they will forward the refill request to us. Please allow 3 business days for your refill to be completed.          Additional Information About Your Visit        MyChart Information     [employer] lets you send messages to your doctor, view your test results, renew your prescriptions, schedule appointments and more. To sign up, go to [email or web address] . Click on \"Log in\" on the left side of the screen, which will take you to the Welcome page. Then click on \"Sign up Now\" on the right side of the page.     You will be asked to enter the access code listed below, as well as some personal information. Please follow the directions to create your username and password.     Your access code is: [email or web address]  Expires: 4/10/2018 12:47 PM     Your access code will  in 90 days. If you need help or a new code, please call your [facility] or [phone].        Care EveryWhere ID     This is your Care EveryWhere ID. This could be used by other organizations to access your [place] medical records  [id number]        Your Vitals Were     Pulse Temperature Height Pulse Oximetry          74 98.5  F (36.9  C) (Tympanic) 6' (1.829 m) 91%         Blood Pressure from Last 3 Encounters:   18 114/66 "   01/15/18 122/67   01/11/18 145/93    Weight from Last 3 Encounters:   01/11/18 181 lb 10.5 oz (82.4 kg)   10/17/16 193 lb (87.5 kg)   04/22/15 202 lb (91.6 kg)              We Performed the Following     CARDIOLOGY EVAL ADULT REFERRAL          Today's Medication Changes          These changes are accurate as of: 1/18/18 12:29 PM.  If you have any questions, ask your nurse or doctor.               Start taking these medicines.        Dose/Directions    traMADol 50 MG tablet   Commonly known as:  ULTRAM   Used for:  Left leg pain   Started by:  Halley Hidalgo PA        Dose:   mg   Take 1-2 tablets ( mg) by mouth every 6 hours as needed for pain maximum 3 tablet(s) per day   Quantity:  30 tablet   Refills:  1         These medicines have changed or have updated prescriptions.        Dose/Directions    folic acid 1 MG tablet   Commonly known as:  FOLVITE   This may have changed:  See the new instructions.   Used for:  Health care maintenance   Changed by:  Halley Hidalgo PA        Dose:  1000 mcg   Take 1 tablet (1,000 mcg) by mouth daily   Quantity:  90 tablet   Refills:  3       metoprolol succinate 50 MG 24 hr tablet   Commonly known as:  TOPROL-XL   This may have changed:  See the new instructions.   Used for:  Essential hypertension with goal blood pressure less than 140/90   Changed by:  Halley Hidalgo PA        TAKE 1 TABLET (50 MG) BY MOUTH DAILY   Quantity:  90 tablet   Refills:  3       ranitidine 150 MG tablet   Commonly known as:  ZANTAC   This may have changed:    - medication strength  - how much to take   Used for:  Gastroesophageal reflux disease with esophagitis   Changed by:  Halley Hidalgo PA        Dose:  150 mg   Take 1 tablet (150 mg) by mouth daily   Quantity:  90 tablet   Refills:  3       simvastatin 20 MG tablet   Commonly known as:  ZOCOR   This may have changed:  See the new instructions.   Used for:  Hyperlipidemia with target LDL less than 100   Changed by:   Halley Hidalgo PA        TAKE 1 TABLET BY MOUTH EVERY EVENING - GENERIC FOR ZOCOR   Quantity:  90 tablet   Refills:  3       warfarin 2.5 MG tablet   Commonly known as:  COUMADIN   This may have changed:    - medication strength  - how much to take  - how to take this  - when to take this  - additional instructions   Used for:  Long term current use of anticoagulant therapy, History of CVA (cerebrovascular accident)        2.5mg 1/18, 1 /19,  1.25mg 1/20, 1/21 then as directed by warfarin clinic   Quantity:  30 tablet   Refills:  0            Where to get your medicines      These medications were sent to [id number] [place] - [address]  [address] [facility] [place] [address]     Phone:  [phone]     folic acid 1 MG tablet    metoprolol succinate 50 MG 24 hr tablet    ranitidine 150 MG tablet    simvastatin 20 MG tablet    tamsulosin 0.4 MG capsule    warfarin 2.5 MG tablet         Some of these will need a paper prescription and others can be bought over the counter.  Ask your nurse if you have questions.     Bring a paper prescription for each of these medications     traMADol 50 MG tablet                Primary Care Provider Office Phone # Fax #    NAVA De León [phone]       [facility] [address]  [place] [address]        Equal Access to Services     AMARJIT WEAVER AH: Hadii aad ku hadasho Soomaali, waaxda luqadaha, qaybta kaalmada adeegyada, waxay idiin hayaan jose d ulloa. So [facility] [phone].    ATENCIÓN: Si habla español, tiene a rahman disposición servicios gratuitos de asistencia lingüística. ame al [phone].    We comply with applicable federal civil rights laws and [place] laws. We do not discriminate on the basis of race, color, national origin, age, disability, sex, sexual orientation, or gender identity.            Thank you!     Thank you for choosing [facility]  for your care. Our goal is always  to provide you with excellent care. Hearing back from our patients is one way we can continue to improve our services. Please take a few minutes to complete the written survey that you may receive in the mail after your visit with us. Thank you!             Your Updated Medication List - Protect others around you: Learn how to safely use, store and throw away your medicines at www.disposemymeds.org.          This list is accurate as of: 1/18/18 12:29 PM.  Always use your most recent med list.                   Brand Name Dispense Instructions for use Diagnosis    aspirin 325 MG tablet      Take 1 tablet by mouth daily        calcium 500 + D 500-400 MG-UNIT Tabs per tablet   Generic drug:  calcium carbonate-vitamin D      2 times daily Take one tablet by oral route every day        finasteride 5 MG tablet    PROSCAR    30 tablet    Take 1 tablet (5 mg) by mouth daily    Benign prostatic hyperplasia with urinary retention       folic acid 1 MG tablet    FOLVITE    90 tablet    Take 1 tablet (1,000 mcg) by mouth daily    Health care maintenance       lisinopril 5 MG tablet    PRINIVIL/ZESTRIL    30 tablet    TAKE 1 TABLET BY MOUTH DAILY    Essential hypertension with goal blood pressure less than 140/90       metoprolol succinate 50 MG 24 hr tablet    TOPROL-XL    90 tablet    TAKE 1 TABLET (50 MG) BY MOUTH DAILY    Essential hypertension with goal blood pressure less than 140/90       ranitidine 150 MG tablet    ZANTAC    90 tablet    Take 1 tablet (150 mg) by mouth daily    Gastroesophageal reflux disease with esophagitis       simvastatin 20 MG tablet    ZOCOR    90 tablet    TAKE 1 TABLET BY MOUTH EVERY EVENING - GENERIC FOR ZOCOR    Hyperlipidemia with target LDL less than 100       tamsulosin 0.4 MG capsule    FLOMAX    90 capsule    Take 1 capsule (0.4 mg) by mouth daily    Benign prostatic hyperplasia with urinary retention       traMADol 50 MG tablet    ULTRAM    30 tablet    Take 1-2 tablets ( mg) by  mouth every 6 hours as needed for pain maximum 3 tablet(s) per day    Left leg pain       warfarin 2.5 MG tablet    COUMADIN    30 tablet    2.5mg 1/18, 1 /19,  1.25mg 1/20, 1/21 then as directed by warfarin clinic    Long term current use of anticoagulant therapy, History of CVA (cerebrovascular accident)

## 2018-01-18 NOTE — PROGRESS NOTES
ANTICOAGULATION INITIAL CLINIC VISIT    Patient Name:  Kostas Cotto  Date:  1/18/2018  Referred by: MANDY Hidalgo PA-C  Contact Type:  Face to Face    SUBJECTIVE:  Coumadin education was completed today.  Topics covered include:  -Introduction to coumadin. We discussed what coumadin is and how it works  -Proper Administration.  We discussed taking warfarin in the evening  -INR Testing  We discussed importance of INR's  -Sign/Symptoms of Bleeding. We discussed how to stop minor bleeding with pressure and when to go to ER for bleeding  -Signs/Symptoms of Clot Formation or Stroke. We discussed signs of stroke/head bleed, and symptoms of venous and arterial clots.  -Dietary Intake of Vitamin K. Vit K food list given to patient and discussed vit K intake and importance of consistency  -Drug Interactions. We discussed before adding or taking away any medication to let warfarin clinic know. We discussed interactions of prescription, OTC and herbal products on warfarin  -Anticoagulation Identification (bracelet, necklace or wallet card). Patient given wallet card  -Future Surgery. We discussed what procedures considered surgery, extensive dental procedures, endoscopy, colonoscopy, biopsy, and radiology procedures and to notify warfarin clinic so warfarin can be withheld for procedure  -Effects of Alcohol, Tobacco, and Exercise on Coumadin. discussed    Coumadin Education Booklet and Coumadin Identification Wallet Card were given to the patient.       Patient Findings     Comments Patient seen for initial INR consult visit. His INR on 1/15/18 was 16.40 after hospital discharge 4 days previous.  He was given 5mg vit K orally by ER MD on 1/15/18. INR today is 2.1.  He, his wife and his son were given warfarin clinic information handouts and all contents discussed. They all verbalized understanding of warfarin dosing and INR recheck date and have no questions.           OBJECTIVE    INR Protime   Date Value Ref Range Status    01/18/2018 2.1 (A) 0.86 - 1.14 Final       ASSESSMENT / PLAN  INR assessment THER    Recheck INR In: 4 DAYS    INR Location Clinic      Anticoagulation Summary as of 1/18/2018     INR goal 2.0-3.0   Today's INR 2.1   Maintenance plan No maintenance plan   Full instructions 1/18: 2.5 mg; 1/19: 2.5 mg; 1/20: 1.25 mg; 1/21: 1.25 mg   Plan last modified Katey Tong RN (1/18/2018)   Next INR check 1/22/2018   Target end date     Indications   Long-term (current) use of anticoagulants [Z79.01] [Z79.01]  CVA (cerebral vascular accident) (H) [I63.9]         Anticoagulation Episode Summary     INR check location     Preferred lab     Send INR reminders to  ANTICOAG POOL    Comments       Anticoagulation Care Providers     Provider Role Specialty Phone number    Halley Hidalgo, PA [facility] [phone]            See the Encounter Report to view Anticoagulation Flowsheet and Dosing Calendar (Go to Encounters tab in chart review, and find the Anticoagulation Therapy Visit)        Katey Tong, RN

## 2018-01-18 NOTE — NURSING NOTE
Chief Complaint   Patient presents with     Hospital F/U     INR       Initial /66 (BP Location: Left arm, Patient Position: Sitting, Cuff Size: Adult Large)  Pulse 74  Temp 98.5  F (36.9  C) (Tympanic)  Ht 6' (1.829 m)  SpO2 91% Estimated body mass index is 24.64 kg/(m^2) as calculated from the following:    Height as of 1/8/18: 6' (1.829 m).    Weight as of 1/11/18: 181 lb 10.5 oz (82.4 kg).  Medication Reconciliation: complete   .Jerri Moreira

## 2018-01-18 NOTE — PATIENT INSTRUCTIONS
Thank you for choosing [facility].   I have office hours 8:00 am to 4:30 pm on Monday's, Wednesday's, Thursday's and Friday's. My nurse and I are out of the office every Tuesday.    Following your visit, when your labs and diagnostic testing have returned, I will review then and you will be contacted by my nurse.  If you are on My Chart, you can also view results there.    For refills, notify your pharmacy regarding what you need and the pharmacy will generate a refill request. Do not call my nurse as she is unable to process refill request. Please plan ahead and allow 3-5 days for refill requests.    You will generally receive a reminder call the day prior to your appointment.  If you cannot attend your appointment, please cancel your appointment with as much notice as possible.  If there is a pattern of failure to present for your appointments, I cannot provide consistent, meaningful, ongoing care for you. It is very important to me that you come in for your care, so we can best assist you with your health care needs.    IMPORTANT:  Please note that it is my standard of practice to NOT participate in prescribing ongoing requested Narcotic Analgesic therapy, and/or participate in the prescribing of other controlled substances.  My nurse and I am happy to assist you with the process of referral for alternative pain management as needed, and other treatment modalities including but not limited to:  Physical Therapy, Physical Medicine and Rehab, Counseling, Chiropractic Care, Orthopedic Care, and non-narcotic medication management.     In the event that you need to be seen for emergent concerns and I am out of office,  please see one of my colleagues for acute concerns.  You may also present to  or ER.  I appreciate the opportunity to serve you and look forward to supporting your healthcare needs in the future. Please contact me with any questions or concerns that you may  have.    Sincerely,      Halley Hidalgo RN, PA-C

## 2018-01-19 ENCOUNTER — PRE VISIT (OUTPATIENT)
Dept: CARDIOLOGY | Facility: OTHER | Age: 73
End: 2018-01-19

## 2018-01-19 ENCOUNTER — TELEPHONE (OUTPATIENT)
Dept: FAMILY MEDICINE | Facility: OTHER | Age: 73
End: 2018-01-19

## 2018-01-19 ASSESSMENT — ANXIETY QUESTIONNAIRES: GAD7 TOTAL SCORE: 0

## 2018-01-19 NOTE — TELEPHONE ENCOUNTER
10:23 AM    Reason for Call: Phone Call    Description: Anabel from [facility] called and stated pt brought in a prescription for Tramadol just now but it is not signed. They need you to call to verify it. Please call them back at [phone] ASAP as pt is waiting    Was an appointment offered for this call? No  If yes : Appointment type              Date    Preferred method for responding to this message: Telephone Call  What is your phone number ?    If we cannot reach you directly, may we leave a detailed response at the number you provided? Yes    Can this message wait until your PCP/provider returns, if available today? Not applicable.    Ny Rios

## 2018-01-19 NOTE — TELEPHONE ENCOUNTER
ED  Discharged         Current Outpatient Prescriptions   Medication     warfarin (COUMADIN) 2.5 MG tablet     traMADol (ULTRAM) 50 MG tablet     tamsulosin (FLOMAX) 0.4 MG capsule     simvastatin (ZOCOR) 20 MG tablet     metoprolol succinate (TOPROL-XL) 50 MG 24 hr tablet     ranitidine (ZANTAC) 150 MG tablet     folic acid (FOLVITE) 1 MG tablet     finasteride (PROSCAR) 5 MG tablet     lisinopril (PRINIVIL/ZESTRIL) 5 MG tablet     aspirin 325 MG tablet     calcium carbonate-vitamin D (CALCIUM 500 + D) 500-400 MG-UNIT TABS tablt     No current facility-administered medications for this visit.         Allergies   Allergen Reactions     Oxycodone      Caused him delirium   Caused him delirium      Counseling given: Not Answered     Social History     Social History     Marital status:      Spouse name: N/A     Number of children: N/A     Years of education: N/A     Occupational History     Retired, [employer]      Social History Main Topics     Smoking status: Former Smoker     Smokeless tobacco: Never Used     Alcohol use No     Drug use: No     Sexual activity: Not on file     Other Topics Concern     Blood Transfusions Yes     Caffeine Concern Yes     coffee, 2 cups daily     Parent/Sibling W/ Cabg, Mi Or Angioplasty Before 65f 55m? No     Social History Narrative          1/15/2018 (1 hours)  [facility]    Abe Olmedo MD   Attending   Treatment team    Elevated INR (international normalized ratio) +2 more   Clinical impression    Coagulation Disorder    Chief complaint    ED Provider Notes   Expand All Collapse All       History           Chief Complaint   Patient presents with     Coagulation Disorder       PCP called, patient's INR >15      HPI  Kostas Cotto is a 72 year old male who was started on coumadin 5 days ago with bridging lovenox until theraeutic INR because of newly dx'd atrial fibrillation.  This was to be checked today with his primary MANDY Hidalgo who found ~15 INR.  No  bleeding other than minor left eye subconjunctival hemorrhage.  So referred to the ED.       Problem List:          Patient Active Problem List     Diagnosis Date Noted     Long-term (current) use of anticoagulants [Z79.01] 01/15/2018       Priority: Medium     CVA (cerebral vascular accident) (H) 01/08/2018       Priority: Medium     Advanced directives, counseling/discussion 03/11/2015       Priority: Medium     CAD (coronary artery disease) 03/05/2014       Priority: Medium     Hyperlipidemia with target LDL less than 100 03/05/2014       Priority: Medium       Diagnosis updated by automated process. Provider to review and confirm.     HTN (hypertension) 03/05/2014       Priority: Medium         Past Medical History:         Past Medical History:   Diagnosis Date     Hypoxic-ischemic encephalopathy (HIE) 11/25/2003     Mixed hyperlipidemia 01/01/2011     Rheumatoid arthritis(714.0) 05/03/2005     S/P CABG 01/06/2005     S/P inferior MI 11/25/2003     Unspecified essential hypertension 07/16/2001         Past Surgical History:     Past Surgical History          Past Surgical History:   Procedure Laterality Date     AAA REPAIR         ANGIOGRAM   1/2004     Coronary Angiogram, LAD> long mid 40-50%, D2 90% ostial,Prox % with collateal flow,Circ had restenosisand was restented x 3 again.  EF now 55% with mild lateral wall hypokineseis and inferior also.     CARDIOVASCULAR SURGERY   1/2005     Syncopal episode, Had repeat angiogam showing now circ 100% along with RCA. LAD 80-90% with EF 35%.  Sent for CABG     CARDIOVASCULAR SURGERY   1/2005     S/P 3 vess CABG, Dr Wilfredo SORIANO>LAD, SVG>OM1, SVG>RCA.  Post op pneumothorax with Chest Tube placement.     CARDIOVASCULAR SURGERY   11/2003     Acute Inferior MI, Had VT arrest resuscitated. Not given lytics and was sent to [facility].  IABP placed and had angiogram stented Circ x 5. EF 30%. Lad had 70%, % with contralateral collateral flow.     HC US CHEST          left     HEART/LUNG RESUSCITATION (CPR)   11/2003     S/P Cardiac Arrest V-Tach, Out of hospital arrest with no bystaner cpr.  Has polymorphic VT on arrival.  Has a prolonged resuscitation which obviously was successful.  This was due to an acute Inferior MI at the time.     OPEN REDUCTION INTERNAL FIXATION HIP   1965     ORIF frac/dislocation L hip, Was a teenager and had orif done after fracture dislocation of left hip. Occurred playing softball.     PHACOEMULSIFICATION WITH STANDARD INTRAOCULAR LENS IMPLANT   3/10/2014     Procedure: PHACOEMULSIFICATION WITH STANDARD INTRAOCULAR LENS IMPLANT;  CATARACT EXTRACTION WITH INTRA OCULAR LENS LEFT(10% SERENE/POSSIBLE STRETCH);  Surgeon: Isael Acuna MD;  Location: [facility]     PHACOEMULSIFICATION WITH STANDARD INTRAOCULAR LENS IMPLANT   3/25/2014     Procedure: PHACOEMULSIFICATION WITH STANDARD INTRAOCULAR LENS IMPLANT;  CATARACT EXTRACTION WITH INTRA OCULAR LENS RIGHT/POSSIBLE PUPIL STRETCH;  Surgeon: Isael Acuna MD;  Location: [facility]            Family History:     Family History           Family History   Problem Relation Age of Onset     CANCER Father         leukemia     DIABETES Mother       CEREBROVASCULAR DISEASE Mother         cause of death            Social History:  Marital Status:   [2]       Social History   Substance Use Topics     Smoking status: Former Smoker     Smokeless tobacco: Never Used     Alcohol use No         Medications:       enoxaparin (LOVENOX) 80 MG/0.8ML injection   warfarin (COUMADIN) 5 MG tablet   finasteride (PROSCAR) 5 MG tablet   tamsulosin (FLOMAX) 0.4 MG capsule   RaNITidine HCl (ZANTAC PO)   lisinopril (PRINIVIL/ZESTRIL) 5 MG tablet   metoprolol (TOPROL-XL) 50 MG 24 hr tablet   simvastatin (ZOCOR) 20 MG tablet   folic acid (FOLVITE) 1 MG tablet   aspirin 325 MG tablet   calcium carbonate-vitamin D (CALCIUM 500 + D) 500-400 MG-UNIT TABS tablt            Review of Systems   Constitutional: Negative.    HENT: Negative.     Eyes: Positive for redness.   Respiratory: Negative.    Cardiovascular: Positive for palpitations.   Gastrointestinal: Negative.    Musculoskeletal: Negative.    Psychiatric/Behavioral: Positive for confusion.      Physical Exam   BP: 153/77  Pulse: 67  Temp: 96  F (35.6  C)  Resp: 16  SpO2: 93 %     Physical Exam   Constitutional: He is oriented to person, place, and time. He appears well-developed and well-nourished. No distress.   HENT:   Head: Normocephalic and atraumatic.   Eyes: Conjunctivae and EOM are normal. Pupils are equal, round, and reactive to light.   Left medical epicanthus subconjunctival mild subconjunctival hemorrhage.    Neck: Normal range of motion. Neck supple. No thyromegaly present.   Cardiovascular: Normal rate and regular rhythm.    Irregularly irregular rhythm   Pulmonary/Chest: Effort normal and breath sounds normal.   Abdominal: Soft. Bowel sounds are normal.   Musculoskeletal: Normal range of motion. He exhibits no edema or deformity.   Neurological: He is alert and oriented to person, place, and time.   Skin: He is not diaphoretic.      ED Course      ED Course      Procedures  ECG  Atrial fibrillation, RBBB, cannot rule out inferior or anterolateral infarct of indeterminate age, QTc 506 ms     Critical Care time:  none              Labs Ordered and Resulted from Time of ED Arrival Up to the Time of Departure from the ED   CBC WITH PLATELETS DIFFERENTIAL - Abnormal; Notable for the following:        Result Value      RBC Count 4.07 (*)       Hemoglobin 12.9 (*)       Hematocrit 38.4 (*)       All other components within normal limits   INR - Abnormal; Notable for the following:      INR 14.14 (*)       All other components within normal limits   PARTIAL THROMBOPLASTIN TIME - Abnormal; Notable for the following:      PTT 69 (*)       All other components within normal limits   PERIPHERAL IV CATHETER      Assessments & Plan (with Medical Decision Making)   Kostas was found to  inexplicably have an INR greater than 10 with no active bleeding so followed Up to Date Pharmacy Services resource center antico guidelines.  Oral vit K 5mg po given along with recommendations to hold the coumadin for 2 days and stop the lovenox and f/u at either the coag clinic in 2 days or with his primary [facility].  I have reviewed the nursing notes.     I have reviewed the findings, diagnosis, plan and need for follow up with the patient.            New Prescriptions     No medications on file         Final diagnoses:   Elevated INR (international normalized ratio)   Long-term (current) use of anticoagulants [Z79.01]   Chronic atrial fibrillation (H)         1/15/2018   [facility]     Abe Olmedo MD  01/15/18 2226           INR point of care   Collected:  1/18/2018 Order: [id number]          Ref Range & Units 1d ago       INR Protime 0.86 - 1.14 2.1 (A)     View Full Report                       CBC with platelets differential   Collected:  1/15/2018  4:19 PM Order: [id number]          Ref Range & Units 4d ago       WBC 4.0 - 11.0 10e9/L 6.5     RBC Count 4.4 - 5.9 10e12/L 4.07 (L)     Hemoglobin 13.3 - 17.7 g/dL 12.9 (L)     Hematocrit 40.0 - 53.0 % 38.4 (L)     MCV 78 - 100 fl 94     MCH 26.5 - 33.0 pg 31.7     MCHC 31.5 - 36.5 g/dL 33.6     RDW 10.0 - 15.0 % 12.8     Platelet Count 150 - 450 10e9/L 245     Diff Method  Automated Method     % Neutrophils % 56.1     % Lymphocytes % 28.4     % Monocytes % 12.6     % Eosinophils % 2.1     % Basophils % 0.5     % Immature Granulocytes % 0.3     Nucleated RBCs 0 /100 0     Absolute Neutrophil 1.6 - 8.3 10e9/L 3.7     Absolute Lymphocytes 0.8 - 5.3 10e9/L 1.9     Absolute Monocytes 0.0 - 1.3 10e9/L 0.8     Absolute Eosinophils 0.0 - 0.7 10e9/L 0.1     Absolute Basophils 0.0 - 0.2 10e9/L 0.0     Abs Immature Granulocytes 0 - 0.4 10e9/L 0.0     Absolute Nucleated RBC  0.0     View Full Report                        Magnesium   Collected:  1/10/2018   5:40 AM Order: [id number]          Ref Range & Units 9d ago       Magnesium 1.6 - 2.3 mg/dL 2.3     View Full Report                       Basic metabolic panel   Collected:  2018  5:55 AM Order: [id number]          Ref Range & Units 10d ago       Sodium 133 - 144 mmol/L 139     Potassium 3.4 - 5.3 mmol/L 3.7     Chloride 94 - 109 mmol/L 104     Carbon Dioxide 20 - 32 mmol/L 29     Anion Gap 3 - 14 mmol/L 6     Glucose 70 - 99 mg/dL 83     Urea Nitrogen 7 - 30 mg/dL 26     Creatinine 0.66 - 1.25 mg/dL 1.05     GFR Estimate >60 mL/min/1.7m2 70     Comments: Non  GFR Calc     GFR Estimate If Black >60 mL/min/1.7m2 84     Comments:  GFR Calc     Calcium 8.5 - 10.1 mg/dL 8.2 (L)     View Full Report                        Lipid panel   Collected:  2018  5:55 AM Order: [id number]          Ref Range & Units 10d ago       Cholesterol <200 mg/dL 109     Triglycerides <150 mg/dL 78     HDL Cholesterol >39 mg/dL 41     LDL Cholesterol Calculated <100 mg/dL 52     Comments: Desirable:       <100 mg/dl     Non HDL Cholesterol <130 mg/dL 68     View Full Report                        Hemoglobin A1c   Collected:  2018 10:33 AM Order: [id number]          Ref Range & Units 11d ago       Hemoglobin A1C 4.3 - 6.0 % 5.6     View Full Report                        Troponin I   Collected:  2018 10:33 AM Order: [id number]          Ref Range & Units 11d ago       Troponin I ES 0.000 - 0.045 ug/L 0.041     Comments: The 99th percentile for upper reference range is 0.045 ug/L.  Troponin values   in the range of 0.045 - 0.120 ug/L may be associated with risks of adverse   clinical events.        View Full Report        Final  Kostas Cotto         MRN:  [id number]       :    1945     [facility] Acct:  [id number] Pt Class:  Inpatient   [facility] Adm:   [facility]:     [address] [place] [phone] Phone: [phone] Sex:  Male          IMAGING REPORT      Exam:   US Carotid Bilateral       ACN:[id number]      Date/Time Completed:   2018 1605                                                                    Authorizing Provider:  Sky Schroeder  Ordering Provider:  Sky Schroeder  Attending Provider:    COLEMAN Provider:    ______________________________________________________________________________________        PROCEDURE: US CAROTID BILATERAL 2018 4:05 PM     HISTORY: Stroke;      COMPARISONS: None.     TECHNIQUE: Duplex ultrasound of the carotids     FINDINGS: On the right: There Is some mild plaquing at the carotid  bifurcations. On the right in the common carotid artery peak stalk  velocity measured 45 cm/s and diastolic velocity measured 13 cm second  in the internal carotid artery peak stalk velocity measured 64 cm/s  and diastolic velocity measured 21 cm/s. The right vertebral was not  visualized.     On the left: There is some mild plaquing at the carotid bifurcation.  In the common carotid artery peak stalk velocity measured 60 cm/s and  diastolic velocity measured 13 cm second in the internal carotid  artery peak systolic velocity measured 62 cm/s and diastolic velocity  measured 20 cm/s forward flow is seen in the right vertebral.             IMPRESSION:   1. Nonvisualized right vertebral artery  2. No carotid stenoses     MANJEET MARCUS MD  ______________________________________________________________________________________  End of diagnostic Imaging report for accession:  [id number]      ______________________________________________________________________________________      Read By: Manjeet Marcus MD  Signed by: Manjeet Marcus MD on 2018  4:11 PM     Final  Kostas Cotto         MRN:  [id number]       :    1945     [facility] Acct:  [id number] Pt Class:  Inpatient   [facility] Adm:   [facility]:     [address] [place] [phone] Phone: [phone] Sex:  Male          IMAGING REPORT      Exam:   MR Brain w/o & w Contrast       ACN:[id number]      Date/Time Completed:   01/08/2018 5691                                                                    Authorizing Provider:  Sky Schroeder  Ordering Provider:  Sky Scrhoeder  Attending Provider:    COLEMAN Provider:    ______________________________________________________________________________________        PROCEDURE: MR BRAIN W/O & W CONTRAST 1/8/2018 5:50 PM     HISTORY: TIA;      COMPARISONS: None.     Meds/Dose Given: Gadavist 7.5 mL     TECHNIQUE: Images were obtained sagittally T1 weighted images were  obtained axially diffusion gradient echo FLAIR T1 and T2-weighted  images were obtained axially sagittally coronally T1 weighted  following gadolinium administration     FINDINGS: Diffusion-weighted images reveal no acute infarcts. There  are some enlarged ventricular system and cortical sulci most likely on  the basis of atrophy. There multiple foci overflow white matter bright  signal in both hemispheres consistent with small vessel disease. The  brainstem and cerebellum appear normal. On the enhanced scans no areas  of pathologic enhancement are seen. Cranial vault is intact. There is  a polyp or retention cyst seen in the floor the left maxillary sinus.          IMPRESSION: Atrophy. No acute brain infarcts. No masses or ventricular  shifts     CHANELL MARCUS MD  ______________________________________________________________________________________  End of diagnostic Imaging report for accession:  [id number]      ______________________________________________________________________________________      Read By: Chanell Marcus MD  Signed by: Chanell Marcus MD on 1/9/2018 11:57 AM

## 2018-01-19 NOTE — TELEPHONE ENCOUNTER
Verified with [facility]; called and verified with pharmacy.  Jerri Moreira

## 2018-01-22 ENCOUNTER — ANTICOAGULATION THERAPY VISIT (OUTPATIENT)
Dept: ANTICOAGULATION | Facility: OTHER | Age: 73
End: 2018-01-22
Attending: PHYSICIAN ASSISTANT
Payer: COMMERCIAL

## 2018-01-22 DIAGNOSIS — I63.9 CVA (CEREBRAL VASCULAR ACCIDENT) (H): ICD-10-CM

## 2018-01-22 DIAGNOSIS — Z79.01 LONG-TERM (CURRENT) USE OF ANTICOAGULANTS: ICD-10-CM

## 2018-01-22 LAB — INR POINT OF CARE: 3.7 (ref 0.86–1.14)

## 2018-01-22 PROCEDURE — 85610 PROTHROMBIN TIME: CPT | Mod: QW,ZL

## 2018-01-22 NOTE — PROGRESS NOTES
ANTICOAGULATION FOLLOW-UP CLINIC VISIT    Patient Name:  Kostas Cotto  Date:  1/22/2018  Contact Type:  Face to Face    SUBJECTIVE:     Patient Findings     Comments Wife states he is constipated so we discussed some laxatives and stool softerners.           OBJECTIVE    INR Protime   Date Value Ref Range Status   01/22/2018 3.7 (A) 0.86 - 1.14 Final       ASSESSMENT / PLAN  INR assessment SUPRA    Recheck INR In: 1 WEEK    INR Location Clinic      Anticoagulation Summary as of 1/22/2018     INR goal 2.0-3.0   Today's INR 3.7!   Maintenance plan No maintenance plan   Full instructions 1/22: Hold; 1/23: Hold; 1/24: 1.25 mg; 1/25: 1.25 mg; 1/26: 1.25 mg; 1/27: 1.25 mg; 1/28: 1.25 mg   Plan last modified Katey Tong, RN (1/18/2018)   Next INR check 1/29/2018   Target end date     Indications   Long-term (current) use of anticoagulants [Z79.01] [Z79.01]  CVA (cerebral vascular accident) (H) [I63.9]         Anticoagulation Episode Summary     INR check location     Preferred lab     Send INR reminders to  BENITO POOL    Comments       Anticoagulation Care Providers     Provider Role Specialty Phone number    Halley Hidalgo, PA [facility] [phone]            See the Encounter Report to view Anticoagulation Flowsheet and Dosing Calendar (Go to Encounters tab in chart review, and find the Anticoagulation Therapy Visit)        Katey Tong, RN

## 2018-01-22 NOTE — MR AVS SNAPSHOT
Kostas Cotto   1/22/2018 9:30 AM   Anticoagulation Therapy Visit    Description:  72 year old male   Provider:  [facility] ANTI COAGULATION   Department:  [facility] Anti Coagulation           INR as of 1/22/2018     Today's INR 3.7!      Anticoagulation Summary as of 1/22/2018     INR goal 2.0-3.0   Today's INR 3.7!   Full instructions 1/22: Hold; 1/23: Hold; 1/24: 1.25 mg; 1/25: 1.25 mg; 1/26: 1.25 mg; 1/27: 1.25 mg; 1/28: 1.25 mg   Next INR check 1/29/2018    Indications   Long-term (current) use of anticoagulants [Z79.01] [Z79.01]  CVA (cerebral vascular accident) (H) [I63.9]         Your next Anticoagulation Clinic appointment(s)     Jan 29, 2018  9:30 AM CST   Anticoagulation Visit with HC ANTI COAGULATION   [facility] ([facility] )    [address] [facility] [address]   [phone]              January 2018 Details    Sun Mon Tue Wed Thu Fri Sat      1               2               3               4               5               6                 7               8               9               10               11               12               13                 14               15               16               17               18               19               20                 21               22      Hold   See details      23      Hold         24      1.25 mg         25      1.25 mg         26      1.25 mg         27      1.25 mg           28      1.25 mg         29            30               31                   Date Details   01/22 This INR check       Date of next INR:  1/29/2018         How to take your warfarin dose     To take:  1.25 mg Take 0.5 of a 2.5 mg tablet.    Hold Do not take your warfarin dose. See the Details table to the right for additional instructions.

## 2018-01-24 ENCOUNTER — OFFICE VISIT (OUTPATIENT)
Dept: CARDIOLOGY | Facility: OTHER | Age: 73
End: 2018-01-24
Attending: INTERNAL MEDICINE
Payer: COMMERCIAL

## 2018-01-24 ENCOUNTER — TELEPHONE (OUTPATIENT)
Dept: CARDIOLOGY | Facility: OTHER | Age: 73
End: 2018-01-24

## 2018-01-24 VITALS
SYSTOLIC BLOOD PRESSURE: 90 MMHG | BODY MASS INDEX: 25.06 KG/M2 | HEART RATE: 81 BPM | DIASTOLIC BLOOD PRESSURE: 59 MMHG | HEIGHT: 72 IN | OXYGEN SATURATION: 95 % | WEIGHT: 185 LBS

## 2018-01-24 DIAGNOSIS — Z09 HOSPITAL DISCHARGE FOLLOW-UP: ICD-10-CM

## 2018-01-24 DIAGNOSIS — I48.91 NEW ONSET ATRIAL FIBRILLATION (H): Primary | ICD-10-CM

## 2018-01-24 DIAGNOSIS — R79.1 SUPRATHERAPEUTIC INR: ICD-10-CM

## 2018-01-24 DIAGNOSIS — Z95.1 HISTORY OF CORONARY ARTERY BYPASS GRAFT X 3: ICD-10-CM

## 2018-01-24 DIAGNOSIS — E78.5 HYPERLIPIDEMIA WITH TARGET LDL LESS THAN 100: ICD-10-CM

## 2018-01-24 DIAGNOSIS — Z87.891 HISTORY OF TOBACCO ABUSE: ICD-10-CM

## 2018-01-24 DIAGNOSIS — I10 ESSENTIAL HYPERTENSION WITH GOAL BLOOD PRESSURE LESS THAN 140/90: ICD-10-CM

## 2018-01-24 DIAGNOSIS — Z79.01 LONG-TERM (CURRENT) USE OF ANTICOAGULANTS: ICD-10-CM

## 2018-01-24 PROBLEM — Z86.74 H/O CARDIAC ARREST: Status: ACTIVE | Noted: 2018-01-24

## 2018-01-24 PROCEDURE — G0463 HOSPITAL OUTPT CLINIC VISIT: HCPCS

## 2018-01-24 PROCEDURE — 99204 OFFICE O/P NEW MOD 45 MIN: CPT | Performed by: INTERNAL MEDICINE

## 2018-01-24 RX ORDER — ASPIRIN 81 MG/1
81 TABLET, CHEWABLE ORAL DAILY
Qty: 36 TABLET | Refills: 11 | Status: SHIPPED | OUTPATIENT
Start: 2018-01-24

## 2018-01-24 ASSESSMENT — PAIN SCALES - GENERAL: PAINLEVEL: NO PAIN (0)

## 2018-01-24 NOTE — PROGRESS NOTES
[facility] HEART CARE   CARDIOLOGY CONSULT     Kostas Cotto     Halley Hidalgo     Chief Complaint   Patient presents with     Atrial Fib     New patient, referred by Halley Hidalgo, was admitted to the hospital 1/15/2018        HPI:   Mr. Cotto is a 72-year-old gentleman who is being seen by cardiology secondary to a new diagnosis of atrial fibrillation. In addition, he has a history of hyperlipidemia, rheumatoid arthritis, history of CABG x3 in 2005, history of myocardial infarction with stent placed in 2003, hypertension, history of ventricular arrest in 2003, a questionable history of a CVA, history of tobacco abuse, abdominal aortic aneurysm status post repair, and history of myocardial infarction.    He was seen in the ER on 1/8/18 and admitted to the hospital. He was thought to have a CVA but was found to be in new onset atrial fibrillation. The family describes him as being out of sorts. He was having hard time walking down the stairs. He may have had difficulty with communicating. It sounds like he may have had a facial droop with neglect. However, he had a CT scan of his head which showed no acute changes and without evidence of intracranial hemorrhage or mass. He had an MRI of his brain on 1/8/18 that showed atrophy with no acute brain infarcts. No masses or ventricular shifts. He had an ultrasound of his carotids completed on 1/8/18.    He was treated for atrial fibrillation. He was initiated on Coumadin and Lovenox. He had been on aspirin and this was continued with history of coronary artery disease.  He is currently on metoprolol 50 mg daily. His INR was 1.23 on 110/18.  He INR was 2.34 on 1/11/18. On 1/15/18, his INR was 16.4. He was referred to the ER and given 5 mg of vitamin K which resulted in an INR of 2.1 on 1/18/18. With the exception of a subconjunctival hemorrhage, he denied concerns for bleeding.    He denies symptoms related to atrial fibrillation. He has not feel his heart racing, skipping,  fluttering, fatigue, lightheaded, or has he passed out. Up until his admission, he's never been told he has atrial fibrillation. Briefly, we discussed the newer anticoagulants. We also touched on a possible cardioversion, antiarrhythmics, and surgical options. Much of the treatment is based on symptoms and he is largely asymptomatic. A cardiac cardioversion would be beneficial if he is in atrial fibrillation all the time. His EKG today does not support atrial fibrillation.    He had ultrasounds of his carotids on 1/8/18. He does not have evidence for carotid stenosis. However, the right vertebral artery was not seen well.    He does have an a history of an abdominal aortic aneurysm measured at 6.0 cm.  He had an U/S completed on 3/26/15. He is status post repair.    He does have a history of heart disease. He denies chest pain, chest tightness, or chest discomfort. He has not had shoulder, jaw, neck, teeth, or interscapular pain.  He has not been short of breath. He had stents placed in 2003 secondary to myocardial infarction with cardiac arrest. He had bypass in 2005 x3, with LIMA to LAD, saphenous vein graft to the obtuse marginal, and saphenous vein graft to the right coronary artery. He has no additional complaints.    IMAGING RESULTS:   PROCEDURE: US CAROTID BILATERAL 1/8/2018 4:05 PM     HISTORY: Stroke;      COMPARISONS: None.     TECHNIQUE: Duplex ultrasound of the carotids     FINDINGS: On the right: There Is some mild plaquing at the carotid  bifurcations. On the right in the common carotid artery peak stalk  velocity measured 45 cm/s and diastolic velocity measured 13 cm second  in the internal carotid artery peak stalk velocity measured 64 cm/s  and diastolic velocity measured 21 cm/s. The right vertebral was not  visualized.     On the left: There is some mild plaquing at the carotid bifurcation.  In the common carotid artery peak stalk velocity measured 60 cm/s and  diastolic velocity measured 13 cm  second in the internal carotid  artery peak systolic velocity measured 62 cm/s and diastolic velocity  measured 20 cm/s forward flow is seen in the right vertebral.             IMPRESSION:   1. Non visualized right vertebral artery  2. No carotid stenoses     CHANELL MARCUS MD      ABDOMINAL AORTA ULTRASOUND     FINDINGS:  There is a large abdominal aortic aneurysm seen, maximal  transverse diameter measured 6 cm.  Iliac arteries are  non-aneurysmal.     IMPRESSION:  LARGE ABDOMINAL AORTIC ANEURYSM MEASURING 6 CM.  Exam Date: Mar 26, 2015 10:06:00 AM  Author: CHANELL MARCUS  This report is final and signed    PROCEDURE: MR BRAIN W/O & W CONTRAST 1/8/2018 5:50 PM     HISTORY: TIA;      COMPARISONS: None.     Meds/Dose Given: Gadavist 7.5 mL     TECHNIQUE: Images were obtained sagittally T1 weighted images were  obtained axially diffusion gradient echo FLAIR T1 and T2-weighted  images were obtained axially sagittally coronally T1 weighted  following gadolinium administration     FINDINGS: Diffusion-weighted images reveal no acute infarcts. There  are some enlarged ventricular system and cortical sulci most likely on  the basis of atrophy. There multiple foci overflow white matter bright  signal in both hemispheres consistent with small vessel disease. The  brainstem and cerebellum appear normal. On the enhanced scans no areas  of pathologic enhancement are seen. Cranial vault is intact. There is  a polyp or retention cyst seen in the floor the left maxillary sinus.          IMPRESSION: Atrophy. No acute brain infarcts. No masses or ventricular  shifts     CHANELL MARCUS MD         PAST MEDICAL HISTORY:   Past Medical History:   Diagnosis Date     Hypoxic-ischemic encephalopathy (HIE) 11/25/2003     Mixed hyperlipidemia 01/01/2011     Rheumatoid arthritis(714.0) 05/03/2005     S/P CABG 01/06/2005     S/P inferior MI 11/25/2003     Unspecified essential hypertension 07/16/2001          FAMILY HISTORY:   Family  History   Problem Relation Age of Onset     CANCER Father      leukemia     DIABETES Mother      CEREBROVASCULAR DISEASE Mother      cause of death          PAST SURGICAL HISTORY:   Past Surgical History:   Procedure Laterality Date     AAA REPAIR       ANGIOGRAM  1/2004    Coronary Angiogram, LAD> long mid 40-50%, D2 90% ostial,Prox % with collateal flow,Circ had restenosisand was restented x 3 again.  EF now 55% with mild lateral wall hypokineseis and inferior also.     CARDIOVASCULAR SURGERY  1/2005    Syncopal episode, Had repeat angiogam showing now circ 100% along with RCA. LAD 80-90% with EF 35%.  Sent for CABG     CARDIOVASCULAR SURGERY  1/2005    S/P 3 vess CABG, Dr Villalobos LIMA>LAD, SVG>OM1, SVG>RCA.  Post op pneumothorax with Chest Tube placement.     CARDIOVASCULAR SURGERY  11/2003    Acute Inferior MI, Had VT arrest resuscitated. Not given lytics and was sent to [facility].  IABP placed and had angiogram stented Circ x 5. EF 30%. Lad had 70%, % with contralateral collateral flow.     HC US CHEST      left     HEART/LUNG RESUSCITATION (CPR)  11/2003    S/P Cardiac Arrest V-Tach, Out of hospital arrest with no bystaner cpr.  Has polymorphic VT on arrival.  Has a prolonged resuscitation which obviously was successful.  This was due to an acute Inferior MI at the time.     OPEN REDUCTION INTERNAL FIXATION HIP  1965    ORIF frac/dislocation L hip, Was a teenager and had orif done after fracture dislocation of left hip. Occurred playing softball.     PHACOEMULSIFICATION WITH STANDARD INTRAOCULAR LENS IMPLANT  3/10/2014    Procedure: PHACOEMULSIFICATION WITH STANDARD INTRAOCULAR LENS IMPLANT;  CATARACT EXTRACTION WITH INTRA OCULAR LENS LEFT(10% SERENE/POSSIBLE STRETCH);  Surgeon: Isael Acuna MD;  Location: [facility] OR     PHACOEMULSIFICATION WITH STANDARD INTRAOCULAR LENS IMPLANT  3/25/2014    Procedure: PHACOEMULSIFICATION WITH STANDARD INTRAOCULAR LENS IMPLANT;  CATARACT EXTRACTION WITH INTRA OCULAR LENS  RIGHT/POSSIBLE PUPIL STRETCH;  Surgeon: Isael Acuna MD;  Location: [facility] OR          SOCIAL HISTORY:   Social History     Social History     Marital status:      Spouse name: N/A     Number of children: N/A     Years of education: N/A     Occupational History     Retired, [employer]      Social History Main Topics     Smoking status: Former Smoker     Smokeless tobacco: Never Used     Alcohol use No     Drug use: No     Sexual activity: Not Asked     Other Topics Concern     Blood Transfusions Yes     Caffeine Concern Yes     coffee, 2 cups daily     Parent/Sibling W/ Cabg, Mi Or Angioplasty Before 65f 55m? No     Social History Narrative          CURRENT MEDICATIONS:   Current Outpatient Prescriptions   Medication     warfarin (COUMADIN) 2.5 MG tablet     tamsulosin (FLOMAX) 0.4 MG capsule     simvastatin (ZOCOR) 20 MG tablet     metoprolol succinate (TOPROL-XL) 50 MG 24 hr tablet     ranitidine (ZANTAC) 150 MG tablet     folic acid (FOLVITE) 1 MG tablet     finasteride (PROSCAR) 5 MG tablet     lisinopril (PRINIVIL/ZESTRIL) 5 MG tablet     calcium carbonate-vitamin D (CALCIUM 500 + D) 500-400 MG-UNIT TABS tablt     traMADol (ULTRAM) 50 MG tablet     No current facility-administered medications for this visit.           ALLERGIES:   Allergies   Allergen Reactions     Oxycodone      Caused him delirium   Caused him delirium           ROS:   CONSTITUTIONAL: No weight loss, fever, chills, but he admits to weakness and fatigue.   HEENT: Eyes: No visual changes. Ears, Nose, Throat: No hearing loss, congestion or difficulty swallowing.   CARDIOVASCULAR: No chest pain, chest pressure or chest discomfort. No palpitations or lower extremity edema.   RESPIRATORY: No shortness of breath, dyspnea upon exertion, cough or sputum production.   GASTROINTESTINAL: No abdominal pain. No anorexia, nausea, vomiting or diarrhea.   NEUROLOGICAL: No headache, lightheadedness, dizziness, syncope, ataxia or weakness.    HEMATOLOGIC: No anemia, bleeding or bruising.   PSYCHIATRIC: No history of depression or anxiety.   ENDOCRINOLOGIC: No reports of sweating, cold or heat intolerance. No polyuria or polydipsia.   SKIN: No abnormal rashes or itching.       PHYSICAL EXAM:   GENERAL: The patient is a well-developed, well-nourished, in no apparent distress. Alert and oriented x3. His speech is reduced.   HEENT: Head is normocephalic and atraumatic. Eyes are symmetrical with normal visual tracking.   HEART: Regular rate and rhythm, S1S2 present without murmur, rub or gallop.   LUNGS: Respirations regular and unlabored. Clear to auscultation.   GI: Abdomen is soft and non distended.    EXTREMITIES: No peripheral edema present.   MUSCULOSKELETAL: No joint swelling.   NEUROLOGIC: Alert and oriented X3.   SKIN: No jaundice. No rashes or visible skin lesions present.       EKG:    EKG from 1/24/18.  This shows left anterior fascicular block.  Right bundle branch block.  Bifascicular block.  Lateral/inferior infarct.  First-degree AV block.  Left axis deviation.    LAB RESULTS:   Anticoagulation Therapy Visit on 01/22/2018   Component Date Value Ref Range Status     INR Protime 01/22/2018 3.7* 0.86 - 1.14 Final   Anticoagulation Therapy Visit on 01/18/2018   Component Date Value Ref Range Status     INR Protime 01/18/2018 2.1* 0.86 - 1.14 Final   Orders Only on 01/15/2018   Component Date Value Ref Range Status     INR 01/15/2018 16.40* 0.80 - 1.20 Final          ASSESSMENT:   Mr. Cotto is a 72-year-old gentleman who is being seen by cardiology secondary to a new diagnosis of atrial fibrillation.       Impression:  1.  Atrial fib.  2.  Hyperlipidemia.  3.  Rheumatoid arthritis.  4.  History of CABG x3 in 2005.  5.  History of myocardial infarction with stent placed in 2003.  6.  Hypertension.  7.  History of ventricular arrest in 2003.  8.  Questionable history of a CVA.  9.  History of tobacco abuse.  10.  Abdominal aortic aneurysm status  post repair.  11.  History of myocardial infarction.      PLAN:   1.  He and his wife will look into one of the NOAC's to see if they are covered with his insurance. They will call if they would like to change from warfarin. They were given the names of the medications.   2.  Meds vs surgery was discussed for a-fib. He is largely asymptomatic and will cont with metoprolol 50 mg daily.  3.  He should decrease the aspirin from 325 mg to 81 mg.   4.  He will be seen in 3 months follow-up.         Thank you for allowing me to participate in the care of your patient. Please do not hesitate to contact me if you have any questions.     Oni Ortega

## 2018-01-24 NOTE — TELEPHONE ENCOUNTER
Patient is requesting to change to Eliquis for anticoagulation. Prescription called into [employer] for Eliquis 5 mg BID  and 3 Refills. Patient is instructed not to begin Eliquis until the INR is < 2.0. Verbalized understanding. Coumadin clinic also notified.   Renetta Galarza RN-BSN

## 2018-01-24 NOTE — TELEPHONE ENCOUNTER
Patient will be discontinuing Warfarin for anticoagulation and beginning Eliquis per Dr. Ortega. Patient will begin Eliquis when INR is <2.0.    Please instruct patient on stopping Warfarin.   Thanks. Renetta

## 2018-01-24 NOTE — MR AVS SNAPSHOT
After Visit Summary   1/24/2018    Kostas Cotto    MRN: [id number]           Patient Information     Date Of Birth          1945        Visit Information        Provider Department      1/24/2018 10:00 AM Oni Ortega, DO [facility] [place]        Today's Diagnoses     Hospital discharge follow-up        New onset atrial fibrillation (H)        Essential hypertension with goal blood pressure less than 140/90          Care Instructions    You were seen by Dr. Ortega, 1/24/2018.     1.  Please continue the use of Coumadin, this medication reduces the risk of stroke due to atrial fibrillation.  You may consider the use of NOACs (New Oral Anticoagulants). This medication works similar to coumadin to keep the blood from clotting and causing stroke. You may consider Eliquis, Pradaxa, Xarelto, or [place] for anticoagulation. To change from coumadin to a NOAC your INR must be less than 2.0.     2. Atrial fibrillation is also treated by surgical intervention called an ablation. This is done for symptom control. This procedure is done at the [facility]. If you are not able to tolerate symptoms related to atrial fibrillation.     3. Continue all other medications as they have been prescribed.     4. Please decrease Aspirin to 81 mg daily, this medication helps to reduce the risk of heart attack and stroke.     5. Please discontinue Aspirin 325 mg daily.     6. Please begin Aspirin 81 mg daily chewable. This form has found to be the most effective.         You will follow up with Dr. Ortega in 3 months.       Please call the cardiology office with problems, questions, or concerns at [phone].    If you experience chest pain, chest pressure, chest tightness, shortness of breath, fainting, lightheadedness, nausea, vomiting, or other concerning symptoms, please report to the Emergency Department or call 911. These symptoms may be emergent, and best treated in the Emergency Department.        Renetta DOS SANTOS RN-BSN  Cardiology   [facility]  [phone]          Understanding Atrial Fibrillation  What is atrial fibrillation?  Atrial fibrillation is an irregular heartbeat. It is fairly common, but it can be serious.   The atria are the two upper chambers of the heart. Normally, they have a steady, constant beat.   If the beat is rapid and uneven, we call this atrial fibrillation. It may feel as if your heart is racing out of control. This can cause discomfort, but the real danger is that blood can pool and form clots in the heart.   If a piece of a blood clot breaks loose, it may travel through the arteries until it gets stuck. This could block blood flow to an organ or other body part. If it blocks a heart artery, you could have a heart attack. If it blocks a brain artery, you could have a stroke.   What causes it?  Atrial fibrillation can be caused by:    Heart disease, such as coronary artery disease, myocarditis (inflammation of the heart muscle), rheumatic heart disease or heart valve disorder    Heart defects you were born with    A long history of high blood pressure    Swelling caused by an injury near the heart.  Sometimes it seems to happen for no reason.  What are the symptoms?  Not everyone feels symptoms, but if they do, symptoms may include:    Fast, fluttering or irregular heartbeat    Chest pain    Trouble breathing, or a sense that you can't catch your breath    Feeling weak or dizzy    Feeling far more tired than normal    Cold sweats.  How is it diagnosed?  Your doctor will do an electrocardiogram (EKG). Electrodes will be place on your wrists, ankles and chest. Wires connect the electrodes to an EKG machine, which will record electrical signals from your heart.  How is it treated?  Treatment is important to reduce the risk of stroke, heart attack or other tissue damage.    A number of medicines are used to treat atrial fibrillation. Some slow your heart rate. Some help  change the heartbeat back to normal. Some help keep blood clots from forming.    An electric charge may be used to get your heart back to its regular beat.    In some cases, a pacemaker or another implanted device can be used to control your heartbeat.  For informational purposes only. Not to replace the advice of your health care provider.   Copyright   2006 [facility]. All rights reserved. [employer] 449601 - REV 07/17.    Using Blood Thinners (Anticoagulants)  Blood thinners or anticoagulants are medicines that help prevent blood clots from forming. They include warfarin, heparin, dabigatran, rivaroxaban, apixaban, and edoxaban. Your healthcare provider will help you decide which medicine is best for you.    Taking an anticoagulant safely  When you are taking a blood thinner, you will need to take certain steps to stay safe. Too much blood thinner puts you at risk for bleeding. Too little puts you at risk for stroke. Follow these guidelines. Also follow any others that your healthcare provider gives you.    You may be told you need regular lab testing while taking these medicines. Warfarin requires routine testing while the other medicines do not.    Tell your doctor about all medicines you take. This includes over-the-counter medicines, supplements, or herbal remedies. Don't take any medicines (including ones you buy over-the-counter) that your doctor doesn t know about. Some medicines can interact with blood thinners and cause serious problems.    Tell any healthcare provider that you see for care (such as doctors, dentists, chiropractors, home health nurses) that you take a blood thinner.    Carry a medical ID card or wear a medical-alert bracelet that says you take an anticoagulant.    Before taking aspirin, check with your doctor. Aspirin can significantly increase your risk of bleeding.    This medicine makes bleeding harder to stop. To protect yourself:    Don't do any activities that may  cause injury. If you fall or are injured, contact your healthcare provider right away. Blood thinners prevent clotting, so you could be bleeding inside without realizing it.    Use a soft-bristle toothbrush and waxed dental floss. Shave with an electric razor rather than a blade.    Don t go barefoot. Don t trim corns or calluses yourself.  Warfarin: Other important information  Several precautions are especially important when you are taking warfarin. Always keep these points in mind:    Be sure to follow your healthcare provider's instructions for taking warfarin.    Take this medicine at the same time each day. Take it with a full glass of water, with or without food. If you miss a dose, contact your doctor to find out how much to take. Don't take a double dose.    Warfarin is an effective drug, but it can be dangerous if not taken properly. It makes your blood less likely to form clots. If you take too much, it can cause serious internal or external bleeding.    You will need to have regular monitoring while you are taking warfarin. This includes blood tests to check your international normalized ratio (INR) and prothrombin time (PT). These tests show how quickly your blood clots. You will also have a complete blood count (CBC) periodically. This looks at your blood and platelet levels. Both of these need to be followed while you're on warfarin. Talk with your healthcare provider about whether you need to visit the clinic every week, or if services are available for monitoring in your home.    Certain medicines can affect your INR and PT levels. Tell your healthcare provider if there are any changes in your medicines. This includes any over-the-counter medicines, supplements like vitamin K, or herbal remedies.    Your diet can also affect your INR and PT levels. Because of this, it's important to eat a consistent diet. It is especially important to eat a consistent amount of foods that are high in vitamin K. Be  sure to talk with your healthcare provider before making any big changes in your diet.    Remember that warfarin increases your risk of bleeding. Be careful not to injure yourself. If you have a significant injury, contact your healthcare provider right away. It's important to alert your doctor if you've fallen or hurt yourself, even if you don't break your skin. You could be bleeding inside your body without realizing it.     Warfarin: Watch your INR/PT blood levels  Two tests are used to find out how your blood is clotting. One is protime (PT), the other is the international normalized ratio (INR).    Go for your blood tests (INR/PT) as often as directed. Your diet and the other medicines you take can affect your INR/PT levels.    Your INR was between ___ and ___.    Ask your doctor what your goal INR is. My goal INR is between ___ and ___.    My next INR/PT blood draw is due on _____________ (date) at ___________ (time) by ___________ (name of doctor or clinic).    The name of the doctor who is monitoring my anticoagulation therapy is _____________________ and the phone number is _________________.    Follow up with your doctor or as advised by his or her staff. It usually takes a few hours for your doctor to get the results of your clotting tests. Call to get your lab results to find out if your doctor needs to make further changes to your warfarin dose.    If your blood is drawn for these tests at a location other than your doctor's office, tell your doctor as soon as you get your lab results.   Warfarin: Watch what you eat  Vitamin K helps your blood clot. So you have to watch how much you eat of foods that contain vitamin K. These foods can affect the way warfarin works. They don't affect the other non-warfarin blood thinners. Here are some specific tips:    Try to keep your diet about the same each day. If you change your diet for any reason, such as for illness or to lose weight, be sure to tell your  doctor.    Each day, eat the same amount of foods that are high in vitamin K. These include asparagus, avocado, broccoli, cabbage, kale, spinach, and some other leafy green vegetables. Oils, such as soybean, canola, and olive oils, are also high in vitamin K.    Limit fats to 2 to 4 tablespoons a day.    Ask your healthcare provider if you should not drink alcohol while you are taking a blood thinner.    Avoid teas that contain sweet clover, sweet cecil, or tonka beans. These can affect how your medicine works.    Talk with your healthcare provider and pharmacist about specific foods or special diets that can affect anticoagulant levels. These include grapefruit juice, cranberries and cranberry juice, fish oil supplements, garlic, estrella, licorice, turmeric, and herbal teas and supplements.  Talk with your healthcare provider if you have concerns about these or other food products and their effects on warfarin.     When to call your healthcare provider  Call your provider right away if you have any of these:    Bleeding that doesn t stop in 10 minutes    A heavier-than-normal menstrual period or bleeding between periods    Coughing or throwing up blood    Bloody diarrhea or bleeding hemorrhoids     Dark-colored urine or black stools    Red or black-and-blue marks on the skin that get larger    Dizziness or fatigue    Chest pain or trouble breathing  Allergic reactions:    Rash    Itching    Swelling    Trouble swallowing or breathing   Medical conditions and anticoagulants  Before starting a blood thinner, be sure your doctor knows if you have any of these conditions:    Stomach ulcer now or in the past    Vomited blood or had bloody stools (black or red color)    Aneurysm, pericarditis, or pericardial effusion    Blood disorder    Recent surgery, stroke, mini-stroke, or spinal puncture    Kidney or liver disease, uncontrolled high blood pressure, diabetes, vasculitis, heart failure, lupus, or other  collagen-vascular disease, or high cholesterol    Pregnancy or breastfeeding    Younger than 18 years old    Recent or planned dental procedure  Drug interactions and anticoagulants  Many medicines interfere with the effect of blood thinners. Before starting these medicines, be sure your doctor knows about any prescription, over-the-counter, or herbal supplements you take. In particular, tell your provider about:    Antibiotics    Heart medicines    Cimetidine    Aspirin or other anti-inflammatory drugs such as ibuprofen, naproxen, ketoprofen, or other arthritis medicines    Drugs for depression, cancer, HIV (protease inhibitors), diabetes, seizures, gout, high cholesterol, or thyroid replacement    Vitamins containing vitamin K or herbal products such as ginkgo, Co-Q10, garlic, or Paxtang's wort     Note: This information topic may not include all directions, precautions, medical conditions, drug/food interactions, and warnings for these medicines. Check with your doctor, nurse, or pharmacist for any questions you have.    Date Last Reviewed: 7/1/2017 2000-2017 [employer]. [address] [place] [address]. All rights reserved. This information is not intended as a substitute for professional medical care. Always follow your healthcare professional's instructions.          Taking Coumadin - CORE MEASURES  Coumadin (warfarin) helps keep your blood from clotting. It is used to reduce the risk for stroke, heart attack, or a blood clot passing to your lung. Coumadin also increases your risk of bleeding. Because of this, it must be taken exactly as directed by your doctor. You also need to protect yourself from injury.    Follow These Tips    Take this medicine at the same time each day. Take it with a full glass of water, with or without food. If you miss a dose, contact your doctor immediately to find out how much to take. Never take a double dose.    Warfarin is an effective drug, but it  can be dangerous if not taken properly. It makes your blood less likely to form clots. If you take too much, it can cause too much internal or external bleeding.    Be sure to tell all of your doctors that you take Coumadin. If you will be taking Coumadin for quite a while, carry an ID card or get a Medic-Alert bracelet. This will alert medical staff in case you aren t able to do so yourself. Also carry with you the name and number of the person to contact in case of an emergency.    You will need to have regular blood tests to measure the effects of the warfarin. Keep your scheduled test appointment and be sure to talk with your doctor afterward to find out your results. Your doctor may need to change your dose. Follow your doctor s advice exactly about how to take this medicine. Do not stop the medicine without talking with your doctor.  Monitoring Your PT/INR Blood Levels After Discharge  Two tests are used to find out how your blood is clotting. One is protime (PT) and the other is the international normalized ratio (INR).    Go for your blood (PT/INR) tests as often as directed. Note that diet and medication can affect your PT/INR level.    Your INR was between _____ and _____ .    Ask your doctor what your goal INR is. My goal INR is between _____ and _____.    My next PT/INR blood draw is due on _________________ (date) at ________________ (time) by ____________________ (name of doctor or clinic).    The name of the doctor who is monitoring my anticoagulation therapy is ______________________ and the phone number is ___________________.    Follow up with your doctor or as advised by his or her staff. It usually takes a few hours for your doctor to get the results of your clotting tests. Please call to get your lab results and find out if your doctor needs to make further changes to your Coumadin dose.    If your labs (PT/INR) are drawn at a location other than your doctor's office, please remember to tell  your doctor as soon as you get your lab results.      What to Do at Home  1. Adjust your Coumadin dose as directed by your doctor, ____________________ (name of doctor).  2. Have another clotting test done every _______ days at _____________________ (name of clinic) by ____________________ (name of doctor).  3. Do not go barefoot. Always wear shoes.  4. Do not trim corns or calluses yourself.  5. Always talk with your doctor before taking any herbs, vitamins, or prescription or over-the-counter (OTC) medications, especially aspirin and nonsteroidal anti-inflammatory drugs.  6. Always talk with your doctor before stopping any medication or changing the dose of any of your medications.  7. Use an electric razor instead of a manual one.  8. Use a soft-bristled toothbrush and waxed floss.  9. Avoid major changes in your diet.      When to Call Your Health Care Provider  Coumadin increases your risk of bleeding. Call your health care provider right away before you take your next dose of Coumadin if you have any of these problems:    Bleeding that doesn t stop in 10 minutes    A heavier-than-normal period or bleeding between periods    Coughing or throwing up blood or something that looks like coffee grounds    Nausea, bloating, diarrhea, or bleeding hemorrhoids    Bleeding hemorrhoids    Dark red or brown urine    Red or black tarry stools    Red or black-and-blue marks on the skin that get larger    A fever or an illness that gets worse    Dizziness, headache, weakness, or fatigue    Chest pain or trouble breathing    A serious fall or a blow to the head    Swelling or pain after an injury or at an injection site    Bleeding gums after brushing your teeth  Keep Your Diet Steady  Keep your diet pretty much the same each day. That s because many foods contain vitamin K. Vitamin K helps your blood clot. So eating foods that contain vitamin K can affect the way Coumadin works. You don t need to avoid foods that have vitamin  K. But you do need to keep the amount of them you eat steady (about the same day to day). If you change your diet for any reason, such as due to illness or to lose weight, be sure to tell your doctor.    Examples of foods high in vitamin K are asparagus, avocado, broccoli, cabbage, kale, spinach, and some other leafy green vegetables. Oils, such as soybean, canola, and olive oils, are also high in vitamin K.    Other food products can affect the way Coumadin works in your body:    Food products that may affect blood clotting include cranberries and cranberry juice, fish oil supplements, garlic, estrella, licorice, and turmeric.    Herbs used in herbal teas or supplements can also affect blood clotting. Keep the amount of herbal teas and supplements you use steady.    Alcohol can increase the effect of Coumadin in your body.  Talk with your health care provider if you have concerns about these or other food products and their effects on Coumadin.  What to Watch For  If you have any of these signs or reactions, call your doctor right away or go to the hospital.  Signs of too much bleeding:    More bruising than normal    Prolonged bleeding from cuts    Bleeding from the nose or gums    Blood in your urine, vomit, or stools (red or black color)    Coughing up blood    Unusually heavy menstrual bleeding    Sudden change to a dark or purplish color in your toes or any other area of your body  Allergic Reactions:    Rash    Itching    Swelling    Trouble swallowing or breathing  ---------- IMPORTANT ----------  Medical Conditions  Before starting this medicine, be sure your doctor knows if you have any of these conditions:    Stomach ulcer now or in the past    Vomited blood or had bloody stools (black or red color)    Aneurysm, pericarditis, or pericardial effusion    Blood disorder    Recent surgery, stroke, mini-stroke, or spinal puncture    Kidney or liver disease, uncontrolled high blood pressure, diabetes, vasculitis,  congestive heart failure, lupus or other collagen-vascular disease, or high cholesterol    Pregnancy or breastfeeding    Younger than 18 years old    Recent or planned dental procedure  Drug Interactions  Many medicines interfere with the effect of Coumadin. Before starting this medicine, be sure your doctor knows about any prescription, OTC, or herbal drugs you are taking. This is especially true if you are taking:    Antibiotics    Heart medicines    Cimetidine (Tagamet)    Aspirin or other anti-inflammatory drugs such as ibuprofen (Advil, Motrin, or Nuprin), naproxen (Aleve, Naprosyn, Anaprox), ketoprofen (Orudis, Oruvail), or other arthritis medicines    Drugs for depression, cancer, HIV (protease inhibitors), diabetes, seizures, gout, high cholesterol, or thyroid replacement    Vitamins containing Vitamin K or herbal products such as ginkgo, Q10, garlic, or Bigg's wort  [NOTE: This information topic may not include all directions, precautions, medical conditions, drug/food interactions, and warnings for this drug. Check with your doctor, nurse, or pharmacist for any questions that you may have.]          3362-5274 [employer]. [address] [place] [address]. All rights reserved. This information is not intended as a substitute for professional medical care. Always follow your healthcare professional's instructions.  This information has been modified by your health care provider with permission from the publisher.    Having Catheter Ablation  A heart rhythm problem (arrhythmia) can make your heart beat too fast or in an irregular pattern. The problem is often caused by cells in your heart that aren t working as they should. Or, it may be the result of an abnormal electric circuit. It may cause bothersome symptoms, such as an irregular heartbeat, dizziness, shortness of breath, chest pain, or fainting. Your doctor has recommended catheter ablation to treat your arrhythmia. This  non-surgical procedure destroys the cells that are causing the problem.  Before the procedure    Before your catheter ablation, you will meet with a specially trained heart doctor (cardiac electrophysiologist) who will do the procedure. He or she will tell you how to get ready. You will likely be told to stop or change your heart rhythm medicines for a period of time before the procedure. Follow your doctor s instructions. Also:    Tell the doctor about all prescription and over-the-counter medicines you take. This includes herbs, supplements, and vitamins. It also includes daily medicines, such as insulin or blood thinners. If you are allergic to any medicines, tell the doctor.    Have any routine tests, such as blood tests, as recommended.    Don t eat or drink anything 12 hours before the procedure.  How catheter ablation is done  Catheter ablation uses thin, flexible wires (electrode catheters) to find and destroy (ablate) problem cells. Here s how the procedure is done:    The heart s signals are mapped. To find the problem, an electrophysiology study (EPS) is done. During this study, the doctor tries to start (induce) your arrhythmia. An electrical map of the heart is then created. This shows the type of arrhythmia you have and where the problem is. Using the map as a guide, the doctor knows where to ablate.    Problem areas are destroyed using heat or cold therapy. Once the EPS shows where the problem is, the doctor threads an electrode catheter through a blood vessel to that area in the heart. Energy is sent through the catheter to destroy the problem cells.    The heart s rhythm is tested again. After ablating the problem cells, the doctor tries to restart (reinduce) your arrhythmia. If a fast rhythm can t be induced, the ablation is a success. But if a fast rhythm does start again, you may need more ablation.  Your experience during catheter ablation  In most cases, catheter ablation is done in an  electrophysiology (EP) lab. It often takes 2 to 4 hours, and sometimes longer. You ll receive medicine to prevent pain. Medicine will also help you relax or sleep during the procedure. If you feel uncomfortable during the procedure, tell the doctor or nurse:    Getting started. The healthcare team washes the skin on your groin (or rarely, the neck). Any hair in that area may be removed. This is where the catheters will be inserted. An IV (intravenous) line is started in your arm. Medicines and fluids are given through this IV. To help keep the insertion site germ-free (sterile), your body is draped with sheets. Only the area where the catheters will be inserted is exposed.    Inserting the catheters. The healthcare provider numbs the skin where the catheters will be inserted with pain medicine (local anesthetic). Then the provider uses a small needle to make punctures in your vein or artery. He or she puts catheters through these punctures and guides them to your heart. The provider uses X-ray monitors to help guide the catheters.    The provider then puts wires in several places in the heart to map the electrical signals. The wires also stimulate the heart.    Finishing up. When the procedure is finished, the provider takes the catheters out of your body. He or she puts pressure on the puncture sites to stop any bleeding. No stitches are needed. You re then taken to a recovery room to rest. You'll need to remain lying down for 2 to 6 hours. You'll also be asked not to move the leg where the catheters were inserted for a few hours. This is to make sure the insertion sites don't bleed.  Risks and complications  The risks of catheter ablation are fairly low compared to the benefits you receive. Discuss these risks with your doctor before the procedure. Possible risks and complications include:    Bleeding or bruising at the catheter insertion site    Blood clots    A slow heart rhythm (requiring a permanent  pacemaker)    Perforation of the heart muscle, blood vessel, or lung (may require an emergency procedure)    Damage to a heart valve (rare)    Stroke or heart attack, also known as acute myocardial infarction, or AMI (rare)    Infection, which is a risk after any invasive procedure. This may be indicated by a fever of 100.4 F (38.0 C) or higher, drainage, or redness and pain at the catheter insertion site.    Death (extremely rare)   Date Last Reviewed: 5/1/2016 2000-2017 [employer]. [address] [place] [address]. All rights reserved. This information is not intended as a substitute for professional medical care. Always follow your healthcare professional's instructions.                Follow-ups after your visit        Your next 10 appointments already scheduled     Jan 26, 2018 11:00 AM CST   Evaluation with Stefani Clancy, PT   [facility] ([facility] )    [address] [id number] [phone]            Jan 29, 2018  9:30 AM CST   Anticoagulation Visit with  ANTI COAGULATION   [facility] ([facility] )    [address] [place] [id number] [phone]              Who to contact     If you have questions or need follow up information about today's clinic visit or your schedule please contact [facility] directly at [phone].  Normal or non-critical lab and imaging results will be communicated to you by MyChart, letter or phone within 4 business days after the clinic has received the results. If you do not hear from us within 7 days, please contact the clinic through MyChart or phone. If you have a critical or abnormal lab result, we will notify you by phone as soon as possible.  Submit refill requests through [employer] or call your pharmacy and they will forward the refill request to us. Please allow 3 business days for your refill to be completed.          Additional Information About  "Your Visit        MyChart Information     [employer] lets you send messages to your doctor, view your test results, renew your prescriptions, schedule appointments and more. To sign up, go to [email or web address] . Click on \"Log in\" on the left side of the screen, which will take you to the Welcome page. Then click on \"Sign up Now\" on the right side of the page.     You will be asked to enter the access code listed below, as well as some personal information. Please follow the directions to create your username and password.     Your access code is: [email or web address]  Expires: 4/10/2018 12:47 PM     Your access code will  in 90 days. If you need help or a new code, please call your [facility] clinic or [phone].        Care EveryWhere ID     This is your Care EveryWhere ID. This could be used by other organizations to access your [facility] medical records  [id number]        Your Vitals Were     Pulse Height Pulse Oximetry BMI (Body Mass Index)          81 1.829 m (6') 95% 25.09 kg/m2         Blood Pressure from Last 3 Encounters:   18 90/59   18 114/66   01/15/18 122/67    Weight from Last 3 Encounters:   18 83.9 kg (185 lb)   18 82.4 kg (181 lb 10.5 oz)   10/17/16 87.5 kg (193 lb)              Today, you had the following     No orders found for display         Today's Medication Changes          These changes are accurate as of 18 10:47 AM.  If you have any questions, ask your nurse or doctor.               Stop taking these medicines if you haven't already. Please contact your care team if you have questions.     aspirin 325 MG tablet   Stopped by:  Oni Ortega, DO                    Primary Care Provider Office Phone # Fax #    NAVA [place] [phone]       [facility] [address] [facility] [address]  [facility] [address]        Equal Access to Services     [facility] Center[facility]ENNA AH: Hadii aad ku hadasho Sojulio, waaxda luqadaha, qaybta kaalmada adeaniyahyada, " [place] sarmiento bruno rodriguez'aan ah. So [facility] [phone].    ATENCIÓN: Si margaret donato, tiene a rahman disposición servicios gratuitos de asistencia lingüística. Chuck keith [phone].    We comply with applicable federal civil rights laws and [place] laws. We do not discriminate on the basis of race, color, national origin, age, disability, sex, sexual orientation, or gender identity.            Thank you!     Thank you for choosing [facility]  for your [facility]. Our goal is always to provide you with excellent care. Hearing back from our patients is one way we can continue to improve our services. Please take a few minutes to complete the written survey that you may receive in the mail after your visit with us. Thank you!             Your Updated Medication List - Protect others around you: Learn how to safely use, store and throw away your medicines at www.disposemymeds.org.          This list is accurate as of 1/24/18 10:47 AM.  Always use your most recent med list.                   Brand Name Dispense Instructions for use Diagnosis    calcium 500 + D 500-400 MG-UNIT Tabs per tablet   Generic drug:  calcium carbonate-vitamin D      2 times daily Take one tablet by oral route every day        finasteride 5 MG tablet    PROSCAR    30 tablet    Take 1 tablet (5 mg) by mouth daily    Benign prostatic hyperplasia with urinary retention       folic acid 1 MG tablet    FOLVITE    90 tablet    Take 1 tablet (1,000 mcg) by mouth daily    Health care maintenance       lisinopril 5 MG tablet    PRINIVIL/ZESTRIL    30 tablet    TAKE 1 TABLET BY MOUTH DAILY    Essential hypertension with goal blood pressure less than 140/90       metoprolol succinate 50 MG 24 hr tablet    TOPROL-XL    90 tablet    TAKE 1 TABLET (50 MG) BY MOUTH DAILY    Essential hypertension with goal blood pressure less than 140/90       ranitidine 150 MG tablet    ZANTAC    90 tablet    Take 1 tablet (150 mg) by mouth daily     Gastroesophageal reflux disease with esophagitis       simvastatin 20 MG tablet    ZOCOR    90 tablet    TAKE 1 TABLET BY MOUTH EVERY EVENING - GENERIC FOR ZOCOR    Hyperlipidemia with target LDL less than 100       tamsulosin 0.4 MG capsule    FLOMAX    90 capsule    Take 1 capsule (0.4 mg) by mouth daily    Benign prostatic hyperplasia with urinary retention       traMADol 50 MG tablet    ULTRAM    30 tablet    Take 1-2 tablets ( mg) by mouth every 6 hours as needed for pain maximum 3 tablet(s) per day    Left leg pain       warfarin 2.5 MG tablet    COUMADIN    30 tablet    2.5mg 1/18, 1 /19,  1.25mg 1/20, 1/21 then as directed by warfarin clinic    Long term current use of anticoagulant therapy, History of CVA (cerebrovascular accident)

## 2018-01-24 NOTE — NURSING NOTE
Chief Complaint   Patient presents with     Atrial Fib     New patient, referred by Halley Hidalgo, was admitted to the hospital 1/15/2018       Initial BP 90/59 (BP Location: Right arm, Patient Position: Chair, Cuff Size: Adult Large)  Pulse 81  Ht 1.829 m (6')  Wt 83.9 kg (185 lb)  SpO2 95%  BMI 25.09 kg/m2 Estimated body mass index is 25.09 kg/(m^2) as calculated from the following:    Height as of this encounter: 1.829 m (6').    Weight as of this encounter: 83.9 kg (185 lb).  Medication Reconciliation: completecomplete  ARI DOUGLAS

## 2018-01-24 NOTE — PATIENT INSTRUCTIONS
You were seen by Dr. Ortega, 1/24/2018.     1.  Please continue the use of Coumadin, this medication reduces the risk of stroke due to atrial fibrillation.  You may consider the use of NOACs (New Oral Anticoagulants). This medication works similar to coumadin to keep the blood from clotting and causing stroke. You may consider Eliquis, Pradaxa, Xarelto, or [place] for anticoagulation. To change from coumadin to a NOAC your INR must be less than 2.0.     2. Atrial fibrillation is also treated by surgical intervention called an ablation. This is done for symptom control. This procedure is done at the [facility]. If you are not able to tolerate symptoms related to atrial fibrillation.     3. Continue all other medications as they have been prescribed.     4. Please decrease Aspirin to 81 mg daily, this medication helps to reduce the risk of heart attack and stroke.     5. Please discontinue Aspirin 325 mg daily.     6. Please begin Aspirin 81 mg daily chewable. This form has found to be the most effective.         You will follow up with Dr. Ortega in 3 months.       Please call the cardiology office with problems, questions, or concerns at [phone].    If you experience chest pain, chest pressure, chest tightness, shortness of breath, fainting, lightheadedness, nausea, vomiting, or other concerning symptoms, please report to the Emergency Department or call 911. These symptoms may be emergent, and best treated in the Emergency Department.       Renetta DOS SANTOS RN-BSN  Cardiology   [facility]  [phone]          Understanding Atrial Fibrillation  What is atrial fibrillation?  Atrial fibrillation is an irregular heartbeat. It is fairly common, but it can be serious.   The atria are the two upper chambers of the heart. Normally, they have a steady, constant beat.   If the beat is rapid and uneven, we call this atrial fibrillation. It may feel as if your heart is racing out of control. This can  cause discomfort, but the real danger is that blood can pool and form clots in the heart.   If a piece of a blood clot breaks loose, it may travel through the arteries until it gets stuck. This could block blood flow to an organ or other body part. If it blocks a heart artery, you could have a heart attack. If it blocks a brain artery, you could have a stroke.   What causes it?  Atrial fibrillation can be caused by:    Heart disease, such as coronary artery disease, myocarditis (inflammation of the heart muscle), rheumatic heart disease or heart valve disorder    Heart defects you were born with    A long history of high blood pressure    Swelling caused by an injury near the heart.  Sometimes it seems to happen for no reason.  What are the symptoms?  Not everyone feels symptoms, but if they do, symptoms may include:    Fast, fluttering or irregular heartbeat    Chest pain    Trouble breathing, or a sense that you can't catch your breath    Feeling weak or dizzy    Feeling far more tired than normal    Cold sweats.  How is it diagnosed?  Your doctor will do an electrocardiogram (EKG). Electrodes will be place on your wrists, ankles and chest. Wires connect the electrodes to an EKG machine, which will record electrical signals from your heart.  How is it treated?  Treatment is important to reduce the risk of stroke, heart attack or other tissue damage.    A number of medicines are used to treat atrial fibrillation. Some slow your heart rate. Some help change the heartbeat back to normal. Some help keep blood clots from forming.    An electric charge may be used to get your heart back to its regular beat.    In some cases, a pacemaker or another implanted device can be used to control your heartbeat.  For informational purposes only. Not to replace the advice of your health care provider.   Copyright   2006 [employer]. All rights reserved. [employer] 045312 - REV 07/17.    Using Blood Thinners  (Anticoagulants)  Blood thinners or anticoagulants are medicines that help prevent blood clots from forming. They include warfarin, heparin, dabigatran, rivaroxaban, apixaban, and edoxaban. Your healthcare provider will help you decide which medicine is best for you.    Taking an anticoagulant safely  When you are taking a blood thinner, you will need to take certain steps to stay safe. Too much blood thinner puts you at risk for bleeding. Too little puts you at risk for stroke. Follow these guidelines. Also follow any others that your healthcare provider gives you.    You may be told you need regular lab testing while taking these medicines. Warfarin requires routine testing while the other medicines do not.    Tell your doctor about all medicines you take. This includes over-the-counter medicines, supplements, or herbal remedies. Don't take any medicines (including ones you buy over-the-counter) that your doctor doesn t know about. Some medicines can interact with blood thinners and cause serious problems.    Tell any healthcare provider that you see for care (such as doctors, dentists, chiropractors, home health nurses) that you take a blood thinner.    Carry a medical ID card or wear a medical-alert bracelet that says you take an anticoagulant.    Before taking aspirin, check with your doctor. Aspirin can significantly increase your risk of bleeding.    This medicine makes bleeding harder to stop. To protect yourself:    Don't do any activities that may cause injury. If you fall or are injured, contact your healthcare provider right away. Blood thinners prevent clotting, so you could be bleeding inside without realizing it.    Use a soft-bristle toothbrush and waxed dental floss. Shave with an electric razor rather than a blade.    Don t go barefoot. Don t trim corns or calluses yourself.  Warfarin: Other important information  Several precautions are especially important when you are taking warfarin. Always  keep these points in mind:    Be sure to follow your healthcare provider's instructions for taking warfarin.    Take this medicine at the same time each day. Take it with a full glass of water, with or without food. If you miss a dose, contact your doctor to find out how much to take. Don't take a double dose.    Warfarin is an effective drug, but it can be dangerous if not taken properly. It makes your blood less likely to form clots. If you take too much, it can cause serious internal or external bleeding.    You will need to have regular monitoring while you are taking warfarin. This includes blood tests to check your international normalized ratio (INR) and prothrombin time (PT). These tests show how quickly your blood clots. You will also have a complete blood count (CBC) periodically. This looks at your blood and platelet levels. Both of these need to be followed while you're on warfarin. Talk with your healthcare provider about whether you need to visit the clinic every week, or if services are available for monitoring in your home.    Certain medicines can affect your INR and PT levels. Tell your healthcare provider if there are any changes in your medicines. This includes any over-the-counter medicines, supplements like vitamin K, or herbal remedies.    Your diet can also affect your INR and PT levels. Because of this, it's important to eat a consistent diet. It is especially important to eat a consistent amount of foods that are high in vitamin K. Be sure to talk with your healthcare provider before making any big changes in your diet.    Remember that warfarin increases your risk of bleeding. Be careful not to injure yourself. If you have a significant injury, contact your healthcare provider right away. It's important to alert your doctor if you've fallen or hurt yourself, even if you don't break your skin. You could be bleeding inside your body without realizing it.     Warfarin: Watch your INR/PT blood  levels  Two tests are used to find out how your blood is clotting. One is protime (PT), the other is the international normalized ratio (INR).    Go for your blood tests (INR/PT) as often as directed. Your diet and the other medicines you take can affect your INR/PT levels.    Your INR was between ___ and ___.    Ask your doctor what your goal INR is. My goal INR is between ___ and ___.    My next INR/PT blood draw is due on _____________ (date) at ___________ (time) by ___________ (name of doctor or clinic).    The name of the doctor who is monitoring my anticoagulation therapy is _____________________ and the phone number is _________________.    Follow up with your doctor or as advised by his or her staff. It usually takes a few hours for your doctor to get the results of your clotting tests. Call to get your lab results to find out if your doctor needs to make further changes to your warfarin dose.    If your blood is drawn for these tests at a location other than your doctor's office, tell your doctor as soon as you get your lab results.   Warfarin: Watch what you eat  Vitamin K helps your blood clot. So you have to watch how much you eat of foods that contain vitamin K. These foods can affect the way warfarin works. They don't affect the other non-warfarin blood thinners. Here are some specific tips:    Try to keep your diet about the same each day. If you change your diet for any reason, such as for illness or to lose weight, be sure to tell your doctor.    Each day, eat the same amount of foods that are high in vitamin K. These include asparagus, avocado, broccoli, cabbage, kale, spinach, and some other leafy green vegetables. Oils, such as soybean, canola, and olive oils, are also high in vitamin K.    Limit fats to 2 to 4 tablespoons a day.    Ask your healthcare provider if you should not drink alcohol while you are taking a blood thinner.    Avoid teas that contain sweet clover, sweet cecil, or tonka  beans. These can affect how your medicine works.    Talk with your healthcare provider and pharmacist about specific foods or special diets that can affect anticoagulant levels. These include grapefruit juice, cranberries and cranberry juice, fish oil supplements, garlic, estrella, licorice, turmeric, and herbal teas and supplements.  Talk with your healthcare provider if you have concerns about these or other food products and their effects on warfarin.     When to call your healthcare provider  Call your provider right away if you have any of these:    Bleeding that doesn t stop in 10 minutes    A heavier-than-normal menstrual period or bleeding between periods    Coughing or throwing up blood    Bloody diarrhea or bleeding hemorrhoids     Dark-colored urine or black stools    Red or black-and-blue marks on the skin that get larger    Dizziness or fatigue    Chest pain or trouble breathing  Allergic reactions:    Rash    Itching    Swelling    Trouble swallowing or breathing   Medical conditions and anticoagulants  Before starting a blood thinner, be sure your doctor knows if you have any of these conditions:    Stomach ulcer now or in the past    Vomited blood or had bloody stools (black or red color)    Aneurysm, pericarditis, or pericardial effusion    Blood disorder    Recent surgery, stroke, mini-stroke, or spinal puncture    Kidney or liver disease, uncontrolled high blood pressure, diabetes, vasculitis, heart failure, lupus, or other collagen-vascular disease, or high cholesterol    Pregnancy or breastfeeding    Younger than 18 years old    Recent or planned dental procedure  Drug interactions and anticoagulants  Many medicines interfere with the effect of blood thinners. Before starting these medicines, be sure your doctor knows about any prescription, over-the-counter, or herbal supplements you take. In particular, tell your provider about:    Antibiotics    Heart medicines    Cimetidine    Aspirin or other  anti-inflammatory drugs such as ibuprofen, naproxen, ketoprofen, or other arthritis medicines    Drugs for depression, cancer, HIV (protease inhibitors), diabetes, seizures, gout, high cholesterol, or thyroid replacement    Vitamins containing vitamin K or herbal products such as ginkgo, Co-Q10, garlic, or Bigg's wort     Note: This information topic may not include all directions, precautions, medical conditions, drug/food interactions, and warnings for these medicines. Check with your doctor, nurse, or pharmacist for any questions you have.    Date Last Reviewed: 7/1/2017 2000-2017 [employer]. [address] [place], [place] [id number]. All rights reserved. This information is not intended as a substitute for professional medical care. Always follow your healthcare professional's instructions.          Taking Coumadin - CORE MEASURES  Coumadin (warfarin) helps keep your blood from clotting. It is used to reduce the risk for stroke, heart attack, or a blood clot passing to your lung. Coumadin also increases your risk of bleeding. Because of this, it must be taken exactly as directed by your doctor. You also need to protect yourself from injury.    Follow These Tips    Take this medicine at the same time each day. Take it with a full glass of water, with or without food. If you miss a dose, contact your doctor immediately to find out how much to take. Never take a double dose.    Warfarin is an effective drug, but it can be dangerous if not taken properly. It makes your blood less likely to form clots. If you take too much, it can cause too much internal or external bleeding.    Be sure to tell all of your doctors that you take Coumadin. If you will be taking Coumadin for quite a while, carry an ID card or get a Medic-Alert bracelet. This will alert medical staff in case you aren t able to do so yourself. Also carry with you the name and number of the person to contact in case of an  emergency.    You will need to have regular blood tests to measure the effects of the warfarin. Keep your scheduled test appointment and be sure to talk with your doctor afterward to find out your results. Your doctor may need to change your dose. Follow your doctor s advice exactly about how to take this medicine. Do not stop the medicine without talking with your doctor.  Monitoring Your PT/INR Blood Levels After Discharge  Two tests are used to find out how your blood is clotting. One is protime (PT) and the other is the international normalized ratio (INR).    Go for your blood (PT/INR) tests as often as directed. Note that diet and medication can affect your PT/INR level.    Your INR was between _____ and _____ .    Ask your doctor what your goal INR is. My goal INR is between _____ and _____.    My next PT/INR blood draw is due on _________________ (date) at ________________ (time) by ____________________ (name of doctor or clinic).    The name of the doctor who is monitoring my anticoagulation therapy is ______________________ and the phone number is ___________________.    Follow up with your doctor or as advised by his or her staff. It usually takes a few hours for your doctor to get the results of your clotting tests. Please call to get your lab results and find out if your doctor needs to make further changes to your Coumadin dose.    If your labs (PT/INR) are drawn at a location other than your doctor's office, please remember to tell your doctor as soon as you get your lab results.      What to Do at Home  1. Adjust your Coumadin dose as directed by your doctor, ____________________ (name of doctor).  2. Have another clotting test done every _______ days at _____________________ (name of clinic) by ____________________ (name of doctor).  3. Do not go barefoot. Always wear shoes.  4. Do not trim corns or calluses yourself.  5. Always talk with your doctor before taking any herbs, vitamins, or  prescription or over-the-counter (OTC) medications, especially aspirin and nonsteroidal anti-inflammatory drugs.  6. Always talk with your doctor before stopping any medication or changing the dose of any of your medications.  7. Use an electric razor instead of a manual one.  8. Use a soft-bristled toothbrush and waxed floss.  9. Avoid major changes in your diet.      When to Call Your Health Care Provider  Coumadin increases your risk of bleeding. Call your health care provider right away before you take your next dose of Coumadin if you have any of these problems:    Bleeding that doesn t stop in 10 minutes    A heavier-than-normal period or bleeding between periods    Coughing or throwing up blood or something that looks like coffee grounds    Nausea, bloating, diarrhea, or bleeding hemorrhoids    Bleeding hemorrhoids    Dark red or brown urine    Red or black tarry stools    Red or black-and-blue marks on the skin that get larger    A fever or an illness that gets worse    Dizziness, headache, weakness, or fatigue    Chest pain or trouble breathing    A serious fall or a blow to the head    Swelling or pain after an injury or at an injection site    Bleeding gums after brushing your teeth  Keep Your Diet Steady  Keep your diet pretty much the same each day. That s because many foods contain vitamin K. Vitamin K helps your blood clot. So eating foods that contain vitamin K can affect the way Coumadin works. You don t need to avoid foods that have vitamin K. But you do need to keep the amount of them you eat steady (about the same day to day). If you change your diet for any reason, such as due to illness or to lose weight, be sure to tell your doctor.    Examples of foods high in vitamin K are asparagus, avocado, broccoli, cabbage, kale, spinach, and some other leafy green vegetables. Oils, such as soybean, canola, and olive oils, are also high in vitamin K.    Other food products can affect the way Coumadin  works in your body:    Food products that may affect blood clotting include cranberries and cranberry juice, fish oil supplements, garlic, estrella, licorice, and turmeric.    Herbs used in herbal teas or supplements can also affect blood clotting. Keep the amount of herbal teas and supplements you use steady.    Alcohol can increase the effect of Coumadin in your body.  Talk with your health care provider if you have concerns about these or other food products and their effects on Coumadin.  What to Watch For  If you have any of these signs or reactions, call your doctor right away or go to the hospital.  Signs of too much bleeding:    More bruising than normal    Prolonged bleeding from cuts    Bleeding from the nose or gums    Blood in your urine, vomit, or stools (red or black color)    Coughing up blood    Unusually heavy menstrual bleeding    Sudden change to a dark or purplish color in your toes or any other area of your body  Allergic Reactions:    Rash    Itching    Swelling    Trouble swallowing or breathing  ---------- IMPORTANT ----------  Medical Conditions  Before starting this medicine, be sure your doctor knows if you have any of these conditions:    Stomach ulcer now or in the past    Vomited blood or had bloody stools (black or red color)    Aneurysm, pericarditis, or pericardial effusion    Blood disorder    Recent surgery, stroke, mini-stroke, or spinal puncture    Kidney or liver disease, uncontrolled high blood pressure, diabetes, vasculitis, congestive heart failure, lupus or other collagen-vascular disease, or high cholesterol    Pregnancy or breastfeeding    Younger than 18 years old    Recent or planned dental procedure  Drug Interactions  Many medicines interfere with the effect of Coumadin. Before starting this medicine, be sure your doctor knows about any prescription, OTC, or herbal drugs you are taking. This is especially true if you are taking:    Antibiotics    Heart  medicines    Cimetidine (Tagamet)    Aspirin or other anti-inflammatory drugs such as ibuprofen (Advil, Motrin, or Nuprin), naproxen (Aleve, Naprosyn, Anaprox), ketoprofen (Orudis, Oruvail), or other arthritis medicines    Drugs for depression, cancer, HIV (protease inhibitors), diabetes, seizures, gout, high cholesterol, or thyroid replacement    Vitamins containing Vitamin K or herbal products such as ginkgo, Q10, garlic, or Bigg's wort  [NOTE: This information topic may not include all directions, precautions, medical conditions, drug/food interactions, and warnings for this drug. Check with your doctor, nurse, or pharmacist for any questions that you may have.]          4211-6128 [employer]. [address] [place] [address]. All rights reserved. This information is not intended as a substitute for professional medical care. Always follow your healthcare professional's instructions.  This information has been modified by your health care provider with permission from the publisher.    Having Catheter Ablation  A heart rhythm problem (arrhythmia) can make your heart beat too fast or in an irregular pattern. The problem is often caused by cells in your heart that aren t working as they should. Or, it may be the result of an abnormal electric circuit. It may cause bothersome symptoms, such as an irregular heartbeat, dizziness, shortness of breath, chest pain, or fainting. Your doctor has recommended catheter ablation to treat your arrhythmia. This non-surgical procedure destroys the cells that are causing the problem.  Before the procedure    Before your catheter ablation, you will meet with a specially trained heart doctor (cardiac electrophysiologist) who will do the procedure. He or she will tell you how to get ready. You will likely be told to stop or change your heart rhythm medicines for a period of time before the procedure. Follow your doctor s instructions. Also:    Tell the  doctor about all prescription and over-the-counter medicines you take. This includes herbs, supplements, and vitamins. It also includes daily medicines, such as insulin or blood thinners. If you are allergic to any medicines, tell the doctor.    Have any routine tests, such as blood tests, as recommended.    Don t eat or drink anything 12 hours before the procedure.  How catheter ablation is done  Catheter ablation uses thin, flexible wires (electrode catheters) to find and destroy (ablate) problem cells. Here s how the procedure is done:    The heart s signals are mapped. To find the problem, an electrophysiology study (EPS) is done. During this study, the doctor tries to start (induce) your arrhythmia. An electrical map of the heart is then created. This shows the type of arrhythmia you have and where the problem is. Using the map as a guide, the doctor knows where to ablate.    Problem areas are destroyed using heat or cold therapy. Once the EPS shows where the problem is, the doctor threads an electrode catheter through a blood vessel to that area in the heart. Energy is sent through the catheter to destroy the problem cells.    The heart s rhythm is tested again. After ablating the problem cells, the doctor tries to restart (reinduce) your arrhythmia. If a fast rhythm can t be induced, the ablation is a success. But if a fast rhythm does start again, you may need more ablation.  Your experience during catheter ablation  In most cases, catheter ablation is done in an electrophysiology (EP) lab. It often takes 2 to 4 hours, and sometimes longer. You ll receive medicine to prevent pain. Medicine will also help you relax or sleep during the procedure. If you feel uncomfortable during the procedure, tell the doctor or nurse:    Getting started. The healthcare team washes the skin on your groin (or rarely, the neck). Any hair in that area may be removed. This is where the catheters will be inserted. An IV  (intravenous) line is started in your arm. Medicines and fluids are given through this IV. To help keep the insertion site germ-free (sterile), your body is draped with sheets. Only the area where the catheters will be inserted is exposed.    Inserting the catheters. The healthcare provider numbs the skin where the catheters will be inserted with pain medicine (local anesthetic). Then the provider uses a small needle to make punctures in your vein or artery. He or she puts catheters through these punctures and guides them to your heart. The provider uses X-ray monitors to help guide the catheters.    The provider then puts wires in several places in the heart to map the electrical signals. The wires also stimulate the heart.    Finishing up. When the procedure is finished, the provider takes the catheters out of your body. He or she puts pressure on the puncture sites to stop any bleeding. No stitches are needed. You re then taken to a recovery room to rest. You'll need to remain lying down for 2 to 6 hours. You'll also be asked not to move the leg where the catheters were inserted for a few hours. This is to make sure the insertion sites don't bleed.  Risks and complications  The risks of catheter ablation are fairly low compared to the benefits you receive. Discuss these risks with your doctor before the procedure. Possible risks and complications include:    Bleeding or bruising at the catheter insertion site    Blood clots    A slow heart rhythm (requiring a permanent pacemaker)    Perforation of the heart muscle, blood vessel, or lung (may require an emergency procedure)    Damage to a heart valve (rare)    Stroke or heart attack, also known as acute myocardial infarction, or AMI (rare)    Infection, which is a risk after any invasive procedure. This may be indicated by a fever of 100.4 F (38.0 C) or higher, drainage, or redness and pain at the catheter insertion site.    Death (extremely rare)   Date Last  Reviewed: 5/1/2016 2000-2017 [employer]. [address] [place] [address], [place] [address]. All rights reserved. This information is not intended as a substitute for professional medical care. Always follow your healthcare professional's instructions.

## 2018-01-26 ENCOUNTER — ANTICOAGULATION THERAPY VISIT (OUTPATIENT)
Dept: ANTICOAGULATION | Facility: OTHER | Age: 73
End: 2018-01-26
Payer: COMMERCIAL

## 2018-01-26 ENCOUNTER — HOSPITAL ENCOUNTER (OUTPATIENT)
Dept: PHYSICAL THERAPY | Facility: HOSPITAL | Age: 73
Setting detail: THERAPIES SERIES
End: 2018-01-26
Attending: INTERNAL MEDICINE
Payer: COMMERCIAL

## 2018-01-26 DIAGNOSIS — Z79.01 LONG-TERM (CURRENT) USE OF ANTICOAGULANTS: ICD-10-CM

## 2018-01-26 PROCEDURE — 40000718 ZZHC STATISTIC PT DEPARTMENT ORTHO VISIT

## 2018-01-26 PROCEDURE — 97161 PT EVAL LOW COMPLEX 20 MIN: CPT | Mod: GP

## 2018-01-26 PROCEDURE — 97110 THERAPEUTIC EXERCISES: CPT | Mod: GP

## 2018-01-26 NOTE — MR AVS SNAPSHOT
Kostas Whittakerin   1/26/2018   Anticoagulation Therapy Visit    Description:  72 year old male   Provider:  Halley Hidalgo PA   Department:  [facility] Anti Coagulation           INR as of 1/26/2018     Today's INR No new INR was available at the time of this encounter.      Anticoagulation Summary as of 1/26/2018     INR goal 2.0-3.0   Today's INR No new INR was available at the time of this encounter.   Full instructions 1/26: 1.25 mg; 1/27: 1.25 mg; 1/28: 1.25 mg   Next INR check 1/29/2018    Indications   Long-term (current) use of anticoagulants [Z79.01] [Z79.01]  CVA (cerebral vascular accident) (H) [I63.9]         Your next Anticoagulation Clinic appointment(s)     Jan 29, 2018  9:30 AM CST   Anticoagulation Visit with  ANTI COAGULATION   [facility] [place] ([facility] [place] )    [address]  [place] [address]   [phone]              January 2018 Details    Sun Mon Tue Wed Thu Fri Sat      1               2               3               4               5               6                 7               8               9               10               11               12               13                 14               15               16               17               18               19               20                 21               22               23               24               25               26      1.25 mg   See details      27      1.25 mg           28      1.25 mg         29            30               31                   Date Details   01/26 This INR check       Date of next INR:  1/29/2018         How to take your warfarin dose     To take:  1.25 mg Take 0.5 of a 2.5 mg tablet.

## 2018-01-26 NOTE — PROGRESS NOTES
01/26/18 1100   General Information   Start of Care Date 01/26/18   Referring Physician Tiago Hidalgo PAC   Orders Evaluate and Treat as Indicated   Order Date 01/23/18   Medical Diagnosis transiet cerebral ischemia   Onset of illness/injury or Date of Surgery 01/15/18   Surgical/Medical history reviewed Yes   Pertinent history of current problem (include personal factors and/or comorbidities that impact the POC) states he was in a fib and was hospitalized. He is now on a walker and  also has an extensive heart history   Pertinent Visual History  no glasses for walking   Prior level of function comment was not on a walker prior but is now and wife feels he may be walking a little slower than before   Diagnostic Tests CT Scan;MRI   MRI Results (see report)   Previous/Current Treatment Physical Therapy   Improvement after PT Mild   Current Community Support Family/friend caregiver   Patient role/Employment history Retired   Living environment House/[facility]   Home/Community Accessibility Comments stairs to enter with no railing, they help him   Current Assistive Devices Front Wheeled Walker   Patient/Family Goals Statement to get off the walker   Fall Risk Screen   Fall screen completed by PT   Have you fallen 2 or more times in the past year? No   Have you fallen and had an injury in the past year? No   Is patient a fall risk? Yes   Pain   Patient currently in pain No   Pain comments does have pain at home 3-5/10   Cognitive Status Examination   Level of Consciousness alert   Follows Commands and Answers Questions 100% of the time   Personal Safety and Judgment intact   Memory impaired   Observation   Observation slow gait with a FWW with decreased step length, no rotation or acessory joint movement   Integumentary   Integumentary No deficits were identified   Posture   Posture Forward head position;Protracted shoulders   Range of Motion (ROM)   ROM Comment grossly WFL limbs, neck limited 25%   Strength   Strength  Comments grossly hips 4/5, knee 4/5, ankle 4/5 DF, -3/5 PF, right shoulder is weaker versus left at -4/5 right , 4/5 left. This is static testing with endurance for all activity is poor, evidenced by shaking muscles at 10 reps of a sitting exercise.   Transfer Skills   Transfer Comments sit to stand and pivot with a walker mod I   Gait   Gait Comments Gait with FWW 50feet slow over a minute, narrow base of support with poor lifting of feet   Balance   Balance Comments 36/56 gunderson   Coordination   Coordination Comments slow motions all   Muscle Tone   Muscle Tone no deficits were identified   Planned Therapy Interventions   Planned Therapy Interventions balance training;gait training;strengthening;transfer training   Clinical Impression   Criteria for Skilled Therapeutic Interventions Met yes, treatment indicated   PT Diagnosis weaknesss   Influenced by the following impairments heart history   Functional limitations due to impairments endurance is poor and he does not do much at home per him and his wife   Clinical Presentation Stable/Uncomplicated   Clinical Presentation Rationale clinical obs   Clinical Decision Making (Complexity) Low complexity   Therapy Frequency 1 time/week   Predicted Duration of Therapy Intervention (days/wks) 12 weeks   Risk & Benefits of therapy have been explained Yes   Patient, Family & other staff in agreement with plan of care Yes   Clinical Impression Comments Client seen for evaluation and it is evidenced that he is quite weak and has very poor endurance. He mostly sits and watches TV and now is on a walker. I explained he needs to moderate his activity level up if he is going to get better and get off the walker. I have outline a 50 foot 2-3 x a day walk in addition to his normal walking  andon days that he does not walk he will do seated exercises at 10-20 reps. Overall I explained he will need to move in order to get off the walker and improve his balance.   Education Assessment    Preferred Learning Style Listening;Reading;Demonstration;Pictures/video   Barriers to Learning Cognitive   Goal 1   Goal Identifier short   Goal Description Client will be compliant with outline HEP.   Target Date 02/09/18   Goal 2   Goal Identifier functional   Goal Description Client will demonstrate CASANOVA increase by 10 points so we can try a cane.   Target Date 03/02/18   Goal 3   Goal Identifier functional   Goal Description Client will demonstrate Casanova improved to 52/56 for I gait with no devoce in the home.   Target Date 04/13/18   Total Evaluation Time   Total Evaluation Time (Minutes) 20

## 2018-01-26 NOTE — PROGRESS NOTES
ANTICOAGULATION FOLLOW-UP CLINIC VISIT    Patient Name:  Kostas Cotto  Date:  1/26/2018  Contact Type:  Telephone/ spoke to patient wife.    SUBJECTIVE:     Patient Findings     Positives Other complaints    Comments Call placed to patient and spoke to his wife. She states that they were told that they would receive the Eliquis in about 5 days. They do not have the Eliquis at this time. We discussed options and decided that he will keep him appt. On Monday 1/29/18 and if he has the Eliquis by then we will stop his warfarin and have him start Eliquis when INR below 2.0           OBJECTIVE    INR Protime   Date Value Ref Range Status   01/22/2018 3.7 (A) 0.86 - 1.14 Final       ASSESSMENT / PLAN  No question data found.  Anticoagulation Summary as of 1/26/2018     INR goal 2.0-3.0   Today's INR No new INR was available at the time of this encounter.   Maintenance plan No maintenance plan   Full instructions 1/26: 1.25 mg; 1/27: 1.25 mg; 1/28: 1.25 mg   No change documented Katey Tong RN   Plan last modified Katey Tong RN (1/18/2018)   Next INR check 1/29/2018   Target end date     Indications   Long-term (current) use of anticoagulants [Z79.01] [Z79.01]  CVA (cerebral vascular accident) (H) [I63.9]         Anticoagulation Episode Summary     INR check location     Preferred lab     Send INR reminders to  ANTICOAG POOL    Comments       Anticoagulation Care Providers     Provider Role Specialty Phone number    Halley Hidalgo PA [facility] [phone]            See the Encounter Report to view Anticoagulation Flowsheet and Dosing Calendar (Go to Encounters tab in chart review, and find the Anticoagulation Therapy Visit)        Katey Tong RN

## 2018-01-28 ENCOUNTER — HOSPITAL ENCOUNTER (EMERGENCY)
Facility: HOSPITAL | Age: 73
Discharge: HOME | End: 2018-01-28
Attending: PHYSICIAN ASSISTANT | Admitting: PHYSICIAN ASSISTANT
Payer: COMMERCIAL

## 2018-01-28 VITALS
OXYGEN SATURATION: 94 % | SYSTOLIC BLOOD PRESSURE: 104 MMHG | DIASTOLIC BLOOD PRESSURE: 86 MMHG | WEIGHT: 185 LBS | BODY MASS INDEX: 25.09 KG/M2 | TEMPERATURE: 99.7 F | RESPIRATION RATE: 18 BRPM

## 2018-01-28 DIAGNOSIS — J10.1 INFLUENZA A: ICD-10-CM

## 2018-01-28 LAB
ANION GAP SERPL CALCULATED.3IONS-SCNC: 10 MMOL/L (ref 3–14)
BASOPHILS # BLD AUTO: 0 10E9/L (ref 0–0.2)
BASOPHILS NFR BLD AUTO: 0.2 %
BUN SERPL-MCNC: 27 MG/DL (ref 7–30)
CALCIUM SERPL-MCNC: 9 MG/DL (ref 8.5–10.1)
CHLORIDE SERPL-SCNC: 97 MMOL/L (ref 94–109)
CO2 SERPL-SCNC: 27 MMOL/L (ref 20–32)
CREAT SERPL-MCNC: 0.89 MG/DL (ref 0.66–1.25)
DIFFERENTIAL METHOD BLD: ABNORMAL
EOSINOPHIL # BLD AUTO: 0 10E9/L (ref 0–0.7)
EOSINOPHIL NFR BLD AUTO: 0.1 %
ERYTHROCYTE [DISTWIDTH] IN BLOOD BY AUTOMATED COUNT: 13.1 % (ref 10–15)
FLUAV+FLUBV AG SPEC QL: NEGATIVE
FLUAV+FLUBV AG SPEC QL: POSITIVE
GFR SERPL CREATININE-BSD FRML MDRD: 84 ML/MIN/1.7M2
GLUCOSE SERPL-MCNC: 132 MG/DL (ref 70–99)
HCT VFR BLD AUTO: 38.2 % (ref 40–53)
HGB BLD-MCNC: 12.8 G/DL (ref 13.3–17.7)
IMM GRANULOCYTES # BLD: 0 10E9/L (ref 0–0.4)
IMM GRANULOCYTES NFR BLD: 0.3 %
INR PPP: 2.27 (ref 0.8–1.2)
LYMPHOCYTES # BLD AUTO: 0.3 10E9/L (ref 0.8–5.3)
LYMPHOCYTES NFR BLD AUTO: 3.1 %
MCH RBC QN AUTO: 31.4 PG (ref 26.5–33)
MCHC RBC AUTO-ENTMCNC: 33.5 G/DL (ref 31.5–36.5)
MCV RBC AUTO: 94 FL (ref 78–100)
MONOCYTES # BLD AUTO: 0.3 10E9/L (ref 0–1.3)
MONOCYTES NFR BLD AUTO: 3.7 %
NEUTROPHILS # BLD AUTO: 8.4 10E9/L (ref 1.6–8.3)
NEUTROPHILS NFR BLD AUTO: 92.6 %
NRBC # BLD AUTO: 0 10*3/UL
NRBC BLD AUTO-RTO: 0 /100
PLATELET # BLD AUTO: 284 10E9/L (ref 150–450)
POTASSIUM SERPL-SCNC: 4.1 MMOL/L (ref 3.4–5.3)
RBC # BLD AUTO: 4.08 10E12/L (ref 4.4–5.9)
SODIUM SERPL-SCNC: 134 MMOL/L (ref 133–144)
SPECIMEN SOURCE: ABNORMAL
WBC # BLD AUTO: 9.1 10E9/L (ref 4–11)

## 2018-01-28 PROCEDURE — 99284 EMERGENCY DEPT VISIT MOD MDM: CPT | Performed by: PHYSICIAN ASSISTANT

## 2018-01-28 PROCEDURE — 25000128 H RX IP 250 OP 636: Performed by: PHYSICIAN ASSISTANT

## 2018-01-28 PROCEDURE — 36415 COLL VENOUS BLD VENIPUNCTURE: CPT | Performed by: PHYSICIAN ASSISTANT

## 2018-01-28 PROCEDURE — 87804 INFLUENZA ASSAY W/OPTIC: CPT | Mod: 59 | Performed by: PHYSICIAN ASSISTANT

## 2018-01-28 PROCEDURE — 85025 COMPLETE CBC W/AUTO DIFF WBC: CPT | Performed by: PHYSICIAN ASSISTANT

## 2018-01-28 PROCEDURE — 80048 BASIC METABOLIC PNL TOTAL CA: CPT | Performed by: PHYSICIAN ASSISTANT

## 2018-01-28 PROCEDURE — 25000132 ZZH RX MED GY IP 250 OP 250 PS 637: Performed by: PHYSICIAN ASSISTANT

## 2018-01-28 PROCEDURE — 96360 HYDRATION IV INFUSION INIT: CPT

## 2018-01-28 PROCEDURE — 99284 EMERGENCY DEPT VISIT MOD MDM: CPT | Mod: 25

## 2018-01-28 PROCEDURE — 85610 PROTHROMBIN TIME: CPT | Performed by: PHYSICIAN ASSISTANT

## 2018-01-28 RX ORDER — OSELTAMIVIR PHOSPHATE 75 MG/1
75 CAPSULE ORAL 2 TIMES DAILY
Qty: 9 CAPSULE | Refills: 0 | Status: SHIPPED | OUTPATIENT
Start: 2018-01-28 | End: 2018-02-02

## 2018-01-28 RX ORDER — OSELTAMIVIR PHOSPHATE 75 MG/1
75 CAPSULE ORAL ONCE
Status: COMPLETED | OUTPATIENT
Start: 2018-01-28 | End: 2018-01-28

## 2018-01-28 RX ORDER — BENZONATATE 200 MG/1
200 CAPSULE ORAL 3 TIMES DAILY PRN
Qty: 21 CAPSULE | Refills: 0 | Status: SHIPPED | OUTPATIENT
Start: 2018-01-28 | End: 2018-05-18

## 2018-01-28 RX ORDER — ACETAMINOPHEN 325 MG/1
975 TABLET ORAL ONCE
Status: COMPLETED | OUTPATIENT
Start: 2018-01-28 | End: 2018-01-28

## 2018-01-28 RX ADMIN — SODIUM CHLORIDE 1000 ML: 9 INJECTION, SOLUTION INTRAVENOUS at 20:38

## 2018-01-28 RX ADMIN — ACETAMINOPHEN 975 MG: 325 TABLET ORAL at 20:38

## 2018-01-28 RX ADMIN — OSELTAMIVIR PHOSPHATE 75 MG: 75 CAPSULE ORAL at 22:04

## 2018-01-28 NOTE — ED AVS SNAPSHOT
[facility]    [address]    [place] [address]    Phone:  [phone]                                       Kostas Cotto   MRN: [id number]    Department:  [facility]   Date of Visit:  1/28/2018           Patient Information     Date Of Birth          1945        Your diagnoses for this visit were:     None       You were seen by Viet Cordero PA.      Follow-up Information     Follow up with Halley Hidalgo PA In 1 day.    Specialty:  Family Practice    Contact information:    [facility]  [address]  [place] [address]  [phone]          Follow up with [facility].    Specialty:  EMERGENCY MEDICINE    Why:  If symptoms worsen    Contact information:    [address]  [place] [address] [phone]    Additional information:    From [facility]: Take [address]. Turn left at [address]. Turn left at the first stoplight on [address]. At the first stop sign, take a right onto [address]. Take a left into the parking lot and continue through until you reach the North enterance of the building.       From [place]: Take [address]. Take the [address] ramp towards [place]. Turn left onto [address]. Take a slight right onto [address]. Turn left at the first stoplight on [address]. At the first stop sign, take a right onto [address]. Take a left into the parking lot and continue through until you reach the North enterance of the building.       From [place]: Take [address]. Take a right at [address]. At the first stop sign, take a right onto [address]. Take a left into the parking lot and continue through until you reach the North enterance of the building.         Discharge Instructions       - Tamiflu for influenza A.  - Deep intentional breaths and coughing helps to ensure fresh air to lung bases, so suppress cough only to  sleep or if it is causing significant discomfort.   - Try not to suppress cough unless it is keeping you up. May fill script for tessalon if needed.   - Plenty of fluids/broth as water is the best expectorant.  - REST to help support immune function.    * Recheck with primary care in 1-2 days with poor improvement. To emergency department with worsening.    INR below  Component      Latest Ref Rng & Units 1/28/2018   INR      0.80 - 1.20 2.27 (H)       Discharge References/Attachments     (ADULT), INFLUENZA (ENGLISH)      Future Appointments        Provider Department Dept Phone Center    1/29/2018 9:00 AM Ally Ervin PTA [facility] [phone] [facility]    1/29/2018 9:30 AM [facility] ANTI COAGULATION [facility] [place] [phone] [place]    2/8/2018 9:30 AM Ally Ervin PTA [facility] [phone] [facility]    2/15/2018 9:30 AM Stefani Clancy, PT [facility] [phone] [facility]    2/22/2018 9:30 AM Stefani Clancy, PT [facility] [phone] [facility]    4/25/2018 10:00 AM Oni Ortega, [facility] [place] [phone] [facility]         Review of your medicines      START taking        Dose / Directions Last dose taken    oseltamivir 75 MG capsule   Commonly known as:  TAMIFLU   Dose:  75 mg   Quantity:  9 capsule        Take 1 capsule (75 mg) by mouth 2 times daily for 9 doses   Refills:  0          Our records show that you are taking the medicines listed below. If these are incorrect, please call your family doctor or clinic.        Dose / Directions Last dose taken    apixaban ANTICOAGULANT 5 MG tablet   Commonly known as:  ELIQUIS   Dose:  5 mg   Quantity:  180 tablet        Take 1 tablet (5 mg) by mouth 2 times daily   Refills:  3        aspirin 81 MG chewable tablet   Dose:  81 mg   Quantity:  36 tablet        Take 1 tablet (81 mg) by mouth daily   Refills:  11        calcium 500 + D 500-400 MG-UNIT Tabs per tablet   Generic  drug:  calcium carbonate-vitamin D        2 times daily Take one tablet by oral route every day   Refills:  0        finasteride 5 MG tablet   Commonly known as:  PROSCAR   Dose:  5 mg   Quantity:  30 tablet        Take 1 tablet (5 mg) by mouth daily   Refills:  3        folic acid 1 MG tablet   Commonly known as:  FOLVITE   Dose:  1000 mcg   Quantity:  90 tablet        Take 1 tablet (1,000 mcg) by mouth daily   Refills:  3        lisinopril 5 MG tablet   Commonly known as:  PRINIVIL/ZESTRIL   Quantity:  30 tablet        TAKE 1 TABLET BY MOUTH DAILY   Refills:  0        metoprolol succinate 50 MG 24 hr tablet   Commonly known as:  TOPROL-XL   Quantity:  90 tablet        TAKE 1 TABLET (50 MG) BY MOUTH DAILY   Refills:  3        ranitidine 150 MG tablet   Commonly known as:  ZANTAC   Dose:  150 mg   Quantity:  90 tablet        Take 1 tablet (150 mg) by mouth daily   Refills:  3        simvastatin 20 MG tablet   Commonly known as:  ZOCOR   Quantity:  90 tablet        TAKE 1 TABLET BY MOUTH EVERY EVENING - GENERIC FOR ZOCOR   Refills:  3        tamsulosin 0.4 MG capsule   Commonly known as:  FLOMAX   Dose:  0.4 mg   Quantity:  90 capsule        Take 1 capsule (0.4 mg) by mouth daily   Refills:  3        traMADol 50 MG tablet   Commonly known as:  ULTRAM   Dose:   mg   Quantity:  30 tablet        Take 1-2 tablets ( mg) by mouth every 6 hours as needed for pain maximum 3 tablet(s) per day   Refills:  1                Prescriptions were sent or printed at these locations (1 Prescription)                   Other Prescriptions                Printed at Department/Unit printer (1 of 1)         oseltamivir (TAMIFLU) 75 MG capsule                Procedures and tests performed during your visit     Basic metabolic panel    CBC with platelets differential    INR    Influenza A/B antigen      Orders Needing Specimen Collection     None      Pending Results     No orders found from 1/26/2018 to 1/29/2018.           "  Pending Culture Results     No orders found from 2018 to 2018.            Thank you for choosing [facility]       Thank you for choosing [facility] for your care. Our goal is always to provide you with excellent care. Hearing back from our patients is one way we can continue to improve our services. Please take a few minutes to complete the written survey that you may receive in the mail after you visit with us. Thank you!        [employer] Information     [employer] lets you send messages to your doctor, view your test results, renew your prescriptions, schedule appointments and more. To sign up, go to [email or web address] . Click on \"Log in\" on the left side of the screen, which will take you to the Welcome page. Then click on \"Sign up Now\" on the right side of the page.     You will be asked to enter the access code listed below, as well as some personal information. Please follow the directions to create your username and password.     Your access code is: [email or web address]  Expires: 4/10/2018 12:47 PM     Your access code will  in 90 days. If you need help or a new code, please call your [facility] clinic or [phone].        Care EveryWhere ID     This is your Care EveryWhere ID. This could be used by other organizations to access your [facility] medical records  [id number]        Equal Access to Services     PEDRO WEAVER AH: Una Moctezuma, waaxda luqadaha, qaybta kaalmada yossi, london ulloa. So [facility] [phone].    ATENCIÓN: Si habla español, tiene a rahman disposición servicios gratuitos de asistencia lingüística. Llame al [phone].    We comply with applicable federal civil rights laws and [place] laws. We do not discriminate on the basis of race, color, national origin, age, disability, sex, sexual orientation, or gender identity.            After Visit Summary       This is your record. Keep this with you and show to your community pharmacist(s) " and doctor(s) at your next visit.

## 2018-01-28 NOTE — ED AVS SNAPSHOT
[facility]    [address]    [place] [address]    Phone:  [phone]                                       Kostas Cotto   MRN: [id number]    Department:  [facility]   Date of Visit:  1/28/2018           After Visit Summary Signature Page     I have received my discharge instructions, and my questions have been answered. I have discussed any challenges I see with this plan with the nurse or doctor.    ..........................................................................................................................................  Patient/Patient Representative Signature      ..........................................................................................................................................  Patient Representative Print Name and Relationship to Patient    ..................................................               ................................................  Date                                            Time    ..........................................................................................................................................  Reviewed by Signature/Title    ...................................................              ..............................................  Date                                                            Time

## 2018-01-29 ENCOUNTER — ANTICOAGULATION THERAPY VISIT (OUTPATIENT)
Dept: ANTICOAGULATION | Facility: OTHER | Age: 73
End: 2018-01-29
Payer: COMMERCIAL

## 2018-01-29 DIAGNOSIS — Z79.01 LONG-TERM (CURRENT) USE OF ANTICOAGULANTS: ICD-10-CM

## 2018-01-29 ASSESSMENT — ENCOUNTER SYMPTOMS
WHEEZING: 0
EYE DISCHARGE: 0
TROUBLE SWALLOWING: 0
SORE THROAT: 0
DIZZINESS: 0
CHEST TIGHTNESS: 0
PSYCHIATRIC NEGATIVE: 1
FATIGUE: 1
COUGH: 1
RHINORRHEA: 0
CHILLS: 1
SHORTNESS OF BREATH: 0
LIGHT-HEADEDNESS: 0
FEVER: 1
HEADACHES: 0
CARDIOVASCULAR NEGATIVE: 1
SINUS PRESSURE: 0
MYALGIAS: 1

## 2018-01-29 NOTE — ED PROVIDER NOTES
"  History     Chief Complaint   Patient presents with     Flu Symptoms     fever, body aches, weakness, cough over last 48 hours     Coagulation Disorder     INR of 16 2 weeks ago, wife concerned it \"is elevated again\"      The history is provided by the patient and a relative. No  was used.     Kostas Cotto is a 72 year old male with Hx HTN, afib, CVA on coumadin who presents with fever, fatigue, body aches and cough. symptoms started the night before last with cough, yesterday F/C, body aches and weakness. No shortness of breath. He denies any pain, but admits to feeling tired. No shortness of breath or wheezing. No ill contacts.  Family would also like his INR checked as it has been quite high in the past.     Problem List:    Patient Active Problem List    Diagnosis Date Noted     New onset atrial fibrillation (H) 01/24/2018     Priority: Medium     Hospital discharge follow-up 01/24/2018     Priority: Medium     History of coronary artery bypass graft x 3 in 2005 01/24/2018     Priority: Medium     H/O cardiac arrest 01/24/2018     Priority: Medium     History of tobacco abuse 01/24/2018     Priority: Medium     Supratherapeutic INR 01/24/2018     Priority: Medium     Long-term (current) use of anticoagulants [Z79.01] 01/15/2018     Priority: Medium     CVA (cerebral vascular accident) (H) 01/08/2018     Priority: Medium     Advanced directives, counseling/discussion 03/11/2015     Priority: Medium     CAD (coronary artery disease) 03/05/2014     Priority: Medium     Hyperlipidemia with target LDL less than 100 03/05/2014     Priority: Medium     Diagnosis updated by automated process. Provider to review and confirm.       Essential hypertension with goal blood pressure less than 140/90 03/05/2014     Priority: Medium        Past Medical History:    Past Medical History:   Diagnosis Date     Hypoxic-ischemic encephalopathy (HIE) 11/25/2003     Rheumatoid arthritis(714.0) 05/03/2005     S/P " inferior MI 11/25/2003       Past Surgical History:    Past Surgical History:   Procedure Laterality Date     AAA REPAIR       ANGIOGRAM  1/2004    Coronary Angiogram, LAD> long mid 40-50%, D2 90% ostial,Prox % with collateal flow,Circ had restenosisand was restented x 3 again.  EF now 55% with mild lateral wall hypokineseis and inferior also.     CARDIOVASCULAR SURGERY  1/2005    Syncopal episode, Had repeat angiogam showing now circ 100% along with RCA. LAD 80-90% with EF 35%.  Sent for CABG     CARDIOVASCULAR SURGERY  1/2005    S/P 3 vess CABG, Dr Wilfreod SORIANO>LAD, SVG>OM1, SVG>RCA.  Post op pneumothorax with Chest Tube placement.     CARDIOVASCULAR SURGERY  11/2003    Acute Inferior MI, Had VT arrest resuscitated. Not given lytics and was sent to [facility].  IABP placed and had angiogram stented Circ x 5. EF 30%. Lad had 70%, % with contralateral collateral flow.     HC US CHEST      left     HEART/LUNG RESUSCITATION (CPR)  11/2003    S/P Cardiac Arrest V-Tach, Out of hospital arrest with no bystaner cpr.  Has polymorphic VT on arrival.  Has a prolonged resuscitation which obviously was successful.  This was due to an acute Inferior MI at the time.     OPEN REDUCTION INTERNAL FIXATION HIP  1965    ORIF frac/dislocation L hip, Was a teenager and had orif done after fracture dislocation of left hip. Occurred playing softball.     PHACOEMULSIFICATION WITH STANDARD INTRAOCULAR LENS IMPLANT  3/10/2014    Procedure: PHACOEMULSIFICATION WITH STANDARD INTRAOCULAR LENS IMPLANT;  CATARACT EXTRACTION WITH INTRA OCULAR LENS LEFT(10% SERENE/POSSIBLE STRETCH);  Surgeon: Isael Acuna MD;  Location: [facility]     PHACOEMULSIFICATION WITH STANDARD INTRAOCULAR LENS IMPLANT  3/25/2014    Procedure: PHACOEMULSIFICATION WITH STANDARD INTRAOCULAR LENS IMPLANT;  CATARACT EXTRACTION WITH INTRA OCULAR LENS RIGHT/POSSIBLE PUPIL STRETCH;  Surgeon: Isael Acuna MD;  Location: [facility] OR       Family History:    Family History    Problem Relation Age of Onset     CANCER Father      leukemia     DIABETES Mother      CEREBROVASCULAR DISEASE Mother      cause of death       Social History:  Marital Status:   [2]  Social History   Substance Use Topics     Smoking status: Former Smoker     Smokeless tobacco: Never Used     Alcohol use No        Medications:      oseltamivir (TAMIFLU) 75 MG capsule   benzonatate (TESSALON) 200 MG capsule   aspirin 81 MG chewable tablet   apixaban ANTICOAGULANT (ELIQUIS) 5 MG tablet   traMADol (ULTRAM) 50 MG tablet   tamsulosin (FLOMAX) 0.4 MG capsule   simvastatin (ZOCOR) 20 MG tablet   metoprolol succinate (TOPROL-XL) 50 MG 24 hr tablet   ranitidine (ZANTAC) 150 MG tablet   folic acid (FOLVITE) 1 MG tablet   finasteride (PROSCAR) 5 MG tablet   lisinopril (PRINIVIL/ZESTRIL) 5 MG tablet   calcium carbonate-vitamin D (CALCIUM 500 + D) 500-400 MG-UNIT TABS tablt         Review of Systems   Constitutional: Positive for chills, fatigue and fever.   HENT: Positive for congestion. Negative for ear pain, postnasal drip, rhinorrhea, sinus pressure, sore throat and trouble swallowing.    Eyes: Negative for discharge.   Respiratory: Positive for cough. Negative for chest tightness, shortness of breath and wheezing.    Cardiovascular: Negative.    Musculoskeletal: Positive for myalgias.   Skin: Negative.    Neurological: Negative for dizziness, light-headedness and headaches.   Psychiatric/Behavioral: Negative.        Physical Exam   BP: 122/91  Heart Rate: 105  Temp: (!) 101.4  F (38.6  C)  Resp: 18  Weight: 83.9 kg (185 lb)  SpO2: (!) 90 %      Physical Exam   Constitutional: He is oriented to person, place, and time. He appears well-developed and well-nourished. No distress.   HENT:   Head: Normocephalic and atraumatic.   Right Ear: External ear normal.   Left Ear: External ear normal.   Nose: Nose normal.   Mouth/Throat: Oropharynx is clear and moist. No oropharyngeal exudate.   Eyes: Conjunctivae and EOM are  normal. Right eye exhibits no discharge. Left eye exhibits no discharge.   Neck: Normal range of motion. Neck supple.   Cardiovascular: Normal rate, regular rhythm and normal heart sounds.    Pulmonary/Chest: Effort normal. He has no wheezes.   diminished at bases   Abdominal: Soft. Bowel sounds are normal. There is no tenderness.   Neurological: He is alert and oriented to person, place, and time.   Skin: Skin is warm and dry.   Psychiatric: He has a normal mood and affect.   Nursing note and vitals reviewed.      ED Course     ED Course     Procedures        Labs Ordered and Resulted from Time of ED Arrival Up to the Time of Departure from the ED   CBC WITH PLATELETS DIFFERENTIAL - Abnormal; Notable for the following:        Result Value    RBC Count 4.08 (*)     Hemoglobin 12.8 (*)     Hematocrit 38.2 (*)     Absolute Neutrophil 8.4 (*)     Absolute Lymphocytes 0.3 (*)     All other components within normal limits   BASIC METABOLIC PANEL - Abnormal; Notable for the following:     Glucose 132 (*)     All other components within normal limits   INR - Abnormal; Notable for the following:     INR 2.27 (*)     All other components within normal limits   INFLUENZA A/B ANTIGEN - Abnormal; Notable for the following:     Influenza A Positive (*)     All other components within normal limits     Medications   0.9% sodium chloride BOLUS (0 mLs Intravenous Stopped 1/28/18 2131)   acetaminophen (TYLENOL) tablet 975 mg (975 mg Oral Given 1/28/18 2038)   oseltamivir (TAMIFLU) capsule 75 mg (75 mg Oral Given 1/28/18 2204)       Assessments & Plan (with Medical Decision Making)     I have reviewed the nursing notes.  I have reviewed the findings, diagnosis, plan and need for follow up with the patient.    Discharge Medication List as of 1/28/2018 10:09 PM      START taking these medications    Details   oseltamivir (TAMIFLU) 75 MG capsule Take 1 capsule (75 mg) by mouth 2 times daily for 9 doses, Disp-9 capsule, R-0, Local Print              Final diagnoses:   Influenza A   IVF, tylenol and tamiflu administered in ED. INR therapeutic at 2.27. O2 94% and temp down to 99.7, BPs systolic 100s-120s with good mentation. Complete tamiflu and may fill tessalon Rx if needed. Pt has appt with PCP tomorrow regarding possible anticoagulant change and will have recheck at that time. I stressed the importance of  frequent deep breaths vs incentive spirometer that family member has at home. Will return here prior to PCP appt tomorrow with any concern for worsening. Kostas and family are agreeable to plan and all questions answered prior to discharge.     1/28/2018   [facility]     Viet Cordero PA  01/29/18 0214

## 2018-01-29 NOTE — ED NOTES
Wife states that patient has had a loose cough for 2 days. Also has vomited x 2 today. Has had decreased energy for a couple days but worse today. Patient denies pain at this time.

## 2018-01-29 NOTE — MR AVS SNAPSHOT
Kostas Yadiel   1/29/2018   Anticoagulation Therapy Visit    Description:  72 year old male   Provider:  Halley Hidalgo PA   Department:  [facility] Anti Coagulation           INR as of 1/29/2018     Today's INR 2.27 (1/28/2018)      Anticoagulation Summary as of 1/29/2018     INR goal 2.0-3.0   Today's INR 2.27 (1/28/2018)   Full instructions 1.25 mg every day   Next INR check 2/2/2018    Indications   Long-term (current) use of anticoagulants [Z79.01] [Z79.01]  CVA (cerebral vascular accident) (H) [I63.9]         Your next Anticoagulation Clinic appointment(s)     Jan 29, 2018  9:30 AM CST   Anticoagulation Visit with HC ANTI COAGULATION   [facility] ([facility] )    [address] [place] [address]   [phone]              January 2018 Details    Sun Mon Tue Wed Thu Fri Sat      1               2               3               4               5               6                 7               8               9               10               11               12               13                 14               15               16               17               18               19               20                 21               22               23               24               25               26               27                 28               29      1.25 mg   See details      30      1.25 mg         31      1.25 mg             Date Details   01/29 This INR check               How to take your warfarin dose     To take:  1.25 mg Take 0.5 of a 2.5 mg tablet.           February 2018 Details    Sun Mon Tue Wed Thu Fri Sat         1      1.25 mg         2            3                 4               5               6               7               8               9               10                 11               12               13               14               15               16               17                 18               19               20               21                22               23               24                 25               26               27               28                   Date Details   No additional details    Date of next INR:  2/2/2018         How to take your warfarin dose     To take:  1.25 mg Take 0.5 of a 2.5 mg tablet.

## 2018-01-29 NOTE — PROGRESS NOTES
ANTICOAGULATION FOLLOW-UP CLINIC VISIT    Patient Name:  Kostas Cotto  Date:  1/29/2018  Contact Type:  Telephone/ spoke to patient wife    SUBJECTIVE:     Patient Findings     Positives Change in medications, Other complaints    Comments Patient seen in ER 1/28/18 and was diagnosed with influenza A.  He was given tamiflu.  Call placed to patient wife and spoke to her re: INR result, warfarin dosing and INR recheck date. Patient will be switched from warfarin to Eliquis but according to wife they still have not received the Eliquis. She states she will let me know when they receive it so warfarin can be discontinued.  She verbalized understanding of warfarin dosing and INR recheck date and has no questions.            OBJECTIVE    INR   Date Value Ref Range Status   01/28/2018 2.27 (H) 0.80 - 1.20 Final       ASSESSMENT / PLAN  INR assessment THER    Recheck INR In: 1 WEEK    INR Location Clinic      Anticoagulation Summary as of 1/29/2018     INR goal 2.0-3.0   Today's INR 2.27 (1/28/2018)   Maintenance plan 1.25 mg (2.5 mg x 0.5) every day   Full instructions 1.25 mg every day   Weekly total 8.75 mg   Plan last modified Katey Tong RN (1/29/2018)   Next INR check 2/2/2018   Target end date     Indications   Long-term (current) use of anticoagulants [Z79.01] [Z79.01]  CVA (cerebral vascular accident) (H) [I63.9]         Anticoagulation Episode Summary     INR check location     Preferred lab     Send INR reminders to  ANTICOAG POOL    Comments       Anticoagulation Care Providers     Provider Role Specialty Phone number    Halley Hidalgo, PA [facility] [phone]            See the Encounter Report to view Anticoagulation Flowsheet and Dosing Calendar (Go to Encounters tab in chart review, and find the Anticoagulation Therapy Visit)        Katey Tong, RN

## 2018-01-29 NOTE — DISCHARGE INSTRUCTIONS
- Tamiflu for influenza A.  - Deep intentional breaths and coughing helps to ensure fresh air to lung bases, so suppress cough only to sleep or if it is causing significant discomfort.   - Try not to suppress cough unless it is keeping you up. May fill script for tessalon if needed.   - Plenty of fluids/broth as water is the best expectorant.  - REST to help support immune function.    * Recheck with primary care in 1-2 days with poor improvement. To emergency department with worsening.    INR below  Component      Latest Ref Rng & Units 1/28/2018   INR      0.80 - 1.20 2.27 (H)

## 2018-02-02 DIAGNOSIS — I10 ESSENTIAL HYPERTENSION WITH GOAL BLOOD PRESSURE LESS THAN 140/90: ICD-10-CM

## 2018-02-02 RX ORDER — LISINOPRIL 5 MG/1
TABLET ORAL
Qty: 30 TABLET | Refills: 5 | Status: SHIPPED | OUTPATIENT
Start: 2018-02-02 | End: 2018-08-24

## 2018-02-02 NOTE — TELEPHONE ENCOUNTER
Lisinopril      Last Written Prescription Date:  1/5/18  Last Fill Quantity: 30,   # refills: 0  Last Office Visit: 1/18/18  Future Office visit:    Next 5 appointments (look out 90 days)     Apr 25, 2018 10:00 AM CDT   (Arrive by 9:45 AM)   Return Visit with Oni Ortega DO   [facility] ([facility] )    [address] [place] [address]   [phone]                   Routing refill request to provider for review/approval because:

## 2018-02-03 ENCOUNTER — HOSPITAL ENCOUNTER (EMERGENCY)
Facility: HOSPITAL | Age: 73
Discharge: HOME | End: 2018-02-03
Attending: FAMILY MEDICINE | Admitting: FAMILY MEDICINE
Payer: COMMERCIAL

## 2018-02-03 ENCOUNTER — APPOINTMENT (OUTPATIENT)
Dept: GENERAL RADIOLOGY | Facility: HOSPITAL | Age: 73
End: 2018-02-03
Attending: FAMILY MEDICINE
Payer: COMMERCIAL

## 2018-02-03 VITALS
SYSTOLIC BLOOD PRESSURE: 136 MMHG | TEMPERATURE: 98 F | OXYGEN SATURATION: 93 % | RESPIRATION RATE: 18 BRPM | HEART RATE: 65 BPM | DIASTOLIC BLOOD PRESSURE: 88 MMHG

## 2018-02-03 DIAGNOSIS — E86.0 DEHYDRATION: ICD-10-CM

## 2018-02-03 DIAGNOSIS — R05.9 COUGH: ICD-10-CM

## 2018-02-03 DIAGNOSIS — M62.81 MUSCLE WEAKNESS (GENERALIZED): ICD-10-CM

## 2018-02-03 LAB
ANION GAP SERPL CALCULATED.3IONS-SCNC: 10 MMOL/L (ref 3–14)
BASOPHILS # BLD AUTO: 0 10E9/L (ref 0–0.2)
BASOPHILS NFR BLD AUTO: 0.1 %
BUN SERPL-MCNC: 23 MG/DL (ref 7–30)
CALCIUM SERPL-MCNC: 8.8 MG/DL (ref 8.5–10.1)
CHLORIDE SERPL-SCNC: 99 MMOL/L (ref 94–109)
CO2 SERPL-SCNC: 29 MMOL/L (ref 20–32)
CREAT SERPL-MCNC: 0.77 MG/DL (ref 0.66–1.25)
DIFFERENTIAL METHOD BLD: NORMAL
EOSINOPHIL # BLD AUTO: 0 10E9/L (ref 0–0.7)
EOSINOPHIL NFR BLD AUTO: 0 %
ERYTHROCYTE [DISTWIDTH] IN BLOOD BY AUTOMATED COUNT: 12.8 % (ref 10–15)
GFR SERPL CREATININE-BSD FRML MDRD: >90 ML/MIN/1.7M2
GLUCOSE SERPL-MCNC: 124 MG/DL (ref 70–99)
HCT VFR BLD AUTO: 41.1 % (ref 40–53)
HGB BLD-MCNC: 13.6 G/DL (ref 13.3–17.7)
IMM GRANULOCYTES # BLD: 0 10E9/L (ref 0–0.4)
IMM GRANULOCYTES NFR BLD: 0.4 %
INR PPP: 3.08 (ref 0.8–1.2)
LYMPHOCYTES # BLD AUTO: 1.2 10E9/L (ref 0.8–5.3)
LYMPHOCYTES NFR BLD AUTO: 14 %
MCH RBC QN AUTO: 30.6 PG (ref 26.5–33)
MCHC RBC AUTO-ENTMCNC: 33.1 G/DL (ref 31.5–36.5)
MCV RBC AUTO: 93 FL (ref 78–100)
MONOCYTES # BLD AUTO: 0.4 10E9/L (ref 0–1.3)
MONOCYTES NFR BLD AUTO: 5 %
NEUTROPHILS # BLD AUTO: 6.8 10E9/L (ref 1.6–8.3)
NEUTROPHILS NFR BLD AUTO: 80.5 %
NRBC # BLD AUTO: 0 10*3/UL
NRBC BLD AUTO-RTO: 0 /100
PLATELET # BLD AUTO: 262 10E9/L (ref 150–450)
POTASSIUM SERPL-SCNC: 4.4 MMOL/L (ref 3.4–5.3)
RBC # BLD AUTO: 4.44 10E12/L (ref 4.4–5.9)
SODIUM SERPL-SCNC: 138 MMOL/L (ref 133–144)
WBC # BLD AUTO: 8.4 10E9/L (ref 4–11)

## 2018-02-03 PROCEDURE — 71046 X-RAY EXAM CHEST 2 VIEWS: CPT | Mod: TC

## 2018-02-03 PROCEDURE — 99284 EMERGENCY DEPT VISIT MOD MDM: CPT | Performed by: FAMILY MEDICINE

## 2018-02-03 PROCEDURE — 36415 COLL VENOUS BLD VENIPUNCTURE: CPT | Performed by: FAMILY MEDICINE

## 2018-02-03 PROCEDURE — 80048 BASIC METABOLIC PNL TOTAL CA: CPT | Performed by: FAMILY MEDICINE

## 2018-02-03 PROCEDURE — 96361 HYDRATE IV INFUSION ADD-ON: CPT

## 2018-02-03 PROCEDURE — 99284 EMERGENCY DEPT VISIT MOD MDM: CPT | Mod: 25

## 2018-02-03 PROCEDURE — 25000128 H RX IP 250 OP 636: Performed by: FAMILY MEDICINE

## 2018-02-03 PROCEDURE — 85025 COMPLETE CBC W/AUTO DIFF WBC: CPT | Performed by: FAMILY MEDICINE

## 2018-02-03 PROCEDURE — 96360 HYDRATION IV INFUSION INIT: CPT

## 2018-02-03 PROCEDURE — 85610 PROTHROMBIN TIME: CPT | Performed by: FAMILY MEDICINE

## 2018-02-03 RX ORDER — SODIUM CHLORIDE 9 MG/ML
INJECTION, SOLUTION INTRAVENOUS CONTINUOUS
Status: DISCONTINUED | OUTPATIENT
Start: 2018-02-03 | End: 2018-02-03 | Stop reason: HOSPADM

## 2018-02-03 RX ORDER — BENZONATATE 100 MG/1
200 CAPSULE ORAL 3 TIMES DAILY PRN
Qty: 30 CAPSULE | Refills: 0 | Status: SHIPPED | OUTPATIENT
Start: 2018-02-03 | End: 2018-05-18

## 2018-02-03 RX ORDER — BENZONATATE 100 MG/1
200 CAPSULE ORAL 3 TIMES DAILY PRN
Qty: 4 CAPSULE | Refills: 0 | Status: SHIPPED | OUTPATIENT
Start: 2018-02-03 | End: 2019-01-14

## 2018-02-03 RX ADMIN — SODIUM CHLORIDE 1000 ML: 9 INJECTION, SOLUTION INTRAVENOUS at 17:50

## 2018-02-03 ASSESSMENT — ENCOUNTER SYMPTOMS
SHORTNESS OF BREATH: 0
COUGH: 1
MUSCULOSKELETAL NEGATIVE: 1
ABDOMINAL PAIN: 0
DIARRHEA: 0
CONSTIPATION: 0
NAUSEA: 0
ACTIVITY CHANGE: 1
PSYCHIATRIC NEGATIVE: 1
WHEEZING: 0
DYSURIA: 0
NEUROLOGICAL NEGATIVE: 1
DIAPHORESIS: 0
VOMITING: 0
FEVER: 0
FATIGUE: 1

## 2018-02-03 NOTE — ED AVS SNAPSHOT
[facility]    [address]    [place] [address]    Phone:  [phone]                                       Kostas Cotto   MRN: [id number]    Department:  [facility]   Date of Visit:  2/3/2018           After Visit Summary Signature Page     I have received my discharge instructions, and my questions have been answered. I have discussed any challenges I see with this plan with the nurse or doctor.    ..........................................................................................................................................  Patient/Patient Representative Signature      ..........................................................................................................................................  Patient Representative Print Name and Relationship to Patient    ..................................................               ................................................  Date                                            Time    ..........................................................................................................................................  Reviewed by Signature/Title    ...................................................              ..............................................  Date                                                            Time

## 2018-02-03 NOTE — ED AVS SNAPSHOT
[facility]    [address] [place] [address]    [place] [address]    Phone:  [phone]                                       Kostas Cotto   MRN: [id number]    Department:  [facility]   Date of Visit:  2/3/2018           Patient Information     Date Of Birth          1945        Your diagnoses for this visit were:     Cough residual from influenza A    Dehydration     Muscle weakness (generalized)        You were seen by Ernestina Jon MD.      Follow-up Information     Follow up with Halley Hidalgo PA In 3 days.    Specialty:  Family Practice    Why:  Follow up ED visit    Contact information:    [facility]  [address] [facility] [address]  [place] [address]  [phone]          Discharge Instructions         Dehydration (Adult)  Dehydration occurs when your body loses too much fluid. This may be the result of prolonged vomiting or diarrhea, excessive sweating, or a high fever. It may also happen if you don t drink enough fluid when you re sick or out in the heat. Misuse of diuretics (water pills) can also be a cause.  Symptoms include thirst and decreased urine output. You may also feel dizzy, weak, fatigued, or very drowsy. The diet described below is usually enough to treat dehydration. In some cases, you may need medicine.  Home care    Drink at least 12 8-ounce glasses of fluid every day to resolve the dehydration. Fluid may include water; orange juice; lemonade; apple, grape, or cranberry juice; clear fruit drinks; electrolyte replacement and sports drinks; and teas and coffee without caffeine. Don't drink alcohol. If you have been diagnosed with a kidney disease, ask your doctor how much and what types of fluids you should drink to prevent dehydration. If you have kidney disease, fluid can build up in the body. This can be dangerous to your health.    If you have a fever, muscle aches, or a headache as a result of a cold or flu, you may take acetaminophen or  ibuprofen, unless another medicine was prescribed. If you have chronic liver or kidney disease, or have ever had a stomach ulcer or gastrointestinal bleeding, talk with your healthcare provider before using these medicines. Don't take aspirin if you are younger than 18 and have a fever. Aspirin raises the chance for severe liver injury.  Follow-up care  Follow up with your healthcare provider, or as advised.  When to seek medical advice  Call your healthcare provider right away if any of these occur:    Continued vomiting    Frequent diarrhea (more than 5 times a day); blood (red or black color) or mucus in diarrhea    Blood in vomit or stool    Swollen abdomen or increasing abdominal pain    Weakness, dizziness, or fainting    Unusual drowsiness or confusion    Reduced urine output or extreme thirst    Fever of 100.4 F (38 C) or higher  Date Last Reviewed: 5/1/2017 2000-2017 The "[employer]. [address] [place] [address]. All rights reserved. This information is not intended as a substitute for professional medical care. Always follow your healthcare professional's instructions.          Future Appointments        Provider Department Dept Phone Center    2/8/2018 9:30 AM Ally Ervin PTA [facility] [phone] [facility]    2/15/2018 9:30 AM Stefani Clancy PT [facility] [phone] [facility]    2/22/2018 9:30 AM Stefani Clancy PT [facility] [phone] [facility]    4/25/2018 10:00 AM Oni Ortega DO [facility] [phone] [facility]      ED Discharge Orders     UA reflex to Microscopic and Culture                    Review of your medicines      Our records show that you are taking the medicines listed below. If these are incorrect, please call your family doctor or clinic.        Dose / Directions Last dose taken    apixaban ANTICOAGULANT 5 MG tablet   Commonly known as:  ELIQUIS   Dose:  5 mg   Quantity:  180 tablet         Take 1 tablet (5 mg) by mouth 2 times daily   Refills:  3        aspirin 81 MG chewable tablet   Dose:  81 mg   Quantity:  36 tablet        Take 1 tablet (81 mg) by mouth daily   Refills:  11        calcium 500 + D 500-400 MG-UNIT Tabs per tablet   Generic drug:  calcium carbonate-vitamin D        2 times daily Take one tablet by oral route every day   Refills:  0        finasteride 5 MG tablet   Commonly known as:  PROSCAR   Dose:  5 mg   Quantity:  30 tablet        Take 1 tablet (5 mg) by mouth daily   Refills:  3        folic acid 1 MG tablet   Commonly known as:  FOLVITE   Dose:  1000 mcg   Quantity:  90 tablet        Take 1 tablet (1,000 mcg) by mouth daily   Refills:  3        lisinopril 5 MG tablet   Commonly known as:  PRINIVIL/ZESTRIL   Quantity:  30 tablet        TAKE 1 TABLET BY MOUTH ONCE DAILY   Refills:  5        metoprolol succinate 50 MG 24 hr tablet   Commonly known as:  TOPROL-XL   Quantity:  90 tablet        TAKE 1 TABLET (50 MG) BY MOUTH DAILY   Refills:  3        ranitidine 150 MG tablet   Commonly known as:  ZANTAC   Dose:  150 mg   Quantity:  90 tablet        Take 1 tablet (150 mg) by mouth daily   Refills:  3        simvastatin 20 MG tablet   Commonly known as:  ZOCOR   Quantity:  90 tablet        TAKE 1 TABLET BY MOUTH EVERY EVENING - GENERIC FOR ZOCOR   Refills:  3        tamsulosin 0.4 MG capsule   Commonly known as:  FLOMAX   Dose:  0.4 mg   Quantity:  90 capsule        Take 1 capsule (0.4 mg) by mouth daily   Refills:  3        traMADol 50 MG tablet   Commonly known as:  ULTRAM   Dose:   mg   Quantity:  30 tablet        Take 1-2 tablets ( mg) by mouth every 6 hours as needed for pain maximum 3 tablet(s) per day   Refills:  1          ASK your doctor about these medications        Dose / Directions Last dose taken    * benzonatate 200 MG capsule   Commonly known as:  TESSALON   Dose:  200 mg   What changed:  Another medication with the same name was added. Make sure you  understand how and when to take each.   Quantity:  21 capsule   Ask about: Which instructions should I use?        Take 1 capsule (200 mg) by mouth 3 times daily as needed for cough   Refills:  0        * benzonatate 100 MG capsule   Commonly known as:  TESSALON   Dose:  200 mg   What changed:  You were already taking a medication with the same name, and this prescription was added. Make sure you understand how and when to take each.   Quantity:  4 capsule   Ask about: Which instructions should I use?        Take 2 capsules (200 mg) by mouth 3 times daily as needed for cough   Refills:  0        * benzonatate 100 MG capsule   Commonly known as:  TESSALON   Dose:  200 mg   What changed:  You were already taking a medication with the same name, and this prescription was added. Make sure you understand how and when to take each.   Quantity:  30 capsule   Ask about: Which instructions should I use?        Take 2 capsules (200 mg) by mouth 3 times daily as needed for cough   Refills:  0        oseltamivir 75 MG capsule   Commonly known as:  TAMIFLU   Dose:  75 mg   Quantity:  9 capsule   Ask about: Should I take this medication?        Take 1 capsule (75 mg) by mouth 2 times daily for 9 doses   Refills:  0        * Notice:  This list has 3 medication(s) that are the same as other medications prescribed for you. Read the directions carefully, and ask your doctor or other care provider to review them with you.            Prescriptions were sent or printed at these locations (2 Prescriptions)                    [id number] - [facility] - [id number] [place] [address]    Telephone:  [phone]   Fax:  [phone]   Hours:                  E-Prescribed (1 of 1)         benzonatate (TESSALON) 100 MG capsule                     Other Prescriptions                Printed at Department/Unit printer (1 of 1)         benzonatate (TESSALON) 100 MG capsule                Procedures and tests  "performed during your visit     Basic metabolic panel    CBC with platelets differential    Chest XR,  PA & LAT    INR      Orders Needing Specimen Collection     Ordered          18 191  UA reflex to Microscopic and Culture - ROUTINE, Prio: Routine, Needs to be Collected     Scheduled Task Status   18 Collect UA reflex to Microscopic and Culture Open   Order Class:  PCU Collect                  Pending Results     No orders found from 2018 to 2018.            Pending Culture Results     No orders found from 2018 to 2018.            Thank you for choosing [facility]       Thank you for choosing [facility] for your care. Our goal is always to provide you with excellent care. Hearing back from our patients is one way we can continue to improve our services. Please take a few minutes to complete the written survey that you may receive in the mail after you visit with us. Thank you!        [employer] Information     [employer] lets you send messages to your doctor, view your test results, renew your prescriptions, schedule appointments and more. To sign up, go to [email or web address] . Click on \"Log in\" on the left side of the screen, which will take you to the Welcome page. Then click on \"Sign up Now\" on the right side of the page.     You will be asked to enter the access code listed below, as well as some personal information. Please follow the directions to create your username and password.     Your access code is: [email or web address]  Expires: 4/10/2018 12:47 PM     Your access code will  in 90 days. If you need help or a new code, please call your [facility] clinic or [phone].        Care EveryWhere ID     This is your Care EveryWhere ID. This could be used by other organizations to access your [facility] medical records  [id number]        Equal Access to Services     PEDRO WEAVER AH: Una Moctezuma, cipriano blount, london chicas " genaro guzman ah. So [facility] [phone].    ATENCIÓN: Si habla español, tiene a rahman disposición servicios gratuitos de asistencia lingüística. Llame al [phone].    We comply with applicable federal civil rights laws and [place] laws. We do not discriminate on the basis of race, color, national origin, age, disability, sex, sexual orientation, or gender identity.            After Visit Summary       This is your record. Keep this with you and show to your community pharmacist(s) and doctor(s) at your next visit.

## 2018-02-03 NOTE — ED PROVIDER NOTES
History     Chief Complaint   Patient presents with     Generalized Weakness     c/o weakness, notes had influenza last week     HPI  Kostas Cotto is a 72 year old male who has been having weakness and poor sleep due to coughing.  He was diagnosed with influenza last week and took Tamiflu, had poor appetite due to nausea with some emesis on the medication.  He has not had a fever, denies dyspnea, chest pain, nausea, abdominal pain, difficulty with bowel or bladder.  He has been eating and drinking less than usual and is somewhat dry.  Problem List:    Patient Active Problem List    Diagnosis Date Noted     New onset atrial fibrillation (H) 01/24/2018     Priority: Medium     Hospital discharge follow-up 01/24/2018     Priority: Medium     History of coronary artery bypass graft x 3 in 2005 01/24/2018     Priority: Medium     H/O cardiac arrest 01/24/2018     Priority: Medium     History of tobacco abuse 01/24/2018     Priority: Medium     Supratherapeutic INR 01/24/2018     Priority: Medium     Long-term (current) use of anticoagulants [Z79.01] 01/15/2018     Priority: Medium     CVA (cerebral vascular accident) (H) 01/08/2018     Priority: Medium     Advanced directives, counseling/discussion 03/11/2015     Priority: Medium     CAD (coronary artery disease) 03/05/2014     Priority: Medium     Hyperlipidemia with target LDL less than 100 03/05/2014     Priority: Medium     Diagnosis updated by automated process. Provider to review and confirm.       Essential hypertension with goal blood pressure less than 140/90 03/05/2014     Priority: Medium        Past Medical History:    Past Medical History:   Diagnosis Date     Hypoxic-ischemic encephalopathy (HIE) 11/25/2003     Rheumatoid arthritis(714.0) 05/03/2005     S/P inferior MI 11/25/2003       Past Surgical History:    Past Surgical History:   Procedure Laterality Date     AAA REPAIR       ANGIOGRAM  1/2004    Coronary Angiogram, LAD> long mid 40-50%, D2 90%  ostial,Prox % with collateal flow,Circ had restenosisand was restented x 3 again.  EF now 55% with mild lateral wall hypokineseis and inferior also.     CARDIOVASCULAR SURGERY  1/2005    Syncopal episode, Had repeat angiogam showing now circ 100% along with RCA. LAD 80-90% with EF 35%.  Sent for CABG     CARDIOVASCULAR SURGERY  1/2005    S/P 3 vess CABG, Dr Villalobos LIMA>LAD, SVG>OM1, SVG>RCA.  Post op pneumothorax with Chest Tube placement.     CARDIOVASCULAR SURGERY  11/2003    Acute Inferior MI, Had VT arrest resuscitated. Not given lytics and was sent to [facility].  IABP placed and had angiogram stented Circ x 5. EF 30%. Lad had 70%, % with contralateral collateral flow.     HC US CHEST      left     HEART/LUNG RESUSCITATION (CPR)  11/2003    S/P Cardiac Arrest V-Tach, Out of hospital arrest with no bystaner cpr.  Has polymorphic VT on arrival.  Has a prolonged resuscitation which obviously was successful.  This was due to an acute Inferior MI at the time.     OPEN REDUCTION INTERNAL FIXATION HIP  1965    ORIF frac/dislocation L hip, Was a teenager and had orif done after fracture dislocation of left hip. Occurred playing softball.     PHACOEMULSIFICATION WITH STANDARD INTRAOCULAR LENS IMPLANT  3/10/2014    Procedure: PHACOEMULSIFICATION WITH STANDARD INTRAOCULAR LENS IMPLANT;  CATARACT EXTRACTION WITH INTRA OCULAR LENS LEFT(10% SERENE/POSSIBLE STRETCH);  Surgeon: Isael Acuna MD;  Location: [facility]     PHACOEMULSIFICATION WITH STANDARD INTRAOCULAR LENS IMPLANT  3/25/2014    Procedure: PHACOEMULSIFICATION WITH STANDARD INTRAOCULAR LENS IMPLANT;  CATARACT EXTRACTION WITH INTRA OCULAR LENS RIGHT/POSSIBLE PUPIL STRETCH;  Surgeon: Isael Acuna MD;  Location: [facility]       Family History:    Family History   Problem Relation Age of Onset     CANCER Father      leukemia     DIABETES Mother      CEREBROVASCULAR DISEASE Mother      cause of death       Social History:  Marital Status:   [2]  Social  History   Substance Use Topics     Smoking status: Former Smoker     Smokeless tobacco: Never Used     Alcohol use No        Medications:      benzonatate (TESSALON) 100 MG capsule   benzonatate (TESSALON) 100 MG capsule   lisinopril (PRINIVIL/ZESTRIL) 5 MG tablet   aspirin 81 MG chewable tablet   tamsulosin (FLOMAX) 0.4 MG capsule   simvastatin (ZOCOR) 20 MG tablet   metoprolol succinate (TOPROL-XL) 50 MG 24 hr tablet   ranitidine (ZANTAC) 150 MG tablet   folic acid (FOLVITE) 1 MG tablet   finasteride (PROSCAR) 5 MG tablet   calcium carbonate-vitamin D (CALCIUM 500 + D) 500-400 MG-UNIT TABS tablt   benzonatate (TESSALON) 200 MG capsule   apixaban ANTICOAGULANT (ELIQUIS) 5 MG tablet   traMADol (ULTRAM) 50 MG tablet         Review of Systems   Constitutional: Positive for activity change and fatigue. Negative for diaphoresis and fever.   HENT: Negative.    Respiratory: Positive for cough. Negative for shortness of breath and wheezing.    Cardiovascular: Negative for chest pain.   Gastrointestinal: Negative for abdominal pain, constipation, diarrhea, nausea and vomiting.   Genitourinary: Negative for dysuria.   Musculoskeletal: Negative.    Skin: Negative.    Neurological: Negative.    Psychiatric/Behavioral: Negative.        Physical Exam   BP: 131/78  Heart Rate: 72  Temp: 96.9  F (36.1  C)  Resp: 18  SpO2: 93 %      Physical Exam   Constitutional: He is oriented to person, place, and time. He appears well-developed and well-nourished. No distress.   HENT:   Head: Normocephalic and atraumatic.   Neck: Normal range of motion. Neck supple.   Cardiovascular: Normal rate, regular rhythm, normal heart sounds and intact distal pulses.    No murmur heard.  Pulmonary/Chest: Effort normal and breath sounds normal. No respiratory distress.   Abdominal: Soft. Bowel sounds are normal. He exhibits no distension. There is no tenderness.   Musculoskeletal: Normal range of motion. He exhibits no edema.   Neurological: He is alert  and oriented to person, place, and time.   Expressive aphasia due to HIE.   Skin: Skin is warm and dry.   Psychiatric: He has a normal mood and affect.   Nursing note and vitals reviewed.      ED Course     ED Course     Procedures      Labs Ordered and Resulted from Time of ED Arrival Up to the Time of Departure from the ED   BASIC METABOLIC PANEL - Abnormal; Notable for the following:        Result Value    Glucose 124 (*)     All other components within normal limits   INR - Abnormal; Notable for the following:     INR 3.08 (*)     All other components within normal limits   CBC WITH PLATELETS DIFFERENTIAL   UA MACROSCOPIC WITH REFLEX TO MICRO AND CULTURE       Assessments & Plan (with Medical Decision Making)   Wife angry with long stay in ED, wishes to leave.  Patient unable to void, she states she will bring a urine sample in tomorrow, wants to go home now.  Given rx for Tessalon Perles and 4 tablets take-home.  She is to encourage the patient to drink as much fluid as possible.  Order for UA placed in EPIC.  Follow up with PCP in 3-4 days.    I have reviewed the nursing notes.    I have reviewed the findings, diagnosis, plan and need for follow up with the patient.    New Prescriptions    BENZONATATE (TESSALON) 100 MG CAPSULE    Take 2 capsules (200 mg) by mouth 3 times daily as needed for cough    BENZONATATE (TESSALON) 100 MG CAPSULE    Take 2 capsules (200 mg) by mouth 3 times daily as needed for cough       Final diagnoses:   Cough - residual from influenza A   Dehydration   Muscle weakness (generalized)       2/3/2018   [facility]     Ernestina Jon MD  02/03/18 2031

## 2018-02-04 DIAGNOSIS — M62.81 MUSCLE WEAKNESS (GENERALIZED): ICD-10-CM

## 2018-02-04 LAB
ALBUMIN UR-MCNC: 10 MG/DL
APPEARANCE UR: CLEAR
BACTERIA #/AREA URNS HPF: ABNORMAL /HPF
BILIRUB UR QL STRIP: NEGATIVE
COLOR UR AUTO: YELLOW
GLUCOSE UR STRIP-MCNC: NEGATIVE MG/DL
HGB UR QL STRIP: NEGATIVE
KETONES UR STRIP-MCNC: 10 MG/DL
LEUKOCYTE ESTERASE UR QL STRIP: NEGATIVE
MUCOUS THREADS #/AREA URNS LPF: PRESENT /LPF
NITRATE UR QL: NEGATIVE
PH UR STRIP: 5.5 PH (ref 4.7–8)
RBC #/AREA URNS AUTO: 3 /HPF (ref 0–2)
SOURCE: ABNORMAL
SP GR UR STRIP: 1.02 (ref 1–1.03)
UROBILINOGEN UR STRIP-MCNC: NORMAL MG/DL (ref 0–2)
WBC #/AREA URNS AUTO: 3 /HPF (ref 0–2)

## 2018-02-04 PROCEDURE — 81001 URINALYSIS AUTO W/SCOPE: CPT | Performed by: FAMILY MEDICINE

## 2018-02-04 NOTE — ED NOTES
Face to face report given with opportunity to observe patient.    Report given to LILY Davison.    NINA MCKINNEY  2/3/2018, 7:06 PM

## 2018-02-04 NOTE — ED NOTES
Assisted pt to attempt void standing at EOB.  Pt unable.  Wife states will bring a urine sample in to hospital in the morning.  States I need to go.  Updated provider on wife's request.

## 2018-02-04 NOTE — ED NOTES
Discharge papers and urine cup given to pt and wife.  Verbalize understanding to bring urine sample in to the lab in the morning and take home meds. Encouraged fluid.  IV out and site is slight bruised.  Denies pain.   VSS as charted.  Discharged to home in w/c with wife and son here to transport.

## 2018-02-04 NOTE — DISCHARGE INSTRUCTIONS
Dehydration (Adult)  Dehydration occurs when your body loses too much fluid. This may be the result of prolonged vomiting or diarrhea, excessive sweating, or a high fever. It may also happen if you don t drink enough fluid when you re sick or out in the heat. Misuse of diuretics (water pills) can also be a cause.  Symptoms include thirst and decreased urine output. You may also feel dizzy, weak, fatigued, or very drowsy. The diet described below is usually enough to treat dehydration. In some cases, you may need medicine.  Home care    Drink at least 12 8-ounce glasses of fluid every day to resolve the dehydration. Fluid may include water; orange juice; lemonade; apple, grape, or cranberry juice; clear fruit drinks; electrolyte replacement and sports drinks; and teas and coffee without caffeine. Don't drink alcohol. If you have been diagnosed with a kidney disease, ask your doctor how much and what types of fluids you should drink to prevent dehydration. If you have kidney disease, fluid can build up in the body. This can be dangerous to your health.    If you have a fever, muscle aches, or a headache as a result of a cold or flu, you may take acetaminophen or ibuprofen, unless another medicine was prescribed. If you have chronic liver or kidney disease, or have ever had a stomach ulcer or gastrointestinal bleeding, talk with your healthcare provider before using these medicines. Don't take aspirin if you are younger than 18 and have a fever. Aspirin raises the chance for severe liver injury.  Follow-up care  Follow up with your healthcare provider, or as advised.  When to seek medical advice  Call your healthcare provider right away if any of these occur:    Continued vomiting    Frequent diarrhea (more than 5 times a day); blood (red or black color) or mucus in diarrhea    Blood in vomit or stool    Swollen abdomen or increasing abdominal pain    Weakness, dizziness, or fainting    Unusual drowsiness or  confusion    Reduced urine output or extreme thirst    Fever of 100.4 F (38 C) or higher  Date Last Reviewed: 5/1/2017 2000-2017 [employer]. [address] [place] [address], [place] [address]. All rights reserved. This information is not intended as a substitute for professional medical care. Always follow your healthcare professional's instructions.

## 2018-02-05 ENCOUNTER — ANTICOAGULATION THERAPY VISIT (OUTPATIENT)
Dept: ANTICOAGULATION | Facility: OTHER | Age: 73
End: 2018-02-05
Payer: COMMERCIAL

## 2018-02-05 DIAGNOSIS — Z79.01 LONG-TERM (CURRENT) USE OF ANTICOAGULANTS: ICD-10-CM

## 2018-02-05 NOTE — MR AVS SNAPSHOT
Kostas Cotto   2/5/2018   Anticoagulation Therapy Visit    Description:  72 year old male   Provider:  Halley Hidalgo PA   Department:  [facility]           INR as of 2/5/2018     Today's INR 3.08! (2/3/2018)      Anticoagulation Summary as of 2/5/2018     INR goal 2.0-3.0   Today's INR 3.08! (2/3/2018)   Full instructions 2/5: Hold; Otherwise 1.25 mg every day   Next INR check 2/12/2018    Indications   Long-term (current) use of anticoagulants [Z79.01] [Z79.01]  CVA (cerebral vascular accident) (H) [I63.9]         February 2018 Details    Sun Mon Tue Wed Thu Fri Sat         1               2               3                 4               5      Hold   See details      6      1.25 mg         7      1.25 mg         8      1.25 mg         9      1.25 mg         10      1.25 mg           11      1.25 mg         12            13               14               15               16               17                 18               19               20               21               22               23               24                 25               26               27               28                   Date Details   02/05 This INR check       Date of next INR:  2/12/2018         How to take your warfarin dose     To take:  1.25 mg Take 0.5 of a 2.5 mg tablet.    Hold Do not take your warfarin dose. See the Details table to the right for additional instructions.

## 2018-02-05 NOTE — PROGRESS NOTES
ANTICOAGULATION FOLLOW-UP CLINIC VISIT    Patient Name:  Kostas Cotto  Date:  2/5/2018  Contact Type:  Telephone/ spoke to patient wife    SUBJECTIVE:     Patient Findings     Positives Other complaints    Comments Call and message on warfarin clinic voicemail from patient wife. Call returned to wife. She states patient in ER on 2/3/18 and INR was 3.  I had noted earlier today that he was in ER and was somewhat dehydrated. I told wife to try to get him to drink more. She states she has gotten some vitamin water and he is drinking that. I suggested popsicles as they are fluid and he may be more apt to take a popsicle and eat it.  She said she would get some. We discussed his INR and new warfarin dosing. She states that their medication company states that they mailed out the Eliquis last week but she states it has no arrived at their home yet. She will call warfarin clinic when she gets the medication. We discussed warfarin dosing and she verbalized understanding and has no questions.            OBJECTIVE    INR   Date Value Ref Range Status   02/03/2018 3.08 (H) 0.80 - 1.20 Final       ASSESSMENT / PLAN  INR assessment THER    Recheck INR In: 1 WEEK    INR Location Clinic      Anticoagulation Summary as of 2/5/2018     INR goal 2.0-3.0   Today's INR 3.08! (2/3/2018)   Maintenance plan 1.25 mg (2.5 mg x 0.5) every day   Full instructions 2/5: Hold; Otherwise 1.25 mg every day   Weekly total 8.75 mg   Plan last modified Katey Tong RN (1/29/2018)   Next INR check 2/12/2018   Target end date     Indications   Long-term (current) use of anticoagulants [Z79.01] [Z79.01]  CVA (cerebral vascular accident) (H) [I63.9]         Anticoagulation Episode Summary     INR check location     Preferred lab     Send INR reminders to  ANTICOAG POOL    Comments       Anticoagulation Care Providers     Provider Role Specialty Phone number    Halley Hidalgo PA [facility] [phone]            See the  Encounter Report to view Anticoagulation Flowsheet and Dosing Calendar (Go to Encounters tab in chart review, and find the Anticoagulation Therapy Visit)        Katey Tong RN

## 2018-02-06 ENCOUNTER — ANTICOAGULATION THERAPY VISIT (OUTPATIENT)
Dept: ANTICOAGULATION | Facility: OTHER | Age: 73
End: 2018-02-06
Payer: COMMERCIAL

## 2018-02-06 DIAGNOSIS — Z79.01 LONG-TERM (CURRENT) USE OF ANTICOAGULANTS: ICD-10-CM

## 2018-02-06 NOTE — PROGRESS NOTES
ANTICOAGULATION FOLLOW-UP CLINIC VISIT    Patient Name:  Kostas Cotto  Date:  2/6/2018  Contact Type:  Telephone/ call from patient wife.    SUBJECTIVE:     Patient Findings     Positives Other complaints    Comments Call from patient wife. She states they received the Eliquis yesterday in the mail. We discussed that he should take no warfarin today and then have INR done tomorrow. If INR 2.0 or lower will start Eliquis as directed by his MD.            OBJECTIVE    INR   Date Value Ref Range Status   02/03/2018 3.08 (H) 0.80 - 1.20 Final       ASSESSMENT / PLAN  No question data found.  Anticoagulation Summary as of 2/6/2018     INR goal 2.0-3.0   Today's INR No new INR was available at the time of this encounter.   Maintenance plan 1.25 mg (2.5 mg x 0.5) every day   Full instructions 2/6: Hold; Otherwise 1.25 mg every day   Weekly total 8.75 mg   Plan last modified Katey Tong RN (1/29/2018)   Next INR check 2/7/2018   Target end date     Indications   Long-term (current) use of anticoagulants [Z79.01] [Z79.01]  CVA (cerebral vascular accident) (H) [I63.9]         Anticoagulation Episode Summary     INR check location     Preferred lab     Send INR reminders to  BENITO POOL    Comments       Anticoagulation Care Providers     Provider Role Specialty Phone number    Halley Hidalgo PA [facility] [phone]            See the Encounter Report to view Anticoagulation Flowsheet and Dosing Calendar (Go to Encounters tab in chart review, and find the Anticoagulation Therapy Visit)        Katey Tong, RN

## 2018-02-06 NOTE — MR AVS SNAPSHOT
Kostas Whittakerin   2/6/2018   Anticoagulation Therapy Visit    Description:  72 year old male   Provider:  Halley Hidalgo PA   Department:  Hc Anti Coagulation           INR as of 2/6/2018     Today's INR No new INR was available at the time of this encounter.      Anticoagulation Summary as of 2/6/2018     INR goal 2.0-3.0   Today's INR No new INR was available at the time of this encounter.   Full instructions 2/6: Hold; Otherwise 1.25 mg every day   Next INR check 2/7/2018    Indications   Long-term (current) use of anticoagulants [Z79.01] [Z79.01]  CVA (cerebral vascular accident) (H) [I63.9]         Your next Anticoagulation Clinic appointment(s)     Feb 07, 2018  9:30 AM CST   Anticoagulation Visit with HC ANTI COAGULATION   [facility] [place] ([facility] [place] )    [address] [facility] [address]   [phone]              February 2018 Details    Sun Mon Tue Wed Thu Fri Sat         1               2               3                 4               5               6      Hold   See details      7            8               9               10                 11               12               13               14               15               16               17                 18               19               20               21               22               23               24                 25               26               27               28                   Date Details   02/06 This INR check       Date of next INR:  2/7/2018         How to take your warfarin dose     To take:  1.25 mg Take 0.5 of a 2.5 mg tablet.    Hold Do not take your warfarin dose. See the Details table to the right for additional instructions.

## 2018-02-07 ENCOUNTER — ANTICOAGULATION THERAPY VISIT (OUTPATIENT)
Dept: ANTICOAGULATION | Facility: OTHER | Age: 73
End: 2018-02-07
Attending: PHYSICIAN ASSISTANT
Payer: COMMERCIAL

## 2018-02-07 DIAGNOSIS — Z79.01 LONG-TERM (CURRENT) USE OF ANTICOAGULANTS: ICD-10-CM

## 2018-02-07 LAB — INR POINT OF CARE: 4.2 (ref 0.86–1.14)

## 2018-02-07 PROCEDURE — 36416 COLLJ CAPILLARY BLOOD SPEC: CPT | Mod: ZL

## 2018-02-07 NOTE — MR AVS SNAPSHOT
Kostas Yadiel   2/7/2018 9:30 AM   Anticoagulation Therapy Visit    Description:  72 year old male   Provider:   ANTI COAGULATION   Department:  Hc Anti Coagulation           INR as of 2/7/2018     Today's INR 4.2!      Anticoagulation Summary as of 2/7/2018     INR goal 2.0-3.0   Today's INR 4.2!   Full instructions 2/7: Hold; 2/8: Hold; Otherwise 1.25 mg every day   Next INR check 2/9/2018    Indications   Long-term (current) use of anticoagulants [Z79.01] [Z79.01]  CVA (cerebral vascular accident) (H) [I63.9]         Your next Anticoagulation Clinic appointment(s)     Feb 09, 2018  9:30 AM CST   Anticoagulation Visit with  ANTI COAGULATION   [facility] ([facility] )    [address] [place] [address]   [phone]              February 2018 Details    Sun Mon Tue Wed Thu Fri Sat         1               2               3                 4               5               6               7      Hold   See details      8      Hold         9            10                 11               12               13               14               15               16               17                 18               19               20               21               22               23               24                 25               26               27               28                   Date Details   02/07 This INR check       Date of next INR:  2/9/2018         How to take your warfarin dose     To take:  1.25 mg Take 0.5 of a 2.5 mg tablet.    Hold Do not take your warfarin dose. See the Details table to the right for additional instructions.

## 2018-02-09 ENCOUNTER — ANTICOAGULATION THERAPY VISIT (OUTPATIENT)
Dept: ANTICOAGULATION | Facility: OTHER | Age: 73
End: 2018-02-09
Attending: PHYSICIAN ASSISTANT
Payer: COMMERCIAL

## 2018-02-09 DIAGNOSIS — Z79.01 LONG-TERM (CURRENT) USE OF ANTICOAGULANTS: ICD-10-CM

## 2018-02-09 LAB — INR POINT OF CARE: 1.4 (ref 0.86–1.14)

## 2018-02-09 PROCEDURE — 36416 COLLJ CAPILLARY BLOOD SPEC: CPT | Mod: ZL

## 2018-02-09 NOTE — PROGRESS NOTES
ANTICOAGULATION FOLLOW-UP CLINIC VISIT    Patient Name:  Kostas Cotto  Date:  2/9/2018  Contact Type:  Face to Face    SUBJECTIVE:     Patient Findings     Positives Other complaints    Comments Patient seen by warfarin clinic nurse. His warfarin has been held for 4 days. He is to start Eliquis when INR is 2.0 or lower. Today's INR is 1.4 so patient was told to start the Eliquis and to stop warfarin.  He will be discharged from warfarin clinic program.           OBJECTIVE    INR Protime   Date Value Ref Range Status   02/09/2018 1.4 (A) 0.86 - 1.14 Final       ASSESSMENT / PLAN  INR assessment SUB      Anticoagulation Summary as of 2/9/2018     INR goal 2.0-3.0   Today's INR 1.4!   Maintenance plan 1.25 mg (2.5 mg x 0.5) every day   Full instructions 1.25 mg every day   Weekly total 8.75 mg   Plan last modified Katey Tong RN (1/29/2018)   Next INR check    Target end date     Indications   Long-term (current) use of anticoagulants [Z79.01] [Z79.01]  CVA (cerebral vascular accident) (H) [I63.9]         Anticoagulation Episode Summary     INR check location     Preferred lab     Resolved date 2/9/2018    Resolved reason Changed Anticoagulant     Send INR reminders to  ANTICOAG POOL    Comments       Anticoagulation Care Providers     Provider Role Specialty Phone number    Halley Hidalgo PA [facility] [phone]            See the Encounter Report to view Anticoagulation Flowsheet and Dosing Calendar (Go to Encounters tab in chart review, and find the Anticoagulation Therapy Visit)        Katey Tong, RN

## 2018-04-16 DIAGNOSIS — R33.8 BENIGN PROSTATIC HYPERPLASIA WITH URINARY RETENTION: ICD-10-CM

## 2018-04-16 DIAGNOSIS — N40.1 BENIGN PROSTATIC HYPERPLASIA WITH URINARY RETENTION: ICD-10-CM

## 2018-04-17 NOTE — TELEPHONE ENCOUNTER
Finasteride  Last Written Prescription Date: 1/11/18  Last Fill Quantity: 30 # of Refills: 3  Last Office Visit: 1/18/18      Ophelia Womack LPN

## 2018-04-18 RX ORDER — FINASTERIDE 5 MG/1
TABLET, FILM COATED ORAL
Qty: 30 TABLET | Refills: 5 | Status: SHIPPED | OUTPATIENT
Start: 2018-04-18 | End: 2018-09-09

## 2018-05-18 ENCOUNTER — OFFICE VISIT (OUTPATIENT)
Dept: CARDIOLOGY | Facility: OTHER | Age: 73
End: 2018-05-18
Attending: INTERNAL MEDICINE
Payer: COMMERCIAL

## 2018-05-18 VITALS
SYSTOLIC BLOOD PRESSURE: 124 MMHG | HEIGHT: 72 IN | OXYGEN SATURATION: 91 % | BODY MASS INDEX: 23.98 KG/M2 | RESPIRATION RATE: 20 BRPM | DIASTOLIC BLOOD PRESSURE: 55 MMHG | WEIGHT: 177 LBS | TEMPERATURE: 97 F | HEART RATE: 61 BPM

## 2018-05-18 DIAGNOSIS — I10 ESSENTIAL HYPERTENSION WITH GOAL BLOOD PRESSURE LESS THAN 140/90: ICD-10-CM

## 2018-05-18 DIAGNOSIS — Z86.74 H/O CARDIAC ARREST: ICD-10-CM

## 2018-05-18 DIAGNOSIS — Z95.1 HISTORY OF CORONARY ARTERY BYPASS GRAFT X 3: ICD-10-CM

## 2018-05-18 DIAGNOSIS — E78.00 PURE HYPERCHOLESTEROLEMIA: ICD-10-CM

## 2018-05-18 DIAGNOSIS — I48.0 PAROXYSMAL ATRIAL FIBRILLATION (H): Primary | ICD-10-CM

## 2018-05-18 DIAGNOSIS — Z87.891 HISTORY OF TOBACCO ABUSE: ICD-10-CM

## 2018-05-18 DIAGNOSIS — I25.810 CORONARY ARTERY DISEASE INVOLVING CORONARY BYPASS GRAFT OF NATIVE HEART WITHOUT ANGINA PECTORIS: ICD-10-CM

## 2018-05-18 DIAGNOSIS — I25.2 HISTORY OF MYOCARDIAL INFARCTION: ICD-10-CM

## 2018-05-18 DIAGNOSIS — Z86.79 H/O VENTRICULAR FIBRILLATION: ICD-10-CM

## 2018-05-18 DIAGNOSIS — Z79.01 LONG-TERM (CURRENT) USE OF ANTICOAGULANTS: ICD-10-CM

## 2018-05-18 PROCEDURE — 99214 OFFICE O/P EST MOD 30 MIN: CPT | Performed by: INTERNAL MEDICINE

## 2018-05-18 PROCEDURE — G0463 HOSPITAL OUTPT CLINIC VISIT: HCPCS

## 2018-05-18 ASSESSMENT — PAIN SCALES - GENERAL: PAINLEVEL: NO PAIN (0)

## 2018-05-18 NOTE — PROGRESS NOTES
Cardiology Progress Note     Assessment & Plan   Mr. Cotto is a 72-year-old gentleman who is being seen by cardiology in follow-up to visit from 1/24/18 as he has a diagnosis of atrial fibrillation.  He was started on Eliquis after having wide swings in his INR while on Coumadin.  He has had no symptoms related A. fib.  He is also been asymptomatic from a coronary artery disease.  He has not seen any changes as he has a possible history of stroke in the past.  He has no specific complaints today.        Impression:  1.  Paroxysmal atrial fibrillation  2.  Hyperlipidemia.  3.  Rheumatoid arthritis.  4.  History of CABG x3 in 2005.  5.  History of myocardial infarction with stent placed in 2003.  6.  Hypertension.  7.  History of ventricular arrest in 2003.  8.  Questionable history of a CVA.  9.  History of tobacco abuse quitting in 2003.  10.  Abdominal aortic aneurysm status post repair.  11.  History of myocardial infarction.      Plan:  1.  No changes made today.  2.  He will continue on aspirin 81 mg daily, metoprolol 50 mg daily, and Eliquis 5 mg twice a day for atrial fibrillation.  3.  He will continue aspirin 81 mg daily, metoprolol 50 mg daily, and Zocor 20 mg daily with a history of CABG/cardiac arrest/myocardial infarction.  4.  He will be seen in 6 months follow-up to reassess symptoms/concerns with a history of atrial fibrillation as well as a history of coronary artery disease.        Oni Ortega    Interval History   Kostas has no specific complaints today.  He denies any palpitations, fluttering in his chest, or racing heart.  He is tolerating Eliquis well.  Previously, he was on Coumadin in which his INR spiked extremely quickly from 1.23-16.4 in a few days.  He has been doing mail order Eliquis with a $20 co-pay per month/$60 for 3 months.  If he were to pick it up locally, it would be $200 for 3 months.    He does have a significant cardiac history.  He had CABG ×3 in 2005, myocardial  infarction in 2003, V. fib arrest in 2003, and a history of tobacco abuse.  He denies any chest pain, chest tightness, or chest discomfort.    He also has a history of vascular disease.  It sounds like he has some speech difficulties related to stroke.  He denies additional deficits.  The wife is not willing to attribute this symptom completely to a stroke but also feels it may have been secondary to his cardiac arrest.  He does have a history of a AAA repair with stability.  He has had an ultrasound of his carotids in January, 2018 that did not show any significant disease.    He remains on Zocor 20 mg daily for his cholesterol.  He has no lower extremity edema.  He has no specific complaints today.    Physical Exam   Temp: 97  F (36.1  C) Temp src: Tympanic BP: 124/55 Pulse: 61   Resp: 20 SpO2: 91 %      Vitals:    05/18/18 0950   Weight: 80.3 kg (177 lb)     Vital Signs with Ranges  Temp:  [97  F (36.1  C)] 97  F (36.1  C)  Pulse:  [61] 61  Resp:  [20] 20  BP: (124)/(55) 124/55  SpO2:  [91 %] 91 %  ROS is negative except that which was noted in the HPI.     Incision/Surgical Site 03/10/14 Left Eye (Active)   Number of days:1530       Incision/Surgical Site Right Eye (Active)   Number of days:       Constitutional: awake, alert, cooperative, no apparent distress, and appears stated age  Eyes: Lids and lashes normal, pupils equal, sclera clear, conjunctiva normal  ENT: Normocephalic, without obvious abnormality, atraumatic.  Respiratory: No increased work of breathing, good air exchange, clear to auscultation bilaterally, no crackles or wheezing  Cardiovascular: Normal apical impulse, regular rate and rhythm, normal S1 and S2, no S3 or S4, and no murmur noted  GI: No scars, normal bowel sounds.  Musculoskeletal: no lower extremity pitting edema present  Neurologic: Awake, alert, oriented to name, place and time.   Neuropsychiatric: General: normal, calm and normal eye contact    Medications         Data   No  results found for this or any previous visit (from the past 24 hour(s)).

## 2018-05-18 NOTE — NURSING NOTE
Chief Complaint   Patient presents with     RECHECK     4 month follow-up       Initial /55 (BP Location: Left arm, Patient Position: Chair, Cuff Size: Adult Regular)  Pulse 61  Temp 97  F (36.1  C) (Tympanic)  Resp 20  Ht 1.829 m (6')  Wt 80.3 kg (177 lb)  SpO2 91%  BMI 24.01 kg/m2 Estimated body mass index is 24.01 kg/(m^2) as calculated from the following:    Height as of this encounter: 1.829 m (6').    Weight as of this encounter: 80.3 kg (177 lb).  Medication Reconciliation: complete    Jerri Obregon LPN

## 2018-05-18 NOTE — PATIENT INSTRUCTIONS
You were seen by Dr. Ortega, 5/18/2018.     1.  Continue with current medications as prescribed.       You will follow up with Dr. Ortega in 6 months.       Please call the cardiology office with problems, questions, or concerns at [phone].    If you experience chest pain, chest pressure, chest tightness, shortness of breath, fainting, lightheadedness, nausea, vomiting, or other concerning symptoms, please report to the Emergency Department or call 911. These symptoms may be emergent, and best treated in the Emergency Department.     Martha Valdivia RN  Cardiology   [facility]  [phone]

## 2018-06-01 NOTE — PROGRESS NOTES
Outpatient Physical Therapy Discharge Note     Patient: Kostas Cotto  : 1945    Beginning/End Dates of Reporting Period:  2018 to 2018    Referring Provider: Halley BOLDEN    Therapy Diagnosis: decreased balance     Client Self Report: Wife is assisting him in answering specifics with questions.     Objective Measurements:                                    Client came x 1 time and I notice went to ED for the flu and he did not return     Outcome Measures (most recent score):      Goals:  Goal Identifier short   Goal Description Client will be compliant with outline HEP.   Target Date 18   Date Met      Progress:     Goal Identifier functional   Goal Description Client will demonstrate CASANOVA increase by 10 points so we can try a cane.   Target Date 18   Date Met      Progress:     Goal Identifier functional   Goal Description Client will demonstrate Casanova improved to 52/56 for I gait with no devoce in the home.   Target Date 18   Date Met      Progress:     Goal Identifier     Goal Description     Target Date     Date Met      Progress:     Goal Identifier     Goal Description     Target Date     Date Met      Progress:     Goal Identifier     Goal Description     Target Date     Date Met      Progress:     Goal Identifier     Goal Description     Target Date     Date Met      Progress:     Goal Identifier     Goal Description     Target Date     Date Met      Progress:     Progress Toward Goals:   Not assessed this period.    Plan:  Discharge from therapy.    Discharge:    Reason for Discharge: Medicare G-code: Patient did not attend a final scheduled session prior to discharge. Unable to determine discharge functional status.    Equipment Issued:     Discharge Plan: Patient to continue home program.

## 2018-08-24 DIAGNOSIS — I10 ESSENTIAL HYPERTENSION WITH GOAL BLOOD PRESSURE LESS THAN 140/90: ICD-10-CM

## 2018-08-24 RX ORDER — LISINOPRIL 5 MG/1
TABLET ORAL
Qty: 30 TABLET | Refills: 4 | Status: SHIPPED | OUTPATIENT
Start: 2018-08-24 | End: 2019-01-23

## 2018-08-24 NOTE — TELEPHONE ENCOUNTER
lisinopril      Last Written Prescription Date:  2/2/18  Last Fill Quantity: 30,   # refills: 5  Last Office Visit: 1/18/18  Future Office visit:    Next 5 appointments (look out 90 days)     Nov 19, 2018  9:30 AM CST   (Arrive by 9:15 AM)   Return Visit with Oni Ortega DO   [facility] ([facility] )    [address] [place] [address]   [phone]

## 2018-11-22 DIAGNOSIS — E78.5 HYPERLIPIDEMIA WITH TARGET LDL LESS THAN 100: ICD-10-CM

## 2018-11-22 DIAGNOSIS — I10 ESSENTIAL HYPERTENSION WITH GOAL BLOOD PRESSURE LESS THAN 140/90: ICD-10-CM

## 2018-11-22 DIAGNOSIS — N40.1 BENIGN PROSTATIC HYPERPLASIA WITH URINARY RETENTION: ICD-10-CM

## 2018-11-22 DIAGNOSIS — R33.8 BENIGN PROSTATIC HYPERPLASIA WITH URINARY RETENTION: ICD-10-CM

## 2018-11-23 NOTE — TELEPHONE ENCOUNTER
Simvastatin       Last Written Prescription Date:  1/18/18  Last Fill Quantity: 90,   # refills: 3  Last Office Visit: 1/18/18  Future Office visit:       Metoprolol       Last Written Prescription Date:  1/18/18  Last Fill Quantity: 90,   # refills: 3  Last Office Visit: 1/18/18  Future Office visit:       Tamsulosin       Last Written Prescription Date:  1/18/18  Last Fill Quantity: 90,   # refills: 3  Last Office Visit: 1/18/18  Future Office visit:

## 2018-11-26 ENCOUNTER — TELEPHONE (OUTPATIENT)
Dept: CARDIOLOGY | Facility: OTHER | Age: 73
End: 2018-11-26

## 2018-11-26 RX ORDER — METOPROLOL SUCCINATE 50 MG/1
TABLET, EXTENDED RELEASE ORAL
Qty: 90 TABLET | Refills: 0 | Status: SHIPPED | OUTPATIENT
Start: 2018-11-26 | End: 2019-02-27

## 2018-11-26 RX ORDER — TAMSULOSIN HYDROCHLORIDE 0.4 MG/1
CAPSULE ORAL
Qty: 90 CAPSULE | Refills: 0 | Status: SHIPPED | OUTPATIENT
Start: 2018-11-26 | End: 2019-02-27

## 2018-11-26 RX ORDER — SIMVASTATIN 20 MG
TABLET ORAL
Qty: 90 TABLET | Refills: 0 | Status: SHIPPED | OUTPATIENT
Start: 2018-11-26 | End: 2019-02-27

## 2018-11-26 NOTE — TELEPHONE ENCOUNTER
Patient cancelled his appointment with Dr. Ortega on 11/19/2018.  Please call to offer patient another appointment for cardiology follow-up.

## 2019-01-09 DIAGNOSIS — I48.91 NEW ONSET ATRIAL FIBRILLATION (H): ICD-10-CM

## 2019-01-09 NOTE — TELEPHONE ENCOUNTER
Next 5 appointments (look out 90 days)    Jan 14, 2019 11:00 AM CST  (Arrive by 10:45 AM)  Return Visit with Oni Ortega DO  [facility] ([facility] ) [address]  [place] [address]  [phone]

## 2019-01-09 NOTE — TELEPHONE ENCOUNTER
eliquis  Last Written Prescription Date: 1/24/18  Last Fill Quantity: 180 # of Refills: 3  Last Office Visit: 5/18/18

## 2019-01-14 ENCOUNTER — OFFICE VISIT (OUTPATIENT)
Dept: CARDIOLOGY | Facility: OTHER | Age: 74
End: 2019-01-14
Attending: INTERNAL MEDICINE
Payer: COMMERCIAL

## 2019-01-14 VITALS
DIASTOLIC BLOOD PRESSURE: 78 MMHG | HEART RATE: 61 BPM | SYSTOLIC BLOOD PRESSURE: 126 MMHG | RESPIRATION RATE: 20 BRPM | WEIGHT: 170 LBS | OXYGEN SATURATION: 94 % | TEMPERATURE: 97.3 F | BODY MASS INDEX: 23.03 KG/M2 | HEIGHT: 72 IN

## 2019-01-14 DIAGNOSIS — Z23 NEED FOR PROPHYLACTIC VACCINATION AND INOCULATION AGAINST INFLUENZA: ICD-10-CM

## 2019-01-14 DIAGNOSIS — Z79.01 CHRONIC ANTICOAGULATION: ICD-10-CM

## 2019-01-14 DIAGNOSIS — Z98.890 HISTORY OF AAA (ABDOMINAL AORTIC ANEURYSM) REPAIR: ICD-10-CM

## 2019-01-14 DIAGNOSIS — E78.2 MIXED HYPERLIPIDEMIA: ICD-10-CM

## 2019-01-14 DIAGNOSIS — I10 ESSENTIAL HYPERTENSION: ICD-10-CM

## 2019-01-14 DIAGNOSIS — Z86.73 HISTORY OF CVA (CEREBROVASCULAR ACCIDENT): ICD-10-CM

## 2019-01-14 DIAGNOSIS — Z86.74 H/O CARDIAC ARREST: ICD-10-CM

## 2019-01-14 DIAGNOSIS — I45.2 BIFASCICULAR BLOCK: ICD-10-CM

## 2019-01-14 DIAGNOSIS — I25.2 HISTORY OF MYOCARDIAL INFARCTION: ICD-10-CM

## 2019-01-14 DIAGNOSIS — I25.10 CORONARY ARTERY DISEASE INVOLVING NATIVE CORONARY ARTERY OF NATIVE HEART WITHOUT ANGINA PECTORIS: ICD-10-CM

## 2019-01-14 DIAGNOSIS — Z87.891 HISTORY OF TOBACCO ABUSE: ICD-10-CM

## 2019-01-14 DIAGNOSIS — Z95.1 HISTORY OF CORONARY ARTERY BYPASS GRAFT X 3: ICD-10-CM

## 2019-01-14 DIAGNOSIS — Z86.79 H/O VENTRICULAR FIBRILLATION: ICD-10-CM

## 2019-01-14 DIAGNOSIS — Z86.73 HISTORY OF TIA (TRANSIENT ISCHEMIC ATTACK): ICD-10-CM

## 2019-01-14 DIAGNOSIS — I48.0 PAROXYSMAL ATRIAL FIBRILLATION (H): ICD-10-CM

## 2019-01-14 DIAGNOSIS — I48.91 NEW ONSET ATRIAL FIBRILLATION (H): Primary | ICD-10-CM

## 2019-01-14 PROCEDURE — G0008 ADMIN INFLUENZA VIRUS VAC: HCPCS

## 2019-01-14 PROCEDURE — 99214 OFFICE O/P EST MOD 30 MIN: CPT | Performed by: INTERNAL MEDICINE

## 2019-01-14 PROCEDURE — 90662 IIV NO PRSV INCREASED AG IM: CPT

## 2019-01-14 PROCEDURE — G0463 HOSPITAL OUTPT CLINIC VISIT: HCPCS

## 2019-01-14 PROCEDURE — G0463 HOSPITAL OUTPT CLINIC VISIT: HCPCS | Mod: 25

## 2019-01-14 ASSESSMENT — PAIN SCALES - GENERAL: PAINLEVEL: NO PAIN (0)

## 2019-01-14 ASSESSMENT — MIFFLIN-ST. JEOR: SCORE: 1564.11

## 2019-01-14 NOTE — NURSING NOTE
Chief Complaint   Patient presents with     RECHECK     8 month cardiology follow-up     Flu Shot       Initial /78 (BP Location: Left arm, Patient Position: Chair, Cuff Size: Adult Regular)   Pulse 61   Temp 97.3  F (36.3  C) (Tympanic)   Resp 20   Ht 1.829 m (6')   Wt 77.1 kg (170 lb)   SpO2 94%   BMI 23.06 kg/m   Estimated body mass index is 23.06 kg/m  as calculated from the following:    Height as of this encounter: 1.829 m (6').    Weight as of this encounter: 77.1 kg (170 lb).  Medication Reconciliation: complete    Jerri Obregon LPN

## 2019-01-14 NOTE — PROGRESS NOTES

## 2019-01-14 NOTE — PATIENT INSTRUCTIONS
You were seen by Dr. Ortega, 1/14/2019.     1.  Please continue all medications as they have been prescribed.      2.  If you develop new or worsening symptoms please call the cardiology office as you may need to be seen sooner.     You will follow up with Dr. Ortega in 1 year.       Please call the cardiology office with problems, questions, or concerns at [phone].    If you experience chest pain, chest pressure, chest tightness, shortness of breath, fainting, lightheadedness, nausea, vomiting, or other concerning symptoms, please report to the Emergency Department or call 911. These symptoms may be emergent, and best treated in the Emergency Department.       Renetta DOS SANTOS RN-BSN  Cardiology   [facility]  [phone]

## 2019-01-14 NOTE — PROGRESS NOTES
Cardiology Progress Note     Assessment & Plan   Kostas Cotto is a 73 year old male who is being seen by cardiology in follow-up to visit from 5/18/18 as he has a diagnosis of atrial fibrillation.    He had an EKG on 1/8/18 that showed atrial fibrillation. He was started on Eliquis after having wide swings in his INR while on Coumadin. He is currently maintained on Eliquis 5 mg twice a day and metoprolol 50 mg daily. He has had no symptoms related A. Fib, denying palpitations or fluttering in his chest. He has not had any problems with bleeding while on Eliquis 5 mg twice a day. He is also known to have a history of bifascicular block on an EKG from 1/24/18 without a history of syncope or near syncope.  He previously had bypass x3 in 2005 with a history of a myocardial infarction in 2003.  He is also been asymptomatic from a coronary artery disease denying chest pain, chest tightness, or chest discomfort. He has not had repeat concerns for V. fib as he previously had a cardiac arrest secondary to V. fib in 2003.  He was hospitalized on 1/8/18 with concerns for a TIA with resolution of symptoms.  He has not seen any changes as he has had a possible history of stroke in the past.          Impression:  1.  Paroxysmal atrial fibrillation diagnosed 1/8/18 with hospitalization returns for TIA resolution of symptoms.  2.  Hyperlipidemia-controlled.  3.  Rheumatoid arthritis.  4.  History of CABG x3 in 2005.  5.  History of myocardial infarction with stent placed in 2003.  6.  Hypertension.  7.  History of ventricular arrest in 2003.  8.  Questionable history of a CVA on 1/8/18.  9.  History of tobacco abuse quitting in 2003.  10.  Abdominal aortic aneurysm status post repair with a 6.0 cm aneurysm on 3/26/15.  11.  History of myocardial infarction in 2003.        Plan:  1.  As mentioned, he has been asymptomatic from atrial fibrillation standpoint.  No changes made.  He will continue Eliquis 5 mg twice a day and  metoprolol 50 mg daily.  2.  He remains asymptomatic from a coronary disease standpoint with history of CABG and myocardial infarction.  3.  He will be seen in approximately 1 year follow-up.    Oni Ortega      Physical Exam   Temp: 97.3  F (36.3  C) Temp src: Tympanic BP: 126/78 Pulse: 61   Resp: 20 SpO2: 94 %      Vitals:    01/14/19 1109   Weight: 77.1 kg (170 lb)     Vital Signs with Ranges  Temp:  [97.3  F (36.3  C)] 97.3  F (36.3  C)  Pulse:  [61] 61  Resp:  [20] 20  BP: (126)/(78) 126/78  SpO2:  [94 %] 94 %  ROS is negative except that which was noted in the HPI.     Incision/Surgical Site 03/10/14 Left Eye (Active)   Number of days: 1771       Incision/Surgical Site Right Eye (Active)   Number of days:        Constitutional: awake, alert, cooperative, no apparent distress, and appears stated age  Eyes: Lids and lashes normal, pupils equal, sclera clear, conjunctiva normal  ENT: Normocephalic, without obvious abnormality, atraumatic.  Respiratory: No increased work of breathing, good air exchange, clear to auscultation bilaterally, no crackles or wheezing  Cardiovascular: Normal apical impulse, regular rate and rhythm, normal S1 and S2, no S3 or S4, and no murmur noted  GI: No scars, normal bowel sounds.  Musculoskeletal: no lower extremity pitting edema present  Neurologic: Awake, alert, oriented to name, place and time.   Neuropsychiatric: General: normal, calm and normal eye contact    Medications         Data   No results found for this or any previous visit (from the past 24 hour(s)).  No results found for this or any previous visit (from the past 24 hour(s)).

## 2019-01-23 DIAGNOSIS — I10 ESSENTIAL HYPERTENSION WITH GOAL BLOOD PRESSURE LESS THAN 140/90: ICD-10-CM

## 2019-01-23 RX ORDER — LISINOPRIL 5 MG/1
5 TABLET ORAL DAILY
Qty: 30 TABLET | Refills: 0 | Status: SHIPPED | OUTPATIENT
Start: 2019-01-23 | End: 2019-03-06

## 2019-01-23 NOTE — LETTER
January 23, 2019      Kostas Cotto  [address] [facility] [address]        Dear Kostas,     APPOINTMENT REMINDER:   Our records indicates that it is time for you to be seen for a yearly follow up appointment.     Your current medication request for Lisinopril will be approved for one refill but you will need to be seen before any additional refills can be approved.  Taking care of your health is important to us, and ongoing visits with your provider are vital to your care.    We look forward to seeing you in the near future.  You may call our office at [phone] to schedule a visit.     Please disregard this notice if you have already made an appointment.      Sincerely,    NAVA Gates

## 2019-01-23 NOTE — TELEPHONE ENCOUNTER
Lisinopril       Last Written Prescription Date:  8/24/2018  Last Fill Quantity: 30,   # refills: 4  Last Office Visit: 1/14/2019  Future Office visit:

## 2019-01-31 DIAGNOSIS — Z00.00 HEALTH CARE MAINTENANCE: ICD-10-CM

## 2019-01-31 RX ORDER — FOLIC ACID 1 MG/1
1000 TABLET ORAL DAILY
Qty: 90 TABLET | Refills: 0 | Status: SHIPPED | OUTPATIENT
Start: 2019-01-31 | End: 2019-05-30

## 2019-01-31 NOTE — TELEPHONE ENCOUNTER
folic acid (FOLVITE) 1 MG tablet    Last Written Prescription Date:  01/18/2018  Last Fill Quantity: 90,   # refills: 3  Last Office Visit: 01/18/2018  Future Office visit:       Routing refill request to provider for review/approval because:

## 2019-02-04 DIAGNOSIS — I10 ESSENTIAL HYPERTENSION WITH GOAL BLOOD PRESSURE LESS THAN 140/90: ICD-10-CM

## 2019-02-04 NOTE — TELEPHONE ENCOUNTER
lisinopril (PRINIVIL/ZESTRIL) 5 MG tablet  Last Written Prescription Date:  1/23/19  Last Fill Quantity: 30,   # refills: 0  Last Office Visit: 1/18/18  Future Office visit:

## 2019-02-05 RX ORDER — LISINOPRIL 5 MG/1
5 TABLET ORAL DAILY
Qty: 30 TABLET | Refills: 0 | OUTPATIENT
Start: 2019-02-05

## 2019-03-04 DIAGNOSIS — I10 ESSENTIAL HYPERTENSION WITH GOAL BLOOD PRESSURE LESS THAN 140/90: ICD-10-CM

## 2019-03-04 NOTE — LETTER
March 6, 2019      Kostas Cotto  [address] [facility] [address]        Dear Kostas,     APPOINTMENT REMINDER:   Our records indicates that it is time for you to be seen for a yearly follow up appointment and labs.     Your current medication request for Lisinopril will be approved for one refill but you will need to be seen before any additional refills can be approved.  Taking care of your health is important to us, and ongoing visits with your provider are vital to your care.    We look forward to seeing you in the near future.  You may call our office at [phone] to schedule a visit.     Please disregard this notice if you have already made an appointment.          Sincerely,  NAVA Gates

## 2019-03-06 RX ORDER — LISINOPRIL 5 MG/1
5 TABLET ORAL DAILY
Qty: 30 TABLET | Refills: 0 | Status: SHIPPED | OUTPATIENT
Start: 2019-03-06 | End: 2019-03-27

## 2019-03-06 NOTE — TELEPHONE ENCOUNTER
Protocol failed due to    Recent (12 mo) or future (30 days) visit within the authorizing provider's specialty    Normal serum creatinine on file in past 12 months    Normal serum potassium on file in past 12 months     Last office visit with PCP 1/18/18. Labs ordered. Letter sent 1/23/19 and today. Please advise. Thank you!

## 2019-03-27 DIAGNOSIS — I10 ESSENTIAL HYPERTENSION WITH GOAL BLOOD PRESSURE LESS THAN 140/90: ICD-10-CM

## 2019-03-27 RX ORDER — LISINOPRIL 5 MG/1
TABLET ORAL
Qty: 30 TABLET | Refills: 0 | Status: SHIPPED | OUTPATIENT
Start: 2019-03-27 | End: 2019-04-15

## 2019-03-27 NOTE — TELEPHONE ENCOUNTER
lisinopril (PRINIVIL/ZESTRIL) 5 MG tablet   Last Written Prescription Date:  3/6/19  Last Fill Quantity: 30,   # refills: 0  Last Office Visit: 1/18/18  Future Office visit:    Next 5 appointments (look out 90 days)    Apr 01, 2019 11:20 AM CDT  (Arrive by 11:05 AM)  Office Visit with NAVA De León  [facility] ([facility] ) [facility] [address]  [place] [address]  [phone]

## 2019-04-12 NOTE — PROGRESS NOTES
"  SUBJECTIVE:   Kostas Cotto is a 73 year old male who presents to clinic today for the following   health issues:          Hospital Follow-up Visit:    Hospital/Nursing Home/IP Rehab Facility: {INPT AND SNF DISCHARGE:677602}  Date of Admission: ***  Date of Discharge: ***  Reason(s) for Admission: ***            Problems taking medications regularly:  {NONE DEFAULTED:043765::\"None\"}       Medication changes since discharge: {NONE DEFAULTED:320752::\"None\"}       Problems adhering to non-medication therapy:  {NONE DEFAULTED:611730::\"None\"}  {roomer to stop here, delete this reminder}  Summary of hospitalization:  {HOSPITAL DISCHARGE SUMMARY INFO:376891::\"[facility] hospital discharge summary reviewed\"}  Diagnostic Tests/Treatments reviewed.  Follow up needed: {NONE DEFAULTED:762842::\"none\"}  Other Healthcare Providers Involved in Patient s Care:         {those currently involved after discharge:311370::\"None\"}  Update since discharge: {IMPROVED DEFAULT:792864::\"improved.\"} {include information from family, SNF, care coordination}    Post Discharge Medication Reconciliation: {ACO Med Rec (Provider):515352}.  Plan of care communicated with {Communicate Plan to:067460::\"patient\"}     Coding guidelines for this visit:  Type of Medical   Decision Making Face-to-Face Visit       within 7 Days of discharge Face-to-Face Visit        within 14 days of discharge   Moderate Complexity 11946 23825   High Complexity 96393 61267              Hyperlipidemia Follow-Up      Rate your low fat/cholesterol diet?: { :567855::\"good\"}    Taking statin?  { :135222::\"No\"}    Other lipid medications/supplements?:  { :898682::\"none\"}    Hypertension Follow-up      Outpatient blood pressures {ISCHECKIN}    Low Salt Diet: { :603890::\"no added salt\"}      Additional history: {NONE - AS DOCUMENTED:929135::\"as documented\"}    Reviewed  and updated as needed this visit by clinical staff         Reviewed and updated as needed this visit by " Provider         {HIST REVIEW/ LINKS 2:040913}    {PROVIDER CHARTING PREFERENCE:075243}

## 2019-04-15 ENCOUNTER — OFFICE VISIT (OUTPATIENT)
Dept: FAMILY MEDICINE | Facility: OTHER | Age: 74
End: 2019-04-15
Attending: PHYSICIAN ASSISTANT
Payer: COMMERCIAL

## 2019-04-15 VITALS
HEIGHT: 72 IN | HEART RATE: 64 BPM | SYSTOLIC BLOOD PRESSURE: 128 MMHG | WEIGHT: 174 LBS | TEMPERATURE: 97.7 F | BODY MASS INDEX: 23.57 KG/M2 | DIASTOLIC BLOOD PRESSURE: 68 MMHG | OXYGEN SATURATION: 92 %

## 2019-04-15 DIAGNOSIS — I10 BENIGN ESSENTIAL HYPERTENSION: ICD-10-CM

## 2019-04-15 DIAGNOSIS — Z23 NEED FOR VACCINATION: ICD-10-CM

## 2019-04-15 DIAGNOSIS — I25.10 CORONARY ARTERY DISEASE INVOLVING NATIVE CORONARY ARTERY OF NATIVE HEART WITHOUT ANGINA PECTORIS: ICD-10-CM

## 2019-04-15 DIAGNOSIS — Z00.00 ROUTINE HISTORY AND PHYSICAL EXAMINATION OF ADULT: Primary | ICD-10-CM

## 2019-04-15 DIAGNOSIS — R73.01 IMPAIRED FASTING GLUCOSE: ICD-10-CM

## 2019-04-15 DIAGNOSIS — R33.8 BENIGN PROSTATIC HYPERPLASIA WITH URINARY RETENTION: ICD-10-CM

## 2019-04-15 DIAGNOSIS — Z95.828 HISTORY OF ENDOVASCULAR STENT GRAFT FOR ABDOMINAL AORTIC ANEURYSM (AAA): ICD-10-CM

## 2019-04-15 DIAGNOSIS — I10 ESSENTIAL HYPERTENSION WITH GOAL BLOOD PRESSURE LESS THAN 140/90: ICD-10-CM

## 2019-04-15 DIAGNOSIS — E78.2 MIXED HYPERLIPIDEMIA: ICD-10-CM

## 2019-04-15 DIAGNOSIS — E78.5 HYPERLIPIDEMIA WITH TARGET LDL LESS THAN 100: ICD-10-CM

## 2019-04-15 DIAGNOSIS — N40.1 BENIGN PROSTATIC HYPERPLASIA WITH URINARY RETENTION: ICD-10-CM

## 2019-04-15 DIAGNOSIS — Z12.11 SPECIAL SCREENING FOR MALIGNANT NEOPLASMS, COLON: ICD-10-CM

## 2019-04-15 DIAGNOSIS — I48.91 NEW ONSET ATRIAL FIBRILLATION (H): ICD-10-CM

## 2019-04-15 PROCEDURE — 99397 PER PM REEVAL EST PAT 65+ YR: CPT | Performed by: PHYSICIAN ASSISTANT

## 2019-04-15 PROCEDURE — 90670 PCV13 VACCINE IM: CPT

## 2019-04-15 PROCEDURE — G0463 HOSPITAL OUTPT CLINIC VISIT: HCPCS | Mod: 25

## 2019-04-15 PROCEDURE — G0009 ADMIN PNEUMOCOCCAL VACCINE: HCPCS | Performed by: PHYSICIAN ASSISTANT

## 2019-04-15 RX ORDER — SIMVASTATIN 20 MG
TABLET ORAL
Qty: 90 TABLET | Refills: 3 | Status: SHIPPED | OUTPATIENT
Start: 2019-04-15 | End: 2020-01-13

## 2019-04-15 RX ORDER — METOPROLOL SUCCINATE 50 MG/1
50 TABLET, EXTENDED RELEASE ORAL DAILY
Qty: 90 TABLET | Refills: 3 | Status: SHIPPED | OUTPATIENT
Start: 2019-04-15 | End: 2020-01-13

## 2019-04-15 RX ORDER — TAMSULOSIN HYDROCHLORIDE 0.4 MG/1
CAPSULE ORAL
Qty: 90 CAPSULE | Refills: 3 | Status: SHIPPED | OUTPATIENT
Start: 2019-04-15 | End: 2020-01-13

## 2019-04-15 RX ORDER — FINASTERIDE 5 MG/1
1 TABLET, FILM COATED ORAL DAILY
Qty: 90 TABLET | Refills: 3 | Status: SHIPPED | OUTPATIENT
Start: 2019-04-15 | End: 2020-01-13

## 2019-04-15 RX ORDER — LISINOPRIL 5 MG/1
TABLET ORAL
Qty: 90 TABLET | Refills: 3 | Status: SHIPPED | OUTPATIENT
Start: 2019-04-15 | End: 2020-01-13

## 2019-04-15 ASSESSMENT — ANXIETY QUESTIONNAIRES
GAD7 TOTAL SCORE: 0
5. BEING SO RESTLESS THAT IT IS HARD TO SIT STILL: NOT AT ALL
6. BECOMING EASILY ANNOYED OR IRRITABLE: NOT AT ALL
7. FEELING AFRAID AS IF SOMETHING AWFUL MIGHT HAPPEN: NOT AT ALL
2. NOT BEING ABLE TO STOP OR CONTROL WORRYING: NOT AT ALL
3. WORRYING TOO MUCH ABOUT DIFFERENT THINGS: NOT AT ALL
1. FEELING NERVOUS, ANXIOUS, OR ON EDGE: NOT AT ALL

## 2019-04-15 ASSESSMENT — PATIENT HEALTH QUESTIONNAIRE - PHQ9
SUM OF ALL RESPONSES TO PHQ QUESTIONS 1-9: 0
5. POOR APPETITE OR OVEREATING: NOT AT ALL

## 2019-04-15 ASSESSMENT — MIFFLIN-ST. JEOR: SCORE: 1582.26

## 2019-04-15 ASSESSMENT — PAIN SCALES - GENERAL: PAINLEVEL: NO PAIN (0)

## 2019-04-15 NOTE — NURSING NOTE
Chief Complaint   Patient presents with     Hospital F/U       Initial /68 (BP Location: Left arm, Patient Position: Sitting, Cuff Size: Adult Regular)   Pulse 64   Temp 97.7  F (36.5  C) (Tympanic)   Ht 1.829 m (6')   Wt 78.9 kg (174 lb)   SpO2 92%   BMI 23.60 kg/m   Estimated body mass index is 23.6 kg/m  as calculated from the following:    Height as of this encounter: 1.829 m (6').    Weight as of this encounter: 78.9 kg (174 lb).  Medication Reconciliation: complete    Sravanthi Samayoa LPN

## 2019-04-15 NOTE — PROGRESS NOTES
SUBJECTIVE:   CC: Kostas Cotto is an 73 year old male who presents for preventive health visit.     Healthy Habits:    Do you get at least three servings of calcium containing foods daily (dairy, green leafy vegetables, etc.)? yes    Amount of exercise or daily activities, outside of work: NA    Problems taking medications regularly No    Medication side effects: No    Have you had an eye exam in the past two years? no    Do you see a dentist twice per year? no    Do you have sleep apnea, excessive snoring or daytime drowsiness?no          Today's PHQ-2 Score: No flowsheet data found.    Abuse: Current or Past(Physical, Sexual or Emotional)- No  Do you feel safe in your environment? Yes    Social History     Tobacco Use     Smoking status: Former Smoker     Smokeless tobacco: Never Used   Substance Use Topics     Alcohol use: No     If you drink alcohol do you typically have >3 drinks per day or >7 drinks per week? No                      Last PSA: No results found for: PSA    Reviewed orders with patient. Reviewed health maintenance and updated orders accordingly - Yes  Labs reviewed in EPIC  BP Readings from Last 3 Encounters:   04/15/19 128/68   01/14/19 126/78   05/18/18 124/55    Wt Readings from Last 3 Encounters:   04/15/19 78.9 kg (174 lb)   01/14/19 77.1 kg (170 lb)   05/18/18 80.3 kg (177 lb)                  Patient Active Problem List   Diagnosis     CAD     History of coronary artery bypass graft x 3 in 2005     H/O cardiac arrest in 2003     History of tobacco abuse quitting in 2003     Paroxysmal atrial fibrillation (H)     Mixed hyperlipidemia     H/O ventricular fibrillation in 2003 with cardiac arrest     History of myocardial infarction in 2003     New onset atrial fibrillation on 1/8/18     Need for prophylactic vaccination and inoculation against influenza     History of CVA (cerebrovascular accident)     Essential hypertension     Bifascicular block     History of AAA (abdominal aortic  aneurysm) repair     Chronic anticoagulation     History of TIA on 1/8/18     Past Surgical History:   Procedure Laterality Date     AAA REPAIR       ANGIOGRAM  1/2004    Coronary Angiogram, LAD> long mid 40-50%, D2 90% ostial,Prox % with collateal flow,Circ had restenosisand was restented x 3 again.  EF now 55% with mild lateral wall hypokineseis and inferior also.     CARDIOVASCULAR SURGERY  1/2005    Syncopal episode, Had repeat angiogam showing now circ 100% along with RCA. LAD 80-90% with EF 35%.  Sent for CABG     CARDIOVASCULAR SURGERY  1/2005    S/P 3 vess CABG, Dr Wilfredo SORIANO>LAD, SVG>OM1, SVG>RCA.  Post op pneumothorax with Chest Tube placement.     CARDIOVASCULAR SURGERY  11/2003    Acute Inferior MI, Had VT arrest resuscitated. Not given lytics and was sent to [facility].  IABP placed and had angiogram stented Circ x 5. EF 30%. Lad had 70%, % with contralateral collateral flow.     HC US CHEST      left     HEART/LUNG RESUSCITATION (CPR)  11/2003    S/P Cardiac Arrest V-Tach, Out of hospital arrest with no bystaner cpr.  Has polymorphic VT on arrival.  Has a prolonged resuscitation which obviously was successful.  This was due to an acute Inferior MI at the time.     OPEN REDUCTION INTERNAL FIXATION HIP  1965    ORIF frac/dislocation L hip, Was a teenager and had orif done after fracture dislocation of left hip. Occurred playing softball.     PHACOEMULSIFICATION WITH STANDARD INTRAOCULAR LENS IMPLANT  3/10/2014    Procedure: PHACOEMULSIFICATION WITH STANDARD INTRAOCULAR LENS IMPLANT;  CATARACT EXTRACTION WITH INTRA OCULAR LENS LEFT(10% SERENE/POSSIBLE STRETCH);  Surgeon: Isael Acuna MD;  Location: [facility] OR     PHACOEMULSIFICATION WITH STANDARD INTRAOCULAR LENS IMPLANT  3/25/2014    Procedure: PHACOEMULSIFICATION WITH STANDARD INTRAOCULAR LENS IMPLANT;  CATARACT EXTRACTION WITH INTRA OCULAR LENS RIGHT/POSSIBLE PUPIL STRETCH;  Surgeon: Isael Acuna MD;  Location: [facility] OR       Social History      Tobacco Use     Smoking status: Former Smoker     Smokeless tobacco: Never Used   Substance Use Topics     Alcohol use: No     Family History   Problem Relation Age of Onset     Cancer Father         leukemia     Diabetes Mother      Cerebrovascular Disease Mother         cause of death         Current Outpatient Medications   Medication Sig Dispense Refill     apixaban ANTICOAGULANT (ELIQUIS) 5 MG tablet Take 1 tablet (5 mg) by mouth 2 times daily 180 tablet 3     aspirin 81 MG chewable tablet Take 1 tablet (81 mg) by mouth daily 36 tablet 11     calcium carbonate-vitamin D (CALCIUM 500 + D) 500-400 MG-UNIT TABS tablt 2 times daily Take one tablet by oral route every day         finasteride (PROSCAR) 5 MG tablet Take 1 tablet (5 mg) by mouth daily 90 tablet 3     folic acid (FOLVITE) 1 MG tablet TAKE 1 TABLET (1,000 MCG) BY MOUTH DAILY 90 tablet 0     lisinopril (PRINIVIL/ZESTRIL) 5 MG tablet TAKE 1 TABLET DAILY BY MOUTH 90 tablet 3     metoprolol succinate ER (TOPROL-XL) 50 MG 24 hr tablet Take 1 tablet (50 mg) by mouth daily 90 tablet 3     ranitidine (ZANTAC) 150 MG tablet Take 1 tablet (150 mg) by mouth daily 90 tablet 3     simvastatin (ZOCOR) 20 MG tablet TAKE 1 TABLET BY MOUTH EVERY EVENING - GENERIC FOR ZOCOR 90 tablet 3     tamsulosin (FLOMAX) 0.4 MG capsule TAKE 1 CAPSULE (0.4 MG) BY MOUTH DAILY 90 capsule 3     Allergies   Allergen Reactions     Oxycodone      Caused him delirium   Caused him delirium        Reviewed and updated as needed this visit by clinical staff  Tobacco  Allergies  Meds         Reviewed and updated as needed this visit by Provider          Past Medical History:   Diagnosis Date     Hypoxic-ischemic encephalopathy (HIE) 11/25/2003     Rheumatoid arthritis(714.0) 05/03/2005     S/P inferior MI 11/25/2003      Past Surgical History:   Procedure Laterality Date     AAA REPAIR       ANGIOGRAM  1/2004    Coronary Angiogram, LAD> long mid 40-50%, D2 90% ostial,Prox % with  collateal flow,Circ had restenosisand was restented x 3 again.  EF now 55% with mild lateral wall hypokineseis and inferior also.     CARDIOVASCULAR SURGERY  1/2005    Syncopal episode, Had repeat angiogam showing now circ 100% along with RCA. LAD 80-90% with EF 35%.  Sent for CABG     CARDIOVASCULAR SURGERY  1/2005    S/P 3 vess CABG, Dr Villalobos LIMA>LAD, SVG>OM1, SVG>RCA.  Post op pneumothorax with Chest Tube placement.     CARDIOVASCULAR SURGERY  11/2003    Acute Inferior MI, Had VT arrest resuscitated. Not given lytics and was sent to [facility].  IABP placed and had angiogram stented Circ x 5. EF 30%. Lad had 70%, % with contralateral collateral flow.     HC US CHEST      left     HEART/LUNG RESUSCITATION (CPR)  11/2003    S/P Cardiac Arrest V-Tach, Out of hospital arrest with no bystaner cpr.  Has polymorphic VT on arrival.  Has a prolonged resuscitation which obviously was successful.  This was due to an acute Inferior MI at the time.     OPEN REDUCTION INTERNAL FIXATION HIP  1965    ORIF frac/dislocation L hip, Was a teenager and had orif done after fracture dislocation of left hip. Occurred playing softball.     PHACOEMULSIFICATION WITH STANDARD INTRAOCULAR LENS IMPLANT  3/10/2014    Procedure: PHACOEMULSIFICATION WITH STANDARD INTRAOCULAR LENS IMPLANT;  CATARACT EXTRACTION WITH INTRA OCULAR LENS LEFT(10% SERENE/POSSIBLE STRETCH);  Surgeon: Isael Acuna MD;  Location: [facility]     PHACOEMULSIFICATION WITH STANDARD INTRAOCULAR LENS IMPLANT  3/25/2014    Procedure: PHACOEMULSIFICATION WITH STANDARD INTRAOCULAR LENS IMPLANT;  CATARACT EXTRACTION WITH INTRA OCULAR LENS RIGHT/POSSIBLE PUPIL STRETCH;  Surgeon: Isael Acuna MD;  Location: [facility] OR     OB History   No data available       ROS:  CONSTITUTIONAL: NEGATIVE for fever, chills, change in weight  INTEGUMENTARY/SKIN: NEGATIVE for worrisome rashes, moles or lesions  EYES: NEGATIVE for vision changes or irritation  ENT: NEGATIVE for ear, mouth and throat  problems  RESP: NEGATIVE for significant cough or SOB  CV: NEGATIVE for chest pain, palpitations or peripheral edema  GI: NEGATIVE for nausea, abdominal pain, heartburn, or change in bowel habits   male: negative for dysuria, hematuria, decreased urinary stream,   MUSCULOSKELETAL: NEGATIVE for significant arthralgias or myalgia  NEURO: NEGATIVE for weakness, dizziness or paresthesias  ENDOCRINE: NEGATIVE for temperature intolerance, skin/hair changes  HEME/ALLERGY/IMMUNE: NEGATIVE for bleeding problems  PSYCHIATRIC: NEGATIVE for changes in mood or affect. Limited speech and ability to communicate. Short term memory loss.     OBJECTIVE:   /68 (BP Location: Left arm, Patient Position: Sitting, Cuff Size: Adult Regular)   Pulse 64   Temp 97.7  F (36.5  C) (Tympanic)   Ht 1.829 m (6')   Wt 78.9 kg (174 lb)   SpO2 92%   BMI 23.60 kg/m    EXAM:  GENERAL: healthy, alert and no distress  EYES: Eyes grossly normal to inspection, PERRL and conjunctivae and sclerae normal  HENT: ear canals and TM's normal, nose and mouth without ulcers or lesions  NECK: no adenopathy, no asymmetry, masses, or scars and thyroid normal to palpation  RESP: lungs clear to auscultation - no rales, rhonchi or wheezes  CV: irregular rate and  Irregular rhythm, normal S1 S2, no S3 or S4, no murmur, click or rub, no peripheral edema and peripheral pulses strong  ABDOMEN: soft, nontender, no hepatosplenomegaly, no masses and bowel sounds normal  MS: no gross musculoskeletal defects noted, no edema  SKIN: no suspicious lesions or rashes  NEURO: Normal strength and tone, mentation intact and speech normal  PSYCH: mentation appears normal, affect normal/bright  LYMPH: no cervical, supraclavicular, axillary, or inguinal adenopathy      Diagnostic Test Results:  Results for orders placed or performed in visit on 02/09/18   INR point of care   Result Value Ref Range    INR Protime 1.4 (A) 0.86 - 1.14       ASSESSMENT/PLAN:   1. Routine history  and physical examination of adult  He is due for colon screening. Discussion on options.  He is doing ok. feeling well. Has known CAD and hx of anoxic brain injury. His wife does help him with short term memory issues.  She does have power of .  He is willing to do labs.   He is more interactive and smiling today.  He hates coming in to the clinic but states he knows she should. Remains anticoagulated but lately INR sub theraputic.     2. History of endovascular stent graft for abdominal aortic aneurysm (AAA)  Has done well. No further concern and managing his cholesterol and pressures.     3. Benign essential hypertension  In range. Managing his risk factors.     4. Mixed hyperlipidemia  In range. Taking medications. Weight is stable. Is thin. Wife states her tries to stay busy. But as far as exercise not really anything formal.     5. New onset atrial fibrillation on 1/8/18  Remains in a fib. On anticoagulation and his rate is good.     6. CAD  Stenting and hx of AAA that has an endovascular repair.     7. Special screening for malignant neoplasms, colon  Due for this. Will be referred.   - GENERAL SURG ADULT REFERRAL    8. Impaired fasting glucose  Slight increase in his glucose. Check A1C  - Hemoglobin A1c; Future    9. Essential hypertension with goal blood pressure less than 140/90  Refill of medications. Wife keeps him compliant in taking.   - lisinopril (PRINIVIL/ZESTRIL) 5 MG tablet; TAKE 1 TABLET DAILY BY MOUTH  Dispense: 90 tablet; Refill: 3  - metoprolol succinate ER (TOPROL-XL) 50 MG 24 hr tablet; Take 1 tablet (50 mg) by mouth daily  Dispense: 90 tablet; Refill: 3    10. Benign prostatic hyperplasia with urinary retention  Would like a refill. Working with Urology.   - tamsulosin (FLOMAX) 0.4 MG capsule; TAKE 1 CAPSULE (0.4 MG) BY MOUTH DAILY  Dispense: 90 capsule; Refill: 3  - finasteride (PROSCAR) 5 MG tablet; Take 1 tablet (5 mg) by mouth daily  Dispense: 90 tablet; Refill: 3    11.  Hyperlipidemia with target LDL less than 100  As above.   - simvastatin (ZOCOR) 20 MG tablet; TAKE 1 TABLET BY MOUTH EVERY EVENING - GENERIC FOR ZOCOR  Dispense: 90 tablet; Refill: 3    COUNSELING:  Reviewed preventive health counseling, as reflected in patient instructions       Consider AAA screening for ages 65-75 and smoking history       Regular exercise       Healthy diet/nutrition       Vision screening       Hearing screening       Immunizations    Vaccinated for: Pneumococcal             Consider Hep C screening for patients born between 1945 and 1965       Prostate cancer screening       One time pneumovax for smokers       Advance Care Planning    BP Readings from Last 1 Encounters:   04/15/19 128/68     Estimated body mass index is 23.6 kg/m  as calculated from the following:    Height as of this encounter: 1.829 m (6').    Weight as of this encounter: 78.9 kg (174 lb).           reports that he has quit smoking. He has never used smokeless tobacco.      Counseling Resources:  ATP IV Guidelines  Pooled Cohorts Equation Calculator  FRAX Risk Assessment  ICSI Preventive Guidelines  Dietary Guidelines for Americans, 2010  USDA's MyPlate  ASA Prophylaxis  Lung CA Screening    NAVA Gates  [facility] - [facility]

## 2019-04-15 NOTE — PATIENT INSTRUCTIONS
Preventive Health Recommendations:     See your health care provider every year to    Review health changes.     Discuss preventive care.      Review your medicines if your doctor has prescribed any.      Talk with your health care provider about whether you should have a test to screen for prostate cancer (PSA).    Every 3 years, have a diabetes test (fasting glucose). If you are at risk for diabetes, you should have this test more often.    Every 5 years, have a cholesterol test. Have this test more often if you are at risk for high cholesterol or heart disease.     Every 10 years, have a colonoscopy. Or, have a yearly FIT test (stool test). These exams will check for colon cancer.    Talk to with your health care provider about screening for Abdominal Aortic Aneurysm if you have a family history of AAA or have a history of smoking.    Shots:     Get a flu shot each year.     Get a tetanus shot every 10 years.     Talk to your doctor about your pneumonia vaccines. There are now two you should receive - Pneumovax (PPSV 23) and Prevnar (PCV 13).     Talk to your pharmacist about a shingles vaccine.     Talk to your doctor about the hepatitis B vaccine.  Nutrition:     Eat at least 5 servings of fruits and vegetables each day.     Eat whole-grain bread, whole-wheat pasta and brown rice instead of white grains and rice.     Get adequate Calcium and Vitamin D.   Lifestyle    Exercise for at least 150 minutes a week (30 minutes a day, 5 days a week). This will help you control your weight and prevent disease.     Limit alcohol to one drink per day.     No smoking.     Wear sunscreen to prevent skin cancer.    See your dentist every six months for an exam and cleaning.    See your eye doctor every 1 to 2 years to screen for conditions such as glaucoma, macular degeneration, cataracts, etc.    Personalized Prevention Plan  You are due for the preventive services outlined below.  Your care team is available to assist you  in scheduling these services.  If you have already completed any of these items, please share that information with your care team to update in your medical record.  Health Maintenance Due   Topic Date Due     Hepatitis C Screening  05/12/1963     Diptheria Tetanus Pertussis (DTAP/TDAP/TD) Vaccine (1 - Tdap) 05/12/1972     Colon Cancer Screening - every 10 years.  05/12/1997     Annual Wellness Visit  05/12/2012     Pneumococcal Vaccine (2 of 2 - PCV13) 09/18/2013     Zoster (Shingles) Vaccine (2 of 3) 05/06/2015     [facility] QUESTIONNAIRE 6 MONTHS  07/18/2018     Depression Assessment - every 6 months  07/18/2018     Cholesterol Lab - yearly  01/09/2019     Discuss Advance Directive Planning  03/05/2019

## 2019-04-16 ENCOUNTER — TELEPHONE (OUTPATIENT)
Dept: SURGERY | Facility: OTHER | Age: 74
End: 2019-04-16

## 2019-04-16 ASSESSMENT — ANXIETY QUESTIONNAIRES: GAD7 TOTAL SCORE: 0

## 2019-04-16 NOTE — LETTER
May 10, 2019          Kostas Cotto  [address] [facility] [address]        Dear Kostas,     Our office has received a colonoscopy referral for colon cancer screening. We have made three or more unsuccessful attempts to contact you. Colonoscopy is typically recommended every 5-10 years, depending on your history, in order to prevent and detect colon cancer at its earliest stages.  Colon cancer is now the second leading cause of cancer death in the [place] for both men and women. There are over 130,000 new cases and 50,000 deaths per year from colon cancer.  Colonoscopies can prevent a large majority of these deaths. This test is not only looking for cancer, but also precancerous lesions. These lesions can be removed before they ever become cancer. You can be given some sedation which makes the test comfortable for most people. You may schedule an appointment to discuss the procedure first if you prefer.    Colonoscopy is the best screening tool for colon cancer; however, if you do not wish to do a colonoscopy or cannot afford to do one at this time, there are other options.  One option is called a FIT test or Fecal Immunochemical Occult Blood Test  Which is a home stool sample kit that is mailed or returned to the clinic lab. This test can detect hidden bleeding in the lower colon and should be done every year. Another option is Cologuard which also involves collecting a stool sample at home. This test kit is shipped directly to your home and returned from any UPS location. It can identify altered DNA shed in the stool associated with the possibility of colon cancer or precancerous lesions and should be done every 3 years. Both tests are easy to do and do not require any dietary or medication restrictions and involve only one collection sample. If a positive result is obtained from either test, you would be referred for a colonoscopy.    If you are under/uninsured, you may contact our Patient Financial Services  Office at [phone] to determine which benefits you may qualify for. If you have completed either one of these tests or had a flexible sigmoidoscopy in the past five years at another facility, please have the records sent to our clinic so that we can best coordinate your care.  Please call us at [phone] if you have questions or would like arrange your colonoscopy procedure, consultation appointment, or discuss other options.       Sincerely;       [facility] Surgery Team

## 2019-04-16 NOTE — TELEPHONE ENCOUNTER
"Referral received colonoscopy. Approved by surgery education nurse for meet and greet colonoscopy. 1st attempt to call patient to schedule. No answer. No answering machine.    City: [place]  Phone number: [phone]  Insurance: [employer] Medicare  [facility]    Lab/X-ray/EKG needed: EKG, Chest x-ray, CBC, BMP, PT, PTT within 30 days due to cardiac history including MI 2003, Cardiac arrest 2005, CABG 2005, CVA, HTN, TIA 1/2018, AAA repair 2015, on anticoagulants.    Saw Halley Hidalgo for \"preventative visit\" 4/16/19. Last cardiology visit with Dr. Ortega 1/14/19. On Aspirin 81mg and Eliquis. Ask surgeon about Eliquis and if needs preop.cardiology clearance.     "

## 2019-04-18 DIAGNOSIS — R73.01 IMPAIRED FASTING GLUCOSE: ICD-10-CM

## 2019-04-18 DIAGNOSIS — I10 ESSENTIAL HYPERTENSION WITH GOAL BLOOD PRESSURE LESS THAN 140/90: ICD-10-CM

## 2019-04-18 LAB
ALBUMIN SERPL-MCNC: 3.6 G/DL (ref 3.4–5)
ALP SERPL-CCNC: 131 U/L (ref 40–150)
ALT SERPL W P-5'-P-CCNC: 9 U/L (ref 0–70)
ANION GAP SERPL CALCULATED.3IONS-SCNC: 4 MMOL/L (ref 3–14)
AST SERPL W P-5'-P-CCNC: 19 U/L (ref 0–45)
BILIRUB SERPL-MCNC: 0.4 MG/DL (ref 0.2–1.3)
BUN SERPL-MCNC: 25 MG/DL (ref 7–30)
CALCIUM SERPL-MCNC: 9.4 MG/DL (ref 8.5–10.1)
CHLORIDE SERPL-SCNC: 104 MMOL/L (ref 94–109)
CO2 SERPL-SCNC: 30 MMOL/L (ref 20–32)
CREAT SERPL-MCNC: 1.07 MG/DL (ref 0.66–1.25)
GFR SERPL CREATININE-BSD FRML MDRD: 69 ML/MIN/{1.73_M2}
GLUCOSE SERPL-MCNC: 93 MG/DL (ref 70–99)
HBA1C MFR BLD: 5.5 % (ref 0–5.6)
POTASSIUM SERPL-SCNC: 4.5 MMOL/L (ref 3.4–5.3)
PROT SERPL-MCNC: 7.9 G/DL (ref 6.8–8.8)
SODIUM SERPL-SCNC: 138 MMOL/L (ref 133–144)

## 2019-04-18 PROCEDURE — 36415 COLL VENOUS BLD VENIPUNCTURE: CPT | Mod: ZL | Performed by: PHYSICIAN ASSISTANT

## 2019-04-18 PROCEDURE — 80053 COMPREHEN METABOLIC PANEL: CPT | Mod: ZL | Performed by: PHYSICIAN ASSISTANT

## 2019-04-18 PROCEDURE — 83036 HEMOGLOBIN GLYCOSYLATED A1C: CPT | Mod: ZL | Performed by: PHYSICIAN ASSISTANT

## 2019-05-06 NOTE — TELEPHONE ENCOUNTER
"Referral received colonoscopy. Approved by surgery education nurse for meet and greet colonoscopy. 2nd attempt to call patient to schedule. No answer. No answering machine.    City: [place]  Phone number: [phone]  Insurance: [employer]  Pharmacy: [facility]    Lab/X-ray/EKG needed: EKG, Chest x-ray, CBC, BMP, PT, PTT within 30 days due to cardiac history including MI 2003, Cardiac arrest 2005, CABG 2005, CVA, HTN, TIA 1/2018, AAA repair 2015, on anticoagulants.    Saw Halley Hidalgo for \"preventative visit\" 4/16/19. Last cardiology visit with Dr. Ortega 1/14/19. On Aspirin 81mg and Eliquis. Ask surgeon about Eliquis and if needs preop.cardiology clearance.     Halley's note says that wife is power of . I do not see any documents or consents in chart.  "

## 2019-05-10 NOTE — TELEPHONE ENCOUNTER
Referral received for meet and greet colonoscopy. This is the 3rd attempt to call patient to schedule. No answer. No answering machine or cell listed in chart.  Letter sent requesting patient to call office to schedule colonoscopy/consult.    Referring provider notified that patient has not yet been scheduled after 3 attempts to reach by phone.

## 2019-05-30 DIAGNOSIS — Z00.00 HEALTH CARE MAINTENANCE: ICD-10-CM

## 2019-05-31 RX ORDER — FOLIC ACID 1 MG/1
TABLET ORAL
Qty: 90 TABLET | Refills: 0 | Status: SHIPPED | OUTPATIENT
Start: 2019-05-31 | End: 2019-08-26

## 2019-05-31 NOTE — TELEPHONE ENCOUNTER
[facility]  Last Written Prescription Date: 1/31/19  Last Fill Quantity: 90 # of Refills: 0  Last Office Visit: 4/15/19

## 2019-08-15 NOTE — PROGRESS NOTES
01/08/18 1500   General Information   Onset Date 01/08/18   Start of Care Date 01/08/18   Referring Physician JOSHUA Mandel   Patient Profile Review/OT: Additional Occupational Profile Info See Profile for full history and prior level of function   Patient/Family Goals Statement Patient would like to eat   Swallowing Evaluation Bedside swallow evaluation   Behaviorial Observations WNL (within normal limits)   Mode of current nutrition NPO   Comments Patient had difficulty understanding portions of the oral mechanism examination with modeling and mod cues   Clinical Swallow Evaluation   Oral Musculature generally intact   Structural Abnormalities none present   Dentition upper and lower dentures   Mucosal Quality adequate   Mandibular Strength and Mobility intact   Oral Labial Strength and Mobility WFL   Lingual Strength and Mobility WFL   Velar Elevation intact   Buccal Strength and Mobility other (see comments)  (unable to puff cheeks)   Laryngeal Function Cough;Throat clear;Swallow;Voicing initiated;Dry swallow palpated   Oral Musculature Comments Patient had moderate difficulty completing all directions to complete full oral mechanism examination; with a model and cues pt unable to puff cheeks out   Additional Documentation Yes   Swallow Eval   Feeding Assistance minimal assistance required   Clinical Swallow Eval: Thin Liquid Texture Trial   Mode of Presentation, Thin Liquids cup;self-fed   Volume of Liquid or Food Presented 8 oz   Oral Phase of Swallow Premature pharyngeal entry;WFL   Pharyngeal Phase of Swallow intact;coughing/choking   Diagnostic Statement Upon initial sip; pt coughed/choked on thin liquids, pt then trialed thin liquid after trialing puree, honey, and nectar with no overt s/s of asp/penx. Patient trialed 3 sips, then finished 4 oz without stopping, no overt s/sx of asp/penx and clear vocal quality   Clinical Swallow Eval: Nectar Thick Liquid Texture Trial   Mode of Presentation, Nectar  cup;self-fed   Volume of Nectar Presented 3 oz   Oral Phase, Irene WFL   Pharyngeal Phase, Irene intact   Diagnostic Statement No overt s/sx of asp/penx, appropraite lip closure, hyolaryngeal elevation observed and clear vocal quality   Clinical Swallow Eval: Honey Thick Liquid Texture Trial   Mode of Presentation, Honey cup;self-fed   Volume of Honey Presented 2 oz   Oral Phase, Honey WFL   Pharyngeal Phase, Honey intact   Diagnostic Statement No overt s.sx of asp/penx; clear vocal quality and appropraite hyolaryngeal elevation observed   Clinical Swallow Eval: Puree Solid Texture Trial   Mode of Presentation, Puree spoon;self-fed   Volume of Puree Presented 2 tsp   Oral Phase, Puree WFL   Pharyngeal Phase, Puree intact   Diagnostic Statement No overt s/sx of asp/penx noted; clear vocal quality post swallow   Clinical Swallow Eval: Semisolid Texture Trial   Mode of Presentation, Semisolid self-fed;spoon   Volume of Semisolid Food Presented 1 tsp   Oral Phase, Semisolid WFL   Pharyngeal Phase, Semisolid intact   Diagnostic Statement No overt s.sx of asp.penx noted; clear vocal quality post swallow   Clinical Swallow Eval: Solid Food Texture Trial   Mode of Presentation, Solid self-fed   Volume of Solid Food Presented 3 chunks of cookie   Oral Phase, Solid WFL   Pharyngeal Phase, Solid intact   Diagnostic Statement No overt s/sx of asp/penx; clear vocal quality; dentures noted to move around post trials of regular consistency   Swallow Compensations   Swallow Compensations Pacing;Reduce amounts   Results No difficulties noted   General Therapy Interventions   Planned Therapy Interventions Dysphagia Treatment   Dysphagia treatment Modified diet education;Instruction of safe swallow strategies;Compensatory strategies for swallowing;Oropharyngeal exercise training   Swallow Eval: Clinical Impressions   Skilled Criteria for Therapy Intervention Skilled criteria met.  Treatment indicated.   Functional Assessment Scale  (FAS) 5   Dysphagia Outcome Severity Scale (SAW) Level 5 - SAW   Treatment Diagnosis Oropharyngeal Dysphagia   Diet texture recommendations Dysphagia diet level 3;Thin liquids   Recommended Feeding/Eating Techniques small sips/bites;other (see comments)  (Finish one bite before beginning another)   Therapy Frequency 3 times/wk   Predicted Duration of Therapy Intervention (days/wks) 5 days   Risks and Benefits of Treatment have been explained. Yes   Patient, family and/or staff in agreement with Plan of Care Yes   Clinical Impression Comments Patient coughed on initial sip of thin liquid; no other overt s/sx of aspiration/penetration was noted after initial sip of thin. Patient tolerated puree, mechanical soft, and regular texture without difficulty; pt has dentures, these were noted to move around after trials of regular texture, pt reported they fit well, however wife stated pt has mild difficulties and uses fixodent at times; recommend foods be cut into bite sized pieces. Patient re-trialed thin after trialing honey and nectar thick liquids; no overt s/sx of aspiration/penetration noted; clear vocal quality, pt trials 3 sips, then finished 3 oz of thin liquid in consecuative swallows, no overt s/sx of asp/penx, clear vocal quality post swallow. Clinician will monotor closely due to initial cough/choking on thin trial; good oral cares should be completed.   Therapy Certification   Medical Diagnosis Oropharyneal Dysphagia   Total Evaluation Time   Total Evaluation Time (Minutes) 20      77

## 2019-08-16 ENCOUNTER — APPOINTMENT (OUTPATIENT)
Dept: CT IMAGING | Facility: HOSPITAL | Age: 74
End: 2019-08-16
Attending: FAMILY MEDICINE
Payer: COMMERCIAL

## 2019-08-16 ENCOUNTER — HOSPITAL ENCOUNTER (EMERGENCY)
Facility: HOSPITAL | Age: 74
Discharge: HOME | End: 2019-08-16
Attending: FAMILY MEDICINE | Admitting: FAMILY MEDICINE
Payer: COMMERCIAL

## 2019-08-16 VITALS
RESPIRATION RATE: 16 BRPM | SYSTOLIC BLOOD PRESSURE: 148 MMHG | HEART RATE: 68 BPM | OXYGEN SATURATION: 98 % | DIASTOLIC BLOOD PRESSURE: 83 MMHG | TEMPERATURE: 97.5 F
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
## 2019-08-16 DIAGNOSIS — S01.111A EYEBROW LACERATION, RIGHT, INITIAL ENCOUNTER: ICD-10-CM

## 2019-08-16 PROCEDURE — 99284 EMERGENCY DEPT VISIT MOD MDM: CPT | Mod: 25

## 2019-08-16 PROCEDURE — 12011 RPR F/E/E/N/L/M 2.5 CM/<: CPT | Performed by: FAMILY MEDICINE

## 2019-08-16 PROCEDURE — 72125 CT NECK SPINE W/O DYE: CPT | Mod: TC

## 2019-08-16 PROCEDURE — 70450 CT HEAD/BRAIN W/O DYE: CPT | Mod: TC

## 2019-08-16 PROCEDURE — 12011 RPR F/E/E/N/L/M 2.5 CM/<: CPT

## 2019-08-16 ASSESSMENT — ENCOUNTER SYMPTOMS
DYSURIA: 0
NAUSEA: 0
ABDOMINAL PAIN: 0
FEVER: 0
PALPITATIONS: 0
DIARRHEA: 0
COUGH: 0
CHILLS: 0
DIZZINESS: 0
WHEEZING: 0
NECK PAIN: 0
VOMITING: 0
BACK PAIN: 0
SORE THROAT: 0
SHORTNESS OF BREATH: 0

## 2019-08-16 NOTE — ED AVS SNAPSHOT
[facility]  [address]  [place] [address]  Phone:  [phone]                                    Kostas Cotto   MRN: [id number]    Department:  [facility]   Date of Visit:  8/16/2019           After Visit Summary Signature Page    I have received my discharge instructions, and my questions have been answered. I have discussed any challenges I see with this plan with the nurse or doctor.    ..........................................................................................................................................  Patient/Patient Representative Signature      ..........................................................................................................................................  Patient Representative Print Name and Relationship to Patient    ..................................................               ................................................  Date                                   Time    ..........................................................................................................................................  Reviewed by Signature/Title    ...................................................              ..............................................  Date                                               Time          22EPIC Rev 08/18

## 2019-08-17 ENCOUNTER — HOSPITAL ENCOUNTER (EMERGENCY)
Facility: HOSPITAL | Age: 74
Discharge: HOME | End: 2019-08-17
Attending: NURSE PRACTITIONER | Admitting: NURSE PRACTITIONER
Payer: COMMERCIAL

## 2019-08-17 VITALS
SYSTOLIC BLOOD PRESSURE: 136 MMHG | HEART RATE: 69 BPM | TEMPERATURE: 96.7 F | OXYGEN SATURATION: 97 % | RESPIRATION RATE: 14 BRPM | DIASTOLIC BLOOD PRESSURE: 85 MMHG

## 2019-08-17 DIAGNOSIS — S51.811D SKIN TEAR OF RIGHT FOREARM WITHOUT COMPLICATION, SUBSEQUENT ENCOUNTER: ICD-10-CM

## 2019-08-17 LAB
APTT PPP: 36 SEC (ref 24–37)
INR PPP: 1.5 (ref 0.8–1.2)

## 2019-08-17 PROCEDURE — 12002 RPR S/N/AX/GEN/TRNK2.6-7.5CM: CPT

## 2019-08-17 PROCEDURE — G0463 HOSPITAL OUTPT CLINIC VISIT: HCPCS

## 2019-08-17 PROCEDURE — 85730 THROMBOPLASTIN TIME PARTIAL: CPT | Performed by: NURSE PRACTITIONER

## 2019-08-17 PROCEDURE — 36415 COLL VENOUS BLD VENIPUNCTURE: CPT | Performed by: NURSE PRACTITIONER

## 2019-08-17 PROCEDURE — 99213 OFFICE O/P EST LOW 20 MIN: CPT | Mod: 25 | Performed by: NURSE PRACTITIONER

## 2019-08-17 PROCEDURE — 12002 RPR S/N/AX/GEN/TRNK2.6-7.5CM: CPT | Performed by: NURSE PRACTITIONER

## 2019-08-17 PROCEDURE — G0463 HOSPITAL OUTPT CLINIC VISIT: HCPCS | Mod: 25

## 2019-08-17 PROCEDURE — 85610 PROTHROMBIN TIME: CPT | Performed by: NURSE PRACTITIONER

## 2019-08-17 ASSESSMENT — ENCOUNTER SYMPTOMS
CHILLS: 0
FATIGUE: 0
ACTIVITY CHANGE: 0
APPETITE CHANGE: 0
DIZZINESS: 0
BRUISES/BLEEDS EASILY: 1
COUGH: 0
FEVER: 0
WOUND: 1
SHORTNESS OF BREATH: 0

## 2019-08-17 NOTE — ED TRIAGE NOTES
Pt presents with c/o falling face first on the concrete, Pt is on blood thinners, trauma eval called.

## 2019-08-17 NOTE — ED NOTES
The patient and family members (two sons and a daughter) were given AVS instructions for wound care, concussion observation and follow up for stitch removal. The family stated the patient would not be alone for 24 hours for head injury observation. The patient denies headache or neck pain. Balance steady with transfer to the wheelchair for discharge. No drainage noted from the right eyebrow stitches and the right forearm optifoam dressing is intact.

## 2019-08-17 NOTE — ED AVS SNAPSHOT
[facility]  [address]  [place] [address]  Phone:  [phone]                                    Kostas Cotto   MRN: [id number]    Department:  [facility]   Date of Visit:  8/17/2019           After Visit Summary Signature Page    I have received my discharge instructions, and my questions have been answered. I have discussed any challenges I see with this plan with the nurse or doctor.    ..........................................................................................................................................  Patient/Patient Representative Signature      ..........................................................................................................................................  Patient Representative Print Name and Relationship to Patient    ..................................................               ................................................  Date                                   Time    ..........................................................................................................................................  Reviewed by Signature/Title    ...................................................              ..............................................  Date                                               Time          22EPIC Rev 08/18

## 2019-08-17 NOTE — ED PROVIDER NOTES
History     Chief Complaint   Patient presents with     Fall     HPI  Kostas Cotto is a 74 year old male who has a history of CABG x3 (2005), cardiac V. fib arrest (2003), CVA/TIA, A. fib (1/18 Eliquis) who was home alone while his wife was at [facility] and he was sitting up in the garage and then was walking on his driveway and fell.  He did not feel lightheaded or dizzy or have any pain prior to or after falling.  Neighbors saw him and brought him in.  He is here currently with his daughter-in-law.  He has bleeding from his right eyebrow.  He has a skin tear on his right elbow.  He denies any headache, vision changes, nausea, vomiting, neck pain, back pain.    Social history:  8/19: Kostas lives with his wife Pari.  He has 4 children.  Daughter-in-law is present.  Mental status: He does not know the month, the year or his age.    Allergies:  Allergies   Allergen Reactions     Oxycodone      Caused him delirium   Caused him delirium        Problem List:    Patient Active Problem List    Diagnosis Date Noted     New onset atrial fibrillation on 1/8/18 01/14/2019     Priority: Medium     Need for prophylactic vaccination and inoculation against influenza 01/14/2019     Priority: Medium     History of CVA (cerebrovascular accident) 01/14/2019     Priority: Medium     Essential hypertension 01/14/2019     Priority: Medium     Bifascicular block 01/14/2019     Priority: Medium     History of AAA (abdominal aortic aneurysm) repair 01/14/2019     Priority: Medium     Chronic anticoagulation 01/14/2019     Priority: Medium     History of TIA on 1/8/18 01/14/2019     Priority: Medium     Paroxysmal atrial fibrillation (H) 05/18/2018     Priority: Medium     Mixed hyperlipidemia 05/18/2018     Priority: Medium     H/O ventricular fibrillation in 2003 with cardiac arrest 05/18/2018     Priority: Medium     History of myocardial infarction in 2003 05/18/2018     Priority: Medium     History of coronary artery bypass graft x 3 in  2005 01/24/2018     Priority: Medium     H/O cardiac arrest in 2003 01/24/2018     Priority: Medium     History of tobacco abuse quitting in 2003 01/24/2018     Priority: Medium     CAD 03/05/2014     Priority: Medium        Past Medical History:    Past Medical History:   Diagnosis Date     Hypoxic-ischemic encephalopathy (HIE) 11/25/2003     Rheumatoid arthritis(714.0) 05/03/2005     S/P inferior MI 11/25/2003       Past Surgical History:    Past Surgical History:   Procedure Laterality Date     AAA REPAIR       ANGIOGRAM  1/2004    Coronary Angiogram, LAD> long mid 40-50%, D2 90% ostial,Prox % with collateal flow,Circ had restenosisand was restented x 3 again.  EF now 55% with mild lateral wall hypokineseis and inferior also.     CARDIOVASCULAR SURGERY  1/2005    Syncopal episode, Had repeat angiogam showing now circ 100% along with RCA. LAD 80-90% with EF 35%.  Sent for CABG     CARDIOVASCULAR SURGERY  1/2005    S/P 3 vess CABG, Dr Villalobos LIMA>LAD, SVG>OM1, SVG>RCA.  Post op pneumothorax with Chest Tube placement.     CARDIOVASCULAR SURGERY  11/2003    Acute Inferior MI, Had VT arrest resuscitated. Not given lytics and was sent to [facility].  IABP placed and had angiogram stented Circ x 5. EF 30%. Lad had 70%, % with contralateral collateral flow.      US CHEST      left     HEART/LUNG RESUSCITATION (CPR)  11/2003    S/P Cardiac Arrest V-Tach, Out of hospital arrest with no bystaner cpr.  Has polymorphic VT on arrival.  Has a prolonged resuscitation which obviously was successful.  This was due to an acute Inferior MI at the time.     OPEN REDUCTION INTERNAL FIXATION HIP  1965    ORIF frac/dislocation L hip, Was a teenager and had orif done after fracture dislocation of left hip. Occurred playing softball.     PHACOEMULSIFICATION WITH STANDARD INTRAOCULAR LENS IMPLANT  3/10/2014    Procedure: PHACOEMULSIFICATION WITH STANDARD INTRAOCULAR LENS IMPLANT;  CATARACT EXTRACTION WITH INTRA OCULAR LENS  LEFT(10% SERENE/POSSIBLE STRETCH);  Surgeon: Isael Acuna MD;  Location: [facility]     PHACOEMULSIFICATION WITH STANDARD INTRAOCULAR LENS IMPLANT  3/25/2014    Procedure: PHACOEMULSIFICATION WITH STANDARD INTRAOCULAR LENS IMPLANT;  CATARACT EXTRACTION WITH INTRA OCULAR LENS RIGHT/POSSIBLE PUPIL STRETCH;  Surgeon: Isael Acuna MD;  Location: [facility]       Family History:    Family History   Problem Relation Age of Onset     Cancer Father         leukemia     Diabetes Mother      Cerebrovascular Disease Mother         cause of death       Social History:  Marital Status:   [2]  Social History     Tobacco Use     Smoking status: Former Smoker     Smokeless tobacco: Never Used   Substance Use Topics     Alcohol use: No     Drug use: No        Medications:      apixaban ANTICOAGULANT (ELIQUIS) 5 MG tablet   aspirin 81 MG chewable tablet   calcium carbonate-vitamin D (CALCIUM 500 + D) 500-400 MG-UNIT TABS tablt   finasteride (PROSCAR) 5 MG tablet   folic acid (FOLVITE) 1 MG tablet   lisinopril (PRINIVIL/ZESTRIL) 5 MG tablet   metoprolol succinate ER (TOPROL-XL) 50 MG 24 hr tablet   ranitidine (ZANTAC) 150 MG tablet   simvastatin (ZOCOR) 20 MG tablet   tamsulosin (FLOMAX) 0.4 MG capsule         Review of Systems   Constitutional: Negative for chills and fever.   HENT: Negative for ear pain and sore throat.    Eyes: Negative for visual disturbance.   Respiratory: Negative for cough, shortness of breath and wheezing.    Cardiovascular: Negative for chest pain and palpitations.   Gastrointestinal: Negative for abdominal pain, diarrhea, nausea and vomiting.   Genitourinary: Negative for dysuria.   Musculoskeletal: Negative for back pain and neck pain.   Skin: Negative for rash.   Neurological: Negative for dizziness.   Psychiatric/Behavioral:        No depression or anxiety.       Physical Exam   BP: 143/82  Pulse: 68  Heart Rate: 64  Temp: 97.5  F (36.4  C)  Resp: 16  SpO2: 94 %      Physical Exam   Constitutional: He  appears well-developed. No distress.   HENT:   Head: Normocephalic and atraumatic.   Eyes: Pupils are equal, round, and reactive to light.   Neck: Neck supple.   Cardiovascular: Normal rate, regular rhythm and normal heart sounds. Exam reveals no gallop and no friction rub.   No murmur heard.  No current a fib   Pulmonary/Chest: Effort normal and breath sounds normal. No respiratory distress.   Abdominal: There is no tenderness. There is no rebound and no guarding.   Musculoskeletal: He exhibits no edema.   Lymphadenopathy:     He has no cervical adenopathy.   Neurological: He is alert.   Skin: Skin is warm and dry.   Psychiatric: He has a normal mood and affect.   Nursing note and vitals reviewed.      ED Course        [facility]    Laceration Repair w/ Sedation  Date/Time: 8/16/2019 8:29 PM  Performed by: Jania Weinberg MD  Authorized by: Jania Weinberg MD     ED EVALUATION:      Difficult Airway?: No    ASA Class: Class 3- Severe systemic disease, definite functional limitations  UNIVERSAL PROTOCOL   Site Marked: Yes  Prior Images Obtained and Reviewed:  No  Required items: Required blood products, implants, devices and special equipment available (NA)    Patient identity confirmed:  Verbally with patient  NA - No sedation, light sedation, or local anesthesia  Confirmation Checklist:  Patient's identity using two indicators  Time out: Immediately prior to the procedure a time out was called    Preparation: Patient was prepped and draped in usual sterile fashion      ANESTHESIA (see MAR for exact dosages):     Anesthesia method:  Local infiltration    Local anesthetic:  Lidocaine 1% w/o epi    SEDATION    Patient Sedated: No    LACERATION DETAILS     Location:  Face    Face location:  R eyebrow    Length (cm):  1    REPAIR TYPE:     Repair type:  Simple      EXPLORATION:     Hemostasis achieved with:  Direct pressure    Wound exploration: wound explored through full range of motion       Contaminated: no      TREATMENT:     Amount of cleaning:  Standard    Irrigation solution:  Tap water    Irrigation method:  Pressure wash    SKIN REPAIR     Repair method:  Sutures    Suture size:  5-0    Suture material:  Nylon    Suture technique:  Simple interrupted    Number of sutures:  2    APPROXIMATION     Approximation:  Close    POST-PROCEDURE DETAILS     Dressing:  Antibiotic ointment      PROCEDURE   Patient Tolerance:  Patient tolerated the procedure well with no immediate complications    Time of Sedation in Minutes by Physician:  0                 Patient Vitals for the past 24 hrs:   BP Temp Temp src Pulse Heart Rate Resp SpO2   08/16/19 2000 157/81 -- -- -- 64 18 --   08/16/19 1910 143/82 97.5  F (36.4  C) Tympanic 68 -- 16 94 %       LABORATORY (REVIEWED AND INTERPRETED):  CBC BMP Liver Panel   Recent Labs   Lab Test 02/03/18  1612   WBC 8.4   HGB 13.6       Recent Labs   Lab Test 04/18/19  1120   POTASSIUM 4.5   CHLORIDE 104   BUN 25    Recent Labs   Lab Test 04/18/19  1120   BILITOTAL 0.4   ALT 9   AST 19        UA     DIP MICRO   Recent Labs   Lab Test 02/04/18  0903   COLOR Yellow   NITRITE Negative    Invalid input(s): RBCUA, WBCUA, BACTERIAUA, EPITHELIALUA       OTHER LABS   Recent Labs   Lab Test 02/09/18  10/17/16  1234   INR 1.4*   < >  --    TSH  --   --  3.18    < > = values in this interval not displayed.            INTERVENTIONS:  Medications - No data to display    ECG (Reviewed and Interpreted by me):  None    IMAGING (Reviewed and Interpreted by me):  Head CT and cervical CT pending    ED COURSE:  The patient has been seen and evaluated by me.  I have reviewed the medical records.    8:31 PM signed pt out to Dr. Reyna who will follow-up on the CT results.     IMPRESSIONS AND PLAN:  Fall--> eye laceration and right elbow skin tear.  Kostas has dementia and was at home alone while his wife was playing bingo.  He was out in the garage and tripped on the driveway and hit his right  eye.  Neighbors brought him in.  He had no loss of consciousness.  He denies any pain.  He has no headache, nausea, vomiting, neck or back pain, change in the level of consciousness.  His daughter-in-law is present.  She notices no difference in his behavior.  He tolerated laceration repair without any difficulty.  CT scans ordered because he is on Eliquis.  CT scan of his head and neck are pending.    We discussed avoiding any ibuprofen/Advil/Motrin with his Eliquis and head injury.  He can use Tylenol.  If he develops increased pain, vision changes, vomiting or change in level of consciousness, they should return to the ER.    Broad differential diagnosis was considered including heart attack, heart failure, cardiac incident, hypoxia, subarachnoid hemorrhage, subdural hematoma among other diagnoses.      2000 Patient's care transitioned to this MD at shift change at shift change at 2015 with plan for discharge if CT head/cervical spine return negative. Patient remains asymptomatic.      The patient is a 74 year old man who a fall resulting in a right eyebrow laceration without evidence of more serious injury.    1) Fall - patient will be treated with good wound care which is discussed with the patient, cold compresses and alternating APAP/ibuprofen.    Plan for PCP follow-up in 5 - 7 days regarding this ER visit. The patient verbalizes agreement with this plan as well as to immediately return to the ER with any new/worsening S/Sx.          DIAGNOSIS:    ICD-10-CM    1. Fall, initial encounter W19.XXXA    2. Eyebrow laceration, right, initial encounter S01.111A        DISCHARGE MEDICATIONS:  New Prescriptions    No medications on file         LOS: lac 1 cm and level 3      8/16/2019  [facility]    Halley Hidalgo Cassandra E, MD  08/16/19 2037       Jania Weinberg MD  08/16/19 2047       Augustin Reyna MD  08/16/19 2133

## 2019-08-17 NOTE — DISCHARGE INSTRUCTIONS
Thank you for coming to [facility].  If you are concerned or things get worse, don't hesitate to return to the Emergency Room.    Stitches should be removed in 4 days. He should be seen sooner if there is spreading redness or pus    Return to the ER if you start vomiting, have change in your behavior, have difficulty walking or you're concerned.      Avoid ibuprofen for the next 5 days.    Tylenol 1-2 tabs po q4h prn    Home  Back  SP  RU  VI  CH     *HEAD INJURY, no wake-up (Adult)    You have had a head injury. It does not appear serious at this time. Sometimes symptoms of a more serious problem (concussion, bruising or bleeding in the brain) may appear later. Therefore, watch for the warning signs listed below.  HOME CARE:  During the next 24 hours someone must stay with you to check for the signs below. It is not necessary to stay awake or be awakened during the night.   If you have swelling of the face or scalp, apply an ice pack (ice cubes in a plastic bag, wrapped in a towel) for 20 minutes. Do this every 1-2 hours until the swelling starts to go down.  You may use acetaminophen (Tylenol) 650-1000 mg every 6 hours or ibuprofen (Motrin, Advil) 600 mg every 6-8 hours with food to control pain, if you are able to take these medicines. [NOTE: If you have chronic liver or kidney disease or ever had a stomach ulcer or GI bleeding, talk with your doctor before using these medicines.] Do not take aspirin after a head injury.  For the next 24 hours:  Do not take alcohol, sedatives or medicines that make you sleepy.  Do not drive or operate machinery.  Avoid strenuous activities. No lifting or straining.  If you have had any symptoms of a concussion today (nausea, vomiting, dizziness, confusion, headache, memory loss or if you were knocked out), do not return to sports or any activity that could result in another head injury until 2 full weeks after all symptoms are gone and you have been  cleared by your doctor. A second head injury before fully recovering from the first one can lead to serious brain injury.  FOLLOW UP with your doctor if symptoms are not improving after 24 hours, or as directed.   GET PROMPT MEDICAL ATTENTION if any of the following warning signs occur:  Repeated vomiting  Severe or worsening headache or dizziness  Unusual drowsiness, or unable to awaken as usual  Confusion or change in behavior or speech, memory loss, blurred vision  Convulsion (seizure)  Increasing scalp or face swelling  Redness, warmth or pus from the swollen area  Fluid drainage or bleeding from the nose or ears    [id number] [facility], [address] [place] [id number]. All rights reserved. This information is not intended as a substitute for professional medical care. Always follow your healthcare professional's instructions

## 2019-08-18 NOTE — DISCHARGE INSTRUCTIONS
If needing to change the bandage in the next 24 hours, use:  Gauze with vaseline (silver package), then   Telfa non-adherent dressing, then  Plain gauze, then  Stretchy Coban to cover.    If bleeding has stopped after 24 hours and there is no more oozing, you may change dressing the Optifoam dressing (light tan dressing) and leave in place for 5-7 days.

## 2019-08-18 NOTE — ED PROVIDER NOTES
History     Chief Complaint   Patient presents with     Wound Check     fell last night and evaluated. bleeding to skin tear on right arm. takes eloquis. wife would also like possible planters warts checked out     HPI  Kostas Cotto is a 74 year old male who fell yesterday and was evaluated in the ED. He has a skin tear to his right forearm. The wound to his right arm continues to bleed today. Family has changed bandages at least 3-4 times today. He is on Eliquis and ASA 81 mg daily. Wife would also like the plantar's wart to his right foot evaluated and treatment recommended.    Allergies:  Allergies   Allergen Reactions     Oxycodone      Caused him delirium   Caused him delirium        Problem List:    Patient Active Problem List    Diagnosis Date Noted     New onset atrial fibrillation on 1/8/18 01/14/2019     Priority: Medium     Need for prophylactic vaccination and inoculation against influenza 01/14/2019     Priority: Medium     History of CVA (cerebrovascular accident) 01/14/2019     Priority: Medium     Essential hypertension 01/14/2019     Priority: Medium     Bifascicular block 01/14/2019     Priority: Medium     History of AAA (abdominal aortic aneurysm) repair 01/14/2019     Priority: Medium     Chronic anticoagulation 01/14/2019     Priority: Medium     History of TIA on 1/8/18 01/14/2019     Priority: Medium     Paroxysmal atrial fibrillation (H) 05/18/2018     Priority: Medium     Mixed hyperlipidemia 05/18/2018     Priority: Medium     H/O ventricular fibrillation in 2003 with cardiac arrest 05/18/2018     Priority: Medium     History of myocardial infarction in 2003 05/18/2018     Priority: Medium     History of coronary artery bypass graft x 3 in 2005 01/24/2018     Priority: Medium     H/O cardiac arrest in 2003 01/24/2018     Priority: Medium     History of tobacco abuse quitting in 2003 01/24/2018     Priority: Medium     CAD 03/05/2014     Priority: Medium        Past Medical History:    Past  Medical History:   Diagnosis Date     Hypoxic-ischemic encephalopathy (HIE) 11/25/2003     Rheumatoid arthritis(714.0) 05/03/2005     S/P inferior MI 11/25/2003       Past Surgical History:    Past Surgical History:   Procedure Laterality Date     AAA REPAIR       ANGIOGRAM  1/2004    Coronary Angiogram, LAD> long mid 40-50%, D2 90% ostial,Prox % with collateal flow,Circ had restenosisand was restented x 3 again.  EF now 55% with mild lateral wall hypokineseis and inferior also.     CARDIOVASCULAR SURGERY  1/2005    Syncopal episode, Had repeat angiogam showing now circ 100% along with RCA. LAD 80-90% with EF 35%.  Sent for CABG     CARDIOVASCULAR SURGERY  1/2005    S/P 3 vess CABG, Dr Villalobos LIMA>LAD, SVG>OM1, SVG>RCA.  Post op pneumothorax with Chest Tube placement.     CARDIOVASCULAR SURGERY  11/2003    Acute Inferior MI, Had VT arrest resuscitated. Not given lytics and was sent to [facility].  IABP placed and had angiogram stented Circ x 5. EF 30%. Lad had 70%, % with contralateral collateral flow.     HC US CHEST      left     HEART/LUNG RESUSCITATION (CPR)  11/2003    S/P Cardiac Arrest V-Tach, Out of hospital arrest with no bystaner cpr.  Has polymorphic VT on arrival.  Has a prolonged resuscitation which obviously was successful.  This was due to an acute Inferior MI at the time.     OPEN REDUCTION INTERNAL FIXATION HIP  1965    ORIF frac/dislocation L hip, Was a teenager and had orif done after fracture dislocation of left hip. Occurred playing softball.     PHACOEMULSIFICATION WITH STANDARD INTRAOCULAR LENS IMPLANT  3/10/2014    Procedure: PHACOEMULSIFICATION WITH STANDARD INTRAOCULAR LENS IMPLANT;  CATARACT EXTRACTION WITH INTRA OCULAR LENS LEFT(10% SERENE/POSSIBLE STRETCH);  Surgeon: Isael Acuna MD;  Location: [facility] OR     PHACOEMULSIFICATION WITH STANDARD INTRAOCULAR LENS IMPLANT  3/25/2014    Procedure: PHACOEMULSIFICATION WITH STANDARD INTRAOCULAR LENS IMPLANT;  CATARACT EXTRACTION WITH INTRA  OCULAR LENS RIGHT/POSSIBLE PUPIL STRETCH;  Surgeon: Isael Acuna MD;  Location: [facility] OR       Family History:    Family History   Problem Relation Age of Onset     Cancer Father         leukemia     Diabetes Mother      Cerebrovascular Disease Mother         cause of death       Social History:  Marital Status:   [2]  Social History     Tobacco Use     Smoking status: Former Smoker     Smokeless tobacco: Never Used   Substance Use Topics     Alcohol use: No     Drug use: No        Medications:      apixaban ANTICOAGULANT (ELIQUIS) 5 MG tablet   aspirin 81 MG chewable tablet   calcium carbonate-vitamin D (CALCIUM 500 + D) 500-400 MG-UNIT TABS tablt   finasteride (PROSCAR) 5 MG tablet   folic acid (FOLVITE) 1 MG tablet   lisinopril (PRINIVIL/ZESTRIL) 5 MG tablet   metoprolol succinate ER (TOPROL-XL) 50 MG 24 hr tablet   ranitidine (ZANTAC) 150 MG tablet   simvastatin (ZOCOR) 20 MG tablet   tamsulosin (FLOMAX) 0.4 MG capsule         Review of Systems   Constitutional: Negative for activity change, appetite change, chills, fatigue and fever.   Respiratory: Negative for cough and shortness of breath.    Skin: Positive for wound.   Neurological: Negative for dizziness.   Hematological: Bruises/bleeds easily.       Physical Exam   BP: 136/85  Pulse: 69  Temp: 96.7  F (35.9  C)  Resp: 14  SpO2: 97 %      Physical Exam   Constitutional: He appears well-developed and well-nourished. No distress.   Pleasant and cooperative.   Neurological:   Alert and oriented to self.    Skin: Skin is warm and dry. Capillary refill takes less than 2 seconds.        Skin tear right lateral/posterior forearm near elbow. Approx 4 cm. Continually oozing blood.       ED Course        Procedures    Hemostasis achieved with cautery. Patient tolerated well. Skin tear re-bandaged with Xerofoam, Telfa, and gauze.    Results for orders placed or performed during the hospital encounter of 08/17/19 (from the past 24 hour(s))   INR   Result Value  Ref Range    INR 1.50 (H) 0.80 - 1.20   Partial thromboplastin time   Result Value Ref Range    PTT 36 24.00 - 37.00 sec       Medications - No data to display    Assessments & Plan (with Medical Decision Making)     I have reviewed the nursing notes.    I have reviewed the findings, diagnosis, plan and need for follow up with the patient.   May change the dressing in 24 hours (if no further oozing). If continuing to bleed, use the same dressing as was applied in UC. If no longer bleeding, use Optifoam and leave in place for 5-7 days. Follow up with PCP as directed yesterday (in 5-7 days). Return to urgent care or ER if bleeding recurs and is not controllable. Kostas and Pari (wife) are agreeable to plan and Kostas is discharged to home in stable condition.      Discharge Medication List as of 8/17/2019  8:38 PM          Final diagnoses:   Skin tear of right forearm without complication, subsequent encounter       8/17/2019   [facility]     Leda Ocampo, APRN CNP  08/17/19 [id number]

## 2019-08-18 NOTE — ED TRIAGE NOTES
Pt Is here today with c/o wound that wont stop bleeding. Pt on eliquis. Was seen in ED yesterday for fall that caused laceration to right upper forearm.

## 2019-08-19 ENCOUNTER — TELEPHONE (OUTPATIENT)
Dept: FAMILY MEDICINE | Facility: OTHER | Age: 74
End: 2019-08-19

## 2019-08-19 NOTE — TELEPHONE ENCOUNTER
Patient scheduled 8/21/19 to register at 2:25. Patient does not have voicemail and was not able to answer writer's call x2. Will attempt to contact again before the end of day today.

## 2019-08-19 NOTE — TELEPHONE ENCOUNTER
Reason for ER/UC visit: Fall     New or Worsening symptoms: No     Do you have any questions or concerns?: No concerns or questions at this time    ER Recommends Follow-up By: In 5-7 days    RN Recommendations: Patient should be seen in the next 2-3 days. Patient's wife stated that he is supposed to have the stitches. Patient's wife was advised to please call back with any questions/concerns or new or worsening symptoms and to go to the ER if after clinic hours. Please advise on the appointment. Thank you.

## 2019-08-20 NOTE — PROGRESS NOTES
Subjective     Kostas Cotto is a 74 year old male who presents to clinic today for the following health issues:    HPI   ED/UC Followup:    Facility:  [facility]  Date of visit: 8/17/19  Reason for visit: fall, eyebrow laceration   Current Status: has not had any more falls since urgent care. Wife states his wounds are healing nicely. Would like his right elbow wound looked over and dressings changed.       Memory changes       Duration: worsening since 2003.     Description (location/character/radiation): has been falling more and showing signs of forgetfulness.     Intensity:  moderate    Accompanying signs and symptoms: lacerations minimal conversation.     History (similar episodes/previous evaluation): None    Precipitating or alleviating factors: None    Therapies tried and outcome: PT but did not follow directions and do home program.        Patient Active Problem List   Diagnosis     CAD     History of coronary artery bypass graft x 3 in 2005     H/O cardiac arrest in 2003     History of tobacco abuse quitting in 2003     Paroxysmal atrial fibrillation (H)     Mixed hyperlipidemia     H/O ventricular fibrillation in 2003 with cardiac arrest     History of myocardial infarction in 2003     New onset atrial fibrillation on 1/8/18     Need for prophylactic vaccination and inoculation against influenza     History of CVA (cerebrovascular accident)     Essential hypertension     Bifascicular block     History of AAA (abdominal aortic aneurysm) repair     Chronic anticoagulation     History of TIA on 1/8/18     Past Surgical History:   Procedure Laterality Date     AAA REPAIR       ANGIOGRAM  1/2004    Coronary Angiogram, LAD> long mid 40-50%, D2 90% ostial,Prox % with collateal flow,Circ had restenosisand was restented x 3 again.  EF now 55% with mild lateral wall hypokineseis and inferior also.     CARDIOVASCULAR SURGERY  1/2005    Syncopal episode, Had repeat angiogam showing now circ 100% along with RCA. LAD  80-90% with EF 35%.  Sent for CABG     CARDIOVASCULAR SURGERY  1/2005    S/P 3 vess CABG, Dr Villalobos LIMA>LAD, SVG>OM1, SVG>RCA.  Post op pneumothorax with Chest Tube placement.     CARDIOVASCULAR SURGERY  11/2003    Acute Inferior MI, Had VT arrest resuscitated. Not given lytics and was sent to [facility].  IABP placed and had angiogram stented Circ x 5. EF 30%. Lad had 70%, % with contralateral collateral flow.     HC US CHEST      left     HEART/LUNG RESUSCITATION (CPR)  11/2003    S/P Cardiac Arrest V-Tach, Out of hospital arrest with no bystaner cpr.  Has polymorphic VT on arrival.  Has a prolonged resuscitation which obviously was successful.  This was due to an acute Inferior MI at the time.     OPEN REDUCTION INTERNAL FIXATION HIP  1965    ORIF frac/dislocation L hip, Was a teenager and had orif done after fracture dislocation of left hip. Occurred playing softball.     PHACOEMULSIFICATION WITH STANDARD INTRAOCULAR LENS IMPLANT  3/10/2014    Procedure: PHACOEMULSIFICATION WITH STANDARD INTRAOCULAR LENS IMPLANT;  CATARACT EXTRACTION WITH INTRA OCULAR LENS LEFT(10% SERENE/POSSIBLE STRETCH);  Surgeon: Isael Acuna MD;  Location: [facility]     PHACOEMULSIFICATION WITH STANDARD INTRAOCULAR LENS IMPLANT  3/25/2014    Procedure: PHACOEMULSIFICATION WITH STANDARD INTRAOCULAR LENS IMPLANT;  CATARACT EXTRACTION WITH INTRA OCULAR LENS RIGHT/POSSIBLE PUPIL STRETCH;  Surgeon: Isael Acuna MD;  Location: [facility] OR       Social History     Tobacco Use     Smoking status: Former Smoker     Smokeless tobacco: Never Used   Substance Use Topics     Alcohol use: No     Family History   Problem Relation Age of Onset     Cancer Father         leukemia     Diabetes Mother      Cerebrovascular Disease Mother         cause of death         Current Outpatient Medications   Medication Sig Dispense Refill     apixaban ANTICOAGULANT (ELIQUIS) 5 MG tablet Take 1 tablet (5 mg) by mouth 2 times daily 180 tablet 3     aspirin 81 MG chewable  tablet Take 1 tablet (81 mg) by mouth daily 36 tablet 11     calcium carbonate-vitamin D (CALCIUM 500 + D) 500-400 MG-UNIT TABS tablt 2 times daily Take one tablet by oral route every day         finasteride (PROSCAR) 5 MG tablet Take 1 tablet (5 mg) by mouth daily 90 tablet 3     folic acid (FOLVITE) 1 MG tablet TAKE 1 TABLET DAILY BY MOUTH 90 tablet 0     lisinopril (PRINIVIL/ZESTRIL) 5 MG tablet TAKE 1 TABLET DAILY BY MOUTH 90 tablet 3     metoprolol succinate ER (TOPROL-XL) 50 MG 24 hr tablet Take 1 tablet (50 mg) by mouth daily 90 tablet 3     ranitidine (ZANTAC) 150 MG tablet Take 1 tablet (150 mg) by mouth daily 90 tablet 3     simvastatin (ZOCOR) 20 MG tablet TAKE 1 TABLET BY MOUTH EVERY EVENING - GENERIC FOR ZOCOR 90 tablet 3     tamsulosin (FLOMAX) 0.4 MG capsule TAKE 1 CAPSULE (0.4 MG) BY MOUTH DAILY 90 capsule 3     Allergies   Allergen Reactions     Oxycodone      Caused him delirium   Caused him delirium      Recent Labs   Lab Test 04/18/19  1120 02/03/18  1612  01/09/18  0555 01/08/18  1033 10/17/16  1234  04/14/15  1417 03/05/14  1058   A1C 5.5  --   --   --  5.6  --   --   --   --    LDL  --   --   --  52  --  65  --   --  73   HDL  --   --   --  41  --  56  --   --  42   TRIG  --   --   --  78  --  99  --   --  89   ALT 9  --   --   --   --  9  --  9 15   CR 1.07 0.77   < > 1.05 1.17 1.12   < > 1.03 1.27   GFRESTIMATED 69 >90   < > 70 62 65   < > 72 57*   GFRESTBLACK 80 >90   < > 84 74 79   < > 87 69   POTASSIUM 4.5 4.4   < > 3.7 4.3 4.3   < > 4.4 4.8   TSH  --   --   --   --   --  3.18  --   --  3.50    < > = values in this interval not displayed.      BP Readings from Last 3 Encounters:   08/21/19 110/62   08/17/19 136/85   08/16/19 148/83    Wt Readings from Last 3 Encounters:   08/21/19 83 kg (183 lb)   04/15/19 78.9 kg (174 lb)   01/14/19 77.1 kg (170 lb)                      Reviewed and updated as needed this visit by Provider         Review of Systems   ROS COMP: Constitutional, HEENT,  cardiovascular, pulmonary, gi and gu systems are negative, except as otherwise noted.      Objective    /62 (Patient Position: Sitting)   Pulse 67   Ht 1.829 m (6')   Wt 83 kg (183 lb)   SpO2 94%   BMI 24.82 kg/m    Body mass index is 24.82 kg/m .  Physical Exam   GENERAL: alert, no distress and frail, smells strongly of urine.   EYES: bruise over right eye and under left eye.   NECK: no adenopathy, no asymmetry, masses, or scars and thyroid normal to palpation  RESP: lungs clear to auscultation - no rales, rhonchi or wheezes  CV: regular rate and rhythm, normal S1 S2, no S3 or S4, no murmur, click or rub, no peripheral edema and peripheral pulses strong  ABDOMEN: soft, nontender, no hepatosplenomegaly, no masses and bowel sounds normal  MS: legs are weak and assist to stand.   SKIN: two sutures removed from upper right eye laceration. Bruises noted.   PSYCH: he is not able to recall fall, short term memory is very limited. He has trouble answering questions. Family discussion on fear of dementia. Tell him to stay seated and then he gets up. He doesn't remember what his wife tells his sons are very involved. They to are becoming concerned.      Diagnostic Test Results:  Labs reviewed in Epic  Results for orders placed or performed in visit on 08/21/19   CBC with platelets and differential   Result Value Ref Range    WBC 7.9 4.0 - 11.0 10e9/L    RBC Count 4.23 (L) 4.4 - 5.9 10e12/L    Hemoglobin 13.5 13.3 - 17.7 g/dL    Hematocrit 40.9 40.0 - 53.0 %    MCV 97 78 - 100 fl    MCH 31.9 26.5 - 33.0 pg    MCHC 33.0 31.5 - 36.5 g/dL    RDW 13.2 10.0 - 15.0 %    Platelet Count 200 150 - 450 10e9/L    Diff Method Automated Method     % Neutrophils 65.2 %    % Lymphocytes 21.0 %    % Monocytes 11.2 %    % Eosinophils 1.8 %    % Basophils 0.5 %    % Immature Granulocytes 0.3 %    Nucleated RBCs 0 0 /100    Absolute Neutrophil 5.1 1.6 - 8.3 10e9/L    Absolute Lymphocytes 1.7 0.8 - 5.3 10e9/L    Absolute Monocytes 0.9  0.0 - 1.3 10e9/L    Absolute Eosinophils 0.1 0.0 - 0.7 10e9/L    Absolute Basophils 0.0 0.0 - 0.2 10e9/L    Abs Immature Granulocytes 0.0 0 - 0.4 10e9/L    Absolute Nucleated RBC 0.0        UA reflex to Microscopic and Culture - [place] (Order [id number])   Specimen Information: Midstream Urine        Component Value Flag Ref Range Units Status Collected Lab   Color Urine Light Yellow     Final 08/22/2019 10:00 AM [facility]   Appearance Urine Clear     Final 08/22/2019 10:00 AM [facility]   Glucose Urine Negative   NEG^Negative mg/dL Final 08/22/2019 10:00 AM [facility]   Bilirubin Urine Negative   NEG^Negative  Final 08/22/2019 10:00 AM [facility]   Ketones Urine Negative   NEG^Negative mg/dL Final 08/22/2019 10:00 AM [facility]   Specific Gravity Urine 1.015   1.003 - 1.035  Final 08/22/2019 10:00 AM [facility]   Blood Urine Negative   NEG^Negative  Final 08/22/2019 10:00 AM [facility]   pH Urine 6.5   4.7 - 8.0 pH Final 08/22/2019 10:00 AM [facility]   Protein Albumin Urine Negative   NEG^Negative mg/dL Final 08/22/2019 10:00 AM [facility]   Urobilinogen mg/dL Normal   0.0 - 2.0 mg/dL Final 08/22/2019 10:00 AM [facility]   Nitrite Urine Negative   NEG^Negative  Final 08/22/2019 10:00 AM [facility]   Leukocyte Esterase Urine Large  Abnormal   NEG^Negative  Final 08/22/2019 10:00 AM [facility]   Source     Final 08/22/2019 10:00    Midstream Urine    RBC Urine 0   0 - 2 /HPF Final 08/22/2019 10:00 AM [facility]   WBC Urine 61  High   0 - 5 /HPF Final 08/22/2019 10:00 AM [facility]   Bacteria Urine Few  Abnormal   NEG^Negative /HPF Final 08/22/2019 10:00 AM [facility]   Mucous Urine Present  Abnormal   NEG^Negative /LPF Final 08/22/2019 10:00 AM [facility]   sperm Present  Abnormal   NEG^Negative /HPF Final 08/22/2019 10:00 AM [facility]   Hyaline Casts 1  Abnormal   OTO2^O - 2 /LPF Final 08/22/2019           Assessment & Plan     1. Fall, subsequent encounter  Checking labs and getting eval of PT and OT neurology consult for memory loss. His CT was ok  Of his brain. Am concerned his previous stroke has gotten him accelerated to dementia and  staying alone is not recommended.  He may need supervised care all times.   - CBC with platelets and differential  - PHYSICAL THERAPY REFERRAL  - NEUROLOGY ADULT REFERRAL  - order for DME; Equipment being ordered: life alert  Dispense: 1 Device; Refill: 1  - UA reflex to Microscopic and Culture - [place]; Future    2. Corns and callosities  Pared the bottom of his foot. Was easier to walk on we will recheck in a month. They will use corn pads and pumice.     3. Memory changes  Strongly suspect Dementia , progressing start     4. Encounter for removal of sutures  Removed his sutures and will approximated.        40 minutes with patient and family over 50 % face to face.   We will refer for evaluation and psyche. Make sure no active infection.   See Patient Instructions    Return in about 4 weeks (around 9/18/2019).    NAVA Gates  [facility] - [place]

## 2019-08-21 ENCOUNTER — OFFICE VISIT (OUTPATIENT)
Dept: FAMILY MEDICINE | Facility: OTHER | Age: 74
End: 2019-08-21
Attending: PHYSICIAN ASSISTANT
Payer: COMMERCIAL

## 2019-08-21 VITALS
OXYGEN SATURATION: 94 % | DIASTOLIC BLOOD PRESSURE: 62 MMHG | BODY MASS INDEX: 24.79 KG/M2 | SYSTOLIC BLOOD PRESSURE: 110 MMHG | WEIGHT: 183 LBS | HEART RATE: 67 BPM | HEIGHT: 72 IN

## 2019-08-21 DIAGNOSIS — Z48.02 ENCOUNTER FOR REMOVAL OF SUTURES: ICD-10-CM
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
## 2019-08-21 DIAGNOSIS — L84 CORNS AND CALLOSITIES: ICD-10-CM

## 2019-08-21 DIAGNOSIS — R41.3 MEMORY CHANGES: ICD-10-CM

## 2019-08-21 LAB
BASOPHILS # BLD AUTO: 0 10E9/L (ref 0–0.2)
BASOPHILS NFR BLD AUTO: 0.5 %
DIFFERENTIAL METHOD BLD: ABNORMAL
EOSINOPHIL # BLD AUTO: 0.1 10E9/L (ref 0–0.7)
EOSINOPHIL NFR BLD AUTO: 1.8 %
ERYTHROCYTE [DISTWIDTH] IN BLOOD BY AUTOMATED COUNT: 13.2 % (ref 10–15)
HCT VFR BLD AUTO: 40.9 % (ref 40–53)
HGB BLD-MCNC: 13.5 G/DL (ref 13.3–17.7)
IMM GRANULOCYTES # BLD: 0 10E9/L (ref 0–0.4)
IMM GRANULOCYTES NFR BLD: 0.3 %
LYMPHOCYTES # BLD AUTO: 1.7 10E9/L (ref 0.8–5.3)
LYMPHOCYTES NFR BLD AUTO: 21 %
MCH RBC QN AUTO: 31.9 PG (ref 26.5–33)
MCHC RBC AUTO-ENTMCNC: 33 G/DL (ref 31.5–36.5)
MCV RBC AUTO: 97 FL (ref 78–100)
MONOCYTES # BLD AUTO: 0.9 10E9/L (ref 0–1.3)
MONOCYTES NFR BLD AUTO: 11.2 %
NEUTROPHILS # BLD AUTO: 5.1 10E9/L (ref 1.6–8.3)
NEUTROPHILS NFR BLD AUTO: 65.2 %
NRBC # BLD AUTO: 0 10*3/UL
NRBC BLD AUTO-RTO: 0 /100
PLATELET # BLD AUTO: 200 10E9/L (ref 150–450)
RBC # BLD AUTO: 4.23 10E12/L (ref 4.4–5.9)
WBC # BLD AUTO: 7.9 10E9/L (ref 4–11)

## 2019-08-21 PROCEDURE — 36415 COLL VENOUS BLD VENIPUNCTURE: CPT | Mod: ZL | Performed by: PHYSICIAN ASSISTANT

## 2019-08-21 PROCEDURE — G0463 HOSPITAL OUTPT CLINIC VISIT: HCPCS

## 2019-08-21 PROCEDURE — 99214 OFFICE O/P EST MOD 30 MIN: CPT | Performed by: PHYSICIAN ASSISTANT

## 2019-08-21 PROCEDURE — 85025 COMPLETE CBC W/AUTO DIFF WBC: CPT | Mod: ZL | Performed by: PHYSICIAN ASSISTANT

## 2019-08-21 ASSESSMENT — ANXIETY QUESTIONNAIRES
2. NOT BEING ABLE TO STOP OR CONTROL WORRYING: NOT AT ALL
4. TROUBLE RELAXING: NOT AT ALL
3. WORRYING TOO MUCH ABOUT DIFFERENT THINGS: NOT AT ALL
5. BEING SO RESTLESS THAT IT IS HARD TO SIT STILL: NOT AT ALL
6. BECOMING EASILY ANNOYED OR IRRITABLE: NOT AT ALL
7. FEELING AFRAID AS IF SOMETHING AWFUL MIGHT HAPPEN: NOT AT ALL

## 2019-08-21 ASSESSMENT — MIFFLIN-ST. JEOR: SCORE: 1618.08

## 2019-08-21 ASSESSMENT — PAIN SCALES - GENERAL: PAINLEVEL: NO PAIN (0)

## 2019-08-21 ASSESSMENT — PATIENT HEALTH QUESTIONNAIRE - PHQ9: SUM OF ALL RESPONSES TO PHQ QUESTIONS 1-9: 6

## 2019-08-21 NOTE — NURSING NOTE
Chief Complaint   Patient presents with     ER F/U       Initial /62 (Patient Position: Sitting)   Pulse 67   Ht 1.829 m (6')   Wt 83 kg (183 lb)   SpO2 94%   BMI 24.82 kg/m   Estimated body mass index is 24.82 kg/m  as calculated from the following:    Height as of this encounter: 1.829 m (6').    Weight as of this encounter: 83 kg (183 lb).  Medication Reconciliation: complete

## 2019-08-21 NOTE — PATIENT INSTRUCTIONS
Thank you for choosing [facility].   I have office hours 8:00 am to 4:30 pm on Monday's, Wednesday's, Thursday's and Friday's. My nurse and I are out of the office every Tuesday.    Following your visit, when your labs and diagnostic testing have returned, I will review then and you will be contacted by my nurse.  If you are on My Chart, you can also view results there.    For refills, notify your pharmacy regarding what you need and the pharmacy will generate a refill request. Do not call my nurse as she is unable to process refill request. Please plan ahead and allow 3-5 days for refill requests.    You will generally receive a reminder call the day prior to your appointment.  If you cannot attend your appointment, please cancel your appointment with as much notice as possible.  If there is a pattern of failure to present for your appointments, I cannot provide consistent, meaningful, ongoing care for you. It is very important to me that you come in for your care, so we can best assist you with your health care needs.    IMPORTANT:  Please note that it is my standard of practice to NOT participate in prescribing ongoing requested Narcotic Analgesic therapy, and/or participate in the prescribing of other controlled substances.  My nurse and I am happy to assist you with the process of referral for alternative pain management as needed, and other treatment modalities including but not limited to:  Physical Therapy, Physical Medicine and Rehab, Counseling, Chiropractic Care, Orthopedic Care, and non-narcotic medication management.     In the event that you need to be seen for emergent concerns and I am out of office,  please see one of my colleagues for acute concerns.  You may also present to  or ER.  I appreciate the opportunity to serve you and look forward to supporting your healthcare needs in the future. Please contact me with any questions or concerns that you may  have.    Sincerely,      Halley Hidalgo RN, PA-C

## 2019-08-22 ENCOUNTER — TELEPHONE (OUTPATIENT)
Dept: FAMILY MEDICINE | Facility: OTHER | Age: 74
End: 2019-08-22

## 2019-08-22 DIAGNOSIS — R82.90 ABNORMAL FINDING IN URINE: Primary | ICD-10-CM
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
## 2019-08-22 LAB
ALBUMIN UR-MCNC: NEGATIVE MG/DL
APPEARANCE UR: CLEAR
BACTERIA #/AREA URNS HPF: ABNORMAL /HPF
BILIRUB UR QL STRIP: NEGATIVE
COLOR UR AUTO: ABNORMAL
GLUCOSE UR STRIP-MCNC: NEGATIVE MG/DL
HGB UR QL STRIP: NEGATIVE
HYALINE CASTS #/AREA URNS LPF: 1 /LPF
KETONES UR STRIP-MCNC: NEGATIVE MG/DL
LEUKOCYTE ESTERASE UR QL STRIP: ABNORMAL
MUCOUS THREADS #/AREA URNS LPF: PRESENT /LPF
NITRATE UR QL: NEGATIVE
PH UR STRIP: 6.5 PH (ref 4.7–8)
RBC #/AREA URNS AUTO: 0 /HPF (ref 0–2)
SOURCE: ABNORMAL
SP GR UR STRIP: 1.01 (ref 1–1.03)
SPERM #/AREA URNS HPF: PRESENT /HPF
UROBILINOGEN UR STRIP-MCNC: NORMAL MG/DL (ref 0–2)
WBC #/AREA URNS AUTO: 61 /HPF (ref 0–5)

## 2019-08-22 PROCEDURE — 87086 URINE CULTURE/COLONY COUNT: CPT | Mod: ZL | Performed by: PHYSICIAN ASSISTANT

## 2019-08-22 PROCEDURE — 81001 URINALYSIS AUTO W/SCOPE: CPT | Mod: ZL | Performed by: PHYSICIAN ASSISTANT

## 2019-08-22 RX ORDER — SULFAMETHOXAZOLE/TRIMETHOPRIM 800-160 MG
1 TABLET ORAL 2 TIMES DAILY
Qty: 14 TABLET | Refills: 0 | Status: SHIPPED | OUTPATIENT
Start: 2019-08-22 | End: 2020-01-13

## 2019-08-24 LAB
BACTERIA SPEC CULT: ABNORMAL
SPECIMEN SOURCE: ABNORMAL

## 2019-08-26 DIAGNOSIS — Z00.00 HEALTH CARE MAINTENANCE: ICD-10-CM

## 2019-08-27 RX ORDER — FOLIC ACID 1 MG/1
TABLET ORAL
Qty: 90 TABLET | Refills: 1 | Status: SHIPPED | OUTPATIENT
Start: 2019-08-27 | End: 2020-02-21

## 2019-08-30 ENCOUNTER — HOSPITAL ENCOUNTER (EMERGENCY)
Facility: HOSPITAL | Age: 74
Discharge: HOME | End: 2019-08-31
Attending: EMERGENCY MEDICINE | Admitting: EMERGENCY MEDICINE
Payer: COMMERCIAL

## 2019-08-30 ENCOUNTER — APPOINTMENT (OUTPATIENT)
Dept: GENERAL RADIOLOGY | Facility: HOSPITAL | Age: 74
End: 2019-08-30
Attending: EMERGENCY MEDICINE
Payer: COMMERCIAL

## 2019-08-30 DIAGNOSIS — L03.114 CELLULITIS OF LEFT UPPER EXTREMITY: ICD-10-CM

## 2019-08-30 DIAGNOSIS — N28.9 RENAL INSUFFICIENCY: ICD-10-CM

## 2019-08-30 LAB
ANION GAP SERPL CALCULATED.3IONS-SCNC: 8 MMOL/L (ref 3–14)
BASOPHILS # BLD AUTO: 0 10E9/L (ref 0–0.2)
BASOPHILS NFR BLD AUTO: 0.5 %
BUN SERPL-MCNC: 24 MG/DL (ref 7–30)
CALCIUM SERPL-MCNC: 9.2 MG/DL (ref 8.5–10.1)
CHLORIDE SERPL-SCNC: 100 MMOL/L (ref 94–109)
CO2 SERPL-SCNC: 27 MMOL/L (ref 20–32)
CREAT SERPL-MCNC: 1.4 MG/DL (ref 0.66–1.25)
DIFFERENTIAL METHOD BLD: ABNORMAL
EOSINOPHIL # BLD AUTO: 0.1 10E9/L (ref 0–0.7)
EOSINOPHIL NFR BLD AUTO: 0.8 %
ERYTHROCYTE [DISTWIDTH] IN BLOOD BY AUTOMATED COUNT: 13.2 % (ref 10–15)
GFR SERPL CREATININE-BSD FRML MDRD: 50 ML/MIN/{1.73_M2}
GLUCOSE SERPL-MCNC: 107 MG/DL (ref 70–99)
HCT VFR BLD AUTO: 39.5 % (ref 40–53)
HGB BLD-MCNC: 13.1 G/DL (ref 13.3–17.7)
IMM GRANULOCYTES # BLD: 0 10E9/L (ref 0–0.4)
IMM GRANULOCYTES NFR BLD: 0.2 %
LYMPHOCYTES # BLD AUTO: 1.1 10E9/L (ref 0.8–5.3)
LYMPHOCYTES NFR BLD AUTO: 17.2 %
MCH RBC QN AUTO: 31.9 PG (ref 26.5–33)
MCHC RBC AUTO-ENTMCNC: 33.2 G/DL (ref 31.5–36.5)
MCV RBC AUTO: 96 FL (ref 78–100)
MONOCYTES # BLD AUTO: 1 10E9/L (ref 0–1.3)
MONOCYTES NFR BLD AUTO: 15.1 %
NEUTROPHILS # BLD AUTO: 4.2 10E9/L (ref 1.6–8.3)
NEUTROPHILS NFR BLD AUTO: 66.2 %
NRBC # BLD AUTO: 0 10*3/UL
NRBC BLD AUTO-RTO: 0 /100
PLATELET # BLD AUTO: 197 10E9/L (ref 150–450)
POTASSIUM SERPL-SCNC: 4.7 MMOL/L (ref 3.4–5.3)
RBC # BLD AUTO: 4.11 10E12/L (ref 4.4–5.9)
SODIUM SERPL-SCNC: 135 MMOL/L (ref 133–144)
WBC # BLD AUTO: 6.3 10E9/L (ref 4–11)

## 2019-08-30 PROCEDURE — 99284 EMERGENCY DEPT VISIT MOD MDM: CPT | Mod: 25

## 2019-08-30 PROCEDURE — 85025 COMPLETE CBC W/AUTO DIFF WBC: CPT | Performed by: EMERGENCY MEDICINE

## 2019-08-30 PROCEDURE — 80048 BASIC METABOLIC PNL TOTAL CA: CPT | Performed by: EMERGENCY MEDICINE

## 2019-08-30 PROCEDURE — 90471 IMMUNIZATION ADMIN: CPT

## 2019-08-30 PROCEDURE — 96365 THER/PROPH/DIAG IV INF INIT: CPT

## 2019-08-30 PROCEDURE — 25000128 H RX IP 250 OP 636: Performed by: EMERGENCY MEDICINE

## 2019-08-30 PROCEDURE — 25000132 ZZH RX MED GY IP 250 OP 250 PS 637: Performed by: EMERGENCY MEDICINE

## 2019-08-30 PROCEDURE — 90715 TDAP VACCINE 7 YRS/> IM: CPT | Performed by: EMERGENCY MEDICINE

## 2019-08-30 PROCEDURE — 36415 COLL VENOUS BLD VENIPUNCTURE: CPT | Performed by: EMERGENCY MEDICINE

## 2019-08-30 PROCEDURE — 73130 X-RAY EXAM OF HAND: CPT | Mod: TC,RT

## 2019-08-30 PROCEDURE — 99284 EMERGENCY DEPT VISIT MOD MDM: CPT | Mod: Z6 | Performed by: EMERGENCY MEDICINE

## 2019-08-30 RX ORDER — HYDROCODONE BITARTRATE AND ACETAMINOPHEN 5; 325 MG/1; MG/1
1 TABLET ORAL ONCE
Status: COMPLETED | OUTPATIENT
Start: 2019-08-30 | End: 2019-08-30

## 2019-08-30 RX ORDER — CEFADROXIL 500 MG/1
1000 CAPSULE ORAL DAILY
Qty: 14 CAPSULE | Refills: 0 | Status: SHIPPED | OUTPATIENT
Start: 2019-08-31 | End: 2020-01-13

## 2019-08-30 RX ORDER — HYDROCODONE BITARTRATE AND ACETAMINOPHEN 5; 325 MG/1; MG/1
1 TABLET ORAL EVERY 6 HOURS PRN
Qty: 10 TABLET | Refills: 0 | Status: SHIPPED | OUTPATIENT
Start: 2019-08-30 | End: 2020-01-13

## 2019-08-30 RX ORDER — CEFAZOLIN SODIUM 1 G/50ML
1 INJECTION, SOLUTION INTRAVENOUS ONCE
Status: COMPLETED | OUTPATIENT
Start: 2019-08-30 | End: 2019-08-31

## 2019-08-30 RX ADMIN — HYDROCODONE BITARTRATE AND ACETAMINOPHEN 1 TABLET: 5; 325 TABLET ORAL at 22:46

## 2019-08-30 RX ADMIN — CLOSTRIDIUM TETANI TOXOID ANTIGEN (FORMALDEHYDE INACTIVATED), CORYNEBACTERIUM DIPHTHERIAE TOXOID ANTIGEN (FORMALDEHYDE INACTIVATED), BORDETELLA PERTUSSIS TOXOID ANTIGEN (GLUTARALDEHYDE INACTIVATED), BORDETELLA PERTUSSIS FILAMENTOUS HEMAGGLUTININ ANTIGEN (FORMALDEHYDE INACTIVATED), BORDETELLA PERTUSSIS PERTACTIN ANTIGEN, AND BORDETELLA PERTUSSIS FIMBRIAE 2/3 ANTIGEN 0.5 ML: 5; 2; 2.5; 5; 3; 5 INJECTION, SUSPENSION INTRAMUSCULAR at 23:52

## 2019-08-30 RX ADMIN — CEFAZOLIN SODIUM 1 G: 1 INJECTION, SOLUTION INTRAVENOUS at 23:51

## 2019-08-30 ASSESSMENT — ENCOUNTER SYMPTOMS
FACIAL ASYMMETRY: 0
GASTROINTESTINAL NEGATIVE: 1
CARDIOVASCULAR NEGATIVE: 1
AGITATION: 1
VOICE CHANGE: 0
ACTIVITY CHANGE: 0
RESPIRATORY NEGATIVE: 1
DIZZINESS: 0

## 2019-08-30 NOTE — ED AVS SNAPSHOT
[facility]  [address]  [place] [address]  Phone:  [phone]                                    Kostas Cotto   MRN: [id number]    Department:  [facility]   Date of Visit:  8/30/2019           After Visit Summary Signature Page    I have received my discharge instructions, and my questions have been answered. I have discussed any challenges I see with this plan with the nurse or doctor.    ..........................................................................................................................................  Patient/Patient Representative Signature      ..........................................................................................................................................  Patient Representative Print Name and Relationship to Patient    ..................................................               ................................................  Date                                   Time    ..........................................................................................................................................  Reviewed by Signature/Title    ...................................................              ..............................................  Date                                               Time          22EPIC Rev 08/18

## 2019-08-31 VITALS
TEMPERATURE: 98.5 F | SYSTOLIC BLOOD PRESSURE: 144 MMHG | OXYGEN SATURATION: 97 % | HEART RATE: 79 BPM | DIASTOLIC BLOOD PRESSURE: 67 MMHG | RESPIRATION RATE: 20 BRPM

## 2019-08-31 NOTE — DISCHARGE INSTRUCTIONS
Please take the antibiotics as prescribed.     Please follow up THIS Monday with orthopedics or your PCP for a repeat evaluation. I have concern for a tendon infection if symptoms do not improve in the next 24-48 hours with antibiotic therapy.    If you develop fevers or chills return to the ER immediately.

## 2019-08-31 NOTE — ED NOTES
Pt received Tdap in R delt. 1g Ancef infused, tolerated well. IV out. R hand tx and wrapped. Instructions given for follow up. Family and Pt verbalizes understanding. Pt ambulates out, steady gait.

## 2019-08-31 NOTE — ED PROVIDER NOTES
History     Chief Complaint   Patient presents with     Hand Pain     open wound, red knuckles and pain on right hand     Fall     getting oob to go to the bathroom around 0600 on wednesday morning.     HPI  Kostas Cotto is a 74 year old male who has a history of CABG x3 (2005), cardiac V. fib arrest (2003), CVA/TIA, A. fib (1/18 Eliquis) presents to the ER secondary to mechanical fall that occurred on 28 August.  Of note patient was seen at this institution in the ER on the 17th secondary to a mechanical fall.  Head CT at that time was negative for acute findings.  He did sustain an eyebrow laceration and skin tear to his right elbow.  At baseline patient does have a underlying dementia likely a combination of an anoxic brain injury from his V. fib arrest and possibly vascular dementia.  Wife and son provide majority the history stating that early in the morning when this Wednesday he went to the bathroom.  He had an unwitnessed fall.  They are unsure if he hit his head he is not complained of hitting his head.  He does have old bruising on his right cheek from the fall from the 17th.  His main complaint is right hand pain.  They have been dressing his right hand with a Ace wrap and noticed increased redness since significant increase in the amount of pain he is been experiencing.  Patient denies fevers or chills.    Allergies:  Allergies   Allergen Reactions     Oxycodone      Caused him delirium   Caused him delirium        Problem List:    Patient Active Problem List    Diagnosis Date Noted     New onset atrial fibrillation on 1/8/18 01/14/2019     Priority: Medium     Need for prophylactic vaccination and inoculation against influenza 01/14/2019     Priority: Medium     History of CVA (cerebrovascular accident) 01/14/2019     Priority: Medium     Essential hypertension 01/14/2019     Priority: Medium     Bifascicular block 01/14/2019     Priority: Medium     History of AAA (abdominal aortic aneurysm) repair  01/14/2019     Priority: Medium     Chronic anticoagulation 01/14/2019     Priority: Medium     History of TIA on 1/8/18 01/14/2019     Priority: Medium     Paroxysmal atrial fibrillation (H) 05/18/2018     Priority: Medium     Mixed hyperlipidemia 05/18/2018     Priority: Medium     H/O ventricular fibrillation in 2003 with cardiac arrest 05/18/2018     Priority: Medium     History of myocardial infarction in 2003 05/18/2018     Priority: Medium     History of coronary artery bypass graft x 3 in 2005 01/24/2018     Priority: Medium     H/O cardiac arrest in 2003 01/24/2018     Priority: Medium     History of tobacco abuse quitting in 2003 01/24/2018     Priority: Medium     CAD 03/05/2014     Priority: Medium        Past Medical History:    Past Medical History:   Diagnosis Date     Hypoxic-ischemic encephalopathy (HIE) 11/25/2003     Rheumatoid arthritis(714.0) 05/03/2005     S/P inferior MI 11/25/2003       Past Surgical History:    Past Surgical History:   Procedure Laterality Date     AAA REPAIR       ANGIOGRAM  1/2004    Coronary Angiogram, LAD> long mid 40-50%, D2 90% ostial,Prox % with collateal flow,Circ had restenosisand was restented x 3 again.  EF now 55% with mild lateral wall hypokineseis and inferior also.     CARDIOVASCULAR SURGERY  1/2005    Syncopal episode, Had repeat angiogam showing now circ 100% along with RCA. LAD 80-90% with EF 35%.  Sent for CABG     CARDIOVASCULAR SURGERY  1/2005    S/P 3 vess CABG, Dr Wilfredo SORIANO>LAD, SVG>OM1, SVG>RCA.  Post op pneumothorax with Chest Tube placement.     CARDIOVASCULAR SURGERY  11/2003    Acute Inferior MI, Had VT arrest resuscitated. Not given lytics and was sent to [facility].  IABP placed and had angiogram stented Circ x 5. EF 30%. Lad had 70%, % with contralateral collateral flow.     HC US CHEST      left     HEART/LUNG RESUSCITATION (CPR)  11/2003    S/P Cardiac Arrest V-Tach, Out of hospital arrest with no bystaner cpr.  Has polymorphic VT  on arrival.  Has a prolonged resuscitation which obviously was successful.  This was due to an acute Inferior MI at the time.     OPEN REDUCTION INTERNAL FIXATION HIP  1965    ORIF frac/dislocation L hip, Was a teenager and had orif done after fracture dislocation of left hip. Occurred playing softball.     PHACOEMULSIFICATION WITH STANDARD INTRAOCULAR LENS IMPLANT  3/10/2014    Procedure: PHACOEMULSIFICATION WITH STANDARD INTRAOCULAR LENS IMPLANT;  CATARACT EXTRACTION WITH INTRA OCULAR LENS LEFT(10% SERENE/POSSIBLE STRETCH);  Surgeon: Isael Acuna MD;  Location: [facility]     PHACOEMULSIFICATION WITH STANDARD INTRAOCULAR LENS IMPLANT  3/25/2014    Procedure: PHACOEMULSIFICATION WITH STANDARD INTRAOCULAR LENS IMPLANT;  CATARACT EXTRACTION WITH INTRA OCULAR LENS RIGHT/POSSIBLE PUPIL STRETCH;  Surgeon: Isael Acuna MD;  Location: [facility]       Family History:    Family History   Problem Relation Age of Onset     Cancer Father         leukemia     Diabetes Mother      Cerebrovascular Disease Mother         cause of death       Social History:  Marital Status:   [2]  Social History     Tobacco Use     Smoking status: Former Smoker     Smokeless tobacco: Never Used   Substance Use Topics     Alcohol use: No     Drug use: No        Medications:      apixaban ANTICOAGULANT (ELIQUIS) 5 MG tablet   aspirin 81 MG chewable tablet   calcium carbonate-vitamin D (CALCIUM 500 + D) 500-400 MG-UNIT TABS tablt   [START ON 8/31/2019] cefadroxil (DURICEF) 500 MG capsule   finasteride (PROSCAR) 5 MG tablet   folic acid (FOLVITE) 1 MG tablet   HYDROcodone-acetaminophen (NORCO) 5-325 MG tablet   lisinopril (PRINIVIL/ZESTRIL) 5 MG tablet   metoprolol succinate ER (TOPROL-XL) 50 MG 24 hr tablet   ranitidine (ZANTAC) 150 MG tablet   simvastatin (ZOCOR) 20 MG tablet   sulfamethoxazole-trimethoprim (BACTRIM DS/SEPTRA DS) 800-160 MG tablet   tamsulosin (FLOMAX) 0.4 MG capsule   order for DME         Review of Systems   Constitutional:  Negative for activity change.   HENT: Negative for mouth sores, nosebleeds and voice change.    Respiratory: Negative.    Cardiovascular: Negative.    Gastrointestinal: Negative.    Skin: Positive for rash.   Neurological: Negative for dizziness and facial asymmetry.   Psychiatric/Behavioral: Positive for agitation.   All other systems reviewed and are negative.      Physical Exam   BP: 135/65  Pulse: 79  Temp: 97.8  F (36.6  C)  Resp: 18  SpO2: 94 %      Physical Exam   Constitutional:   Chronically ill-appearing   HENT:   Laceration over right brow healing well.    Bruising over right cheek   Eyes: Pupils are equal, round, and reactive to light. Right eye exhibits no discharge. Left eye exhibits no discharge.   Neck: Normal range of motion. Neck supple.   No midline cervical spinal tenderness   Cardiovascular:   Irregular rhythm regular rate   Pulmonary/Chest: Effort normal and breath sounds normal.   Abdominal: Soft.   Musculoskeletal:   Right hand: Moderate erythema and edema over the dorsum.  Unable to pronate and supinate secondary to pain.  Unable to extend the wrist limited by pain.  Significant tenderness with light palpation.    Right elbow: Skin tear healing well no surrounding erythema   Neurological: He is alert. No cranial nerve deficit. Coordination normal.   Skin: Skin is warm. Capillary refill takes 2 to 3 seconds.   Psychiatric:   Flat affect       ED Course        Procedures                 Results for orders placed or performed during the hospital encounter of 08/30/19 (from the past 24 hour(s))   Basic metabolic panel   Result Value Ref Range    Sodium 135 133 - 144 mmol/L    Potassium 4.7 3.4 - 5.3 mmol/L    Chloride 100 94 - 109 mmol/L    Carbon Dioxide 27 20 - 32 mmol/L    Anion Gap 8 3 - 14 mmol/L    Glucose 107 (H) 70 - 99 mg/dL    Urea Nitrogen 24 7 - 30 mg/dL    Creatinine 1.40 (H) 0.66 - 1.25 mg/dL    GFR Estimate 50 (L) >60 mL/min/[1.73_m2]    GFR Estimate If Black 58 (L) >60  mL/min/[1.73_m2]    Calcium 9.2 8.5 - 10.1 mg/dL   CBC with platelets differential   Result Value Ref Range    WBC 6.3 4.0 - 11.0 10e9/L    RBC Count 4.11 (L) 4.4 - 5.9 10e12/L    Hemoglobin 13.1 (L) 13.3 - 17.7 g/dL    Hematocrit 39.5 (L) 40.0 - 53.0 %    MCV 96 78 - 100 fl    MCH 31.9 26.5 - 33.0 pg    MCHC 33.2 31.5 - 36.5 g/dL    RDW 13.2 10.0 - 15.0 %    Platelet Count 197 150 - 450 10e9/L    Diff Method Automated Method     % Neutrophils 66.2 %    % Lymphocytes 17.2 %    % Monocytes 15.1 %    % Eosinophils 0.8 %    % Basophils 0.5 %    % Immature Granulocytes 0.2 %    Nucleated RBCs 0 0 /100    Absolute Neutrophil 4.2 1.6 - 8.3 10e9/L    Absolute Lymphocytes 1.1 0.8 - 5.3 10e9/L    Absolute Monocytes 1.0 0.0 - 1.3 10e9/L    Absolute Eosinophils 0.1 0.0 - 0.7 10e9/L    Absolute Basophils 0.0 0.0 - 0.2 10e9/L    Abs Immature Granulocytes 0.0 0 - 0.4 10e9/L    Absolute Nucleated RBC 0.0        Medications   Tdap (tetanus-diptheria-acell pertussis) (BOOSTRIX) injection 0.5 mL (has no administration in time range)   ceFAZolin (ANCEF) intermittent infusion 1 g (has no administration in time range)   HYDROcodone-acetaminophen (NORCO) 5-325 MG per tablet 1 tablet (1 tablet Oral Given 8/30/19 2245)       Assessments & Plan (with Medical Decision Making)     I have reviewed the nursing notes.    I have reviewed the findings, diagnosis, plan and need for follow up with the patient.      New Prescriptions    CEFADROXIL (DURICEF) 500 MG CAPSULE    Take 2 capsules (1,000 mg) by mouth daily for 7 days    HYDROCODONE-ACETAMINOPHEN (NORCO) 5-325 MG TABLET    Take 1 tablet by mouth every 6 hours as needed for severe pain       Final diagnoses:   Cellulitis of left upper extremity   Renal insufficiency       Patient presents to the ER with chief complaint of trauma secondary to a fall for the second time this month alone.  Patient does have a history of dementia likely vascular and secondary to cardiac arrest.  With more  concerning is he is anticoagulated on Eliquis for his A. fib given his history of recurrent falls.  Patient arrives to the ER at his neurological baseline he is got no signs of new head or neck trauma but does have evidence of bruising over his right cheek and eye from his prior fall as well as a laceration is healing over his right brow.  His main complaint is right shoulder pain and right hand pain.  Patient's right hand reveals erythema on the dorsum as well as some purulence and pain with extension of the wrist and fingers itself.  X-ray does not reveal an obvious fracture.  In addition patient's white count is within normal limits.  Patient does have pain with range of motion of his right arm this likely represents a rotator cuff injury there is no clinical evidence to suggest a bony injury such as a humerus fracture.  Regards to the right upper extremity he does have a severe cellulitis but he has been afebrile and nontoxic-appearing.  His BMP reveals a renal insufficiency which can be managed in the outpatient setting as well.  This point there is no clinical evidence to suggest extensor tenosynovitis.  That being said, we will administer 1 dose of Ancef in the ER and send him home with antibiotics for a duration of 7 days with explicit instructions to be reevaluated this coming Monday with orthopedics or his PCP.  In addition if he develops fevers or chills he should return to the ER for further management.  The bigger problem is patient's recurrent falls and his dementia which is seemingly progressing to the point where the family members are having a hard time handling him.  Ideally his PCP can discuss utility of having in place in the memory care center if deemed appropriate or with PCA cares.  Lastly patient's tetanus will be updated while in the ER.    8/30/2019   [facility]     Augustin Woo MD  08/30/19 [id number]       Augustin Woo MD  08/30/19 [id number]

## 2019-09-05 ENCOUNTER — HOSPITAL ENCOUNTER (OUTPATIENT)
Dept: PHYSICAL THERAPY | Facility: HOSPITAL | Age: 74
Setting detail: THERAPIES SERIES
End: 2019-09-05
Attending: PHYSICIAN ASSISTANT
Payer: COMMERCIAL

## 2019-09-05 PROCEDURE — 97161 PT EVAL LOW COMPLEX 20 MIN: CPT | Mod: GP

## 2019-09-05 PROCEDURE — 97110 THERAPEUTIC EXERCISES: CPT | Mod: GP

## 2019-09-05 NOTE — PROGRESS NOTES
Initial Physical Therapy Evaluation      Name: Kostas Cotto MRN# [id number]   Age: 74 year old YOB: 1945     Date of Consultation: September 5, 2019  Primary care provider: Halley Hidalgo    Referring Physician: Halley Hidalgo  Orders: Eval and Treat  Medical Diagnosis: Fall, subsequent encounter  Onset of Illness/Injury: n/a    Reason for PT Visit: Patient is a 73 y/o male who presents with LE weakness and balance issues. Has a history of falls over the past month. Has had 2 falls - one fall where wife or son did not see him fall. Hit his head on the most recent fall. Does have a walker at home that he will use occasionally, but does . Last fall was roughly 1 week ago. Feels that his R side is weaker than the left. Does have a R hip plate placement - has had an x ray where bone has grown over broken screws - doctors do not want to perform surgery on L hip at this time.    Did have a bout of physical therapy a couple of years ago, but did not follow through with his exercises. Wife and son are present during initial evaluation.  Prior Level of Function: history of PT services roughly 2 years ago  Pain: 0/10       Community Support/Living Environment/Employment History: Retired     Patient/Family Goal: Decrease pain, improve strength    Fall Screen:   Have you fallen 2 or more times in the last year? Yes  Have you fallen and had an injury in the last year? Yes  Timed up & go: 22 seconds  Is patient a fall risk? Yes    Past Medical History:   Past Medical History:   Diagnosis Date     Hypoxic-ischemic encephalopathy (HIE) 11/25/2003     Rheumatoid arthritis(714.0) 05/03/2005     S/P inferior MI 11/25/2003       Past Surgical History:  Past Surgical History:   Procedure Laterality Date     AAA REPAIR       ANGIOGRAM  1/2004    Coronary Angiogram, LAD> long mid 40-50%, D2 90% ostial,Prox % with collateal flow,Circ had restenosisand was restented x 3 again.  EF now 55% with mild lateral wall  hypokineseis and inferior also.     CARDIOVASCULAR SURGERY  1/2005    Syncopal episode, Had repeat angiogam showing now circ 100% along with RCA. LAD 80-90% with EF 35%.  Sent for CABG     CARDIOVASCULAR SURGERY  1/2005    S/P 3 vess CABG, Dr Wilfredo SORIANO>LAD, SVG>OM1, SVG>RCA.  Post op pneumothorax with Chest Tube placement.     CARDIOVASCULAR SURGERY  11/2003    Acute Inferior MI, Had VT arrest resuscitated. Not given lytics and was sent to [facility].  IABP placed and had angiogram stented Circ x 5. EF 30%. Lad had 70%, % with contralateral collateral flow.     HC US CHEST      left     HEART/LUNG RESUSCITATION (CPR)  11/2003    S/P Cardiac Arrest V-Tach, Out of hospital arrest with no bystaner cpr.  Has polymorphic VT on arrival.  Has a prolonged resuscitation which obviously was successful.  This was due to an acute Inferior MI at the time.     OPEN REDUCTION INTERNAL FIXATION HIP  1965    ORIF frac/dislocation L hip, Was a teenager and had orif done after fracture dislocation of left hip. Occurred playing softball.     PHACOEMULSIFICATION WITH STANDARD INTRAOCULAR LENS IMPLANT  3/10/2014    Procedure: PHACOEMULSIFICATION WITH STANDARD INTRAOCULAR LENS IMPLANT;  CATARACT EXTRACTION WITH INTRA OCULAR LENS LEFT(10% SERENE/POSSIBLE STRETCH);  Surgeon: Isael Acuna MD;  Location: [facility] OR     PHACOEMULSIFICATION WITH STANDARD INTRAOCULAR LENS IMPLANT  3/25/2014    Procedure: PHACOEMULSIFICATION WITH STANDARD INTRAOCULAR LENS IMPLANT;  CATARACT EXTRACTION WITH INTRA OCULAR LENS RIGHT/POSSIBLE PUPIL STRETCH;  Surgeon: Isael Acuna MD;  Location: [facility] OR       Medications:   Current Outpatient Medications   Medication Sig     apixaban ANTICOAGULANT (ELIQUIS) 5 MG tablet Take 1 tablet (5 mg) by mouth 2 times daily     aspirin 81 MG chewable tablet Take 1 tablet (81 mg) by mouth daily     calcium carbonate-vitamin D (CALCIUM 500 + D) 500-400 MG-UNIT TABS tablt 2 times daily Take one tablet by oral route every day        "cefadroxil (DURICEF) 500 MG capsule Take 2 capsules (1,000 mg) by mouth daily for 7 days     finasteride (PROSCAR) 5 MG tablet Take 1 tablet (5 mg) by mouth daily     folic acid (FOLVITE) 1 MG tablet TAKE 1 TABLET DAILY BY MOUTH     lisinopril (PRINIVIL/ZESTRIL) 5 MG tablet TAKE 1 TABLET DAILY BY MOUTH     metoprolol succinate ER (TOPROL-XL) 50 MG 24 hr tablet Take 1 tablet (50 mg) by mouth daily     order for DME Equipment being ordered: life alert     ranitidine (ZANTAC) 150 MG tablet Take 1 tablet (150 mg) by mouth daily     simvastatin (ZOCOR) 20 MG tablet TAKE 1 TABLET BY MOUTH EVERY EVENING - GENERIC FOR ZOCOR     sulfamethoxazole-trimethoprim (BACTRIM DS/SEPTRA DS) 800-160 MG tablet Take 1 tablet by mouth 2 times daily     tamsulosin (FLOMAX) 0.4 MG capsule TAKE 1 CAPSULE (0.4 MG) BY MOUTH DAILY     No current facility-administered medications for this encounter.        Musculoskeletal Findings:     Imaging: CT scan taken of head following fall on 8/16/19:  IMPRESSION: No acute mass effect or hemorrhage.     Findings are concordant with the original virtual radiology result.     TOM JEAN BAPTISTE MD  Patient goals: \"improve strength and balance.\"    OBJECTIVE  Observation: Appears to PT in no acute distress. Does have bruising over R side of face area    Gait: unsteady   Assistive devices: no assistive devices    Posture: Slightly stooped posture.  Sitting Posture:   Standing Posture:  Correction of posture:     Range of Motion:  Active Lumbar Spine:       Flexion: Able to reach to midshin       Extension: Moderate limitation       Right sidebend: mild limitation       Left sidebend: Mild limitation       Right rotation: Mild limitation       Left rotation: mild limitation    Right Lower Extremity Range of Motion: within normal limits    Left Lower Extremity Range of Motion: within normal limits    Strength:  Right Lower Extremity Strength: 5/5 except 4/5 hip flexion, 4/5 hip abduction, 4/5 hip ER  Left Lower " Extremity Strength: 5/5 except 3+/5 hip flexion, 3+/5 hip abduction, 3+/5 hip ER, 4/5 hip IR    Outcome Measures:   Perdue balance scale: 21 out of 56  30 second sit to stand: 5 reps  TU seconds    Prognosis/Plan of Care: Good  Appropriate for Physical Therapy Intervention: Yes     GOALS:   Short-term goals:  To be achieved in 2-3 weeks:    Instruct in home program  1.) Patient will be independent with a short-term home exercise program.  2.) Patient will demonstrate proper body/lifting mechanics with abdominal bracing without cueing.  3.) Patient will report a 25% or greater improvement in symptoms in order to allow for increased comfort with activities of daily living.  4.) Patient will report no falls within the past 3 week time frame.       Long-term goals:  To be achieved in 6-8 weeks:    Self management of symptoms  Return to previous level of function  1.) Improve time on TUG to 19 seconds or less to allow increased gait speed with daily movement and activity  2.) Patient will report a 50% or greater improvement in symptoms in order to allow for increased comfort with activities of daily living  3.) Patient will improve score on Perdue balance scale by 5 points or greater to reduce fall risk and improve daily functional stability    Discharge goals: Patient will be independent with home program for self-management of symptoms.      Planned Interventions: Therapeutic exercise, manual therapy, therapeutic activity, patient education    Patient presents today with signs and symptoms consistent of generalized LE weakness and balance deficit. Therapy today consisted of evaluation, patient education, and therapeutic exercise. Patient would benefit from continued PT sessions addressing overall pain and dysfunction with use of appropriate interventions.    Treatment Rendered/Intervention:  Evaluation completed as described above followed by discussion of exam findings and plan of care.    Therapeutic exercise:  Patient instructed in and demonstrated proper performance of home exercise program consisting of:  Tandem standing, standing heel and toe raises, standing marching, standing hip abduction, standing mini squats. Instructed to be done next to countertop for increased safety.    Educated in posture principles and neutral spine positioning. Patient was instructed in home use of heat and/or ice for pain management    Clinical Impressions:  Criteria for Skilled Therapeutic Intervention Met: Yes  PT Diagnosis: LE weakness and balance deficit  Influenced by the following impairments: weakness, balance deficit  Functional limitations due to impairment: Fall risk, reduced functional ability to perform daily movement and activity  Clinical presentation: Evolving/Changing  Clinical presentation rationale: clinical judgement  Clinical Decision making (complexity): Moderate Complexity  Predicted Duration of Therapy Intervention (days/wks): 8 weeks  Risks and Benefits of therapy have been explained: Yes  Patient, Family & other staff in agreement with plan of care: Yes  Comments: 2x/wk for 8-10 weeks depending on patient progress    Date range: 9/5/19 to 12/4/19      Total Evaluation Time: 45 minutes

## 2019-09-10 ENCOUNTER — HOSPITAL ENCOUNTER (OUTPATIENT)
Dept: PHYSICAL THERAPY | Facility: HOSPITAL | Age: 74
Setting detail: THERAPIES SERIES
End: 2019-09-10
Attending: PHYSICIAN ASSISTANT
Payer: COMMERCIAL

## 2019-09-10 PROCEDURE — 97110 THERAPEUTIC EXERCISES: CPT | Mod: GP

## 2019-09-13 ENCOUNTER — HOSPITAL ENCOUNTER (OUTPATIENT)
Dept: PHYSICAL THERAPY | Facility: HOSPITAL | Age: 74
Setting detail: THERAPIES SERIES
End: 2019-09-13
Attending: PHYSICIAN ASSISTANT
Payer: COMMERCIAL

## 2019-09-13 PROCEDURE — 97110 THERAPEUTIC EXERCISES: CPT | Mod: GP

## 2019-09-17 ENCOUNTER — HOSPITAL ENCOUNTER (OUTPATIENT)
Dept: PHYSICAL THERAPY | Facility: HOSPITAL | Age: 74
Setting detail: THERAPIES SERIES
End: 2019-09-17
Attending: PHYSICIAN ASSISTANT
Payer: COMMERCIAL

## 2019-09-17 PROCEDURE — 97110 THERAPEUTIC EXERCISES: CPT | Mod: GP

## 2019-09-24 ENCOUNTER — HOSPITAL ENCOUNTER (OUTPATIENT)
Dept: PHYSICAL THERAPY | Facility: HOSPITAL | Age: 74
Setting detail: THERAPIES SERIES
End: 2019-09-24
Attending: PHYSICIAN ASSISTANT
Payer: COMMERCIAL

## 2019-09-24 PROCEDURE — 97110 THERAPEUTIC EXERCISES: CPT | Mod: GP

## 2019-10-02 ENCOUNTER — HOSPITAL ENCOUNTER (OUTPATIENT)
Dept: PHYSICAL THERAPY | Facility: HOSPITAL | Age: 74
Setting detail: THERAPIES SERIES
End: 2019-10-02
Attending: PHYSICIAN ASSISTANT
Payer: COMMERCIAL

## 2019-10-02 PROCEDURE — 97110 THERAPEUTIC EXERCISES: CPT | Mod: GP

## 2019-10-04 ENCOUNTER — HOSPITAL ENCOUNTER (OUTPATIENT)
Dept: PHYSICAL THERAPY | Facility: HOSPITAL | Age: 74
Setting detail: THERAPIES SERIES
End: 2019-10-04
Attending: PHYSICIAN ASSISTANT
Payer: COMMERCIAL

## 2019-10-04 PROCEDURE — 97110 THERAPEUTIC EXERCISES: CPT | Mod: GP

## 2019-10-09 ENCOUNTER — HOSPITAL ENCOUNTER (OUTPATIENT)
Dept: MRI IMAGING | Facility: HOSPITAL | Age: 74
Discharge: HOME | End: 2019-10-09
Admitting: PSYCHIATRY & NEUROLOGY
Payer: COMMERCIAL

## 2019-10-09 ENCOUNTER — HOSPITAL ENCOUNTER (OUTPATIENT)
Dept: PHYSICAL THERAPY | Facility: HOSPITAL | Age: 74
Setting detail: THERAPIES SERIES
End: 2019-10-09
Attending: PHYSICIAN ASSISTANT
Payer: COMMERCIAL

## 2019-10-09 DIAGNOSIS — F03.90 DEMENTIA WITHOUT BEHAVIORAL DISTURBANCE, UNSPECIFIED DEMENTIA TYPE: ICD-10-CM

## 2019-10-09 PROCEDURE — 70551 MRI BRAIN STEM W/O DYE: CPT | Mod: TC

## 2019-10-09 PROCEDURE — 97110 THERAPEUTIC EXERCISES: CPT | Mod: GP

## 2019-10-11 ENCOUNTER — HOSPITAL ENCOUNTER (OUTPATIENT)
Dept: PHYSICAL THERAPY | Facility: HOSPITAL | Age: 74
Setting detail: THERAPIES SERIES
End: 2019-10-11
Attending: PHYSICIAN ASSISTANT
Payer: COMMERCIAL

## 2019-10-11 PROCEDURE — 97110 THERAPEUTIC EXERCISES: CPT | Mod: GP

## 2019-10-15 ENCOUNTER — HOSPITAL ENCOUNTER (OUTPATIENT)
Dept: PHYSICAL THERAPY | Facility: HOSPITAL | Age: 74
Setting detail: THERAPIES SERIES
End: 2019-10-15
Attending: PHYSICIAN ASSISTANT
Payer: COMMERCIAL

## 2019-10-15 PROCEDURE — 97750 PHYSICAL PERFORMANCE TEST: CPT | Mod: GP

## 2019-10-15 PROCEDURE — 97110 THERAPEUTIC EXERCISES: CPT | Mod: GP

## 2019-10-15 NOTE — PROGRESS NOTES
Outpatient Physical Therapy Discharge Note     Patient: Kostas Cotto  : 1945    Beginning/End Dates of Reporting Period:  19 to 10/15/2019    Referring Provider: Halley Hidalgo Diagnosis: Fall, subsequent encounter     Client Self Report:  Patient reports today for LE strengthening and balance work. Wife and daughter present during last session. Reports that he has had no falls over the course of therapy. Have been working on exercises at home. Feels comfortable with exercises and will continue to work on them outside of clinic.      Outcome Measures (most recent score):  26/56 Perdue Balance Scale  7 sit to stands in 30 seconds  25 seconds TUG    Goals:  Short-term goals:  To be achieved in 2-3 weeks:     Instruct in home program  1.) Patient will be independent with a short-term home exercise program.  2.) Patient will demonstrate proper body/lifting mechanics with abdominal bracing without cueing.  3.) Patient will report a 25% or greater improvement in symptoms in order to allow for increased comfort with activities of daily living.   4.) Patient will report no falls within the past 3 week time frame.     *ALL ST Goals met     Long-term goals:  To be achieved in 6-8 weeks:     Self management of symptoms MET  Return to previous level of function MET  1.) Improve time on TUG to 19 seconds or less to allow increased gait speed with daily movement and activity NOT MET  2.) Patient will report a 50% or greater improvement in symptoms in order to allow for increased comfort with activities of daily living NOT MET  3.) Patient will improve score on Perdue balance scale by 5 points or greater to reduce fall risk and improve daily functional stability MET     Assessment:  Throughout course of therapy, patient has improved overall LE strength and balance, reduced fall risk, and improve functional strength. Patient will continue to work on exercises outside of clinic to allow further improvement in  strength and balance to reduce falls and improve functional capacity.    Plan:  Discharge from therapy.    Discharge:    Reason for Discharge: No further expectation of progress.    Equipment Issued: none    Discharge Plan: Patient to continue home program.

## 2019-12-24 DIAGNOSIS — I48.91 NEW ONSET ATRIAL FIBRILLATION (H): ICD-10-CM

## 2019-12-24 RX ORDER — APIXABAN 5 MG/1
TABLET, FILM COATED ORAL
Qty: 180 TABLET | Refills: 3 | Status: SHIPPED | OUTPATIENT
Start: 2019-12-24 | End: 2020-01-13

## 2019-12-24 NOTE — TELEPHONE ENCOUNTER
Requested Prescriptions   Pending Prescriptions Disp Refills     ELIQUIS ANTICOAGULANT 5 MG tablet [Pharmacy Med Name: ELIQUIS TAB 5MG] 180 tablet 3     Sig: TAKE 1 TABLET TWICE A DAY       Direct Oral Anticoagulant Agents Passed - 12/24/2019  1:46 AM        Passed - Normal Platelets on file in past 12 months     Recent Labs   Lab Test 08/30/19  2242                  Passed - Medication is active on med list        Passed - Patient is 18-79 years of age        Passed - Serum creatinine less than or equal to 1.4 on file in past 12 mos     Recent Labs   Lab Test 08/30/19  2242   CR 1.40*             Passed - Weight is greater than 60 kg for the past year     Wt Readings from Last 3 Encounters:   08/21/19 83 kg (183 lb)   04/15/19 78.9 kg (174 lb)   01/14/19 77.1 kg (170 lb)             Passed - Recent (6 mo) or future (30 days) visit within the authorizing provider's specialty        LOV 01/14/2019 Oni Ortega DO,   Next appointment 01/13/2020 Oni Ortega DO  Unable to complete prescription refill per RN medication refill policy. Will route to provider for review and consideration.  Wendy Ramirez RN on 12/24/2019 at 8:52 AM

## 2020-01-13 ENCOUNTER — OFFICE VISIT (OUTPATIENT)
Dept: CARDIOLOGY | Facility: OTHER | Age: 75
End: 2020-01-13
Attending: INTERNAL MEDICINE
Payer: COMMERCIAL

## 2020-01-13 VITALS
HEIGHT: 72 IN | BODY MASS INDEX: 23.3 KG/M2 | TEMPERATURE: 96.8 F | HEART RATE: 76 BPM | OXYGEN SATURATION: 95 % | WEIGHT: 172 LBS | RESPIRATION RATE: 20 BRPM | SYSTOLIC BLOOD PRESSURE: 130 MMHG | DIASTOLIC BLOOD PRESSURE: 86 MMHG

## 2020-01-13 DIAGNOSIS — Z98.890 HISTORY OF AAA (ABDOMINAL AORTIC ANEURYSM) REPAIR: ICD-10-CM

## 2020-01-13 DIAGNOSIS — Z86.73 HISTORY OF CVA (CEREBROVASCULAR ACCIDENT): ICD-10-CM

## 2020-01-13 DIAGNOSIS — N40.1 BENIGN PROSTATIC HYPERPLASIA WITH URINARY RETENTION: ICD-10-CM

## 2020-01-13 DIAGNOSIS — Z79.01 CHRONIC ANTICOAGULATION: ICD-10-CM

## 2020-01-13 DIAGNOSIS — I25.2 HISTORY OF MYOCARDIAL INFARCTION: ICD-10-CM

## 2020-01-13 DIAGNOSIS — Z87.891 HISTORY OF TOBACCO ABUSE: ICD-10-CM

## 2020-01-13 DIAGNOSIS — Z95.1 HISTORY OF CORONARY ARTERY BYPASS GRAFT X 3: ICD-10-CM

## 2020-01-13 DIAGNOSIS — I25.10 CORONARY ARTERY DISEASE INVOLVING NATIVE CORONARY ARTERY OF NATIVE HEART WITHOUT ANGINA PECTORIS: ICD-10-CM

## 2020-01-13 DIAGNOSIS — I48.91 NEW ONSET ATRIAL FIBRILLATION (H): Primary | ICD-10-CM

## 2020-01-13 DIAGNOSIS — I10 ESSENTIAL HYPERTENSION WITH GOAL BLOOD PRESSURE LESS THAN 140/90: ICD-10-CM

## 2020-01-13 DIAGNOSIS — E78.5 HYPERLIPIDEMIA WITH TARGET LDL LESS THAN 100: ICD-10-CM

## 2020-01-13 DIAGNOSIS — R33.8 BENIGN PROSTATIC HYPERPLASIA WITH URINARY RETENTION: ICD-10-CM

## 2020-01-13 DIAGNOSIS — I10 ESSENTIAL HYPERTENSION: ICD-10-CM

## 2020-01-13 DIAGNOSIS — I48.0 PAROXYSMAL ATRIAL FIBRILLATION (H): ICD-10-CM

## 2020-01-13 DIAGNOSIS — Z86.79 H/O VENTRICULAR FIBRILLATION: ICD-10-CM

## 2020-01-13 DIAGNOSIS — Z86.73 HISTORY OF TIA (TRANSIENT ISCHEMIC ATTACK): ICD-10-CM

## 2020-01-13 DIAGNOSIS — K21.9 GASTROESOPHAGEAL REFLUX DISEASE WITHOUT ESOPHAGITIS: ICD-10-CM

## 2020-01-13 DIAGNOSIS — Z86.74 H/O CARDIAC ARREST: ICD-10-CM

## 2020-01-13 DIAGNOSIS — I48.91 NEW ONSET ATRIAL FIBRILLATION (H): ICD-10-CM

## 2020-01-13 DIAGNOSIS — I45.2 BIFASCICULAR BLOCK: ICD-10-CM

## 2020-01-13 PROCEDURE — G0463 HOSPITAL OUTPT CLINIC VISIT: HCPCS

## 2020-01-13 PROCEDURE — 99214 OFFICE O/P EST MOD 30 MIN: CPT | Performed by: INTERNAL MEDICINE

## 2020-01-13 RX ORDER — TAMSULOSIN HYDROCHLORIDE 0.4 MG/1
CAPSULE ORAL
Qty: 90 CAPSULE | Refills: 3 | Status: SHIPPED | OUTPATIENT
Start: 2020-01-13 | End: 2021-02-15

## 2020-01-13 RX ORDER — FINASTERIDE 5 MG/1
1 TABLET, FILM COATED ORAL DAILY
Qty: 90 TABLET | Refills: 3 | Status: SHIPPED | OUTPATIENT
Start: 2020-01-13 | End: 2021-02-15

## 2020-01-13 RX ORDER — FAMOTIDINE 20 MG/1
20 TABLET, FILM COATED ORAL 2 TIMES DAILY
Qty: 180 TABLET | Refills: 3 | Status: SHIPPED | OUTPATIENT
Start: 2020-01-13

## 2020-01-13 RX ORDER — SIMVASTATIN 20 MG
TABLET ORAL
Qty: 90 TABLET | Refills: 3 | Status: SHIPPED | OUTPATIENT
Start: 2020-01-13 | End: 2021-02-15

## 2020-01-13 RX ORDER — LISINOPRIL 5 MG/1
TABLET ORAL
Qty: 90 TABLET | Refills: 3 | Status: SHIPPED | OUTPATIENT
Start: 2020-01-13 | End: 2021-02-15

## 2020-01-13 RX ORDER — METOPROLOL SUCCINATE 50 MG/1
50 TABLET, EXTENDED RELEASE ORAL DAILY
Qty: 90 TABLET | Refills: 3 | Status: ON HOLD | OUTPATIENT
Start: 2020-01-13 | End: 2021-03-23

## 2020-01-13 ASSESSMENT — PAIN SCALES - GENERAL: PAINLEVEL: NO PAIN (0)

## 2020-01-13 ASSESSMENT — MIFFLIN-ST. JEOR: SCORE: 1568.19

## 2020-01-13 NOTE — PROGRESS NOTES
Cardiology Progress Note     Assessment & Plan   Kostas Cotto is a 74 year old male who is being seen by cardiology in follow-up to visit from 1/14/19 as he has a diagnosis of atrial fibrillation.        He had an EKG on 1/8/18 that showed atrial fibrillation. He was started on Eliquis after having wide swings in his INR while on Coumadin. He is currently maintained on Eliquis 5 mg twice a day and metoprolol 50 mg daily. He has had no symptoms related A. Fib, denying palpitations or fluttering in his chest. He has not had any problems with bleeding while on Eliquis 5 mg twice a day.     He is also known to have a history of bifascicular block on an EKG from 1/24/18 without a history of syncope or near syncope.      He previously had bypass x3 in 2005 with a history of a myocardial infarction in 2003.  He is also been asymptomatic from a coronary artery disease denying chest pain, chest tightness, or chest discomfort.     He has not had repeat concerns for V. fib as he previously had a cardiac arrest secondary to V. fib in 2003.      He was hospitalized on 1/8/18 with concerns for a TIA with resolution of symptoms.  He has not seen any changes as he has had a possible history of stroke in the past.      1/13/2020:  He is doing well.  He has not had any symptoms of A. fib.  He is tolerating Eliquis and metoprolol without issues.  He has not had any chest pain or concerns for angina.  He was falling in the summer 2019 but has seen physical therapy with improvement.  He still has some deficiency of speech secondary to his stroke but claims he has no neurological deficit.  He is due for an ultrasound of the abdomen with a history of abdominal aortic aneurysm repair.        Impression:  1.  Paroxysmal atrial fibrillation diagnosed 1/8/18 with hospitalization returns for TIA resolution of symptoms.  2.  Hyperlipidemia-controlled.  3.  Rheumatoid arthritis.  4.  H/O CABG x3 in 2005.  5.  H/O myocardial infarction with  stent placed in 2003.  6.  Hypertension.  7.  H/O ventricular arrest in 2003.  8.  Questionable history of a CVA on 1/8/18.  9.  H/O tobacco abuse quitting in 2003.  10.  Abdominal aortic aneurysm status post repair with a 6.0 cm aneurysm on 3/26/15.  11.  H/O myocardial infarction in 2003.        Plan:  1.  We will plan for an ultrasound of his abdomen with a history of abdominal aortic aneurysm repair.  2.  We will refill all of his medications  3.  Continue Eliquis and metoprolol for A. Fib.  4.  Follow-up in 1 year or sooner with issues.    Oni Ortega      Physical Exam   Temp: 96.8  F (36  C) Temp src: Tympanic BP: 130/86 Pulse: 76   Resp: 20 SpO2: 95 %      Vitals:    01/13/20 1052   Weight: 78 kg (172 lb)     Vital Signs with Ranges  Temp:  [96.8  F (36  C)] 96.8  F (36  C)  Pulse:  [63-76] 76  Resp:  [20] 20  BP: (130-150)/(84-86) 130/86  SpO2:  [95 %] 95 %  ROS is negative except that which was noted in the HPI.     Incision/Surgical Site 03/10/14 Left Eye (Active)   Number of days: 2135       Incision/Surgical Site Right Eye (Active)   Number of days:        Constitutional: awake, alert, cooperative, no apparent distress, and appears stated age  Eyes: Lids and lashes normal, pupils equal, sclera clear, conjunctiva normal  ENT: Normocephalic, without obvious abnormality, atraumatic.  Respiratory: No increased work of breathing, good air exchange, clear to auscultation bilaterally, no crackles or wheezing  Cardiovascular: Normal apical impulse, regular rate and rhythm, normal S1 and S2, no S3 or S4, and no murmur noted  GI: No scars, normal bowel sounds.  Musculoskeletal: no lower extremity pitting edema present  Neurologic: Awake, alert.  Neuropsychiatric: General: normal, calm and normal eye contact    Medications         Data   No results found for this or any previous visit (from the past 24 hour(s)).  No results found for this or any previous visit (from the past 24 hour(s)).

## 2020-01-13 NOTE — NURSING NOTE
Chief Complaint   Patient presents with     RECHECK     1 year cardiology follow-up       Initial BP (!) 150/84 (BP Location: Right arm, Patient Position: Chair, Cuff Size: Adult Regular)   Pulse 63   Temp 96.8  F (36  C) (Tympanic)   Resp 20   Ht 1.829 m (6')   Wt 78 kg (172 lb)   SpO2 95%   BMI 23.33 kg/m   Estimated body mass index is 23.33 kg/m  as calculated from the following:    Height as of this encounter: 1.829 m (6').    Weight as of this encounter: 78 kg (172 lb).  Medication Reconciliation: complete  Jerri Obregon LPN

## 2020-01-13 NOTE — PATIENT INSTRUCTIONS
You were seen by Dr. Ortega, 01/13/20.     1.  You will have an ultrasound of your abdomen. The hospital will call you to schedule this. We will notify you of the results.      2. If you develop new or worsening symptoms, please call the cardiology office as you may need to be evaluated.     3. Please continue all other medications as they have been prescribed.     You will follow up with Dr. Ortega in 1 year.       Please call the cardiology office with problems, questions, or concerns at [phone].    If you experience chest pain, chest pressure, chest tightness, shortness of breath, fainting, lightheadedness, nausea, vomiting, or other concerning symptoms, please report to the Emergency Department or call 911. These symptoms may be emergent, and best treated in the Emergency Department.       Gina THURSTON RN  Cardiology   [facility]  [phone]          At your visit today, we discussed your risk for falls and preventive options.    Fall Prevention  Falls often occur due to slipping, tripping or losing your balance. Millions of people fall every year and injure themselves. Here are ways to reduce your risk of falling again.    Think about your fall, was there anything that caused your fall that can be fixed, removed, or replaced?    Make your home safe by keeping walkways clear of objects you may trip over, such as electric cords.    Use non-slip pads under rugs. Don't use area rugs or small throw rugs.    Use non-slip mats in bathtubs and showers.    Install handrails and lights on staircases. The handrails should be on both sides of the stairs.    Don't walk in poorly lit areas.    Don't stand on chairs or wobbly ladders.    Use caution when reaching overhead or looking upward. This position can cause a loss of balance.    Be sure your shoes fit properly, have non-slip bottoms and are in good condition.     Wear shoes both inside and out. Don't go barefoot or wear slippers.    Be cautious when  going up and down stairs, curbs, and when walking on uneven sidewalks.    If your balance is poor, consider using a cane or walker.    If your fall was related to alcohol use, stop or limit alcohol intake.     If your fall was related to use of sleeping medicines, talk to your healthcare provider about this. You may need to reduce your dosage at bedtime if you awaken during the night to go to the bathroom.      To reduce the need for nighttime bathroom trips:  ? Don't drink fluids for several hours before going to bed  ? Empty your bladder before going to bed  ? Men can keep a urinal at the bedside    Stay as active as you can. Balance, flexibility, strength, and endurance all come from exercise. They all play a role in preventing falls. Ask your healthcare provider which types of activity are right for you.    Get your vision checked on a regular basis.    If you have pets, know where they are before you stand up or walk so you don't trip over them.    Use night lights.    Go over all your medicines with a pharmacist or other healthcare provider to see if any of them could make you more likely to fall.  Date Last Reviewed: 4/1/2018 2000-2019 [employer]. [address] [place] [address], [place] [address]. All rights reserved. This information is not intended as a substitute for professional medical care. Always follow your healthcare professional's instructi

## 2020-01-27 ENCOUNTER — HOSPITAL ENCOUNTER (OUTPATIENT)
Dept: ULTRASOUND IMAGING | Facility: HOSPITAL | Age: 75
Discharge: HOME | End: 2020-01-27
Attending: INTERNAL MEDICINE | Admitting: INTERNAL MEDICINE
Payer: COMMERCIAL

## 2020-01-27 DIAGNOSIS — Z98.890 HISTORY OF AAA (ABDOMINAL AORTIC ANEURYSM) REPAIR: ICD-10-CM

## 2020-01-27 PROCEDURE — 76775 US EXAM ABDO BACK WALL LIM: CPT | Mod: TC

## 2020-02-19 DIAGNOSIS — Z00.00 HEALTH CARE MAINTENANCE: ICD-10-CM

## 2020-02-21 RX ORDER — FOLIC ACID 1 MG/1
TABLET ORAL
Qty: 90 TABLET | Refills: 0 | Status: SHIPPED | OUTPATIENT
Start: 2020-02-21 | End: 2020-05-22

## 2020-02-21 NOTE — TELEPHONE ENCOUNTER
Folic acid      Last Written Prescription Date:  8/27/19  Last Fill Quantity: 90,   # refills: 1  Last Office Visit: 8/21/19  Future Office visit:

## 2020-05-22 DIAGNOSIS — Z00.00 HEALTH CARE MAINTENANCE: ICD-10-CM

## 2020-05-22 RX ORDER — FOLIC ACID 1 MG/1
TABLET ORAL
Qty: 90 TABLET | Refills: 0 | Status: SHIPPED | OUTPATIENT
Start: 2020-05-22 | End: 2020-08-21

## 2020-05-22 NOTE — TELEPHONE ENCOUNTER
Folic Acid      Last Written Prescription Date:  02/21/20  Last Fill Quantity: 90,   # refills: 0  Last Office Visit: 08/21/19  Future Office visit:

## 2020-05-28 NOTE — TELEPHONE ENCOUNTER
lisinopril (PRINIVIL/ZESTRIL) 5 MG tablet  Last Written Prescription Date:  1/23/19  Last Fill Quantity: 30,   # refills: 0  Last Office Visit: 1/18/18  Future Office visit:          Statement Selected

## 2020-08-20 DIAGNOSIS — Z00.00 HEALTH CARE MAINTENANCE: ICD-10-CM

## 2020-08-20 NOTE — TELEPHONE ENCOUNTER
folic acid (FOLVITE) 1 MG tablet      Last Written Prescription Date:  5/22/20  Last Fill Quantity: 90,   # refills: 0  Last Office Visit: 8/21/2019  Future Office visit:

## 2020-08-20 NOTE — LETTER
[facility]  [address]  [facility] [address]  Phone: [phone]    August 21, 2020       Kostas Cotto  [address] [facility] [address]            Dear Kostas:    APPOINTMENT REMINDER:   Our records indicates that it is time for you to be seen for your yearly physical.      Your current medication request will be approved for one refill but you will need to be seen before any additional refills can be approved. Taking care of your health is important to us, and ongoing visits with your provider are vital to your care.    We look forward to seeing you in the near future.  You may call our office at [phone] to schedule a visit.     Please disregard this notice if you have already made an appointment.        Thank You,  Halley Hidalgo

## 2020-08-21 RX ORDER — FOLIC ACID 1 MG/1
TABLET ORAL
Qty: 90 TABLET | Refills: 0 | Status: SHIPPED | OUTPATIENT
Start: 2020-08-21 | End: 2020-11-18

## 2020-08-27 ENCOUNTER — NURSE TRIAGE (OUTPATIENT)
Dept: FAMILY MEDICINE | Facility: OTHER | Age: 75
End: 2020-08-27

## 2020-08-27 NOTE — TELEPHONE ENCOUNTER
COVID 19 Nurse Triage Plan/Patient Instructions    Please be aware that novel coronavirus (COVID-19) may be circulating in the community. If you develop symptoms such as fever, cough, or SOB or if you have concerns about the presence of another infection including coronavirus (COVID-19), please contact your health care provider or visit [email or web address].     Disposition/Instructions    Home care recommended. Follow home care protocol based instructions.    Thank you for taking steps to prevent the spread of this virus.  o Limit your contact with others.  o Wear a simple mask to cover your cough.  o Wash your hands well and often.    Resources    [facility]: About COVID-19: [email or web address]    CDC: What to Do If You're Sick: www.cdc.gov/coronavirus/2019-ncov/about/steps-when-sick.html    CDC: Ending Home Isolation: www.cdc.gov/coronavirus/2019-ncov/hcp/disposition-in-home-patients.html     CDC: Caring for Someone: www.cdc.gov/coronavirus/2019-ncov/if-you-are-sick/care-for-someone.html     [facility]: Interim Guidance for Hospital Discharge to Home: www.[facility].[place]/diseases/coronavirus/hcp/hospdischarge.pdf    [facility] clinical trials (COVID-19 research studies): clinicalaffairs.[facility]-clinical-trials     Below are the COVID-19 hotlines at the [place] Department of Health ([facility]). Interpreters are available.   o For health questions: Call [phone] or 1-217.707.2696 (7 a.m. to 7 p.m.)  o For questions about schools and childcare: Call [phone] or 1-962.870.2245 (7 a.m. to 7 p.m.)                     Reason for Disposition    [1] COVID-19 EXPOSURE (Close Contact) AND [2] within last 14 days BUT [3] NO symptoms    Additional Information    Negative: COVID-19 has been diagnosed by a healthcare provider (HCP)    Negative: COVID-19 lab test positive    Negative: [1] Symptoms of COVID-19 (e.g., cough, fever, SOB, or others) AND [2] lives in an area with community spread     "Negative: [1] Symptoms of COVID-19 (e.g., cough, fever, SOB, or others) AND [2] within 14 days of EXPOSURE (close contact) with diagnosed or suspected COVID-19 patient    Negative: [1] Symptoms of COVID-19 (e.g., cough, fever, SOB, or others) AND [2] within 14 days of travel from high-risk area for COVID-19 community spread (identified by CDC)    Negative: [1] Difficulty breathing (shortness of breath) occurs AND [2] onset > 14 days after COVID-19 EXPOSURE (Close Contact) AND [3] no community spread where patient lives    Negative: [1] Dry cough occurs AND [2] onset > 14 days after COVID-19 EXPOSURE AND [3] no community spread where patient lives    Negative: [1] Wet cough (i.e., white-yellow, yellow, green, or reji colored sputum) AND [2] onset > 14 days after COVID-19 EXPOSURE AND [3] no community spread where patient lives    Negative: [1] Common cold symptoms AND [2] onset > 14 days after COVID-19 EXPOSURE AND [3] no community spread where patient lives    Negative: [1] COVID-19 EXPOSURE (Close Contact) within last 14 days AND [2] needs COVID-19 lab test to return to work AND [3] NO symptoms    Negative: [1] COVID-19 EXPOSURE (Close Contact) within last 14 days AND [2] exposed person is a healthcare worker who was NOT using all recommended personal protective equipment (i.e., a respirator-N95 mask, eye protection, gloves, and gown) AND [3] NO symptoms    Answer Assessment - Initial Assessment Questions  1. CLOSE CONTACT: \"Who is the person with the confirmed or suspected COVID-19 infection that you were exposed to?\"      son  2. PLACE of CONTACT: \"Where were you when you were exposed to COVID-19?\" (e.g., home, school, medical waiting room; which city?)      home  3. TYPE of CONTACT: \"How much contact was there?\" (e.g., sitting next to, live in same house, work in same office, same building)      Very close  4. DURATION of CONTACT: \"How long were you in contact with the COVID-19 patient?\" (e.g., a few seconds, " "passed by person, a few minutes, live with the patient)      2 days  5. DATE of CONTACT: \"When did you have contact with a COVID-19 patient?\" (e.g., how many days ago)      Last weekend  6. TRAVEL: \"Have you traveled out of the country recently?\" If so, \"When and where?\"      * Also ask about out-of-state travel, since the [facility] has identified some high-risk cities for community spread in the .      * Note: Travel becomes less relevant if there is widespread community transmission where the patient lives.      no  7. COMMUNITY SPREAD: \"Are there lots of cases of COVID-19 (community spread) where you live?\" (See public health department website, if unsure)        minor  8. SYMPTOMS: \"Do you have any symptoms?\" (e.g., fever, cough, breathing difficulty)      no  9. PREGNANCY OR POSTPARTUM: \"Is there any chance you are pregnant?\" \"When was your last menstrual period?\" \"Did you deliver in the last 2 weeks?\"      n/a  10. HIGH RISK: \"Do you have any heart or lung problems? Do you have a weak immune system?\" (e.g., CHF, COPD, asthma, HIV positive, chemotherapy, renal failure, diabetes mellitus, sickle cell anemia)        no    Protocols used: CORONAVIRUS (COVID-19) EXPOSURE-A- 5.16.20      "

## 2020-08-28 ENCOUNTER — OFFICE VISIT (OUTPATIENT)
Dept: FAMILY MEDICINE | Facility: OTHER | Age: 75
End: 2020-08-28
Attending: PHYSICIAN ASSISTANT
Payer: COMMERCIAL

## 2020-08-28 DIAGNOSIS — Z20.822 EXPOSURE TO COVID-19 VIRUS: Primary | ICD-10-CM

## 2020-08-28 PROCEDURE — U0003 INFECTIOUS AGENT DETECTION BY NUCLEIC ACID (DNA OR RNA); SEVERE ACUTE RESPIRATORY SYNDROME CORONAVIRUS 2 (SARS-COV-2) (CORONAVIRUS DISEASE [COVID-19]), AMPLIFIED PROBE TECHNIQUE, MAKING USE OF HIGH THROUGHPUT TECHNOLOGIES AS DESCRIBED BY CMS-2020-01-R: HCPCS | Mod: ZL | Performed by: PHYSICIAN ASSISTANT

## 2020-08-30 ENCOUNTER — NURSE TRIAGE (OUTPATIENT)
Dept: NURSING | Facility: CLINIC | Age: 75
End: 2020-08-30

## 2020-08-30 LAB
SARS-COV-2 RNA SPEC QL NAA+PROBE: ABNORMAL
SPECIMEN SOURCE: ABNORMAL

## 2020-08-30 NOTE — TELEPHONE ENCOUNTER
Coronavirus (COVID-19) Notification    Caller Name (Patient, parent, daughter/son, grandparent, etc)  Pari Cotto, spouse    Reason for call  Notify of Positive Coronavirus (COVID-19) lab results, assess symptoms,  review [facility] recommendations    Lab Result    Lab test:  2019-nCoV rRt-PCR or SARS-CoV-2 PCR    Oropharyngeal AND/OR nasopharyngeal swabs is POSITIVE for 2019-nCoV RNA/SARS-COV-2 PCR (COVID-19 virus)    RN Recommendations/Instructions per [facility] Coronavirus COVID-19 recommendations    Your result was positive. This means you have COVID-19 (coronavirus).  We have sent you a letter that reviews the information that I'll be reviewing with you now.    How can I protect others?    If you have symptoms: stay home and away from others (self-isolate) until:    You've had no fever--and no medicine that reduces fever--for 1 full day (24 hours). And       Your other symptoms have gotten better. For example, your cough or breathing has improved. And     At least 10 days have passed since your symptoms started. (If you've been told by a doctor that you have a weak immune system, wait 20 days.)     If you don't have symptoms: Stay home and away from others (self-isolate) until at least 10 days have passed since your first positive COVID-19 test. (Date test collected)    During this time:    Stay in your own room, including for meals. Use your own bathroom if you can.    Stay away from others in your home. No hugging, kissing or shaking hands. No visitors.     Don't go to work, school or anywhere else.     Clean  high touch  surfaces often (doorknobs, counters, handles, etc.). Use a household cleaning spray or wipes. You'll find a full list on the EPA website at www.epa.gov/pesticide-registration/list-n-disinfectants-use-against-sars-cov-2.     Cover your mouth and nose with a mask, tissue or other face covering to avoid spreading germs.    Wash your hands and face often with soap and  water.    Caregivers in these groups are at risk for severe illness due to COVID-19:  o People 65 years and older  o People who live in a nursing home or long-term care facility  o People with chronic disease (lung, heart, cancer, diabetes, kidney, liver, immunologic)  o People who have a weakened immune system, including those who:  - Are in cancer treatment  - Take medicine that weakens the immune system, such as corticosteroids  - Had a bone marrow or organ transplant  - Have an immune deficiency  - Have poorly controlled HIV or AIDS  - Are obese (body mass index of 40 or higher)  - Smoke regularly    Caregivers should wear gloves while washing dishes, handling laundry and cleaning bedrooms and bathrooms.    Wash and dry laundry with special caution. Don't shake dirty laundry, and use the warmest water setting you can.    If you have a weakened immune system, ask your doctor about other actions you should take.    For more tips, go to www.cdc.gov/coronavirus/2019-ncov/downloads/10Things.pdf.    You should not go back to work until you meet the guidelines above for ending your home isolation. You should meet these along with any other guidelines that your employer has.    Employers: This document serves as formal notice of your employee's medical guidelines for going back to work. They must meet the above guidelines before going back to work in person.    How can I take care of myself?    1. Get lots of rest. Drink extra fluids (unless a doctor has told you not to).    2. Take Tylenol (acetaminophen) for fever or pain. If you have liver or kidney problems, ask your family doctor if it's okay to take Tylenol.     Take either:     650 mg (two 325 mg pills) every 4 to 6 hours, or     1,000 mg (two 500 mg pills) every 8 hours as needed.     Note: Don't take more than 3,000 mg in one day. Acetaminophen is found in many medicines (both prescribed and over-the-counter medicines). Read all labels to be sure you don't take  too much.    For children, check the Tylenol bottle for the right dose (based on their age or weight).    3. If you have other health problems (like cancer, heart failure, an organ transplant or severe kidney disease): Call your specialty clinic if you don't feel better in the next 2 days.    4. Know when to call 911: Emergency warning signs include:    Trouble breathing or shortness of breath    Pain or pressure in the chest that doesn't go away    Feeling confused like you haven't felt before, or not being able to wake up    Bluish-colored lips or face    5. Sign up for [employer]. We know it's scary to hear that you have COVID-19. We want to track your symptoms to make sure you're okay over the next 2 weeks. Please look for an email from [employer]--this is a free, online program that we'll use to keep in touch. To sign up, follow the link in the email. Learn more at [email or web address].    Where can I get more information?    [facility]: [email or web address]    Coronavirus Basics: www.health.[facility].[place]/diseases/coronavirus/basics.html    What to Do If You're Sick: www.cdc.gov/coronavirus/2019-ncov/about/steps-when-sick.html    Ending Home Isolation: www.cdc.gov/coronavirus/2019-ncov/hcp/disposition-in-home-patients.html     Caring for Someone with COVID-19: www.cdc.gov/coronavirus/2019-ncov/if-you-are-sick/care-for-someone.html     [facility] clinical trials (COVID-19 research studies): clinicalaffairs.[facility]-clinical-trials     A Positive COVID-19 letter will be sent via [employer] or the mail. (Exception, no letters sent to Presurgerical/Preprocedure Patients)    Patient and caller missed the call to inform them of a positive Covid-19 result, so called back.  The information in this letter was relayed to caller and patient with patient's permission, and they expressed understanding.    Irasema Snow RN  [facility]

## 2020-09-02 ENCOUNTER — HOSPITAL ENCOUNTER (EMERGENCY)
Facility: HOSPITAL | Age: 75
Discharge: HOME | End: 2020-09-02
Attending: EMERGENCY MEDICINE | Admitting: EMERGENCY MEDICINE
Payer: COMMERCIAL

## 2020-09-02 ENCOUNTER — APPOINTMENT (OUTPATIENT)
Dept: GENERAL RADIOLOGY | Facility: HOSPITAL | Age: 75
End: 2020-09-02
Attending: EMERGENCY MEDICINE
Payer: COMMERCIAL

## 2020-09-02 VITALS
HEART RATE: 72 BPM | OXYGEN SATURATION: 94 % | SYSTOLIC BLOOD PRESSURE: 114 MMHG | TEMPERATURE: 98.3 F | DIASTOLIC BLOOD PRESSURE: 69 MMHG | RESPIRATION RATE: 28 BRPM

## 2020-09-02 DIAGNOSIS — U07.1 2019 NOVEL CORONAVIRUS DISEASE (COVID-19): Primary | ICD-10-CM

## 2020-09-02 LAB
ALBUMIN SERPL-MCNC: 3.3 G/DL (ref 3.4–5)
ALP SERPL-CCNC: 93 U/L (ref 40–150)
ALT SERPL W P-5'-P-CCNC: 11 U/L (ref 0–70)
ANION GAP SERPL CALCULATED.3IONS-SCNC: 5 MMOL/L (ref 3–14)
AST SERPL W P-5'-P-CCNC: 26 U/L (ref 0–45)
BASE EXCESS BLDV CALC-SCNC: 0.8 MMOL/L
BASOPHILS # BLD AUTO: 0 10E9/L (ref 0–0.2)
BASOPHILS NFR BLD AUTO: 0.2 %
BILIRUB SERPL-MCNC: 0.5 MG/DL (ref 0.2–1.3)
BUN SERPL-MCNC: 25 MG/DL (ref 7–30)
CALCIUM SERPL-MCNC: 8.4 MG/DL (ref 8.5–10.1)
CHLORIDE SERPL-SCNC: 101 MMOL/L (ref 94–109)
CO2 SERPL-SCNC: 27 MMOL/L (ref 20–32)
CREAT SERPL-MCNC: 1.08 MG/DL (ref 0.66–1.25)
CRP SERPL-MCNC: <2.9 MG/L (ref 0–8)
DIFFERENTIAL METHOD BLD: ABNORMAL
EOSINOPHIL # BLD AUTO: 0 10E9/L (ref 0–0.7)
EOSINOPHIL NFR BLD AUTO: 0.2 %
ERYTHROCYTE [DISTWIDTH] IN BLOOD BY AUTOMATED COUNT: 12.9 % (ref 10–15)
GFR SERPL CREATININE-BSD FRML MDRD: 68 ML/MIN/{1.73_M2}
GLUCOSE SERPL-MCNC: 104 MG/DL (ref 70–99)
HCO3 BLDV-SCNC: 27 MMOL/L (ref 21–28)
HCT VFR BLD AUTO: 42.3 % (ref 40–53)
HGB BLD-MCNC: 14.1 G/DL (ref 13.3–17.7)
IMM GRANULOCYTES # BLD: 0 10E9/L (ref 0–0.4)
IMM GRANULOCYTES NFR BLD: 0.2 %
LACTATE BLD-SCNC: 1.8 MMOL/L (ref 0.7–2)
LYMPHOCYTES # BLD AUTO: 1.1 10E9/L (ref 0.8–5.3)
LYMPHOCYTES NFR BLD AUTO: 23.7 %
MAGNESIUM SERPL-MCNC: 2.2 MG/DL (ref 1.6–2.3)
MCH RBC QN AUTO: 31.3 PG (ref 26.5–33)
MCHC RBC AUTO-ENTMCNC: 33.3 G/DL (ref 31.5–36.5)
MCV RBC AUTO: 94 FL (ref 78–100)
MONOCYTES # BLD AUTO: 0.6 10E9/L (ref 0–1.3)
MONOCYTES NFR BLD AUTO: 12.4 %
NEUTROPHILS # BLD AUTO: 3 10E9/L (ref 1.6–8.3)
NEUTROPHILS NFR BLD AUTO: 63.3 %
NRBC # BLD AUTO: 0 10*3/UL
NRBC BLD AUTO-RTO: 0 /100
O2/TOTAL GAS SETTING VFR VENT: NORMAL %
OXYHGB MFR BLDV: 62 %
PCO2 BLDV: 48 MM HG (ref 40–50)
PH BLDV: 7.36 PH (ref 7.32–7.43)
PHOSPHATE SERPL-MCNC: 2.8 MG/DL (ref 2.5–4.5)
PLATELET # BLD AUTO: 143 10E9/L (ref 150–450)
PO2 BLDV: 35 MM HG (ref 25–47)
POTASSIUM SERPL-SCNC: 4.1 MMOL/L (ref 3.4–5.3)
PROT SERPL-MCNC: 7.7 G/DL (ref 6.8–8.8)
RBC # BLD AUTO: 4.51 10E12/L (ref 4.4–5.9)
SODIUM SERPL-SCNC: 133 MMOL/L (ref 133–144)
TROPONIN I SERPL-MCNC: 0.04 UG/L (ref 0–0.04)
WBC # BLD AUTO: 4.8 10E9/L (ref 4–11)

## 2020-09-02 PROCEDURE — 82805 BLOOD GASES W/O2 SATURATION: CPT | Performed by: EMERGENCY MEDICINE

## 2020-09-02 PROCEDURE — 83735 ASSAY OF MAGNESIUM: CPT | Performed by: EMERGENCY MEDICINE

## 2020-09-02 PROCEDURE — 85025 COMPLETE CBC W/AUTO DIFF WBC: CPT | Performed by: EMERGENCY MEDICINE

## 2020-09-02 PROCEDURE — 80053 COMPREHEN METABOLIC PANEL: CPT | Performed by: EMERGENCY MEDICINE

## 2020-09-02 PROCEDURE — 99284 EMERGENCY DEPT VISIT MOD MDM: CPT | Mod: 25

## 2020-09-02 PROCEDURE — 87040 BLOOD CULTURE FOR BACTERIA: CPT | Performed by: EMERGENCY MEDICINE

## 2020-09-02 PROCEDURE — 86140 C-REACTIVE PROTEIN: CPT | Performed by: EMERGENCY MEDICINE

## 2020-09-02 PROCEDURE — 84100 ASSAY OF PHOSPHORUS: CPT | Performed by: EMERGENCY MEDICINE

## 2020-09-02 PROCEDURE — 36415 COLL VENOUS BLD VENIPUNCTURE: CPT | Performed by: EMERGENCY MEDICINE

## 2020-09-02 PROCEDURE — 83605 ASSAY OF LACTIC ACID: CPT | Performed by: EMERGENCY MEDICINE

## 2020-09-02 PROCEDURE — 71045 X-RAY EXAM CHEST 1 VIEW: CPT | Mod: TC

## 2020-09-02 PROCEDURE — 99284 EMERGENCY DEPT VISIT MOD MDM: CPT | Mod: Z6 | Performed by: EMERGENCY MEDICINE

## 2020-09-02 PROCEDURE — 84484 ASSAY OF TROPONIN QUANT: CPT | Performed by: EMERGENCY MEDICINE

## 2020-09-02 ASSESSMENT — ENCOUNTER SYMPTOMS
WEAKNESS: 1
SHORTNESS OF BREATH: 0
FEVER: 0
ABDOMINAL PAIN: 0

## 2020-09-02 NOTE — ED AVS SNAPSHOT
[facility]  [address]  [place] [address]  Phone:  [phone]                                    Kostas Cotto   MRN: [id number]    Department:  [facility]   Date of Visit:  9/2/2020           After Visit Summary Signature Page    I have received my discharge instructions, and my questions have been answered. I have discussed any challenges I see with this plan with the nurse or doctor.    ..........................................................................................................................................  Patient/Patient Representative Signature      ..........................................................................................................................................  Patient Representative Print Name and Relationship to Patient    ..................................................               ................................................  Date                                   Time    ..........................................................................................................................................  Reviewed by Signature/Title    ...................................................              ..............................................  Date                                               Time          22EPIC Rev 08/18

## 2020-09-03 NOTE — ED NOTES
Lab called with positive blood culture,  Dr العلي notified of positive blood culture on 9-3-2020  @ 6274.

## 2020-09-03 NOTE — ED PROVIDER NOTES
History     Chief Complaint   Patient presents with     Fatigue     Shortness of Breath     HPI  Kostas Cotto is a 75 year old male who presents to the emergency department accompanied by the spouse.  Patient tested positive for COVID-19 3 days ago and was brought in today for evaluation because he has been feeling more fatigued and lethargic than usual.  He denies shortness of breath but has been lying in bed the whole day.  He denies any fever, chills, diarrhea, vomiting, chest pain, or headache.  No change in smell or taste sensation.  He was advised to come to the ED by triage nurse on the phone for evaluation.    Allergies:  Allergies   Allergen Reactions     Oxycodone      Caused him delirium   Caused him delirium        Problem List:    Patient Active Problem List    Diagnosis Date Noted     2019 novel coronavirus disease (COVID-19) 09/02/2020     Priority: Medium     GERD 01/13/2020     Priority: Medium     New onset atrial fibrillation on 1/8/18 01/14/2019     Priority: Medium     Need for prophylactic vaccination and inoculation against influenza 01/14/2019     Priority: Medium     History of CVA (cerebrovascular accident) 01/14/2019     Priority: Medium     Essential hypertension 01/14/2019     Priority: Medium     Bifascicular block 01/14/2019     Priority: Medium     History of AAA (abdominal aortic aneurysm) repair 01/14/2019     Priority: Medium     Chronic anticoagulation 01/14/2019     Priority: Medium     History of TIA on 1/8/18 01/14/2019     Priority: Medium     Paroxysmal atrial fibrillation (H) 05/18/2018     Priority: Medium     Mixed hyperlipidemia 05/18/2018     Priority: Medium     H/O ventricular fibrillation in 2003 with cardiac arrest 05/18/2018     Priority: Medium     History of myocardial infarction in 2003 05/18/2018     Priority: Medium     History of coronary artery bypass graft x 3 in 2005 01/24/2018     Priority: Medium     H/O cardiac arrest in 2003 01/24/2018     Priority:  Medium     History of tobacco abuse quitting in 2003 01/24/2018     Priority: Medium     CAD 03/05/2014     Priority: Medium        Past Medical History:    Past Medical History:   Diagnosis Date     Hypoxic-ischemic encephalopathy (HIE) 11/25/2003     Rheumatoid arthritis(714.0) 05/03/2005     S/P inferior MI 11/25/2003       Past Surgical History:    Past Surgical History:   Procedure Laterality Date     AAA REPAIR       ANGIOGRAM  1/2004    Coronary Angiogram, LAD> long mid 40-50%, D2 90% ostial,Prox % with collateal flow,Circ had restenosisand was restented x 3 again.  EF now 55% with mild lateral wall hypokineseis and inferior also.     CARDIOVASCULAR SURGERY  1/2005    Syncopal episode, Had repeat angiogam showing now circ 100% along with RCA. LAD 80-90% with EF 35%.  Sent for CABG     CARDIOVASCULAR SURGERY  1/2005    S/P 3 vess CABG, Dr Wilfredo SORIANO>LAD, SVG>OM1, SVG>RCA.  Post op pneumothorax with Chest Tube placement.     CARDIOVASCULAR SURGERY  11/2003    Acute Inferior MI, Had VT arrest resuscitated. Not given lytics and was sent to [facility].  IABP placed and had angiogram stented Circ x 5. EF 30%. Lad had 70%, % with contralateral collateral flow.     HC US CHEST      left     HEART/LUNG RESUSCITATION (CPR)  11/2003    S/P Cardiac Arrest V-Tach, Out of hospital arrest with no bystaner cpr.  Has polymorphic VT on arrival.  Has a prolonged resuscitation which obviously was successful.  This was due to an acute Inferior MI at the time.     OPEN REDUCTION INTERNAL FIXATION HIP  1965    ORIF frac/dislocation L hip, Was a teenager and had orif done after fracture dislocation of left hip. Occurred playing softball.     PHACOEMULSIFICATION WITH STANDARD INTRAOCULAR LENS IMPLANT  3/10/2014    Procedure: PHACOEMULSIFICATION WITH STANDARD INTRAOCULAR LENS IMPLANT;  CATARACT EXTRACTION WITH INTRA OCULAR LENS LEFT(10% SERENE/POSSIBLE STRETCH);  Surgeon: Isael Acuna MD;  Location: [facility]      PHACOEMULSIFICATION WITH STANDARD INTRAOCULAR LENS IMPLANT  3/25/2014    Procedure: PHACOEMULSIFICATION WITH STANDARD INTRAOCULAR LENS IMPLANT;  CATARACT EXTRACTION WITH INTRA OCULAR LENS RIGHT/POSSIBLE PUPIL STRETCH;  Surgeon: Isael Acuna MD;  Location: [facility]       Family History:    Family History   Problem Relation Age of Onset     Cancer Father         leukemia     Diabetes Mother      Cerebrovascular Disease Mother         cause of death       Social History:  Marital Status:   [2]  Social History     Tobacco Use     Smoking status: Former Smoker     Smokeless tobacco: Never Used   Substance Use Topics     Alcohol use: No     Drug use: No        Medications:    apixaban ANTICOAGULANT (ELIQUIS ANTICOAGULANT) 5 MG tablet  aspirin 81 MG chewable tablet  calcium carbonate-vitamin D (CALCIUM 500 + D) 500-400 MG-UNIT TABS tablt  famotidine (PEPCID) 20 MG tablet  finasteride (PROSCAR) 5 MG tablet  folic acid (FOLVITE) 1 MG tablet  lisinopril (PRINIVIL/ZESTRIL) 5 MG tablet  metoprolol succinate ER (TOPROL-XL) 50 MG 24 hr tablet  simvastatin (ZOCOR) 20 MG tablet  tamsulosin (FLOMAX) 0.4 MG capsule          Review of Systems   Constitutional: Negative for fever.   Respiratory: Negative for shortness of breath.    Cardiovascular: Negative for chest pain.   Gastrointestinal: Negative for abdominal pain.   Neurological: Positive for weakness.   All other systems reviewed and are negative.      Physical Exam   BP: 132/75  Pulse: 71  Temp: 98.3  F (36.8  C)  Resp: 18  SpO2: 95 %      Physical Exam  Vitals signs and nursing note reviewed.   Constitutional:       General: He is not in acute distress.     Appearance: He is ill-appearing. He is not diaphoretic.   HENT:      Head: Normocephalic and atraumatic.   Eyes:      Pupils: Pupils are equal, round, and reactive to light.   Cardiovascular:      Rate and Rhythm: Normal rate and regular rhythm.      Heart sounds: Normal heart sounds.   Pulmonary:      Effort:  Pulmonary effort is normal. No respiratory distress.      Breath sounds: No stridor. Decreased breath sounds present. No wheezing, rhonchi or rales.   Abdominal:      General: Bowel sounds are normal. There is no distension.      Tenderness: There is no abdominal tenderness.   Musculoskeletal:         General: No tenderness or deformity.   Neurological:      General: No focal deficit present.      Mental Status: He is alert and oriented to person, place, and time.         ED Course   Patient evaluated upon arrival to the ED and laboratory tests ordered.  Patient is having oxygen saturation of 99% room air and is not dyspneic.  Patient appears pale and fatigued.    Procedures    Results for orders placed or performed during the hospital encounter of 09/02/20 (from the past 24 hour(s))   CBC with platelets differential   Result Value Ref Range    WBC 4.8 4.0 - 11.0 10e9/L    RBC Count 4.51 4.4 - 5.9 10e12/L    Hemoglobin 14.1 13.3 - 17.7 g/dL    Hematocrit 42.3 40.0 - 53.0 %    MCV 94 78 - 100 fl    MCH 31.3 26.5 - 33.0 pg    MCHC 33.3 31.5 - 36.5 g/dL    RDW 12.9 10.0 - 15.0 %    Platelet Count 143 (L) 150 - 450 10e9/L    Diff Method Automated Method     % Neutrophils 63.3 %    % Lymphocytes 23.7 %    % Monocytes 12.4 %    % Eosinophils 0.2 %    % Basophils 0.2 %    % Immature Granulocytes 0.2 %    Nucleated RBCs 0 0 /100    Absolute Neutrophil 3.0 1.6 - 8.3 10e9/L    Absolute Lymphocytes 1.1 0.8 - 5.3 10e9/L    Absolute Monocytes 0.6 0.0 - 1.3 10e9/L    Absolute Eosinophils 0.0 0.0 - 0.7 10e9/L    Absolute Basophils 0.0 0.0 - 0.2 10e9/L    Abs Immature Granulocytes 0.0 0 - 0.4 10e9/L    Absolute Nucleated RBC 0.0    Comprehensive metabolic panel   Result Value Ref Range    Sodium 133 133 - 144 mmol/L    Potassium 4.1 3.4 - 5.3 mmol/L    Chloride 101 94 - 109 mmol/L    Carbon Dioxide 27 20 - 32 mmol/L    Anion Gap 5 3 - 14 mmol/L    Glucose 104 (H) 70 - 99 mg/dL    Urea Nitrogen 25 7 - 30 mg/dL    Creatinine 1.08  0.66 - 1.25 mg/dL    GFR Estimate 68 >60 mL/min/[1.73_m2]    GFR Estimate If Black 78 >60 mL/min/[1.73_m2]    Calcium 8.4 (L) 8.5 - 10.1 mg/dL    Bilirubin Total 0.5 0.2 - 1.3 mg/dL    Albumin 3.3 (L) 3.4 - 5.0 g/dL    Protein Total 7.7 6.8 - 8.8 g/dL    Alkaline Phosphatase 93 40 - 150 U/L    ALT 11 0 - 70 U/L    AST 26 0 - 45 U/L   Magnesium   Result Value Ref Range    Magnesium 2.2 1.6 - 2.3 mg/dL   Lactic acid whole blood   Result Value Ref Range    Lactic Acid 1.8 0.7 - 2.0 mmol/L   Blood gas venous and oxyhgb   Result Value Ref Range    Ph Venous 7.36 7.32 - 7.43 pH    PCO2 Venous 48 40 - 50 mm Hg    PO2 Venous 35 25 - 47 mm Hg    Bicarbonate Venous 27 21 - 28 mmol/L    FIO2 21%     Oxyhemoglobin Venous 62 %    Base Excess Venous 0.8 mmol/L   Troponin I   Result Value Ref Range    Troponin I ES 0.035 0.000 - 0.045 ug/L   Blood culture    Specimen: Blood    Right Arm   Result Value Ref Range    Specimen Description Blood Right Arm     Culture Micro No growth after 10 hours    Phosphorus   Result Value Ref Range    Phosphorus 2.8 2.5 - 4.5 mg/dL   CRP inflammation   Result Value Ref Range    CRP Inflammation <2.9 0.0 - 8.0 mg/L   XR Chest Port 1 View    Narrative    XR CHEST PORT 1 VW    HISTORY: 75 yearsMale Dyspnea/SOB    TECHNIQUE: A single view of the chest was performed    COMPARISON: 2/3/2018    FINDINGS:   Seen are changes of median sternotomy. Heart size is normal limits.  There is mild basilar atelectasis. Lungs otherwise clear.        Impression    IMPRESSION: Mild bilateral basilar atelectasis. Lungs otherwise clear.    RADHA ZHOU MD       Medications - No data to display    Assessments & Plan (with Medical Decision Making)   2019 novel coronavirus disease: Patient was diagnosed with COVID-19 on August 30.  He comes today because he has been feeling more tired than usual but no shortness of breath.  Spouse reports that she had not eaten anything for the whole day just lying in bed.  Patient  denied any shortness of breath and did not have pains anywhere in the body.  Patient remained stable throughout ED stay with oxygen saturation of 95 to 99% room air.  His respiratory rate remained 10 to 14 breaths/min throughout ED stay.  Lungs were clear bilaterally and chest x-ray showed mild bilateral basilar atelectasis otherwise clear lungs.  Venous arterial blood gases were essentially normal with normal magnesium, lactic acid, CBC and CMP.  These findings are discussed with the patient and spouse and encouraged to push fluids and eat his food.  If he develops shortness of breath or his condition worsens then he may return to the ED.  I have reviewed the nursing notes.    I have reviewed the findings, diagnosis, plan and need for follow up with the patient.    Discharge Medication List as of 9/2/2020  8:15 PM          Final diagnoses:   2019 novel coronavirus disease (COVID-19)       9/2/2020   [facility]     Venkatesh العلي MD  09/03/20 [id number]

## 2020-09-03 NOTE — ED NOTES
"Patient presented via [facility]. Patient received positive COVID 19 test on Sunday (8/30/20). When asking patient why he came to ED today, he states \"I dont know\". Wife states reason EMS was called to bring patient to ED was because \"he has COVID and he hasn't gotten out of bed today.\" Patient states his only complaint is fatigue. Patient is alert and orient x4.  "

## 2020-09-04 ENCOUNTER — HOSPITAL ENCOUNTER (EMERGENCY)
Facility: HOSPITAL | Age: 75
Discharge: ANOTHER HOSPITAL | End: 2020-09-04
Attending: EMERGENCY MEDICINE | Admitting: EMERGENCY MEDICINE
Payer: COMMERCIAL

## 2020-09-04 ENCOUNTER — TRANSFERRED RECORDS (OUTPATIENT)
Dept: HEALTH INFORMATION MANAGEMENT | Facility: CLINIC | Age: 75
End: 2020-09-04

## 2020-09-04 ENCOUNTER — TELEPHONE (OUTPATIENT)
Dept: FAMILY MEDICINE | Facility: OTHER | Age: 75
End: 2020-09-04

## 2020-09-04 ENCOUNTER — APPOINTMENT (OUTPATIENT)
Dept: GENERAL RADIOLOGY | Facility: HOSPITAL | Age: 75
End: 2020-09-04
Attending: EMERGENCY MEDICINE
Payer: COMMERCIAL

## 2020-09-04 VITALS
HEART RATE: 67 BPM | RESPIRATION RATE: 17 BRPM | DIASTOLIC BLOOD PRESSURE: 74 MMHG | SYSTOLIC BLOOD PRESSURE: 116 MMHG | TEMPERATURE: 98.5 F | OXYGEN SATURATION: 93 %

## 2020-09-04 DIAGNOSIS — R09.02 HYPOXEMIA: ICD-10-CM

## 2020-09-04 DIAGNOSIS — U07.1 2019 NOVEL CORONAVIRUS DISEASE (COVID-19): Primary | ICD-10-CM

## 2020-09-04 PROBLEM — R78.81 GRAM-POSITIVE BACTEREMIA: Status: ACTIVE | Noted: 2020-09-04

## 2020-09-04 LAB
ALBUMIN SERPL-MCNC: 3.1 G/DL (ref 3.4–5)
ALP SERPL-CCNC: 83 U/L (ref 40–150)
ALT SERPL W P-5'-P-CCNC: 13 U/L (ref 0–70)
ANION GAP SERPL CALCULATED.3IONS-SCNC: 9 MMOL/L (ref 3–14)
AST SERPL W P-5'-P-CCNC: 32 U/L (ref 0–45)
BASE DEFICIT BLDA-SCNC: 1.3 MMOL/L
BASOPHILS # BLD AUTO: 0 10E9/L (ref 0–0.2)
BASOPHILS NFR BLD AUTO: 0.1 %
BILIRUB SERPL-MCNC: 0.6 MG/DL (ref 0.2–1.3)
BUN SERPL-MCNC: 32 MG/DL (ref 7–30)
CALCIUM SERPL-MCNC: 8.7 MG/DL (ref 8.5–10.1)
CHLORIDE SERPL-SCNC: 100 MMOL/L (ref 94–109)
CO2 SERPL-SCNC: 26 MMOL/L (ref 20–32)
CREAT SERPL-MCNC: 1.14 MG/DL (ref 0.66–1.25)
DIFFERENTIAL METHOD BLD: ABNORMAL
EOSINOPHIL # BLD AUTO: 0 10E9/L (ref 0–0.7)
EOSINOPHIL NFR BLD AUTO: 0 %
ERYTHROCYTE [DISTWIDTH] IN BLOOD BY AUTOMATED COUNT: 13 % (ref 10–15)
GFR SERPL CREATININE-BSD FRML MDRD: 63 ML/MIN/{1.73_M2}
GLUCOSE SERPL-MCNC: 129 MG/DL (ref 70–99)
HCO3 BLD-SCNC: 21 MMOL/L (ref 21–28)
HCT VFR BLD AUTO: 41.6 % (ref 40–53)
HGB BLD-MCNC: 14 G/DL (ref 13.3–17.7)
IMM GRANULOCYTES # BLD: 0 10E9/L (ref 0–0.4)
IMM GRANULOCYTES NFR BLD: 0.4 %
LACTATE BLD-SCNC: 3 MMOL/L (ref 0.7–2)
LIPASE SERPL-CCNC: 328 U/L (ref 73–393)
LYMPHOCYTES # BLD AUTO: 1.4 10E9/L (ref 0.8–5.3)
LYMPHOCYTES NFR BLD AUTO: 19.5 %
MCH RBC QN AUTO: 31.5 PG (ref 26.5–33)
MCHC RBC AUTO-ENTMCNC: 33.7 G/DL (ref 31.5–36.5)
MCV RBC AUTO: 94 FL (ref 78–100)
MONOCYTES # BLD AUTO: 0.9 10E9/L (ref 0–1.3)
MONOCYTES NFR BLD AUTO: 12.4 %
NEUTROPHILS # BLD AUTO: 4.8 10E9/L (ref 1.6–8.3)
NEUTROPHILS NFR BLD AUTO: 67.6 %
NRBC # BLD AUTO: 0 10*3/UL
NRBC BLD AUTO-RTO: 0 /100
O2/TOTAL GAS SETTING VFR VENT: ABNORMAL %
OXYHGB MFR BLD: 94 % (ref 92–100)
PCO2 BLD: 28 MM HG (ref 35–45)
PH BLD: 7.48 PH (ref 7.35–7.45)
PLATELET # BLD AUTO: 147 10E9/L (ref 150–450)
PO2 BLD: 67 MM HG (ref 80–105)
POTASSIUM SERPL-SCNC: 4.2 MMOL/L (ref 3.4–5.3)
PROCALCITONIN SERPL-MCNC: <0.05 NG/ML
PROT SERPL-MCNC: 7.7 G/DL (ref 6.8–8.8)
RBC # BLD AUTO: 4.45 10E12/L (ref 4.4–5.9)
SODIUM SERPL-SCNC: 135 MMOL/L (ref 133–144)
WBC # BLD AUTO: 7.1 10E9/L (ref 4–11)

## 2020-09-04 PROCEDURE — 83605 ASSAY OF LACTIC ACID: CPT | Performed by: EMERGENCY MEDICINE

## 2020-09-04 PROCEDURE — 99285 EMERGENCY DEPT VISIT HI MDM: CPT | Mod: Z6 | Performed by: EMERGENCY MEDICINE

## 2020-09-04 PROCEDURE — 99285 EMERGENCY DEPT VISIT HI MDM: CPT | Mod: 25

## 2020-09-04 PROCEDURE — 82805 BLOOD GASES W/O2 SATURATION: CPT | Performed by: EMERGENCY MEDICINE

## 2020-09-04 PROCEDURE — 85025 COMPLETE CBC W/AUTO DIFF WBC: CPT | Performed by: EMERGENCY MEDICINE

## 2020-09-04 PROCEDURE — 36600 WITHDRAWAL OF ARTERIAL BLOOD: CPT

## 2020-09-04 PROCEDURE — 36415 COLL VENOUS BLD VENIPUNCTURE: CPT | Performed by: EMERGENCY MEDICINE

## 2020-09-04 PROCEDURE — 71045 X-RAY EXAM CHEST 1 VIEW: CPT | Mod: TC

## 2020-09-04 PROCEDURE — 96365 THER/PROPH/DIAG IV INF INIT: CPT

## 2020-09-04 PROCEDURE — 84145 PROCALCITONIN (PCT): CPT | Performed by: EMERGENCY MEDICINE

## 2020-09-04 PROCEDURE — 80053 COMPREHEN METABOLIC PANEL: CPT | Performed by: EMERGENCY MEDICINE

## 2020-09-04 PROCEDURE — 96367 TX/PROPH/DG ADDL SEQ IV INF: CPT

## 2020-09-04 PROCEDURE — 83690 ASSAY OF LIPASE: CPT | Performed by: EMERGENCY MEDICINE

## 2020-09-04 PROCEDURE — 25800030 ZZH RX IP 258 OP 636: Performed by: EMERGENCY MEDICINE

## 2020-09-04 PROCEDURE — 25000128 H RX IP 250 OP 636: Performed by: EMERGENCY MEDICINE

## 2020-09-04 RX ORDER — SODIUM CHLORIDE 9 MG/ML
INJECTION, SOLUTION INTRAVENOUS CONTINUOUS
Status: DISCONTINUED | OUTPATIENT
Start: 2020-09-04 | End: 2020-09-04 | Stop reason: HOSPADM

## 2020-09-04 RX ADMIN — TAZOBACTAM SODIUM AND PIPERACILLIN SODIUM 3.38 G: 375; 3 INJECTION, SOLUTION INTRAVENOUS at 18:12

## 2020-09-04 RX ADMIN — VANCOMYCIN HYDROCHLORIDE 1500 MG: 1 INJECTION, POWDER, LYOPHILIZED, FOR SOLUTION INTRAVENOUS at 18:44

## 2020-09-04 RX ADMIN — SODIUM CHLORIDE 1000 ML: 9 INJECTION, SOLUTION INTRAVENOUS at 17:47

## 2020-09-04 RX ADMIN — SODIUM CHLORIDE 1000 ML: 9 INJECTION, SOLUTION INTRAVENOUS at 18:44

## 2020-09-04 ASSESSMENT — ENCOUNTER SYMPTOMS
ABDOMINAL PAIN: 0
SHORTNESS OF BREATH: 0
FEVER: 0

## 2020-09-04 NOTE — ED TRIAGE NOTES
Pt recently tested positive for COVID, has been having increased cough and weakness at home. Wife states that pt has been in bed for 2 days, pt usually is able to perform ADL's with a walker, now needs assistance to ambulate and perform ADL's. Wife notes that pt was incontinent of urine today which is abnormal for pt.

## 2020-09-04 NOTE — ED NOTES
DATE:  9/4/2020   TIME OF RECEIPT FROM LAB:  3835  LAB TEST:  Lactic acid  LAB VALUE:  3.0  RESULTS GIVEN WITH READ-BACK TO (PROVIDER):  Venkatesh العلي MD  TIME LAB VALUE REPORTED TO PROVIDER:   6261

## 2020-09-04 NOTE — PHARMACY-VANCOMYCIN DOSING SERVICE
Pharmacy consulted to dose vancomycin one time in the ED. 1500 mg x 1 dose ordered. If patient is admitted and further vancomycin therapy is desired, hospitalist please order consult for pharmacy to dose.

## 2020-09-04 NOTE — TELEPHONE ENCOUNTER
Pt tested for covid and was positiive ... 3 days ago went to the ED due to decreased appetite. Last pm MD from ED contacted pt and notified pt has bacteria infection in his blood. Prescribed an abx: amox/clav. Pt still has not been able to get out of bed.     Wife inquiring if they should give it another day, started on abx last pm (9/3/20) around 630 pm.     Pt is drinking ensure and fluids (gatorade). Yogurts.     Notified a message will be sent to Halley Hidalgo to review. If s/s worsen return a call to the clinic. Notified abx will also take some time to start working as well.     Pt spouse may be reached at [phone].

## 2020-09-04 NOTE — TELEPHONE ENCOUNTER
Spoke with the wife and her  tested + for Covid a week ago. He also had a blood infection and started on Augmentin and not a whole lot better. Eating a little better but not wanting to get out of bed, Recommended up right positioning and good check of his saturation and if she can get a saturation monitor would recommend doing that. If not able to get up that is concerning for me.  His wife states he just doesn't want to at this point and so if not better and not willing to get out of bed tomorrow would recommending calling ER and having further evaluation due to his septic status. Wife is in agreement.

## 2020-09-04 NOTE — ED PROVIDER NOTES
History     Chief Complaint   Patient presents with     Generalized Weakness     Cough     HPI  Kostas Cotto is a 75 year old male who presented to the emergency department with complaints of generalized weakness and inability to get out of bed for the last 48 hours.  Patient was diagnosed with COVID-19 on August 30 and had been doing well until 2 days ago when his condition worsened.  He denies shortness of breath, vomiting, fever, diarrhea or abdominal pain.  He was seen here 2 days ago and blood cultures grew coagulase-negative staphylococci most likely contaminant.  Patient denies pain anywhere in the body but is just feeling very weak and fatigued.    Allergies:  Allergies   Allergen Reactions     Oxycodone      Caused him delirium   Caused him delirium        Problem List:    Patient Active Problem List    Diagnosis Date Noted     Gram-positive bacteremia 09/04/2020     Priority: Medium     Hypoxemia 09/04/2020     Priority: Medium     2019 novel coronavirus disease (COVID-19) 09/02/2020     Priority: Medium     GERD 01/13/2020     Priority: Medium     New onset atrial fibrillation on 1/8/18 01/14/2019     Priority: Medium     Need for prophylactic vaccination and inoculation against influenza 01/14/2019     Priority: Medium     History of CVA (cerebrovascular accident) 01/14/2019     Priority: Medium     Essential hypertension 01/14/2019     Priority: Medium     Bifascicular block 01/14/2019     Priority: Medium     History of AAA (abdominal aortic aneurysm) repair 01/14/2019     Priority: Medium     Chronic anticoagulation 01/14/2019     Priority: Medium     History of TIA on 1/8/18 01/14/2019     Priority: Medium     Paroxysmal atrial fibrillation (H) 05/18/2018     Priority: Medium     Mixed hyperlipidemia 05/18/2018     Priority: Medium     H/O ventricular fibrillation in 2003 with cardiac arrest 05/18/2018     Priority: Medium     History of myocardial infarction in 2003 05/18/2018     Priority: Medium      History of coronary artery bypass graft x 3 in 2005 01/24/2018     Priority: Medium     H/O cardiac arrest in 2003 01/24/2018     Priority: Medium     History of tobacco abuse quitting in 2003 01/24/2018     Priority: Medium     CAD 03/05/2014     Priority: Medium        Past Medical History:    Past Medical History:   Diagnosis Date     Hypoxic-ischemic encephalopathy (HIE) 11/25/2003     Rheumatoid arthritis(714.0) 05/03/2005     S/P inferior MI 11/25/2003       Past Surgical History:    Past Surgical History:   Procedure Laterality Date     AAA REPAIR       ANGIOGRAM  1/2004    Coronary Angiogram, LAD> long mid 40-50%, D2 90% ostial,Prox % with collateal flow,Circ had restenosisand was restented x 3 again.  EF now 55% with mild lateral wall hypokineseis and inferior also.     CARDIOVASCULAR SURGERY  1/2005    Syncopal episode, Had repeat angiogam showing now circ 100% along with RCA. LAD 80-90% with EF 35%.  Sent for CABG     CARDIOVASCULAR SURGERY  1/2005    S/P 3 vess CABG, Dr Villalobos LIMA>LAD, SVG>OM1, SVG>RCA.  Post op pneumothorax with Chest Tube placement.     CARDIOVASCULAR SURGERY  11/2003    Acute Inferior MI, Had VT arrest resuscitated. Not given lytics and was sent to [facility].  IABP placed and had angiogram stented Circ x 5. EF 30%. Lad had 70%, % with contralateral collateral flow.      US CHEST      left     HEART/LUNG RESUSCITATION (CPR)  11/2003    S/P Cardiac Arrest V-Tach, Out of hospital arrest with no bystaner cpr.  Has polymorphic VT on arrival.  Has a prolonged resuscitation which obviously was successful.  This was due to an acute Inferior MI at the time.     OPEN REDUCTION INTERNAL FIXATION HIP  1965    ORIF frac/dislocation L hip, Was a teenager and had orif done after fracture dislocation of left hip. Occurred playing softball.     PHACOEMULSIFICATION WITH STANDARD INTRAOCULAR LENS IMPLANT  3/10/2014    Procedure: PHACOEMULSIFICATION WITH STANDARD INTRAOCULAR LENS  IMPLANT;  CATARACT EXTRACTION WITH INTRA OCULAR LENS LEFT(10% SERENE/POSSIBLE STRETCH);  Surgeon: Isael Acuna MD;  Location: [facility]     PHACOEMULSIFICATION WITH STANDARD INTRAOCULAR LENS IMPLANT  3/25/2014    Procedure: PHACOEMULSIFICATION WITH STANDARD INTRAOCULAR LENS IMPLANT;  CATARACT EXTRACTION WITH INTRA OCULAR LENS RIGHT/POSSIBLE PUPIL STRETCH;  Surgeon: Isael Acuna MD;  Location: [facility] OR       Family History:    Family History   Problem Relation Age of Onset     Cancer Father         leukemia     Diabetes Mother      Cerebrovascular Disease Mother         cause of death       Social History:  Marital Status:   [2]  Social History     Tobacco Use     Smoking status: Former Smoker     Smokeless tobacco: Never Used   Substance Use Topics     Alcohol use: No     Drug use: No        Medications:    amoxicillin-clavulanate (AUGMENTIN) 875-125 MG tablet  apixaban ANTICOAGULANT (ELIQUIS ANTICOAGULANT) 5 MG tablet  aspirin 81 MG chewable tablet  calcium carbonate-vitamin D (CALCIUM 500 + D) 500-400 MG-UNIT TABS tablt  famotidine (PEPCID) 20 MG tablet  finasteride (PROSCAR) 5 MG tablet  folic acid (FOLVITE) 1 MG tablet  lisinopril (PRINIVIL/ZESTRIL) 5 MG tablet  metoprolol succinate ER (TOPROL-XL) 50 MG 24 hr tablet  simvastatin (ZOCOR) 20 MG tablet  tamsulosin (FLOMAX) 0.4 MG capsule          Review of Systems   Constitutional: Negative for fever.   Respiratory: Negative for shortness of breath.    Cardiovascular: Negative for chest pain.   Gastrointestinal: Negative for abdominal pain.   All other systems reviewed and are negative.      Physical Exam   BP: 127/77  Pulse: 78  Temp: 98.2  F (36.8  C)  Resp: 16  SpO2: 94 %      Physical Exam  Vitals signs and nursing note reviewed.   Constitutional:       General: He is not in acute distress.     Appearance: He is well-developed. He is ill-appearing. He is not diaphoretic.   HENT:      Head: Normocephalic and atraumatic.   Eyes:      Pupils: Pupils are  equal, round, and reactive to light.   Cardiovascular:      Rate and Rhythm: Normal rate and regular rhythm.      Heart sounds: Normal heart sounds.   Pulmonary:      Effort: Pulmonary effort is normal. No respiratory distress.      Breath sounds: Decreased air movement present. No stridor. Decreased breath sounds present.   Abdominal:      General: Bowel sounds are normal. There is no distension.      Tenderness: There is no abdominal tenderness.   Musculoskeletal:         General: No tenderness or deformity.   Neurological:      Mental Status: He is oriented to person, place, and time.      Cranial Nerves: No cranial nerve deficit.         ED Course   Patient evaluated and laboratory tests ordered.    Procedures    Results for orders placed or performed during the hospital encounter of 09/04/20 (from the past 24 hour(s))   Blood gas arterial and oxyhgb   Result Value Ref [place]    pH Arterial 7.48 (H) 7.35 - 7.45 pH    pCO2 Arterial 28 (L) 35 - 45 mm Hg    pO2 Arterial 67 (L) 80 - 105 mm Hg    Bicarbonate Arterial 21 21 - 28 mmol/L    FIO2 21%     Oxyhemoglobin Arterial 94 92 - 100 %    Base Deficit Art 1.3 mmol/L   XR Chest Port 1 View    Narrative    PROCEDURE:  XR CHEST PORT 1 VW    HISTORY:  weakness and cough, Covid 19 positive.     COMPARISON:  September 2, 2020    FINDINGS:   The cardiac silhouette is normal in size. There is irregular  distribution of pulmonary vascularity consistent with emphysema there  is interstitial thickening seen in both lungs worse on the right than  the left interstitial thickening has worsened as compared to 2018. The  interstitial thickening is unchanged as compared September 2, 2020.  There is some linear scarring seen at both lung bases. Postoperative  changes are seen in the mediastinum No pleural effusion or  pneumothorax.      Impression    IMPRESSION:  Emphysema. Interstitial thickening which appears chronic.  Bilateral pulmonary scarring.      CHANELL MARCUS MD   CBC  with platelets differential   Result Value Ref [place]    WBC 7.1 4.0 - 11.0 10e9/L    RBC Count 4.45 4.4 - 5.9 10e12/L    Hemoglobin 14.0 13.3 - 17.7 g/dL    Hematocrit 41.6 40.0 - 53.0 %    MCV 94 78 - 100 fl    MCH 31.5 26.5 - 33.0 pg    MCHC 33.7 31.5 - 36.5 g/dL    RDW 13.0 10.0 - 15.0 %    Platelet Count 147 (L) 150 - 450 10e9/L    Diff Method Automated Method     % Neutrophils 67.6 %    % Lymphocytes 19.5 %    % Monocytes 12.4 %    % Eosinophils 0.0 %    % Basophils 0.1 %    % Immature Granulocytes 0.4 %    Nucleated RBCs 0 0 /100    Absolute Neutrophil 4.8 1.6 - 8.3 10e9/L    Absolute Lymphocytes 1.4 0.8 - 5.3 10e9/L    Absolute Monocytes 0.9 0.0 - 1.3 10e9/L    Absolute Eosinophils 0.0 0.0 - 0.7 10e9/L    Absolute Basophils 0.0 0.0 - 0.2 10e9/L    Abs Immature Granulocytes 0.0 0 - 0.4 10e9/L    Absolute Nucleated RBC 0.0    Comprehensive metabolic panel   Result Value Ref [place]    Sodium 135 133 - 144 mmol/L    Potassium 4.2 3.4 - 5.3 mmol/L    Chloride 100 94 - 109 mmol/L    Carbon Dioxide 26 20 - 32 mmol/L    Anion Gap 9 3 - 14 mmol/L    Glucose 129 (H) 70 - 99 mg/dL    Urea Nitrogen 32 (H) 7 - 30 mg/dL    Creatinine 1.14 0.66 - 1.25 mg/dL    GFR Estimate 63 >60 mL/min/[1.73_m2]    GFR Estimate If Black 73 >60 mL/min/[1.73_m2]    Calcium 8.7 8.5 - 10.1 mg/dL    Bilirubin Total 0.6 0.2 - 1.3 mg/dL    Albumin 3.1 (L) 3.4 - 5.0 g/dL    Protein Total 7.7 6.8 - 8.8 g/dL    Alkaline Phosphatase 83 40 - 150 U/L    ALT 13 0 - 70 U/L    AST 32 0 - 45 U/L   Lactic acid whole blood   Result Value Ref [place]    Lactic Acid 3.0 (H) 0.7 - 2.0 mmol/L   Procalcitonin   Result Value Ref [place]    Procalcitonin <0.05 ng/ml   Lipase   Result Value Ref [place]    Lipase 328 73 - 393 U/L       Medications   sodium chloride (PF) 0.9% PF flush 3 mL (has no administration in time range)   sodium chloride (PF) 0.9% PF flush 3 mL ( Intracatheter Canceled Entry 9/4/20 5940)   0.9% sodium chloride BOLUS (0 mLs Intravenous Stopped 9/4/20  "1844)     Followed by   0.9% sodium chloride BOLUS (1,000 mLs Intravenous New Bag 9/4/20 1844)     Followed by   sodium chloride 0.9% infusion (has no administration in time range)   vancomycin (VANCOCIN) 1,500 mg in sodium chloride 0.9 % 500 mL intermittent infusion (1,500 mg Intravenous New Bag 9/4/20 1844)   piperacillin-tazobactam (ZOSYN) infusion 3.375 g (0 g Intravenous Stopped 9/4/20 1844)       Assessments & Plan (with Medical Decision Making)   COVID-19 with hypoxemia: Patient was seen 2 days ago and returns today with worsening weakness but no shortness of breath.  He has been unable to get out of bed for the last 48 hours.  He looks weak and sick.  ABGs done today shows hypoxemia with PO2 of 67%.  Patient started on IV fluid hydration.  Dr. Gibson of [facility] was consulted on the phone about transferring this patient to their facility.  He stated \"If you used commonsense than this patient should be admitted to your facility rather than being transferred here because of one abnormal lab\" Dr. Augustin Newby medical director consulted on the phone was confirmed that C patient's been transferred to tertiary care centers rather than being admitted here.  Dr. Gibson called back and in a rude tone of voice accepted the patient to be transferred to their facility.  Will transfer via ambulance.    I have reviewed the nursing notes.    I have reviewed the findings, diagnosis, plan and need for follow up with the patient.    New Prescriptions    No medications on file       Final diagnoses:   2019 novel coronavirus disease (COVID-19)   Hypoxemia       9/4/2020   [facility]     Venkatesh العلي MD  09/04/20 9851    "

## 2020-09-05 NOTE — ED NOTES
Pt discharged at this time via EMS to [facility]. Wife present and aware of transfer. Belongings sent with patient. Report called to LILY Escobedo at [facility].

## 2020-09-07 ENCOUNTER — TRANSFERRED RECORDS (OUTPATIENT)
Dept: HEALTH INFORMATION MANAGEMENT | Facility: CLINIC | Age: 75
End: 2020-09-07

## 2020-09-09 LAB
BACTERIA SPEC CULT: ABNORMAL
BACTERIA SPEC CULT: ABNORMAL
SPECIMEN SOURCE: ABNORMAL

## 2020-09-11 ENCOUNTER — TRANSFERRED RECORDS (OUTPATIENT)
Dept: HEALTH INFORMATION MANAGEMENT | Facility: CLINIC | Age: 75
End: 2020-09-11

## 2020-09-14 ENCOUNTER — TRANSFERRED RECORDS (OUTPATIENT)
Dept: HEALTH INFORMATION MANAGEMENT | Facility: CLINIC | Age: 75
End: 2020-09-14

## 2020-09-22 ENCOUNTER — TRANSFERRED RECORDS (OUTPATIENT)
Dept: HEALTH INFORMATION MANAGEMENT | Facility: CLINIC | Age: 75
End: 2020-09-22

## 2020-09-24 ENCOUNTER — TELEPHONE (OUTPATIENT)
Dept: FAMILY MEDICINE | Facility: OTHER | Age: 75
End: 2020-09-24

## 2020-09-24 NOTE — TELEPHONE ENCOUNTER
3:51 PM    Reason for Call: Phone Call    Description: latasha from [facility] called to get verbal orders to admit to agency please call [phone] for further details       Karina Jara

## 2020-09-25 ENCOUNTER — MEDICAL CORRESPONDENCE (OUTPATIENT)
Dept: HEALTH INFORMATION MANAGEMENT | Facility: CLINIC | Age: 75
End: 2020-09-25

## 2020-09-25 ENCOUNTER — TELEPHONE (OUTPATIENT)
Dept: FAMILY MEDICINE | Facility: OTHER | Age: 75
End: 2020-09-25

## 2020-09-28 ENCOUNTER — TELEPHONE (OUTPATIENT)
Dept: FAMILY MEDICINE | Facility: OTHER | Age: 75
End: 2020-09-28

## 2020-09-28 NOTE — TELEPHONE ENCOUNTER
Received phone call from [facility] and they need to get verbal orders to  patient as a client. I gave verbal orders for this. You will be getting something to sign shortly today or this week. Anabel Craig LPN

## 2020-09-29 ENCOUNTER — MEDICAL CORRESPONDENCE (OUTPATIENT)
Dept: HEALTH INFORMATION MANAGEMENT | Facility: CLINIC | Age: 75
End: 2020-09-29

## 2020-09-30 ENCOUNTER — MEDICAL CORRESPONDENCE (OUTPATIENT)
Dept: HEALTH INFORMATION MANAGEMENT | Facility: CLINIC | Age: 75
End: 2020-09-30

## 2020-10-06 ENCOUNTER — TELEPHONE (OUTPATIENT)
Dept: FAMILY MEDICINE | Facility: OTHER | Age: 75
End: 2020-10-06

## 2020-10-06 NOTE — TELEPHONE ENCOUNTER
2:08 PM    Reason for Call: Verbal Orders     Description: Call from Nargis with [facility] requesting Verbal Orders for PT 1 x per week x 8 weeks and OT 1 x per week x 4 weeks.       Was an appointment offered for this call? No  If yes : Appointment type              Date    Preferred method for responding to this message: Telephone Call  What is your phone number ? Nargis [phone]    If we cannot reach you directly, may we leave a detailed response at the number you provided? Yes    Can this message wait until your PCP/provider returns, if available today? YES      Martha Xiong, RN

## 2020-10-07 NOTE — ED NOTES
Discharge instructions gone over with patient and family and they state understanding. Patient is then discharged in stable condition, per wheelchair, with family.    Simple: Patient demonstrates quick and easy understanding

## 2020-10-16 DIAGNOSIS — Z53.9 PERSONS ENCOUNTERING HEALTH SERVICES FOR SPECIFIC PROCEDURES, NOT CARRIED OUT: Primary | ICD-10-CM

## 2020-10-16 PROCEDURE — G0180 MD CERTIFICATION HHA PATIENT: HCPCS | Performed by: PHYSICIAN ASSISTANT

## 2020-11-02 DIAGNOSIS — I10 BENIGN ESSENTIAL HYPERTENSION: Primary | ICD-10-CM

## 2020-11-03 NOTE — TELEPHONE ENCOUNTER
metoprolol      Last Written Prescription Date:  1/13/20  Last Fill Quantity: 90,   # refills: 3  Last Office Visit: 8/21/19  Future Office visit:    Next 5 appointments (look out 90 days)    Jan 18, 2021 10:00 AM  (Arrive by 9:45 AM)  Return Visit with Oni Ortega DO  [facility] ([facility] [place] ) [address]  [place] [address]  [phone]

## 2020-11-04 RX ORDER — METOPROLOL SUCCINATE 25 MG/1
TABLET, EXTENDED RELEASE ORAL
Qty: 30 TABLET | Refills: 1 | Status: SHIPPED | OUTPATIENT
Start: 2020-11-04 | End: 2021-03-16

## 2020-11-18 DIAGNOSIS — Z00.00 HEALTH CARE MAINTENANCE: ICD-10-CM

## 2020-11-18 RX ORDER — FOLIC ACID 1 MG/1
TABLET ORAL
Qty: 90 TABLET | Refills: 0 | Status: SHIPPED | OUTPATIENT
Start: 2020-11-18 | End: 2021-02-23

## 2020-11-24 DIAGNOSIS — Z00.00 HEALTH CARE MAINTENANCE: ICD-10-CM

## 2020-11-25 RX ORDER — FOLIC ACID 1 MG/1
TABLET ORAL
Qty: 90 TABLET | Refills: 0 | OUTPATIENT
Start: 2020-11-25

## 2020-12-06 DIAGNOSIS — I48.91 NEW ONSET ATRIAL FIBRILLATION (H): ICD-10-CM

## 2020-12-06 DIAGNOSIS — Z86.73 HISTORY OF TIA (TRANSIENT ISCHEMIC ATTACK): ICD-10-CM

## 2020-12-06 DIAGNOSIS — I48.0 PAROXYSMAL ATRIAL FIBRILLATION (H): Primary | ICD-10-CM

## 2020-12-06 DIAGNOSIS — Z79.01 CHRONIC ANTICOAGULATION: ICD-10-CM

## 2020-12-08 RX ORDER — APIXABAN 5 MG/1
TABLET, FILM COATED ORAL
Qty: 180 TABLET | Refills: 3 | Status: SHIPPED | OUTPATIENT
Start: 2020-12-08 | End: 2021-11-24

## 2020-12-08 NOTE — TELEPHONE ENCOUNTER
Requested Prescriptions   Pending Prescriptions Disp Refills     ELIQUIS ANTICOAGULANT 5 MG tablet [Pharmacy Med Name: ELIQUIS TAB 5MG] 180 tablet 3     Sig: TAKE 1 TABLET TWICE A DAY       Direct Oral Anticoagulant Agents Failed - 12/6/2020  7:11 AM        Failed - Normal Platelets on file in past 12 months     Recent Labs   Lab Test 09/04/20  1729   *               Failed - Recent (6 mo) or future (30 days) visit within the authorizing provider's specialty        Passed - Medication is active on med list        Passed - Patient is 18-79 years of age        Passed - Serum creatinine less than or equal to 1.4 on file in past 12 mos     Recent Labs   Lab Test 09/04/20  1729   CR 1.14       Ok to refill medication if creatinine is low          Passed - Weight is greater than 60 kg for the past year     Wt Readings from Last 3 Encounters:   01/13/20 78 kg (172 lb)   08/21/19 83 kg (183 lb)   04/15/19 78.9 kg (174 lb)                 Last Written Prescription Date:  01/13/2020  Last Fill Quantity: 180,   # refills: 3  Last Office Visit: 01/13/2020 Oni Ortega DO  Future Office visit:    Next 5 appointments (look out 90 days)    Jan 18, 2021 10:00 AM  (Arrive by 9:45 AM)  [facility]i [facility]tega DO  [facility] [address] [facility] [address]  [phone]                .  Unable to complete prescription refill per RN medication refill policy. Will route to provider for review and consideration.  Wendy Ramirez RN on 12/8/2020 at 4:43 PM

## 2021-01-15 DIAGNOSIS — I25.10 CORONARY ARTERY DISEASE INVOLVING NATIVE CORONARY ARTERY OF NATIVE HEART WITHOUT ANGINA PECTORIS: Primary | ICD-10-CM

## 2021-01-15 DIAGNOSIS — I48.0 PAROXYSMAL ATRIAL FIBRILLATION (H): ICD-10-CM

## 2021-02-12 DIAGNOSIS — N40.1 BENIGN PROSTATIC HYPERPLASIA WITH URINARY RETENTION: ICD-10-CM

## 2021-02-12 DIAGNOSIS — E78.5 HYPERLIPIDEMIA WITH TARGET LDL LESS THAN 100: ICD-10-CM

## 2021-02-12 DIAGNOSIS — R33.8 BENIGN PROSTATIC HYPERPLASIA WITH URINARY RETENTION: ICD-10-CM

## 2021-02-12 DIAGNOSIS — I10 ESSENTIAL HYPERTENSION WITH GOAL BLOOD PRESSURE LESS THAN 140/90: ICD-10-CM

## 2021-02-15 NOTE — TELEPHONE ENCOUNTER
" Disp Refills Start End [place]   finasteride (PROSCAR) 5 MG tablet 90 tablet 3 1/13/2020  No   Sig - Route: Take 1 tablet (5 mg) by mouth daily - Oral      Disp Refills Start End [place]   simvastatin (ZOCOR) 20 MG tablet 90 tablet 3 1/13/2020  No   Sig: TAKE 1 TABLET BY MOUTH EVERY EVENING - GENERIC FOR ZOCOR      Disp Refills Start End [place]   tamsulosin (FLOMAX) 0.4 MG capsule 90 capsule 3 1/13/2020  No   Sig: TAKE 1 CAPSULE (0.4 MG) BY MOUTH DAILY      Disp Refills Start End [place]   lisinopril (PRINIVIL/ZESTRIL) 5 MG tablet 90 tablet 3 1/13/2020  No   Sig: TAKE 1 TABLET DAILY BY MOUTH     Pharmacy note: \"Patient enrolled in our Rx Med Sync service to improveadherence. We are requesting a refill authorization inadvance to ensure an active prescription is on file.\"      LOV: 1/13/2020  Future Office visit: Patient was to follow up with Cardiology in 1 year. Patient is overdue for a follow up appointment.     Failed protocol  Patient seen in [facility].    Jewels Nolasco RN  ....................  2/15/2021   1:27 PM      "

## 2021-02-17 RX ORDER — FINASTERIDE 5 MG/1
TABLET, FILM COATED ORAL
Qty: 90 TABLET | Refills: 0 | Status: SHIPPED | OUTPATIENT
Start: 2021-02-17 | End: 2021-05-19

## 2021-02-17 RX ORDER — SIMVASTATIN 20 MG
TABLET ORAL
Qty: 90 TABLET | Refills: 0 | Status: SHIPPED | OUTPATIENT
Start: 2021-02-17 | End: 2021-05-19

## 2021-02-17 RX ORDER — LISINOPRIL 5 MG/1
TABLET ORAL
Qty: 90 TABLET | Refills: 0 | Status: SHIPPED | OUTPATIENT
Start: 2021-02-17 | End: 2021-05-19

## 2021-02-17 RX ORDER — TAMSULOSIN HYDROCHLORIDE 0.4 MG/1
CAPSULE ORAL
Qty: 90 CAPSULE | Refills: 0 | Status: SHIPPED | OUTPATIENT
Start: 2021-02-17 | End: 2021-05-19

## 2021-02-23 DIAGNOSIS — Z00.00 HEALTH CARE MAINTENANCE: ICD-10-CM

## 2021-02-23 NOTE — TELEPHONE ENCOUNTER
Folic acid 1 mg      Last Written Prescription Date:  11-  Last Fill Quantity: 90,   # refills: 0  Last Office Visit: 8-21-19

## 2021-02-24 RX ORDER — FOLIC ACID 1 MG/1
1000 TABLET ORAL DAILY
Qty: 90 TABLET | Refills: 0 | Status: SHIPPED | OUTPATIENT
Start: 2021-02-24 | End: 2021-05-19

## 2021-03-15 DIAGNOSIS — I10 BENIGN ESSENTIAL HYPERTENSION: ICD-10-CM

## 2021-03-15 NOTE — TELEPHONE ENCOUNTER
Metoprolol succinate ER       Last Written Prescription Date:  11/04/2020  Last Fill Quantity: 30,   # refills: 1  Last Office Visit: 08/08/2020  Future Office visit:       Routing refill request to provider for review/approval because:

## 2021-03-16 RX ORDER — METOPROLOL SUCCINATE 25 MG/1
TABLET, EXTENDED RELEASE ORAL
Qty: 30 TABLET | Refills: 1 | Status: SHIPPED | OUTPATIENT
Start: 2021-03-16 | End: 2021-07-08

## 2021-03-22 ENCOUNTER — APPOINTMENT (OUTPATIENT)
Dept: CT IMAGING | Facility: HOSPITAL | Age: 76
DRG: 871 | End: 2021-03-22
Attending: EMERGENCY MEDICINE
Payer: COMMERCIAL

## 2021-03-22 ENCOUNTER — HOSPITAL ENCOUNTER (INPATIENT)
Facility: HOSPITAL | Age: 76
LOS: 4 days | Discharge: HOME | DRG: 871 | End: 2021-03-27
Attending: EMERGENCY MEDICINE | Admitting: INTERNAL MEDICINE
Payer: COMMERCIAL

## 2021-03-22 DIAGNOSIS — K56.41 FECAL IMPACTION (H): ICD-10-CM

## 2021-03-22 DIAGNOSIS — N30.00 ACUTE CYSTITIS WITHOUT HEMATURIA: Primary | ICD-10-CM

## 2021-03-22 DIAGNOSIS — K52.9 COLITIS: ICD-10-CM

## 2021-03-22 LAB
ALBUMIN SERPL-MCNC: 3.5 G/DL (ref 3.4–5)
ALP SERPL-CCNC: 116 U/L (ref 40–150)
ALT SERPL W P-5'-P-CCNC: 10 U/L (ref 0–70)
ANION GAP SERPL CALCULATED.3IONS-SCNC: 6 MMOL/L (ref 3–14)
AST SERPL W P-5'-P-CCNC: 18 U/L (ref 0–45)
BASOPHILS # BLD AUTO: 0 10E9/L (ref 0–0.2)
BASOPHILS NFR BLD AUTO: 0.3 %
BILIRUB SERPL-MCNC: 0.3 MG/DL (ref 0.2–1.3)
BUN SERPL-MCNC: 23 MG/DL (ref 7–30)
CALCIUM SERPL-MCNC: 9.3 MG/DL (ref 8.5–10.1)
CHLORIDE SERPL-SCNC: 101 MMOL/L (ref 94–109)
CO2 SERPL-SCNC: 28 MMOL/L (ref 20–32)
CREAT SERPL-MCNC: 1.16 MG/DL (ref 0.66–1.25)
DIFFERENTIAL METHOD BLD: ABNORMAL
EOSINOPHIL # BLD AUTO: 0.1 10E9/L (ref 0–0.7)
EOSINOPHIL NFR BLD AUTO: 0.6 %
ERYTHROCYTE [DISTWIDTH] IN BLOOD BY AUTOMATED COUNT: 13.7 % (ref 10–15)
ETHANOL SERPL-MCNC: <0.01 G/DL
GFR SERPL CREATININE-BSD FRML MDRD: 62 ML/MIN/{1.73_M2}
GLUCOSE SERPL-MCNC: 109 MG/DL (ref 70–99)
HCT VFR BLD AUTO: 44.5 % (ref 40–53)
HGB BLD-MCNC: 14.5 G/DL (ref 13.3–17.7)
IMM GRANULOCYTES # BLD: 0.1 10E9/L (ref 0–0.4)
IMM GRANULOCYTES NFR BLD: 0.5 %
INR PPP: 1.18 (ref 0.86–1.14)
LACTATE BLD-SCNC: 2.4 MMOL/L (ref 0.7–2)
LIPASE SERPL-CCNC: 193 U/L (ref 73–393)
LYMPHOCYTES # BLD AUTO: 1 10E9/L (ref 0.8–5.3)
LYMPHOCYTES NFR BLD AUTO: 6.9 %
MCH RBC QN AUTO: 31.7 PG (ref 26.5–33)
MCHC RBC AUTO-ENTMCNC: 32.6 G/DL (ref 31.5–36.5)
MCV RBC AUTO: 97 FL (ref 78–100)
MONOCYTES # BLD AUTO: 0.3 10E9/L (ref 0–1.3)
MONOCYTES NFR BLD AUTO: 1.8 %
NEUTROPHILS # BLD AUTO: 12.7 10E9/L (ref 1.6–8.3)
NEUTROPHILS NFR BLD AUTO: 89.9 %
NRBC # BLD AUTO: 0 10*3/UL
NRBC BLD AUTO-RTO: 0 /100
PLATELET # BLD AUTO: 241 10E9/L (ref 150–450)
POTASSIUM SERPL-SCNC: 4 MMOL/L (ref 3.4–5.3)
PROT SERPL-MCNC: 8.3 G/DL (ref 6.8–8.8)
RBC # BLD AUTO: 4.58 10E12/L (ref 4.4–5.9)
SODIUM SERPL-SCNC: 135 MMOL/L (ref 133–144)
TROPONIN I SERPL-MCNC: <0.015 UG/L (ref 0–0.04)
WBC # BLD AUTO: 14.1 10E9/L (ref 4–11)

## 2021-03-22 PROCEDURE — 82077 ASSAY SPEC XCP UR&BREATH IA: CPT | Performed by: EMERGENCY MEDICINE

## 2021-03-22 PROCEDURE — 85610 PROTHROMBIN TIME: CPT | Performed by: EMERGENCY MEDICINE

## 2021-03-22 PROCEDURE — 86140 C-REACTIVE PROTEIN: CPT | Performed by: INTERNAL MEDICINE

## 2021-03-22 PROCEDURE — 83605 ASSAY OF LACTIC ACID: CPT | Performed by: EMERGENCY MEDICINE

## 2021-03-22 PROCEDURE — 255N000002 HC RX 255 OP 636: Performed by: EMERGENCY MEDICINE

## 2021-03-22 PROCEDURE — 70450 CT HEAD/BRAIN W/O DYE: CPT

## 2021-03-22 PROCEDURE — 74177 CT ABD & PELVIS W/CONTRAST: CPT

## 2021-03-22 PROCEDURE — 84484 ASSAY OF TROPONIN QUANT: CPT | Performed by: EMERGENCY MEDICINE

## 2021-03-22 PROCEDURE — 85025 COMPLETE CBC W/AUTO DIFF WBC: CPT | Performed by: EMERGENCY MEDICINE

## 2021-03-22 PROCEDURE — 83690 ASSAY OF LIPASE: CPT | Performed by: EMERGENCY MEDICINE

## 2021-03-22 PROCEDURE — 80306 DRUG TEST PRSMV INSTRMNT: CPT | Performed by: EMERGENCY MEDICINE

## 2021-03-22 PROCEDURE — 96365 THER/PROPH/DIAG IV INF INIT: CPT

## 2021-03-22 PROCEDURE — 99285 EMERGENCY DEPT VISIT HI MDM: CPT | Mod: 25

## 2021-03-22 PROCEDURE — 80053 COMPREHEN METABOLIC PANEL: CPT | Performed by: EMERGENCY MEDICINE

## 2021-03-22 PROCEDURE — 250N000011 HC RX IP 250 OP 636: Performed by: EMERGENCY MEDICINE

## 2021-03-22 PROCEDURE — 99283 EMERGENCY DEPT VISIT LOW MDM: CPT | Performed by: EMERGENCY MEDICINE

## 2021-03-22 PROCEDURE — 84443 ASSAY THYROID STIM HORMONE: CPT | Performed by: INTERNAL MEDICINE

## 2021-03-22 PROCEDURE — 74174 CTA ABD&PLVS W/CONTRAST: CPT

## 2021-03-22 PROCEDURE — 81001 URINALYSIS AUTO W/SCOPE: CPT | Performed by: EMERGENCY MEDICINE

## 2021-03-22 RX ORDER — IOPAMIDOL 755 MG/ML
100 INJECTION, SOLUTION INTRAVASCULAR ONCE
Status: COMPLETED | OUTPATIENT
Start: 2021-03-22 | End: 2021-03-23

## 2021-03-22 RX ORDER — IOPAMIDOL 612 MG/ML
100 INJECTION, SOLUTION INTRAVASCULAR ONCE
Status: COMPLETED | OUTPATIENT
Start: 2021-03-22 | End: 2021-03-22

## 2021-03-22 RX ADMIN — TAZOBACTAM SODIUM AND PIPERACILLIN SODIUM 4.5 G: 500; 4 INJECTION, SOLUTION INTRAVENOUS at 22:13

## 2021-03-22 RX ADMIN — IOPAMIDOL 100 ML: 612 INJECTION, SOLUTION INTRAVENOUS at 21:09

## 2021-03-22 ASSESSMENT — ENCOUNTER SYMPTOMS
CARDIOVASCULAR NEGATIVE: 1
SHORTNESS OF BREATH: 0
CONSTITUTIONAL NEGATIVE: 1
MUSCULOSKELETAL NEGATIVE: 1
FEVER: 0
NERVOUS/ANXIOUS: 0
ALLERGIC/IMMUNOLOGIC NEGATIVE: 1
NECK PAIN: 0
RESPIRATORY NEGATIVE: 1
NECK STIFFNESS: 0
ABDOMINAL PAIN: 1
NEUROLOGICAL NEGATIVE: 1
HEMATOLOGIC/LYMPHATIC NEGATIVE: 1
PHOTOPHOBIA: 0
PSYCHIATRIC NEGATIVE: 1
ENDOCRINE NEGATIVE: 1
EYES NEGATIVE: 1
SLEEP DISTURBANCE: 0

## 2021-03-23 PROBLEM — K52.9 COLITIS: Status: ACTIVE | Noted: 2021-03-23

## 2021-03-23 LAB
ALBUMIN SERPL-MCNC: 2.8 G/DL (ref 3.4–5)
ALBUMIN UR-MCNC: 30 MG/DL
ALP SERPL-CCNC: 86 U/L (ref 40–150)
ALT SERPL W P-5'-P-CCNC: 8 U/L (ref 0–70)
AMPHETAMINES UR QL: NOT DETECTED NG/ML
ANION GAP SERPL CALCULATED.3IONS-SCNC: 2 MMOL/L (ref 3–14)
APPEARANCE UR: ABNORMAL
AST SERPL W P-5'-P-CCNC: 14 U/L (ref 0–45)
BACTERIA #/AREA URNS HPF: ABNORMAL /HPF
BARBITURATES UR QL SCN: NOT DETECTED NG/ML
BENZODIAZ UR QL SCN: NOT DETECTED NG/ML
BILIRUB SERPL-MCNC: 0.6 MG/DL (ref 0.2–1.3)
BILIRUB UR QL STRIP: NEGATIVE
BUN SERPL-MCNC: 20 MG/DL (ref 7–30)
BUPRENORPHINE UR QL: NOT DETECTED NG/ML
CALCIUM SERPL-MCNC: 8.2 MG/DL (ref 8.5–10.1)
CANNABINOIDS UR QL: NOT DETECTED NG/ML
CHLORIDE SERPL-SCNC: 104 MMOL/L (ref 94–109)
CO2 SERPL-SCNC: 27 MMOL/L (ref 20–32)
COCAINE UR QL SCN: NOT DETECTED NG/ML
COLOR UR AUTO: YELLOW
CREAT SERPL-MCNC: 1.15 MG/DL (ref 0.66–1.25)
CRP SERPL-MCNC: 11.3 MG/L (ref 0–8)
CRP SERPL-MCNC: <2.9 MG/L (ref 0–8)
D-METHAMPHET UR QL: NOT DETECTED NG/ML
ERYTHROCYTE [DISTWIDTH] IN BLOOD BY AUTOMATED COUNT: 13.9 % (ref 10–15)
GFR SERPL CREATININE-BSD FRML MDRD: 62 ML/MIN/{1.73_M2}
GLUCOSE SERPL-MCNC: 108 MG/DL (ref 70–99)
GLUCOSE UR STRIP-MCNC: NEGATIVE MG/DL
HCT VFR BLD AUTO: 38.7 % (ref 40–53)
HGB BLD-MCNC: 12.6 G/DL (ref 13.3–17.7)
HGB UR QL STRIP: ABNORMAL
KETONES UR STRIP-MCNC: NEGATIVE MG/DL
LABORATORY COMMENT REPORT: NORMAL
LACTATE BLD-SCNC: 1.2 MMOL/L (ref 0.7–2)
LACTATE BLD-SCNC: 1.3 MMOL/L (ref 0.7–2)
LEUKOCYTE ESTERASE UR QL STRIP: ABNORMAL
MCH RBC QN AUTO: 32.1 PG (ref 26.5–33)
MCHC RBC AUTO-ENTMCNC: 32.6 G/DL (ref 31.5–36.5)
MCV RBC AUTO: 99 FL (ref 78–100)
METHADONE UR QL SCN: NOT DETECTED NG/ML
NITRATE UR QL: POSITIVE
OPIATES UR QL SCN: NOT DETECTED NG/ML
OXYCODONE UR QL SCN: NOT DETECTED NG/ML
PCP UR QL SCN: NOT DETECTED NG/ML
PH UR STRIP: 7.5 PH (ref 4.7–8)
PLATELET # BLD AUTO: 206 10E9/L (ref 150–450)
POTASSIUM SERPL-SCNC: 4.2 MMOL/L (ref 3.4–5.3)
PROPOXYPH UR QL: NOT DETECTED NG/ML
PROT SERPL-MCNC: 6.8 G/DL (ref 6.8–8.8)
RBC # BLD AUTO: 3.92 10E12/L (ref 4.4–5.9)
RBC #/AREA URNS AUTO: 17 /HPF (ref 0–2)
SARS-COV-2 RNA RESP QL NAA+PROBE: NEGATIVE
SODIUM SERPL-SCNC: 133 MMOL/L (ref 133–144)
SOURCE: ABNORMAL
SP GR UR STRIP: 1.01 (ref 1–1.03)
SPECIMEN SOURCE: NORMAL
SQUAMOUS #/AREA URNS AUTO: 0 /HPF (ref 0–1)
TRICYCLICS UR QL SCN: NOT DETECTED NG/ML
TSH SERPL DL<=0.005 MIU/L-ACNC: 6.56 MU/L (ref 0.4–4)
UROBILINOGEN UR STRIP-MCNC: NORMAL MG/DL (ref 0–2)
WBC # BLD AUTO: 8.8 10E9/L (ref 4–11)
WBC #/AREA URNS AUTO: >182 /HPF (ref 0–5)
WBC CLUMPS #/AREA URNS HPF: PRESENT /HPF

## 2021-03-23 PROCEDURE — 87186 SC STD MICRODIL/AGAR DIL: CPT | Performed by: INTERNAL MEDICINE

## 2021-03-23 PROCEDURE — 86140 C-REACTIVE PROTEIN: CPT | Performed by: INTERNAL MEDICINE

## 2021-03-23 PROCEDURE — 258N000003 HC RX IP 258 OP 636: Performed by: EMERGENCY MEDICINE

## 2021-03-23 PROCEDURE — 36415 COLL VENOUS BLD VENIPUNCTURE: CPT | Performed by: INTERNAL MEDICINE

## 2021-03-23 PROCEDURE — 87088 URINE BACTERIA CULTURE: CPT | Performed by: INTERNAL MEDICINE

## 2021-03-23 PROCEDURE — 96361 HYDRATE IV INFUSION ADD-ON: CPT

## 2021-03-23 PROCEDURE — 250N000013 HC RX MED GY IP 250 OP 250 PS 637: Performed by: INTERNAL MEDICINE

## 2021-03-23 PROCEDURE — 85027 COMPLETE CBC AUTOMATED: CPT | Performed by: INTERNAL MEDICINE

## 2021-03-23 PROCEDURE — C9803 HOPD COVID-19 SPEC COLLECT: HCPCS

## 2021-03-23 PROCEDURE — 87086 URINE CULTURE/COLONY COUNT: CPT | Performed by: INTERNAL MEDICINE

## 2021-03-23 PROCEDURE — 120N000001 HC R&B MED SURG/OB

## 2021-03-23 PROCEDURE — U0005 INFEC AGEN DETEC AMPLI PROBE: HCPCS | Performed by: EMERGENCY MEDICINE

## 2021-03-23 PROCEDURE — 250N000011 HC RX IP 250 OP 636

## 2021-03-23 PROCEDURE — 250N000011 HC RX IP 250 OP 636: Performed by: INTERNAL MEDICINE

## 2021-03-23 PROCEDURE — 258N000003 HC RX IP 258 OP 636: Performed by: INTERNAL MEDICINE

## 2021-03-23 PROCEDURE — 250N000011 HC RX IP 250 OP 636: Performed by: EMERGENCY MEDICINE

## 2021-03-23 PROCEDURE — 80053 COMPREHEN METABOLIC PANEL: CPT | Performed by: INTERNAL MEDICINE

## 2021-03-23 PROCEDURE — U0003 INFECTIOUS AGENT DETECTION BY NUCLEIC ACID (DNA OR RNA); SEVERE ACUTE RESPIRATORY SYNDROME CORONAVIRUS 2 (SARS-COV-2) (CORONAVIRUS DISEASE [COVID-19]), AMPLIFIED PROBE TECHNIQUE, MAKING USE OF HIGH THROUGHPUT TECHNOLOGIES AS DESCRIBED BY CMS-2020-01-R: HCPCS | Performed by: EMERGENCY MEDICINE

## 2021-03-23 PROCEDURE — 99222 1ST HOSP IP/OBS MODERATE 55: CPT | Mod: AI | Performed by: INTERNAL MEDICINE

## 2021-03-23 PROCEDURE — 83605 ASSAY OF LACTIC ACID: CPT | Performed by: INTERNAL MEDICINE

## 2021-03-23 RX ORDER — LANOLIN ALCOHOL/MO/W.PET/CERES
3 CREAM (GRAM) TOPICAL
Status: DISCONTINUED | OUTPATIENT
Start: 2021-03-23 | End: 2021-03-27 | Stop reason: HOSPADM

## 2021-03-23 RX ORDER — ONDANSETRON 4 MG/1
4 TABLET, ORALLY DISINTEGRATING ORAL EVERY 6 HOURS PRN
Status: DISCONTINUED | OUTPATIENT
Start: 2021-03-23 | End: 2021-03-27 | Stop reason: HOSPADM

## 2021-03-23 RX ORDER — TAMSULOSIN HYDROCHLORIDE 0.4 MG/1
0.4 CAPSULE ORAL DAILY
Status: DISCONTINUED | OUTPATIENT
Start: 2021-03-23 | End: 2021-03-27 | Stop reason: HOSPADM

## 2021-03-23 RX ORDER — FAMOTIDINE 20 MG/1
20 TABLET, FILM COATED ORAL 2 TIMES DAILY
Status: DISCONTINUED | OUTPATIENT
Start: 2021-03-23 | End: 2021-03-27 | Stop reason: HOSPADM

## 2021-03-23 RX ORDER — LISINOPRIL 5 MG/1
5 TABLET ORAL DAILY
Status: DISCONTINUED | OUTPATIENT
Start: 2021-03-23 | End: 2021-03-27 | Stop reason: HOSPADM

## 2021-03-23 RX ORDER — SIMVASTATIN 20 MG
20 TABLET ORAL AT BEDTIME
Status: DISCONTINUED | OUTPATIENT
Start: 2021-03-23 | End: 2021-03-27 | Stop reason: HOSPADM

## 2021-03-23 RX ORDER — LIDOCAINE 40 MG/G
CREAM TOPICAL
Status: DISCONTINUED | OUTPATIENT
Start: 2021-03-23 | End: 2021-03-27 | Stop reason: HOSPADM

## 2021-03-23 RX ORDER — FINASTERIDE 5 MG/1
5 TABLET, FILM COATED ORAL DAILY
Status: DISCONTINUED | OUTPATIENT
Start: 2021-03-23 | End: 2021-03-27 | Stop reason: HOSPADM

## 2021-03-23 RX ORDER — ASPIRIN 81 MG/1
81 TABLET, CHEWABLE ORAL DAILY
Status: DISCONTINUED | OUTPATIENT
Start: 2021-03-23 | End: 2021-03-27 | Stop reason: HOSPADM

## 2021-03-23 RX ORDER — LACTULOSE 10 G/15ML
20 SOLUTION ORAL 2 TIMES DAILY
Status: DISCONTINUED | OUTPATIENT
Start: 2021-03-23 | End: 2021-03-25

## 2021-03-23 RX ORDER — SODIUM CHLORIDE 9 MG/ML
INJECTION, SOLUTION INTRAVENOUS CONTINUOUS
Status: DISCONTINUED | OUTPATIENT
Start: 2021-03-23 | End: 2021-03-25

## 2021-03-23 RX ORDER — HALOPERIDOL 5 MG/ML
5 INJECTION INTRAMUSCULAR ONCE
Status: DISCONTINUED | OUTPATIENT
Start: 2021-03-23 | End: 2021-03-23

## 2021-03-23 RX ORDER — CEFTRIAXONE SODIUM 1 G/50ML
1 INJECTION, SOLUTION INTRAVENOUS EVERY 24 HOURS
Status: DISCONTINUED | OUTPATIENT
Start: 2021-03-23 | End: 2021-03-24

## 2021-03-23 RX ORDER — BISACODYL 10 MG
10 SUPPOSITORY, RECTAL RECTAL DAILY
Status: DISCONTINUED | OUTPATIENT
Start: 2021-03-23 | End: 2021-03-25

## 2021-03-23 RX ORDER — LORAZEPAM 2 MG/ML
2 INJECTION INTRAMUSCULAR ONCE
Status: DISCONTINUED | OUTPATIENT
Start: 2021-03-23 | End: 2021-03-23

## 2021-03-23 RX ORDER — ONDANSETRON 2 MG/ML
4 INJECTION INTRAMUSCULAR; INTRAVENOUS EVERY 6 HOURS PRN
Status: DISCONTINUED | OUTPATIENT
Start: 2021-03-23 | End: 2021-03-27 | Stop reason: HOSPADM

## 2021-03-23 RX ORDER — ACETAMINOPHEN 325 MG/1
650 TABLET ORAL EVERY 4 HOURS PRN
Status: DISCONTINUED | OUTPATIENT
Start: 2021-03-23 | End: 2021-03-27 | Stop reason: HOSPADM

## 2021-03-23 RX ADMIN — SODIUM CHLORIDE 500 ML: 9 INJECTION, SOLUTION INTRAVENOUS at 01:57

## 2021-03-23 RX ADMIN — METRONIDAZOLE 500 MG: 500 INJECTION, SOLUTION INTRAVENOUS at 03:38

## 2021-03-23 RX ADMIN — METOPROLOL SUCCINATE 75 MG: 50 TABLET, EXTENDED RELEASE ORAL at 10:03

## 2021-03-23 RX ADMIN — SODIUM CHLORIDE 1000 ML: 9 INJECTION, SOLUTION INTRAVENOUS at 22:12

## 2021-03-23 RX ADMIN — APIXABAN 5 MG: 2.5 TABLET, FILM COATED ORAL at 22:15

## 2021-03-23 RX ADMIN — LISINOPRIL 5 MG: 5 TABLET ORAL at 10:05

## 2021-03-23 RX ADMIN — FAMOTIDINE 20 MG: 20 TABLET, FILM COATED ORAL at 22:15

## 2021-03-23 RX ADMIN — IOPAMIDOL 100 ML: 755 INJECTION, SOLUTION INTRAVENOUS at 00:11

## 2021-03-23 RX ADMIN — SIMVASTATIN 20 MG: 20 TABLET, FILM COATED ORAL at 22:15

## 2021-03-23 RX ADMIN — SODIUM CHLORIDE: 9 INJECTION, SOLUTION INTRAVENOUS at 17:55

## 2021-03-23 RX ADMIN — FAMOTIDINE 20 MG: 20 TABLET, FILM COATED ORAL at 10:03

## 2021-03-23 RX ADMIN — METRONIDAZOLE 500 MG: 500 INJECTION, SOLUTION INTRAVENOUS at 10:09

## 2021-03-23 RX ADMIN — LACTULOSE 20 G: 10 SOLUTION ORAL at 13:15

## 2021-03-23 RX ADMIN — ASPIRIN 81 MG: 81 TABLET, CHEWABLE ORAL at 10:04

## 2021-03-23 RX ADMIN — BISACODYL 10 MG: 10 SUPPOSITORY RECTAL at 10:09

## 2021-03-23 RX ADMIN — ACETAMINOPHEN 650 MG: 325 TABLET, FILM COATED ORAL at 19:12

## 2021-03-23 RX ADMIN — TAMSULOSIN HYDROCHLORIDE 0.4 MG: 0.4 CAPSULE ORAL at 10:06

## 2021-03-23 RX ADMIN — SODIUM CHLORIDE: 9 INJECTION, SOLUTION INTRAVENOUS at 02:59

## 2021-03-23 RX ADMIN — FINASTERIDE 5 MG: 5 TABLET, FILM COATED ORAL at 10:05

## 2021-03-23 RX ADMIN — SODIUM CHLORIDE: 9 INJECTION, SOLUTION INTRAVENOUS at 07:57

## 2021-03-23 RX ADMIN — APIXABAN 5 MG: 2.5 TABLET, FILM COATED ORAL at 10:06

## 2021-03-23 RX ADMIN — LACTULOSE 20 G: 10 SOLUTION ORAL at 22:18

## 2021-03-23 RX ADMIN — MAGNESIUM HYDROXIDE 30 ML: 400 SUSPENSION ORAL at 10:07

## 2021-03-23 RX ADMIN — CEFTRIAXONE SODIUM 1 G: 1 INJECTION, SOLUTION INTRAVENOUS at 07:58

## 2021-03-23 RX ADMIN — METRONIDAZOLE 500 MG: 500 INJECTION, SOLUTION INTRAVENOUS at 22:20

## 2021-03-23 ASSESSMENT — ACTIVITIES OF DAILY LIVING (ADL)
ADLS_ACUITY_SCORE: 21
ADLS_ACUITY_SCORE: 17

## 2021-03-23 ASSESSMENT — MIFFLIN-ST. JEOR: SCORE: 1516

## 2021-03-23 NOTE — ED PROVIDER NOTES
8.20am - Called and notified Dr. Le of updated CTA report showing renal infarct.      Danilo Sorenson MD  03/23/21 0591

## 2021-03-23 NOTE — DISCHARGE INSTRUCTIONS
What to expect when you have contrast    During your exam, we will inject  contrast  into your vein or artery. (Contrast is a clear liquid with iodine in it. It shows up on X-rays.)    You may feel warm or hot. You may have a metal taste in your mouth and a slight upset stomach. You may also feel pressure near the kidneys and bladder. These effects will last about 1 to 3 minutes.    Please tell us if you have:    Sneezing     Itching    Hives     Swelling in the face    A hoarse voice    Breathing problems    Other new symptoms            YOU NEED A FOLLOW UP APPOINTMENT WITH DEAN WINN CNP AT THE [facility] WITHIN 7 DAYS OF DISCHARGE--WE ARE UNABLE TO DO THIS ON A WEEKEND--PLEASE CALL [phone] ON Monday TO SCHEDULE THIS                   Serious problems are rare.  They may include:    Irregular heartbeat     Seizures    Kidney failure              Tissue damage    Shock      Death    If you have any problems during the exam, we  will treat them right away.    When you get home    Call your hospital if you have any new symptoms in the next 2 days, like hives or swelling. (Phone numbers are at the bottom of this page.) Or call your family doctor.     If you have wheezing or trouble breathing, call [phone].    Self-care  -Drink at least 4 extra glasses of water today.   This reduces the stress on your kidneys.  -Keep taking your regular medicines.    The contrast will pass out of your body in your  Urine(pee). This will happen in the next 24 hours. You  will not feel this. Your urine will not  change color.    If you have kidney problems or take metformin    Drink 4 to 8 large glasses of water for the next  2 days, if you are not on a fluid restriction.    ?If you take metformin (Glucophage or Glucovance) for diabetes, keep taking it.      ?Your kidney function tests are abnormal.  If you take Metformin, do not take it for 48 hours. Please go to your clinic for a blood test within 3 days after your exam before  the restarting this medicine.     (Note to provider:please give patient prescription for lab tests.)    ?Special instructions:     I have read and understand the above information.    Patient Sign Here:______________________________________Date:________Time:______    Staff Sign Here:________________________________________Date:_______Time:______      Radiology Departments:     ?[facility]: [phone] ?[place]: [phone]     ?[place]: [phone] ?[facility]:[phone]      ?[place]: [phone]  ?[place]: [phone]  ?[place]:[phone]    ?[facility]:[phone]  ?[facility]:[phone]

## 2021-03-23 NOTE — ED PROVIDER NOTES
History     Chief Complaint   Patient presents with     Nausea     Abdominal Pain     pt denies      HPI  Kostas Cotto is a 75 year old male who presents today with complaints of nausea and abdominal pain.  Family called stating that patient patient was complaining of abdominal pain.  However patient denies any abdominal pain.  Patient has no complaints whatsoever.    Allergies:  Allergies   Allergen Reactions     Oxycodone      Caused him delirium   Caused him delirium        Problem List:    Patient Active Problem List    Diagnosis Date Noted     Gram-positive bacteremia 09/04/2020     Priority: Medium     Hypoxemia 09/04/2020     Priority: Medium     2019 novel coronavirus disease (COVID-19) 09/02/2020     Priority: Medium     GERD 01/13/2020     Priority: Medium     New onset atrial fibrillation on 1/8/18 01/14/2019     Priority: Medium     Need for prophylactic vaccination and inoculation against influenza 01/14/2019     Priority: Medium     History of CVA (cerebrovascular accident) 01/14/2019     Priority: Medium     Essential hypertension 01/14/2019     Priority: Medium     Bifascicular block 01/14/2019     Priority: Medium     History of AAA (abdominal aortic aneurysm) repair 01/14/2019     Priority: Medium     Chronic anticoagulation 01/14/2019     Priority: Medium     History of TIA on 1/8/18 01/14/2019     Priority: Medium     Paroxysmal atrial fibrillation (H) 05/18/2018     Priority: Medium     Mixed hyperlipidemia 05/18/2018     Priority: Medium     H/O ventricular fibrillation in 2003 with cardiac arrest 05/18/2018     Priority: Medium     History of myocardial infarction in 2003 05/18/2018     Priority: Medium     History of coronary artery bypass graft x 3 in 2005 01/24/2018     Priority: Medium     H/O cardiac arrest in 2003 01/24/2018     Priority: Medium     History of tobacco abuse quitting in 2003 01/24/2018     Priority: Medium     CAD 03/05/2014     Priority: Medium        Past Medical  History:    Past Medical History:   Diagnosis Date     Hypoxic-ischemic encephalopathy (HIE) 11/25/2003     Rheumatoid arthritis(714.0) 05/03/2005     S/P inferior MI 11/25/2003       Past Surgical History:    Past Surgical History:   Procedure Laterality Date     AAA REPAIR       ANGIOGRAM  1/2004    Coronary Angiogram, LAD> long mid 40-50%, D2 90% ostial,Prox % with collateal flow,Circ had restenosisand was restented x 3 again.  EF now 55% with mild lateral wall hypokineseis and inferior also.     CARDIOVASCULAR SURGERY  1/2005    Syncopal episode, Had repeat angiogam showing now circ 100% along with RCA. LAD 80-90% with EF 35%.  Sent for CABG     CARDIOVASCULAR SURGERY  1/2005    S/P 3 vess CABG, Dr Wilfredo SORIANO>LAD, SVG>OM1, SVG>RCA.  Post op pneumothorax with Chest Tube placement.     CARDIOVASCULAR SURGERY  11/2003    Acute Inferior MI, Had VT arrest resuscitated. Not given lytics and was sent to [facility].  IABP placed and had angiogram stented Circ x 5. EF 30%. Lad had 70%, % with contralateral collateral flow.     HC US CHEST      left     HEART/LUNG RESUSCITATION (CPR)  11/2003    S/P Cardiac Arrest V-Tach, Out of hospital arrest with no bystaner cpr.  Has polymorphic VT on arrival.  Has a prolonged resuscitation which obviously was successful.  This was due to an acute Inferior MI at the time.     OPEN REDUCTION INTERNAL FIXATION HIP  1965    ORIF frac/dislocation L hip, Was a teenager and had orif done after fracture dislocation of left hip. Occurred playing softball.     PHACOEMULSIFICATION WITH STANDARD INTRAOCULAR LENS IMPLANT  3/10/2014    Procedure: PHACOEMULSIFICATION WITH STANDARD INTRAOCULAR LENS IMPLANT;  CATARACT EXTRACTION WITH INTRA OCULAR LENS LEFT(10% SERENE/POSSIBLE STRETCH);  Surgeon: Isael Acuna MD;  Location: [facility] OR     PHACOEMULSIFICATION WITH STANDARD INTRAOCULAR LENS IMPLANT  3/25/2014    Procedure: PHACOEMULSIFICATION WITH STANDARD INTRAOCULAR LENS IMPLANT;  CATARACT  EXTRACTION WITH INTRA OCULAR LENS RIGHT/POSSIBLE PUPIL STRETCH;  Surgeon: Isael Acuna MD;  Location: [facility] OR       Family History:    Family History   Problem Relation Age of Onset     Cancer Father         leukemia     Diabetes Mother      Cerebrovascular Disease Mother         cause of death       Social History:  Marital Status:   [2]  Social History     Tobacco Use     Smoking status: Former Smoker     Smokeless tobacco: Never Used   Substance Use Topics     Alcohol use: No     Drug use: No        Medications:    amoxicillin-clavulanate (AUGMENTIN) 875-125 MG tablet  aspirin 81 MG chewable tablet  calcium carbonate-vitamin D (CALCIUM 500 + D) 500-400 MG-UNIT TABS tablt  ELIQUIS ANTICOAGULANT 5 MG tablet  famotidine (PEPCID) 20 MG tablet  finasteride (PROSCAR) 5 MG tablet  folic acid (FOLVITE) 1 MG tablet  lisinopril (ZESTRIL) 5 MG tablet  metoprolol succinate ER (TOPROL-XL) 25 MG 24 hr tablet  metoprolol succinate ER (TOPROL-XL) 50 MG 24 hr tablet  simvastatin (ZOCOR) 20 MG tablet  tamsulosin (FLOMAX) 0.4 MG capsule          Review of Systems   Constitutional: Negative.  Negative for fever.   HENT: Negative.    Eyes: Negative.  Negative for photophobia.   Respiratory: Negative.  Negative for shortness of breath.    Cardiovascular: Negative.    Gastrointestinal: Positive for abdominal pain.   Endocrine: Negative.    Genitourinary: Negative.    Musculoskeletal: Negative.  Negative for neck pain and neck stiffness.   Skin: Negative.    Allergic/Immunologic: Negative.    Neurological: Negative.    Hematological: Negative.    Psychiatric/Behavioral: Negative.  Negative for self-injury, sleep disturbance and suicidal ideas. The patient is not nervous/anxious.        Physical Exam   BP: 133/96  Pulse: 99  Temp: 99.6  F (37.6  C)  Resp: 24  SpO2: 93 %      Physical Exam  Constitutional:       General: He is not in acute distress.     Appearance: He is well-developed and normal weight. He is not  toxic-appearing.   HENT:      Head: Normocephalic and atraumatic.   Eyes:      Extraocular Movements: Extraocular movements intact.   Cardiovascular:      Rate and Rhythm: Normal rate and regular rhythm.   Abdominal:      General: Abdomen is flat. Bowel sounds are normal. There is no distension. There are no signs of injury.      Palpations: Abdomen is soft.      Tenderness: There is generalized abdominal tenderness.   Neurological:      General: No focal deficit present.      Mental Status: He is alert.   Psychiatric:         Mood and Affect: Mood normal. Mood is not anxious or depressed.         Behavior: Behavior normal.         ED Course     ED Course as of Mar 23 0604   Mon Mar 22, 2021   7338 Spoke with surgeon, Dr. Kevin, no surgical intervention at this time. Plan: Admit to hospitalist service for abx and enema to decompress rectum.       Tue Mar 23, 2021   0120 Hospitalist paged      0125 Case discussed with hospitalist.  Plan admit        Procedures          CTA of abd and pelvis with contrast -no evidence of endoleak or change in abdominal aortic aneurysm.         Results for orders placed or performed during the hospital encounter of 03/22/21 (from the past 24 hour(s))   CBC with platelets differential   Result Value Ref Range    WBC 14.1 (H) 4.0 - 11.0 10e9/L    RBC Count 4.58 4.4 - 5.9 10e12/L    Hemoglobin 14.5 13.3 - 17.7 g/dL    Hematocrit 44.5 40.0 - 53.0 %    MCV 97 78 - 100 fl    MCH 31.7 26.5 - 33.0 pg    MCHC 32.6 31.5 - 36.5 g/dL    RDW 13.7 10.0 - 15.0 %    Platelet Count 241 150 - 450 10e9/L    Diff Method Automated Method     % Neutrophils 89.9 %    % Lymphocytes 6.9 %    % Monocytes 1.8 %    % Eosinophils 0.6 %    % Basophils 0.3 %    % Immature Granulocytes 0.5 %    Nucleated RBCs 0 0 /100    Absolute Neutrophil 12.7 (H) 1.6 - 8.3 10e9/L    Absolute Lymphocytes 1.0 0.8 - 5.3 10e9/L    Absolute Monocytes 0.3 0.0 - 1.3 10e9/L    Absolute Eosinophils 0.1 0.0 - 0.7 10e9/L    Absolute  Basophils 0.0 0.0 - 0.2 10e9/L    Abs Immature Granulocytes 0.1 0 - 0.4 10e9/L    Absolute Nucleated RBC 0.0    INR   Result Value Ref Range    INR 1.18 (H) 0.86 - 1.14   Comprehensive metabolic panel   Result Value Ref Range    Sodium 135 133 - 144 mmol/L    Potassium 4.0 3.4 - 5.3 mmol/L    Chloride 101 94 - 109 mmol/L    Carbon Dioxide 28 20 - 32 mmol/L    Anion Gap 6 3 - 14 mmol/L    Glucose 109 (H) 70 - 99 mg/dL    Urea Nitrogen 23 7 - 30 mg/dL    Creatinine 1.16 0.66 - 1.25 mg/dL    GFR Estimate 62 >60 mL/min/[1.73_m2]    GFR Estimate If Black 72 >60 mL/min/[1.73_m2]    Calcium 9.3 8.5 - 10.1 mg/dL    Bilirubin Total 0.3 0.2 - 1.3 mg/dL    Albumin 3.5 3.4 - 5.0 g/dL    Protein Total 8.3 6.8 - 8.8 g/dL    Alkaline Phosphatase 116 40 - 150 U/L    ALT 10 0 - 70 U/L    AST 18 0 - 45 U/L   Lipase   Result Value Ref Range    Lipase 193 73 - 393 U/L   Lactic acid whole blood   Result Value Ref Range    Lactic Acid 2.4 (H) 0.7 - 2.0 mmol/L   Troponin I   Result Value Ref Range    Troponin I ES <0.015 0.000 - 0.045 ug/L   Alcohol ethyl   Result Value Ref Range    Ethanol g/dL <0.01 0.01 g/dL   CT Head w/o Contrast    Narrative    PROCEDURE: CT HEAD W/O CONTRAST   3/22/2021 9:23 PM    HISTORY:Male, age,  75 years, , , Mental status change, unknown cause;  Patient with complaints of confusion.  Rule out intracranial bleed    COMPARISON: 8/16/2019    TECHNIQUE: CT of the brain without contrast.    FINDINGS: Ventricles and sulci are prominent in size and shape. Gray  and white matter demonstrate scattered areas of decreased density.    There is no evidence of mass, mass effect or midline shift. No  evidence of acute hemorrhage.    Bones of the skull are similar in appearance with polypoid mucosal  thickening again seen in the maxillary sinuses. No acute fracture.       Impression    IMPRESSION:   No acute intracranial abnormality.  No acute fracture.     Mild atrophy and white matter changes suggesting small vessel  disease  are similar dating back to 8/16/2019.    KURT ROCK MD   CT Abdomen Pelvis w Contrast    Narrative    CT ABDOMEN PELVIS W CONTRAST    CLINICAL HISTORY: Male, age 75 years,  Patient with complaints of  abdominal pain.  History of AAA repair.  Has elevated white blood cell  count..  Rule out diverticulitis versus AAA;    Comparison:  None.    TECHNIQUE:  CT was performed of the abdomen and pelvis with IV  contrast. Sagittal, coronal, axial and MIP reconstructions were  reviewed.     FINDINGS:  Chronic changes are seen within the lung bases with peripheral areas  of atelectasis, early fibrosis and scarring. Coronary artery  calcifications are seen within the visualized portions of the heart.    There are number of low dense lesion seen within the dome the liver  that are too small to characterize.    Gallbladder is normal.    Stomach is moderately distended without acute abnormality. The  duodenum is normal.    Pancreas: No acute abnormality. Normal    Spleen: Normal.    Adrenal glands: Normal.    Kidneys: Normal. Ureters: Normal.    Urinary bladder: Heterogeneous wall thickening.    Large and small bowel bowel: Diverticulosis of the colon. No apparent  diverticulitis. The distal large volume of stool within the rectum  with stercoral colitis.    The appendix is normal.    Abdominal aortic stent graft appears grossly patent. Fusiform aneurysm  of the abdominal aorta is appreciated measuring 6.4 x 7.0 cm in size.    Small fat-containing left inguinal hernia is present. Bony structures  demonstrate postoperative changes in the left femur and no acute  abnormality.    Inguinal lymph nodes are normal.      Impression    IMPRESSION:   Stercoral colitis. Large volume of stool within the rectum.     Diverticulosis of the colon. No distinct evidence of diverticulitis.    Fat-containing left inguinal hernia without acute normality.    7.0 x 6.4 cm infrarenal abdominal aortic aneurysm with patent aortic  stent  graft. If concern exists for endograft leak, CT angiogram would  better characterize. No previous CT angiogram examination is available  for comparison.    Chronic changes within the lung bases. No evidence of focal  consolidation. Graft there are at least 2-3 low dense lesion seen  within the dome the liver that are too small to characterize. The  appearance suggests small hepatic cysts.    KURT ROCK MD       Medications - No data to display    Assessments & Plan (with Medical Decision Making)     75-year-old male who presents today with complaints of abdominal pain.  Patient states he is not having any pain but does have a history of dementia.  On exam patient was tender in his abdominal region.  A CT of the abdomen and pelvis labs showed a white count of 14.1 and CT of the abdomen and pelvis showed stercoral colitis.  Leak of the aortic aneurysm could not be ruled out so a CT of the abdomen pelvis was done and it showed no evidence of a leak.  Patient was given Zosyn and is going to be admitted for colitis as well as possible enema to clear stool in rectum.  All discussed with hospitalist, Dr. Pereira who agrees to admit.     Due to the nature of this electronic medical record, laboratory results, imaging results, diagnosis, other information and medications reported above may not represent information available to me at the the time of my care and disposition. Medications reported above may have not been ordered by me.     Portions of the record may have been created with voice recognition software. Occasional wrong-word or 'sound-a- like' substitution may have occurred due to the inherent limitations of voice recognition software. Though the chart has been reviewed, there may be inadvertent transcription errors. Read the chart carefully and recognize, using context, where substitutions have occurred.       New Prescriptions    No medications on file       Final diagnoses:   Colitis       3/22/2021   [facility]  EMERGENCY DEPARTMENT     Danilo Sorenson MD  03/23/21 0606

## 2021-03-23 NOTE — ED NOTES
Client boosted up in the bed, warm blanket given, both side rails are up and call light is in hand. Client denies need for anything at this time.

## 2021-03-23 NOTE — PLAN OF CARE
Most recent vitals: /56   Pulse 75   Temp 100.1  F (37.8  C) (Tympanic)   Resp 18   Ht 1.829 m (6')   Wt 73.3 kg (161 lb 9.6 oz)   SpO2 94%   BMI 21.92 kg/m       Alert and oriented to person and place, makes needs known, uses tami light appropriately, stand-by assist x1 w/GB, patient incontinent of urine x1, small stool smear in brief x2 ,uses bedside commode to void. Denies pain, denies N/V and abdominal discomfort. Lungs clear, oxygen 94-95% on RA. Patient has scattered scabbing to both feet and legs. IV  hr/ml, IV rocephin q 24hrs, IV flagyl q 6hrs. Attempted to go over patient med list, patient stated that he does not know what medications he takes and when he last took them, states that wife Pari takes care of medications. Was passed on to oncoming shift to contact wife Pari regarding patient medications. Clear liquid diet. Slept part of this shift. Call light and personal items within reach. Alarms  Active and audible.     Face to face report given with opportunity to observe patient.    Report given to LILY Cadena RN   3/23/2021  8:53 AM

## 2021-03-23 NOTE — UTILIZATION REVIEW
Admission Status; Secondary Review Determination    Under the authority of the Utilization Management Committee, the utilization review process indicated a secondary review on the above patient. The review outcome is based on review of the medical records, discussions with staff, and applying clinical experience noted on the date of the review.    (x) Inpatient Status Appropriate - This patient's medical care is consistent with medical management for inpatient care and reasonable inpatient medical practice.    RATIONALE FOR DETERMINATION:75-year-old male with early dementia since a cardiac arrest in 2003 who now presents to the hospital after developing acute shaking, rigors and increased confusion along with abdominal pain.  Patient found to have leukocytosis, mild hypoxemia, lactic acidosis and CT imaging revealing stercoral colitis.  Due to the ischemic risk of the colon and presenting symptoms of pain and leukocytosis, patient will require IV fluids, IV antibiotics as well as a bowel regimen carefully monitor patient to avoid complications such as bowel perforation.  Patient expected require multiple nights in the hospital with careful management appropriate for inpatient care.    At the time of admission with the information available to the attending physician more than 2 nights Hospital complex care was anticipated, based on patient risk of adverse outcome if treated as outpatient and complex care required. Inpatient admission is appropriate based on the Medicare guidelines.    This document was produced using voice recognition software    The information on this document is developed by the utilization review team in order for the business office to ensure compliance. This only denotes the appropriateness of proper admission status and does not reflect the quality of care rendered.    The definitions of Inpatient Status and Observation Status used in making the determination above are those provided in  the CMS Coverage Manual, Chapter 1 and Chapter 6, section 70.4.    Sincerely,    Marlo Santoyo MD  Utilization Review  Physician Advisor  [facility].

## 2021-03-23 NOTE — PLAN OF CARE
Face to face report given with opportunity to observe patient.    Report given to SN Aston Salomon RN   3/23/2021  3:54 PM    Student nurse to be caring for patient during afternoon shift.

## 2021-03-23 NOTE — PLAN OF CARE
Prior to Admission Medication Reconciliation:     Medications added:   [x] None  [] As listed below:    Medications deleted:   [] None  [x] As listed below:    augmentin    Duplicate famotidine    Quetiapine 25 mg at bedtime- started at discharge from [facility] in September, pt only picked up once. (Per wife she was never sure why the patient was started on this so she never gave it to him)     Metoprolol succinate 50 mg- pt decreased to 25 mg in September 2020    Changes made to existing medications:   [] None  [x] As listed below: calcium/d from daily to BID per wife report    Last times/dates taken verified with patient:  [x] Yes- completed myself: per wife report- famotidine hasn't been taken recently due to pt running out and needing to get another refill  [] Nurse completed no changes made  [] Unable to review with patient:  [] Nurse completed/changes made:     Allergy modifications:    [x]Did not review  []Patient verified NKA  []Patient verified current existing allergies: no changes made  []New allergies: listed below      Medication reconciliation sources:   []Patient  [x]Patient family member/emergency contact: wife Prai  [x][facility] Report Review: Date: 09/23/20  New    metoprolol succinate [Toprol XL] 25 mg Tablet Extended Release 24 Hr 25 mg PO DAILY 30 Days Qty: 30 RF: 0    quetiapine [Seroquel] 25 mg Tablet 25 mg PO BEDTIME 30 Days Qty: 30 RF: 0    Continued    folic acid 1 mg tablet 1 mg PO DAILY RF: 0    finasteride 5 mg tablet 5 mg PO DAILY RF: 0    Eliquis 5 mg tablet 5 mg PO BID RF: 0    tamsulosin 0.4 mg capsule 0.4 mg PO DAILY RF: 0    simvastatin 20 mg tablet  20 mg PO BEDTIME RF: 0     famotidine [Pepcid] 20 mg Tablet 20 mg PO BID RF: 0    aspirin 81 mg Tablet,Chewable 81 mg PO DAILY RF: 0    Changed    lisinopril 5 mg tablet 2.5 mg PO DAILY Qty: 0 RF: 0    Discontinued    amoxicillin-pot clavulanate 875-125 mg tablet  1 tab PO BID RF: 0    metoprolol succinate 50 mg tablet extended  release 24 hr 50 mg PO DAILY RF: 0    []Epic Chart Review  []Care Everywhere review  []Pharmacy med list: **  []Pharmacy phone call  [x]Outside meds dispense report: see below  []Nursing home or Assisted Living MAR:  []Other: **    Pharmacy desired at discharge: [facility]    Is patient on coumadin?  [x]No    Requests for consultation by provider or pharmacist:   [] Patient understands why all of their meds were prescribed and how to take them. No questions.   [x] Fill dates coincide with compliancy for all maintenance meds.   [] Fill dates do not coincide with compliancy with maintenance meds. See notes in PTA medlist about how patient is taking.   [] Patient has questions about the following:      Comments: wife manages pt medications and went through med bottles with me. Good historian. No concerns.       Alejandrina Long on 3/23/2021 at 7:53 AM       Discrepancies: [x] No []Not Applicable []Yes: listed below    Time spent on medication reconciliation:   []5-20 mins  [x]20-40 mins  []> 40 mins    Issues completing PTA medication reconciliation:  [] On hold for a long time  [x] Waited for a call back  [] Fax didn't come through  [] Fax took a long time  [] Other:    Notifying appropriate party of changes/additions/discrepancies:  [x]No pertinent changes made, notification not necessary.   [] Notified attending provider via text page  [] Notified attending provider in person  [] Notified pharmacy  [] Notified nurse  [] Attending provider not available, left detailed notes  [] Changes/additions made don't need provider notification because provider has not seen patient or input any orders  [] Changes/additions made don't need provider notification because changes made are to medications not ordered  Medications Prior to Admission   Medication Sig Dispense Refill Last Dose     aspirin 81 MG chewable tablet Take 1 tablet (81 mg) by mouth daily 36 tablet 11 3/22/2021 at am     calcium carbonate-vitamin D (CALCIUM 500 + D)  500-400 MG-UNIT TABS tablt Take 1 tablet by mouth 2 times daily    3/22/2021 at pm     ELIQUIS ANTICOAGULANT 5 MG tablet TAKE 1 TABLET TWICE A  tablet 3 3/22/2021 at pm     famotidine (PEPCID) 20 MG tablet Take 1 tablet (20 mg) by mouth 2 times daily 180 tablet 3 Past Month at Unknown time     finasteride (PROSCAR) 5 MG tablet TAKE 1 TABLET BY MOUTH EVERY DAY 90 tablet 0 3/22/2021 at am     folic acid (FOLVITE) 1 MG tablet Take 1 tablet (1,000 mcg) by mouth daily 90 tablet 0 3/22/2021 at am     lisinopril (ZESTRIL) 5 MG tablet TAKE 1 TABLET BY MOUTH EVERY DAY 90 tablet 0 3/22/2021 at pm     metoprolol succinate ER (TOPROL-XL) 25 MG 24 hr tablet TAKE 1 TABLET BY MOUTH EVERY DAY 30 tablet 1 3/22/2021 at am     simvastatin (ZOCOR) 20 MG tablet TAKE 1 TABLET BY MOUTH EVERY EVENING 90 tablet 0 3/22/2021 at pm     tamsulosin (FLOMAX) 0.4 MG capsule TAKE 1 CAPSULE BY MOUTH EVERY DAY 90 capsule 0 3/22/2021 at pm       Medication Dispense History (from 3/23/2020 to 3/22/2021)  Expand All  Collapse All  Amoxicillin-Pot Clavulanate   Dispensed Days Supply Quantity Provider Pharmacy   AMOX/CLAV 875MG-125MG TABLET 09/03/2020 7 14 Units [facility]ANT[facility] Pharm[facility]...         Apixaban   Dispensed Days Supply Quantity Provider Pharmacy   ELIQUIS      TAB 5MG 03/12/2021 90 180 each [facility] ...   ELIQUIS      TAB 5MG 09/13/2020 90 180 each [facility] ...   ELIQUIS      TAB 5MG 06/07/2020 90 180 each [facility] ...         Famotidine   Dispensed Days Supply Quantity Provider Pharmacy   FAMOTIDINE 40MG TABLET 10/26/2020 30 30 Units [facility] Pharmacy...   FAMOTIDINE 40MG TABLET [facility]/31/2020 [facility]         Finasteride   Dispensed Days Supply Quantity Provider Pharmacy   FINASTERIDE  TAB 5MG 02/23/2021 90 90 each [facility]NEON J[facility]...   FINASTERIDE 5MG TABLET 02/23/2021  90 90 Units ZENON JORGE #61 - Fa...   FINASTERIDE  TAB 5MG 11/24/2020 90 90 each [facility]ON JORGE Pharmacy...   FINASTERIDE 5MG TABLET 11/24/2020 90 90 Units [facility]EN[facility] JOR[facility] #61 - Fa...   FINASTERIDE  TAB 5MG 08/25/2020 90 90 each [facility] Pharmacy...   FINASTERIDE 5MG TABLET 08/25/2020 90 90 Units [facility] #61 - Fa...   FINASTERIDE  TAB 5MG 05/27/2020 90 90 each [facility]ON JORGE Pharmacy...   FINASTERIDE 5MG TABLET 05/27/2020 90 90 Units [facility] #61 - Fa...         Folic Acid   Dispensed Days Supply Quantity Provider Pharmacy   FOLIC ACID 1MG TABLET 02/25/2021 90 90 Units DEAN DIAS #61 - Fa...   FOLIC ACID 1MG TABLET 12/03/2020 90 90 Units [facility] DIAS Pharmacy...   FOLIC ACID 1MG TABLET 11/18/2020 90 90 Units [facility] DIAS Pharmacy...   FOLIC ACID 1MG TABLET 08/25/2020 90 90 Units DEAN DIAS #61 - Fa...   FOLIC ACID 1MG TABLET 05/27/2020 90 90 Units DEAN DIAS #61 - Fa...         Influenza Vac High-Dose Quad   Dispensed Days Supply Quantity Provider Pharmacy   FLUZONE(65)HD PF 2020-21 INJ 0.7ML 11/10/2020 1 0.7 mL OVERHOLTLUIS FELIPE [facility] #...         Lisinopril   Dispensed Days Supply Quantity Provider Pharmacy   LISINOPRIL   TAB 5MG 02/23/2021 90 90 each [facility]ON JORGE Pharmacy...   LISINOPRIL 5MG TABLET 02/23/2021 90 90 Units [facility]OR[facility] #61 - Fa...   LISINOPRIL   TAB 5MG 11/24/2020 90 90 each [facility]ENON JORGE Pharmacy...   LISINOPRIL 5MG TABLET 11/24/2020 90 90 Units ZENON JORGE #61 - Fa...   LISINOPRIL   TAB 5MG 08/25/2020 90 90 each ZENON JORGE Pharmacy...   LISINOPRIL 5MG TABLET 08/25/2020 90 90 Units ZENON JORGE #61 - Fa...   LISINOPRIL   TAB 5MG 05/29/2020 90 90 each ZENON JORGE Pharmacy...    LISINOPRIL 5MG TABLET 05/29/2020 90 90 Units ZENON JORGE #61 - Fa...   LISINOPRIL 5MG TABLET 05/27/2020 90 90 Units ZENON JORGE #61 - Fa...         Metoprolol Succinate   Dispensed Days Supply Quantity Provider Pharmacy   METOPROLOL SUCC 25MG ER TABLET 03/15/2021 30 30 Units DEAN DIAS Pharmacy...   METOPROLOL SUCC 25MG ER TABLET 01/07/2021 30 30 Units DEAN DIAS Pharmacy...   METOPROLOL SUCC 25MG ER TABLET 11/25/2020 30 30 Units DEAN DIAS Pharmacy...   METOPROLOL SUCC 25MG ER TABLET 09/23/2020 30 30 Units CONCHITA,CHERRY [facility]...   METOPROL SUC TAB 50MG ER 08/25/2020 90 90 each [facility] J[facility] Pharmacy...   METOPROLOL SUCC 50MG ER TABLET 08/25/2020 90 90 Units ZENON JORGE #61 - Fa...   METOPROL SUC TAB 50MG ER 05/27/2020 90 90 each ZENON JORGE Pharmacy...   METOPROLOL SUCC 50MG ER TABLET 05/27/2020 90 90 Units ZENON JORGE #61 - Fa...         QUEtiapine Fumarate   Dispensed Days Supply Quantity Provider Pharmacy   QUETIAPINE 25MG TABLET 09/23/2020 30 30 Units CONCHITA,CHERRY [facility]...         Simvastatin   Dispensed Days Supply Quantity Provider Pharmacy   SIMVASTATIN  TAB 20MG 02/23/2021 90 90 each Z[facility]ON JORGE Pharmacy...   SIMVASTATIN 20MG TABLET 02/23/2021 90 90 Units ZENON JORGE #61 - Fa...   SIMVASTATIN  TAB 20MG 11/24/2020 90 90 each ZENON JORGE Pharmacy...   SIMVASTATIN 20MG TABLET 11/24/2020 90 90 Units ZENON JORGE #61 - Fa...   SIMVASTATIN  TAB 20MG 08/25/2020 90 90 each ZENON JORGE Pharmacy...   SIMVASTATIN 20MG TABLET 08/25/2020 90 90 Units ZENON JORGE #61 - Fa...   SIMVASTATIN  TAB 20MG 05/27/2020 90 90 each ZENON JORGE Pharmacy...   SIMVASTATIN 20MG TABLET 05/27/2020 90 90 Units ZENON JORGE #61 - Fa...          Tamsulosin HCl   Dispensed Days Supply Quantity Provider Pharmacy   TAMSULOSIN   CAP 0.4MG 02/23/2021 90 90 each ZENON JORGE Pharmacy...   TAMSULOSIN 0.4MG CAPSULE 02/23/2021 90 90 Units ZENON JORGE #61 - Fa...   TAMSULOSIN   CAP 0.4MG 11/24/2020 90 90 each ZENON JORGE Pharmacy...   TAMSULOSIN 0.4MG CAPSULE 11/24/2020 90 90 Units ZENON JORGE #61 - Fa...   TAMSULOSIN   CAP 0.4MG 08/25/2020 90 90 each ZENON JORGE Pharmacy...   TAMSULOSIN 0.4MG CAPSULE 08/25/2020 90 90 Units ZENON JORGE #61 - Fa...   TAMSULOSIN   CAP 0.4MG 05/27/2020 90 90 each ZENON JORGE Pharmacy...   TAMSULOSIN 0.4MG CAPSULE 05/27/2020 90 90 Units ZENON JORGE #61 - Fa...

## 2021-03-23 NOTE — PROGRESS NOTES
Assessment completed with Kostas and wife Pari     LOC: alert, oriented     Dx: Colitis   Chronic Disease Management: S/P inferior MI, Hypoxic-ischemic encephalopathy     Lives with: Spouse- Pari   Living at:  Owns a home in [place]   Transportation: YES Spouse and four children take turns providing transportation.     Primary PCP: Halley Hidalgo  Insurance:  [employer] Medicare   Medicare  letter reviewed with Kostas and his wife Pari.     Support System:  Adequate- spouse and children are very involved in patients care.   Homecare/PCA: Denies  /County Services:   Denies  [place]: NO    Health Care Directive: Yes- not on file. Spouse states she will bring in a copy to his next clinic follow up.  Guardian: Denies  POA: Denies     Pharmacy: [facility] [place]   Meds management: Wife manages medications     Adequate Resources for needs (housing, utilities, food/med): YES  Household chores: Spouse  Work/community/social activity: YES     ADLs: IND  Ambulation:FWW  Falls: Denies  Nutrition: Adequate  Sleep: Adequate     Equipment used: FWW       Mental health: Denies  Substance abuse: Denies  Exposure to violence/abuse: Denies  Stressors: No apparent stressors     Able to Return to Prior Living Arrangements: YES    LUIGI: Low    Plan: Return to home at time of discharge

## 2021-03-23 NOTE — ED NOTES
"Pt presents via [facility] ambulance.  Pt is disoriented to time. States he feels \"ill\"  Denies pain, although winces when his abd or sides are lightly palpated. Pt is unsure if he has fallen recently.  He reports he lives at home with his wife.  Per report hx, AAA repair this past year, MI, and possible COVID.  "

## 2021-03-23 NOTE — PLAN OF CARE
[facility] Inpatient Admission Note:    Patient admitted to 3230/3230-1 at approximately 0236 via bed accompanied by transport tech from emergency room . Report received from Milly in SBAR format at 0220 via telephone. Patient ambulated to bed via assist of 1. Patient is alert and oriented X 3, denies pain; rates at 0 on 0-10 scale.  Patient oriented to room, unit, hourly rounding, and plan of care. Explained admission packet and patient handbook with patient bill of rights brochure. Will continue to monitor and document as needed.     Inpatient Nursing criteria listed below was met:    Health care directives status obtained and documented: Yes    Care Everywhere authorization obtained No    MRSA swab completed for patient 65 years and older: N/A    Patient identifies a surrogate decision maker: No If yes, who: NA Contact Information:NA     If initial lactic acid >2.0, repeat lactic acid drawn within one hour of arrival to unit: No. If no, state reason: with AM labs    Vaccination assessment and education completed: NA    Vaccinations received prior to admission: Pneumovax NA  Influenza(seasonal)  N/A   Vaccination(s) ordered: not given today because NA    Clergy visit ordered if patient requests: N/A    Skin issues/needs documented: yes    Isolation Patient: no Education given, correct sign in place and documentation row added to PCS:  NA    Fall Prevention Yes: Care plan updated, education given and documented, sticker and magnet in place: Yes    Care Plan initiated: Yes    Education Documented (including assessment): Yes    Patient has discharge needs : NO If yes, please explain:NA

## 2021-03-23 NOTE — PLAN OF CARE
VSS, afebrile, HR Irregular, lungs clear, bowels active. Pt given suppository, milk of mag, and lactulose this shift with one very small / smear at the end of the shift. Pt alert to self and place, up with an assist of 1 and gait belt to the bedside commode. Pt is tolerating clears, makes his needs known.

## 2021-03-23 NOTE — ED TRIAGE NOTES
Pt arrived via [facility] ambulance pt stated he doesn't feel good.  Onset of not feeling well today this evening.  Pt continues to deny pain but is wincing.  When palpating his abdominal pushes my hand away and continues to deny pain.  Hx of AAA

## 2021-03-23 NOTE — ED NOTES
Face to face report given with opportunity to observe patient.    Report given to LILY Galaviz RN   3/22/2021  11:04 PM

## 2021-03-23 NOTE — H&P
[facility]    History and Physical - Hospitalist Service       Date of Admission:  3/22/2021    Assessment & Plan   Kostas Cotto is a 75 year old male admitted on 3/22/2021.     Rectal stool impaction: enema, bowel regimen    Associated stercoral colitis: empiric antibiotics    Associated sepsis. Sepsis criteria met (WBC 14.1 + ) lactic acid is elevated (2.4), supporting the diagnosis. However, the patient does not appear systemically ill. Will trend lactic acid and continue with IV fluids    Associated metabolic encephalopathy: trend progress with treatments    Paroxysmal AFIB: continue with Eliquis    Chronic constipation: check TSH    Medically managed BPH: check post-void bladder scan     Diet:   clear  DVT Prophylaxis: Eliquis  Garzon Catheter: not present  Code Status:   Full         Disposition Plan   Expected discharge: 2 - 3 days, recommended to prior living arrangement once antibiotic plan established.  Entered: Sourav Pereira DO 03/23/2021, 1:54 AM     The patient's care was discussed with the Patient's wife, present    Sourav Pereira DO  [facility]  Contact information available via [facility] Paging/Directory      ______________________________________________________________________    Chief Complaint   Shaking, more confused    History is obtained from the patient's wife, ER Provider    History of Present Illness   Kostas Cotto is a 75 year old male who suffered a cardiac arrest in 2003. That event seemed to araceli the start of his progressive dementia. He then was found to have an aortic aneurism in 2013 with leakage this past fall. After the repair in 9/2020, he seemed to suffer a stepwise cognitive decline. He and his wife have managed to live independently and he has been doing well, relatively speaking.    He seemed fine today; he enjoyed supper and then didn't seem himself: he started shaking, acting more confused and indicating that his stomach hurt.    ER Course: vitals  showed low-grade fever (99.6) and borderline hypoxia (87-91%); he was in no distress. Lab diagnostics resulted an elevated WBC (14.1) with left shift.; the chemistry panel was unremarkable, with preserved stage 2 renal performance. Lactic acid was elevated (2.4) CT imagine showed stercoral colitis and large rectal stool; the aortic aneurism was stable. IV fluids and antibiotics were given    Review of Systems  from his wife, present; the patient states that he feels fine, but then grimaces to light abdominal palpation. His wife reports he was at baseline until after supper    At the time of the episode:    Constitutional: No fever or chills, but he started shaking and had generalized weakness, no unintentional weight change, no particular change in appetite  Ears, Nose & Throat: no sore throat, no nasal drainage, no congestion. No ear pain, no ear drainage, no particular change in hearing  Eyes: no particular change in vision, no redness, no drainage  Cardiovascular: No chest pain at rest, no chest pain with familiar activities.  Pulmonary: No cough, no particular change in work of breathing, no particular change in shortness of breath with position changes or familiar activites  Gastrointestinal: indicating abdominal pain, no nausea, no vomiting, no diarrhea. No particular change in bowel movement pattern, no black stools, no bloody stools. His wife states that he's chronically constipated and has a BM every 2-3 days  Genitourinary: No particular change in incontinence, no pain with urination, no particular change in stream, no particular change in amount urinated with urge, no discharge  Skin: No particular change in bruising, no rashes  Musculoskeletal: no particular change in strength, no particular change in muscle development  Neurological: no numbness and tingling, no headache, no particular change in balance, no dizziness  Psychologic: No particular change in depression and/or anxiety, but seemed more  confused  Endocrine: No particular change in heat or cold intolerance            Past Medical History    I have reviewed this patient's medical history and updated it with pertinent information if needed.   Past Medical History:   Diagnosis Date     Hypoxic-ischemic encephalopathy (HIE) 11/25/2003     Rheumatoid arthritis(714.0) 05/03/2005     S/P inferior MI 11/25/2003       Past Surgical History   I have reviewed this patient's surgical history and updated it with pertinent information if needed.  Past Surgical History:   Procedure Laterality Date     AAA REPAIR       ANGIOGRAM  1/2004    Coronary Angiogram, LAD> long mid 40-50%, D2 90% ostial,Prox % with collateal flow,Circ had restenosisand was restented x 3 again.  EF now 55% with mild lateral wall hypokineseis and inferior also.     CARDIOVASCULAR SURGERY  1/2005    Syncopal episode, Had repeat angiogam showing now circ 100% along with RCA. LAD 80-90% with EF 35%.  Sent for CABG     CARDIOVASCULAR SURGERY  1/2005    S/P 3 vess CABG, Dr Villalobos LIMA>LAD, SVG>OM1, SVG>RCA.  Post op pneumothorax with Chest Tube placement.     CARDIOVASCULAR SURGERY  11/2003    Acute Inferior MI, Had VT arrest resuscitated. Not given lytics and was sent to [facility].  IABP placed and had angiogram stented Circ x 5. EF 30%. Lad had 70%, % with contralateral collateral flow.      US CHEST      left     HEART/LUNG RESUSCITATION (CPR)  11/2003    S/P Cardiac Arrest V-Tach, Out of hospital arrest with no bystaner cpr.  Has polymorphic VT on arrival.  Has a prolonged resuscitation which obviously was successful.  This was due to an acute Inferior MI at the time.     OPEN REDUCTION INTERNAL FIXATION HIP  1965    ORIF frac/dislocation L hip, Was a teenager and had orif done after fracture dislocation of left hip. Occurred playing softball.     PHACOEMULSIFICATION WITH STANDARD INTRAOCULAR LENS IMPLANT  3/10/2014    Procedure: PHACOEMULSIFICATION WITH STANDARD INTRAOCULAR LENS  IMPLANT;  CATARACT EXTRACTION WITH INTRA OCULAR LENS LEFT(10% SERENE/POSSIBLE STRETCH);  Surgeon: Isael Acuna MD;  Location: [facility] OR     PHACOEMULSIFICATION WITH STANDARD INTRAOCULAR LENS IMPLANT  3/25/2014    Procedure: PHACOEMULSIFICATION WITH STANDARD INTRAOCULAR LENS IMPLANT;  CATARACT EXTRACTION WITH INTRA OCULAR LENS RIGHT/POSSIBLE PUPIL STRETCH;  Surgeon: Isael Acuna MD;  Location: [facility] OR       Social History   I have reviewed this patient's social history and updated it with pertinent information if needed.  Social History     Tobacco Use     Smoking status: Former Smoker     Smokeless tobacco: Never Used   Substance Use Topics     Alcohol use: No     Drug use: No       Family History   I have reviewed this patient's family history and updated it with pertinent information if needed.  Family History   Problem Relation Age of Onset     Cancer Father         leukemia     Diabetes Mother      Cerebrovascular Disease Mother         cause of death       Prior to Admission Medications   Prior to Admission Medications   Prescriptions Last Dose Informant Patient Reported? Taking?   ELIQUIS ANTICOAGULANT 5 MG tablet   No No   Sig: TAKE 1 TABLET TWICE A DAY   amoxicillin-clavulanate (AUGMENTIN) 875-125 MG tablet   Yes No   Sig: Take 1 tablet by mouth 2 times daily x7 days, started 9-3-2020   aspirin 81 MG chewable tablet  Spouse/Significant Other No No   Sig: Take 1 tablet (81 mg) by mouth daily   calcium carbonate-vitamin D (CALCIUM 500 + D) 500-400 MG-UNIT TABS tablt  Spouse/Significant Other Yes No   Si times daily Take one tablet by oral route every day     famotidine (PEPCID) 20 MG tablet   No No   Sig: Take 1 tablet (20 mg) by mouth 2 times daily   finasteride (PROSCAR) 5 MG tablet   No No   Sig: TAKE 1 TABLET BY MOUTH EVERY DAY   folic acid (FOLVITE) 1 MG tablet   No No   Sig: Take 1 tablet (1,000 mcg) by mouth daily   lisinopril (ZESTRIL) 5 MG tablet   No No   Sig: TAKE 1 TABLET BY MOUTH EVERY DAY    metoprolol succinate ER (TOPROL-XL) 25 MG 24 hr tablet   No No   Sig: TAKE 1 TABLET BY MOUTH EVERY DAY   metoprolol succinate ER (TOPROL-XL) 50 MG 24 hr tablet   No No   Sig: Take 1 tablet (50 mg) by mouth daily   simvastatin (ZOCOR) 20 MG tablet   No No   Sig: TAKE 1 TABLET BY MOUTH EVERY EVENING   tamsulosin (FLOMAX) 0.4 MG capsule   No No   Sig: TAKE 1 CAPSULE BY MOUTH EVERY DAY      Facility-Administered Medications: None     Allergies   Allergies   Allergen Reactions     Oxycodone      Caused him delirium   Caused him delirium        Physical Exam   Vital Signs: Temp: 99.6  F (37.6  C) Temp src: Tympanic BP: 99/56 Pulse: 118   Resp: 25 SpO2: (!) 90 % O2 Device: None (Room air)    Weight: 0 lbs 0 oz     Case reviewed with the ER Provider, EHR reviewed; patient seen in ER room 9, with wife present    Vital signs:  Temp: 99.6  F (37.6  C) Temp src: Tympanic BP: 99/56 Pulse: 118   Resp: 25 SpO2: (!) 90 % O2 Device: None (Room air)        Estimated body mass index is 23.33 kg/m  as calculated from the following:    Height as of 1/13/20: 1.829 m (6').    Weight as of 1/13/20: 78 kg (172 lb).      General: No distress, interactive  Head: normocephalic, no obvious trauma  Eyes: Gaze directed normally, sclera clear, no discharge, no abnormal ocular movements  Ears: Normal appearing age-related external ears, no discharge, stable hearing acuity loss  Nose: Normal age-related appearance  Mouth: Normal appearing oral mucosa, Gums and throat appear age-related normal  Neck: Normal age-related appearance, age-related range of motion, supple, no adenopathy  Pulmonary: Normal work of breathing, no expiratory delay, no coarseness, no wheezing  Cardiovascular: Distant heart sounds, regular rhythm   Abdomen: No obvious distention, soft, bowel sounds present with normal frequency and pitch. Grimaces with light central palpation  Rectal: Deferred  Back: Age-related normal  Skin: Age-related normal, no rashes  Extremities: Not  tender, no lower extremity edema. Moving upper and lower extremities  Neurological: Grossly in tact  Psychiatric: Mood is stable, appropriately interactive, but unreliable        Data   Data reviewed today: I reviewed all medications, new labs and imaging results over the last 24 hours.

## 2021-03-23 NOTE — PHARMACY
[facility]    Pharmacy      Antimicrobial Stewardship Note     Current antimicrobial therapy:  Anti-infectives (From now, onward)    Start     Dose/Rate Route Frequency Ordered Stop    03/23/21 2200  metroNIDAZOLE (FLAGYL) infusion 500 mg      500 mg  100 mL/hr over 60 Minutes Intravenous EVERY 12 HOURS 03/23/21 1551      03/23/21 0800  cefTRIAXone in d5w (ROCEPHIN) intermittent infusion 1 g      1 g  over 30 Minutes Intravenous EVERY 24 HOURS 03/23/21 0320            Indication: intra-abdominal infection    Days of Therapy: 1     Pertinent labs:  Creatinine   Creatinine   Date Value Ref Range Status   03/23/2021 1.15 0.66 - 1.25 mg/dL Final   03/22/2021 1.16 0.66 - 1.25 mg/dL Final   09/04/2020 1.14 0.66 - 1.25 mg/dL Final     WBC   WBC   Date Value Ref Range Status   03/23/2021 8.8 4.0 - 11.0 10e9/L Final   03/22/2021 14.1 (H) 4.0 - 11.0 10e9/L Final   09/04/2020 7.1 4.0 - 11.0 10e9/L Final     Procalcitonin   Procalcitonin   Date Value Ref Range Status   09/04/2020 <0.05 ng/ml Final     Comment:     <0.05 ng/ml  Normal  Recommendation: Very low risk of bacterial infection.   Discourage antibiotics unless strong clinical suspicion for serious infection.       CRP   CRP Inflammation   Date Value Ref Range Status   03/23/2021 11.3 (H) 0.0 - 8.0 mg/L Final   03/22/2021 <2.9 0.0 - 8.0 mg/L Final   09/02/2020 <2.9 0.0 - 8.0 mg/L Final       Culture Results: none     Recommendations/Interventions:  1. I changed metronidazole to every 12 hours per Infectious Disease Dose Adjustments for Adults.    Gaby Pederson, [facility]  March 23, 2021

## 2021-03-23 NOTE — ED NOTES
Returned from CT, continues to deny pain. Warm blanket given. Monitor  Placed on again. Side rails up and call light in hand.

## 2021-03-23 NOTE — ED NOTES
DATE:  3/22/2021   TIME OF RECEIPT FROM LAB:  2046  LAB TEST:  lactic  LAB VALUE:  2.4  RESULTS GIVEN WITH READ-BACK TO (PROVIDER):  Danilo Sorenson MD  TIME LAB VALUE REPORTED TO PROVIDER:   2046

## 2021-03-24 LAB
ALBUMIN SERPL-MCNC: 2.5 G/DL (ref 3.4–5)
ALP SERPL-CCNC: 76 U/L (ref 40–150)
ALT SERPL W P-5'-P-CCNC: 9 U/L (ref 0–70)
ANION GAP SERPL CALCULATED.3IONS-SCNC: 5 MMOL/L (ref 3–14)
AST SERPL W P-5'-P-CCNC: 20 U/L (ref 0–45)
BILIRUB SERPL-MCNC: 0.5 MG/DL (ref 0.2–1.3)
BUN SERPL-MCNC: 15 MG/DL (ref 7–30)
CALCIUM SERPL-MCNC: 7.9 MG/DL (ref 8.5–10.1)
CHLORIDE SERPL-SCNC: 107 MMOL/L (ref 94–109)
CO2 SERPL-SCNC: 23 MMOL/L (ref 20–32)
CREAT SERPL-MCNC: 1.02 MG/DL (ref 0.66–1.25)
ERYTHROCYTE [DISTWIDTH] IN BLOOD BY AUTOMATED COUNT: 14.1 % (ref 10–15)
GFR SERPL CREATININE-BSD FRML MDRD: 72 ML/MIN/{1.73_M2}
GLUCOSE SERPL-MCNC: 89 MG/DL (ref 70–99)
HCT VFR BLD AUTO: 35.9 % (ref 40–53)
HGB BLD-MCNC: 11.6 G/DL (ref 13.3–17.7)
MCH RBC QN AUTO: 31.9 PG (ref 26.5–33)
MCHC RBC AUTO-ENTMCNC: 32.3 G/DL (ref 31.5–36.5)
MCV RBC AUTO: 99 FL (ref 78–100)
PLATELET # BLD AUTO: 168 10E9/L (ref 150–450)
POTASSIUM SERPL-SCNC: 4.1 MMOL/L (ref 3.4–5.3)
PROT SERPL-MCNC: 6.1 G/DL (ref 6.8–8.8)
RBC # BLD AUTO: 3.64 10E12/L (ref 4.4–5.9)
SODIUM SERPL-SCNC: 135 MMOL/L (ref 133–144)
WBC # BLD AUTO: 8 10E9/L (ref 4–11)

## 2021-03-24 PROCEDURE — 250N000011 HC RX IP 250 OP 636: Performed by: INTERNAL MEDICINE

## 2021-03-24 PROCEDURE — 80053 COMPREHEN METABOLIC PANEL: CPT | Performed by: INTERNAL MEDICINE

## 2021-03-24 PROCEDURE — 85027 COMPLETE CBC AUTOMATED: CPT | Performed by: INTERNAL MEDICINE

## 2021-03-24 PROCEDURE — 250N000013 HC RX MED GY IP 250 OP 250 PS 637: Performed by: INTERNAL MEDICINE

## 2021-03-24 PROCEDURE — 120N000001 HC R&B MED SURG/OB

## 2021-03-24 PROCEDURE — 258N000003 HC RX IP 258 OP 636: Performed by: INTERNAL MEDICINE

## 2021-03-24 PROCEDURE — 36415 COLL VENOUS BLD VENIPUNCTURE: CPT | Performed by: INTERNAL MEDICINE

## 2021-03-24 PROCEDURE — 99232 SBSQ HOSP IP/OBS MODERATE 35: CPT | Performed by: INTERNAL MEDICINE

## 2021-03-24 RX ORDER — CEFTRIAXONE SODIUM 2 G/50ML
2 INJECTION, SOLUTION INTRAVENOUS EVERY 24 HOURS
Status: DISCONTINUED | OUTPATIENT
Start: 2021-03-24 | End: 2021-03-26

## 2021-03-24 RX ADMIN — APIXABAN 5 MG: 2.5 TABLET, FILM COATED ORAL at 21:07

## 2021-03-24 RX ADMIN — METRONIDAZOLE 500 MG: 500 INJECTION, SOLUTION INTRAVENOUS at 10:20

## 2021-03-24 RX ADMIN — TAMSULOSIN HYDROCHLORIDE 0.4 MG: 0.4 CAPSULE ORAL at 08:38

## 2021-03-24 RX ADMIN — SIMVASTATIN 20 MG: 20 TABLET, FILM COATED ORAL at 21:07

## 2021-03-24 RX ADMIN — SODIUM CHLORIDE: 9 INJECTION, SOLUTION INTRAVENOUS at 15:47

## 2021-03-24 RX ADMIN — CEFTRIAXONE SODIUM 2 G: 2 INJECTION, SOLUTION INTRAVENOUS at 08:34

## 2021-03-24 RX ADMIN — ACETAMINOPHEN 650 MG: 325 TABLET, FILM COATED ORAL at 06:24

## 2021-03-24 RX ADMIN — APIXABAN 5 MG: 2.5 TABLET, FILM COATED ORAL at 08:36

## 2021-03-24 RX ADMIN — FAMOTIDINE 20 MG: 20 TABLET, FILM COATED ORAL at 21:07

## 2021-03-24 RX ADMIN — FINASTERIDE 5 MG: 5 TABLET, FILM COATED ORAL at 08:38

## 2021-03-24 RX ADMIN — METRONIDAZOLE 500 MG: 500 INJECTION, SOLUTION INTRAVENOUS at 21:45

## 2021-03-24 RX ADMIN — ASPIRIN 81 MG: 81 TABLET, CHEWABLE ORAL at 08:39

## 2021-03-24 RX ADMIN — ACETAMINOPHEN 650 MG: 325 TABLET, FILM COATED ORAL at 21:11

## 2021-03-24 RX ADMIN — SODIUM CHLORIDE: 9 INJECTION, SOLUTION INTRAVENOUS at 06:20

## 2021-03-24 RX ADMIN — LACTULOSE 20 G: 10 SOLUTION ORAL at 21:07

## 2021-03-24 RX ADMIN — FAMOTIDINE 20 MG: 20 TABLET, FILM COATED ORAL at 08:39

## 2021-03-24 ASSESSMENT — ACTIVITIES OF DAILY LIVING (ADL)
ADLS_ACUITY_SCORE: 21
ADLS_ACUITY_SCORE: 22
ADLS_ACUITY_SCORE: 21
ADLS_ACUITY_SCORE: 21

## 2021-03-24 NOTE — PLAN OF CARE
Face to face report given with opportunity to observe patient.    Report given to Aston Henderson   3/23/2021  8:17 PM

## 2021-03-24 NOTE — PROGRESS NOTES
BEN REAL  Patient visit during  rounds.  Patient was alert and orientated.  He did not have any spiritual care requests at this time.

## 2021-03-24 NOTE — PLAN OF CARE
MD was updated on the low blood pressure, 98/60 and heart rate 75, ok to hold the lisinopril and metoprolol.

## 2021-03-24 NOTE — PHARMACY
Pharmacy Antimicrobial Stewardship Assessment     Current Antimicrobial Therapy:  Anti-infectives (From now, onward)    Start     Dose/Rate Route Frequency Ordered Stop    03/24/21 0800  cefTRIAXone IN D5W (ROCEPHIN) intermittent infusion 2 g      2 g  100 mL/hr over 30 Minutes Intravenous EVERY 24 HOURS 03/24/21 0624      03/23/21 2200  metroNIDAZOLE (FLAGYL) infusion 500 mg      500 mg  100 mL/hr over 60 Minutes Intravenous EVERY 12 HOURS 03/23/21 1551            Indication: intra-abdominal infection     Days of Therapy: 2     Pertinent Labs:  WBC   Date Value Ref Range Status   03/24/2021 8.0 4.0 - 11.0 10e9/L Final   03/23/2021 8.8 4.0 - 11.0 10e9/L Final   03/22/2021 14.1 (H) 4.0 - 11.0 10e9/L Final     Procalcitonin   Date Value Ref Range Status   09/04/2020 <0.05 ng/ml Final     Comment:     <0.05 ng/ml  Normal  Recommendation: Very low risk of bacterial infection.   Discourage antibiotics unless strong clinical suspicion for serious infection.       CRP Inflammation   Date Value Ref Range Status   03/23/2021 11.3 (H) 0.0 - 8.0 mg/L Final   03/22/2021 <2.9 0.0 - 8.0 mg/L Final   09/02/2020 <2.9 0.0 - 8.0 mg/L Final       Culture Results:   3/23: Urine culture, in process  3/23: COVID, negative     Recommendations/Interventions:  No recommendations at this time. Will continue to monitor.     Le Merlos RPH  March 24, 2021

## 2021-03-24 NOTE — PROGRESS NOTES
[facility]    Hospitalist Progress Note    Date of Service (when I saw the patient): 03/24/2021    Assessment & Plan   Kostas Cotto is a 75 year old male who was admitted on 3/22/2021.    Rectal stool impaction: responding well to lactulose with multiple BMs.  Will repeat CT scan abdomen tomorrow to look for clearing of impaction and signs of perforation.     Associated stercoral colitis: empiric antibiotics, continue with ceftriaxone and flagyl.  Monitoring WBC, fever curve.     Associated sepsis. Resolved now.  Sepsis criteria met (WBC 14.1 + ) on admission,  lactic acid was elevated (2.4), supporting the diagnosis.  Resolved now, WBC 8.0.  Blood cultures were not drawn on admission prior to abx.     Associated metabolic encephalopathy: trend progress with treatments     Paroxysmal AFIB: continue with Eliquis     Chronic constipation: check TSH     Medically managed BPH: check post-void bladder scan prn    DVT Prophylaxis: Eliquis    Code Status: Full Code    Disposition: Expected discharge in 1-2 days once clinically improved.      Patrick Le MD    Interval History   Patient seen at bedside.  Some dementia, confusion, however he denies having any pain, denies having any bowel movements, which is not supported by nursing staff report the patient has had small bowel movements.  He is also having some tenderness with palpation diffusely in his abdomen, no rebound, no guarding.    -Data reviewed today: I reviewed all new labs and imaging results over the last 24 hours. I personally reviewed imaging reports.    Physical Exam   Temp: 98.2  F (36.8  C) Temp src: Tympanic BP: 98/60 Pulse: 69   Resp: 20 SpO2: 93 % O2 Device: None (Room air)    Vitals:    03/23/21 0239   Weight: 73.3 kg (161 lb 9.6 oz)     Vital Signs with Ranges  Temp:  [98.2  F (36.8  C)-102.4  F (39.1  C)] 98.2  F (36.8  C)  Pulse:  [61-93] 69  Resp:  [18-29] 20  BP: ()/(44-60) 98/60  SpO2:  [91 %-96 %] 93 %    Intake/Output  Summary (Last 24 hours) at 3/24/2021 1108  Last data filed at 3/24/2021 0323  Gross per 24 hour   Intake 3917 ml   Output 425 ml   Net 3492 ml       Peripheral IV 03/22/21 Right Upper arm (Active)   Site Assessment WD 03/24/21 1019   Line Status Saline locked 03/24/21 1019   Phlebitis Scale 0-->no symptoms 03/24/21 1019   Infiltration Scale 0 03/24/21 1019   Infiltration Site Treatment Method  None 03/23/21 2349   Number of days: 2       Peripheral IV 03/22/21 Right Lower forearm (Active)   Site Assessment [facility] 03/24/21 1019   Line Status Infusing 03/24/21 1019   Phlebitis Scale 0-->no symptoms 03/24/21 1019   Infiltration Scale 0 03/24/21 1019   Infiltration Site Treatment Method  None 03/23/21 2349   Number of days: 2       Incision/Surgical Site 03/10/14 Left Eye (Active)   Number of days: 2571       Incision/Surgical Site Right Eye (Active)   Number of days:      Line/device assessment(s) completed for medical necessity    Constitutional - awake, some confusion, dementia apparent  HEENT - atraumatic, normocephalic  Neck - supple, no masses, no JVD  CVS - S1 S2 RRR, no murmurs, rubs, gallops  Respiratory - CTA b/l  GI - soft, diffusely tender to palpation, ND, + bowel sounds  Musculoskeletal - no LE edema, no lesions  Neuro - oriented x 1, no gross focal deficits    Medications     - MEDICATION INSTRUCTIONS -       sodium chloride 125 mL/hr at 03/24/21 0620       apixaban ANTICOAGULANT  5 mg Oral BID     aspirin  81 mg Oral Daily     bisacodyl  10 mg Rectal Daily     cefTRIAXone  2 g Intravenous Q24H     famotidine  20 mg Oral BID     finasteride  5 mg Oral Daily     lactulose  20 g Oral BID     lisinopril  5 mg Oral Daily     magnesium hydroxide  30 mL Oral Daily     metoprolol succinate ER  75 mg Oral Daily     metroNIDAZOLE  500 mg Intravenous Q12H     simvastatin  20 mg Oral At Bedtime     sodium chloride (PF)  3 mL Intracatheter Q8H     tamsulosin  0.4 mg Oral Daily       Data   Recent Labs   Lab  03/24/21  0533 03/23/21  0532 03/22/21 2002   WBC 8.0 8.8 14.1*   HGB 11.6* 12.6* 14.5   MCV 99 99 97    206 241   INR  --   --  1.18*    133 135   POTASSIUM 4.1 4.2 4.0   CHLORIDE 107 104 101   CO2 23 27 28   BUN 15 20 23   CR 1.02 1.15 1.16   ANIONGAP 5 2* 6   KAYE 7.9* 8.2* 9.3   GLC 89 108* 109*   ALBUMIN 2.5* 2.8* 3.5   PROTTOTAL 6.1* 6.8 8.3   BILITOTAL 0.5 0.6 0.3   ALKPHOS 76 86 116   ALT 9 8 10   AST 20 14 18   LIPASE  --   --  193   TROPI  --   --  <0.015     Lactic Acid   Date Value Ref Range Status   03/23/2021 1.2 0.7 - 2.0 mmol/L Final   03/22/2021 2.4 (H) 0.7 - 2.0 mmol/L Final     Comment:     Significant value called to and read back by  ANA LUISA GUNTER 2046 3/22/21 JS     09/04/2020 3.0 (H) 0.7 - 2.0 mmol/L Final     Comment:     Significant value called to and read back by  Anabel Bah at 1742 on 9/4/20 by NMA         No results found for this or any previous visit (from the past 24 hour(s)).    Patrick Le MD

## 2021-03-24 NOTE — PLAN OF CARE
Most recent vitals: BP (!) 118/45   Pulse 62   Temp 100.1  F (37.8  C) (Tympanic)   Resp 18   Ht 1.829 m (6')   Wt 73.3 kg (161 lb 9.6 oz)   SpO2 96%   BMI 21.92 kg/m       Alert, oriented to person and place, makes needs known, uses call light appropriately, uses bedside commode, assist x2 with gait belt/ walker. Denies pain, lower abdomen tender to palpation. Denies N/V. Incontinent of urine and stool, stool loose/watery. IV  ml/hr, IV rocephin q24 hrs, IV flagyl q 12 hrs. Clear liquid diet. Slept part of shift. Personal items and call light within reach. Alarms active and audible.     Face to face report given with opportunity to observe patient.    Report given to LILY Cadena RN   3/24/2021  7:55 AM

## 2021-03-24 NOTE — PLAN OF CARE
VSS, afebrile. HR Irregular, lungs have fine crackles in the bases, bowels active. Pt does have tenderness in the RLQ to palpitation. Pt has been incontinent mostly of stool and urine, but does occasionally use the urinal. Pt has had 4 loose stool this shift. Pt is only alert to self, and place. Up with an assist of 1 gait belt and walker. Pt coccyx and scrotum is very excoriated, barrier cream applied.     Face to face report given with opportunity to observe patient.    Report given to Eli BAUTISTA and Joshua BRASHER.     Aston Hess RN   3/24/2021  3:44 PM

## 2021-03-24 NOTE — PLAN OF CARE
VS as charted BP Low 88/46, MD aware, 250 mL/hr 1L bolus started. Febrile, 101.7 at last check. MD Aware. Lungs are clear, bowels are hyperactive. Pt has had 5 loose stools this shift, and denies pain in his abdomen, but does wince when lower abdomen is palpated. Pt is only alert to self and place. Pt has been both continent and incontinent.     Face to face report given with opportunity to observe patient.    Report given to LILY Galaviz RN   3/23/2021  11:48 PM

## 2021-03-25 ENCOUNTER — APPOINTMENT (OUTPATIENT)
Dept: CT IMAGING | Facility: HOSPITAL | Age: 76
DRG: 871 | End: 2021-03-25
Attending: INTERNAL MEDICINE
Payer: COMMERCIAL

## 2021-03-25 ENCOUNTER — APPOINTMENT (OUTPATIENT)
Dept: PHYSICAL THERAPY | Facility: HOSPITAL | Age: 76
DRG: 871 | End: 2021-03-25
Attending: INTERNAL MEDICINE
Payer: COMMERCIAL

## 2021-03-25 ENCOUNTER — APPOINTMENT (OUTPATIENT)
Dept: OCCUPATIONAL THERAPY | Facility: HOSPITAL | Age: 76
DRG: 871 | End: 2021-03-25
Attending: INTERNAL MEDICINE
Payer: COMMERCIAL

## 2021-03-25 LAB
ALBUMIN SERPL-MCNC: 2.2 G/DL (ref 3.4–5)
ALP SERPL-CCNC: 65 U/L (ref 40–150)
ALT SERPL W P-5'-P-CCNC: 8 U/L (ref 0–70)
ANION GAP SERPL CALCULATED.3IONS-SCNC: 5 MMOL/L (ref 3–14)
AST SERPL W P-5'-P-CCNC: 14 U/L (ref 0–45)
BILIRUB SERPL-MCNC: 0.2 MG/DL (ref 0.2–1.3)
BUN SERPL-MCNC: 11 MG/DL (ref 7–30)
CALCIUM SERPL-MCNC: 7.4 MG/DL (ref 8.5–10.1)
CHLORIDE SERPL-SCNC: 108 MMOL/L (ref 94–109)
CO2 SERPL-SCNC: 23 MMOL/L (ref 20–32)
CREAT SERPL-MCNC: 0.95 MG/DL (ref 0.66–1.25)
ERYTHROCYTE [DISTWIDTH] IN BLOOD BY AUTOMATED COUNT: 13.9 % (ref 10–15)
GFR SERPL CREATININE-BSD FRML MDRD: 79 ML/MIN/{1.73_M2}
GLUCOSE SERPL-MCNC: 97 MG/DL (ref 70–99)
HCT VFR BLD AUTO: 34.7 % (ref 40–53)
HGB BLD-MCNC: 11.4 G/DL (ref 13.3–17.7)
MCH RBC QN AUTO: 31.8 PG (ref 26.5–33)
MCHC RBC AUTO-ENTMCNC: 32.9 G/DL (ref 31.5–36.5)
MCV RBC AUTO: 97 FL (ref 78–100)
PLATELET # BLD AUTO: 173 10E9/L (ref 150–450)
POTASSIUM SERPL-SCNC: 3.6 MMOL/L (ref 3.4–5.3)
PROT SERPL-MCNC: 5.7 G/DL (ref 6.8–8.8)
RBC # BLD AUTO: 3.58 10E12/L (ref 4.4–5.9)
SODIUM SERPL-SCNC: 136 MMOL/L (ref 133–144)
WBC # BLD AUTO: 8.4 10E9/L (ref 4–11)

## 2021-03-25 PROCEDURE — 97165 OT EVAL LOW COMPLEX 30 MIN: CPT | Mod: GO

## 2021-03-25 PROCEDURE — 97116 GAIT TRAINING THERAPY: CPT | Mod: GP | Performed by: PHYSICAL THERAPIST

## 2021-03-25 PROCEDURE — 85027 COMPLETE CBC AUTOMATED: CPT | Performed by: INTERNAL MEDICINE

## 2021-03-25 PROCEDURE — 97161 PT EVAL LOW COMPLEX 20 MIN: CPT | Mod: GP | Performed by: PHYSICAL THERAPIST

## 2021-03-25 PROCEDURE — 250N000011 HC RX IP 250 OP 636: Performed by: INTERNAL MEDICINE

## 2021-03-25 PROCEDURE — 258N000003 HC RX IP 258 OP 636: Performed by: INTERNAL MEDICINE

## 2021-03-25 PROCEDURE — 74176 CT ABD & PELVIS W/O CONTRAST: CPT

## 2021-03-25 PROCEDURE — 97530 THERAPEUTIC ACTIVITIES: CPT | Mod: GO

## 2021-03-25 PROCEDURE — 250N000013 HC RX MED GY IP 250 OP 250 PS 637: Performed by: INTERNAL MEDICINE

## 2021-03-25 PROCEDURE — 80053 COMPREHEN METABOLIC PANEL: CPT | Performed by: INTERNAL MEDICINE

## 2021-03-25 PROCEDURE — 120N000001 HC R&B MED SURG/OB

## 2021-03-25 PROCEDURE — 36415 COLL VENOUS BLD VENIPUNCTURE: CPT | Performed by: INTERNAL MEDICINE

## 2021-03-25 PROCEDURE — 99232 SBSQ HOSP IP/OBS MODERATE 35: CPT | Performed by: INTERNAL MEDICINE

## 2021-03-25 PROCEDURE — 97530 THERAPEUTIC ACTIVITIES: CPT | Mod: GP | Performed by: PHYSICAL THERAPIST

## 2021-03-25 RX ADMIN — METRONIDAZOLE 500 MG: 500 INJECTION, SOLUTION INTRAVENOUS at 21:09

## 2021-03-25 RX ADMIN — CEFTRIAXONE SODIUM 2 G: 2 INJECTION, SOLUTION INTRAVENOUS at 09:46

## 2021-03-25 RX ADMIN — ACETAMINOPHEN 650 MG: 325 TABLET, FILM COATED ORAL at 16:52

## 2021-03-25 RX ADMIN — FINASTERIDE 5 MG: 5 TABLET, FILM COATED ORAL at 09:53

## 2021-03-25 RX ADMIN — METOPROLOL SUCCINATE 75 MG: 50 TABLET, EXTENDED RELEASE ORAL at 09:53

## 2021-03-25 RX ADMIN — FAMOTIDINE 20 MG: 20 TABLET, FILM COATED ORAL at 21:03

## 2021-03-25 RX ADMIN — TAMSULOSIN HYDROCHLORIDE 0.4 MG: 0.4 CAPSULE ORAL at 09:52

## 2021-03-25 RX ADMIN — APIXABAN 5 MG: 2.5 TABLET, FILM COATED ORAL at 09:52

## 2021-03-25 RX ADMIN — LISINOPRIL 5 MG: 5 TABLET ORAL at 09:53

## 2021-03-25 RX ADMIN — ASPIRIN 81 MG: 81 TABLET, CHEWABLE ORAL at 09:52

## 2021-03-25 RX ADMIN — ACETAMINOPHEN 650 MG: 325 TABLET, FILM COATED ORAL at 21:03

## 2021-03-25 RX ADMIN — METRONIDAZOLE 500 MG: 500 INJECTION, SOLUTION INTRAVENOUS at 10:32

## 2021-03-25 RX ADMIN — FAMOTIDINE 20 MG: 20 TABLET, FILM COATED ORAL at 09:52

## 2021-03-25 RX ADMIN — SODIUM CHLORIDE: 9 INJECTION, SOLUTION INTRAVENOUS at 00:50

## 2021-03-25 RX ADMIN — SIMVASTATIN 20 MG: 20 TABLET, FILM COATED ORAL at 21:03

## 2021-03-25 RX ADMIN — POLYETHYLENE GLYCOL 3350, SODIUM SULFATE ANHYDROUS, SODIUM BICARBONATE, SODIUM CHLORIDE, POTASSIUM CHLORIDE 4000 ML: 236; 22.74; 6.74; 5.86; 2.97 POWDER, FOR SOLUTION ORAL at 11:29

## 2021-03-25 RX ADMIN — Medication 3 MG: at 00:55

## 2021-03-25 RX ADMIN — APIXABAN 5 MG: 2.5 TABLET, FILM COATED ORAL at 21:03

## 2021-03-25 ASSESSMENT — ACTIVITIES OF DAILY LIVING (ADL)
ADLS_ACUITY_SCORE: 21

## 2021-03-25 NOTE — PROGRESS NOTES
Inpatient Occupational Therapy Evaluation    Name: Kostas Cotto MRN# [id number]   Age: 75 year old YOB: 1945     Date of Consultation: 2021  Consultation is requested by: Dr. Le  Specific Consultation Request: weakness  Primary care provider: Halley Hidalgo    General Information:   Onset Date: 3/25/2021    History of Current Problem/Admission: Colitis [K52.9]    Prior Level of Function: Pt reports he was previously independent in ADLs and just recently (2-3 days prior to admission) started using a 4WW. Pt has h/o memory/cognitive deficits and per case management, pt's wife reports pt had been using a walker and receives assist for bathing.   Ambulation: 1 - Assistive Equipment   Transferrin - Assistive Equipment   Toiletin - Independent    Bathing: 3 - Assistive Equipment & Person  Dressin - Independent   Eatin - Independent   Communication: 0 - Understands/communicates without difficulty  Swallowin - swallows foods/liquids without difficulty  Cognition: 1 - attention or memory deficits    Fall history within the last 6 months: No    Current Living Situation: Pt lives in a house with his wife. 2 steps to enter home, 0 steps inside home. Bathroom has a walk in shower with a shower chair, regular height toilet, and 0 grab bars.     Current Equipment Used at Home: Walker, shower chair     Patient & Family Goals: return home with wife     Past Medical History:   Past Medical History:   Diagnosis Date     Hypoxic-ischemic encephalopathy (HIE) 2003     Rheumatoid arthritis(714.0) 2005     S/P inferior MI 2003       Past Surgical History:  Past Surgical History:   Procedure Laterality Date     AAA REPAIR       ANGIOGRAM  2004    Coronary Angiogram, LAD> long mid 40-50%, D2 90% ostial,Prox % with collateal flow,Circ had restenosisand was restented x 3 again.  EF now 55% with mild lateral wall hypokineseis and inferior also.     CARDIOVASCULAR  SURGERY  1/2005    Syncopal episode, Had repeat angiogam showing now circ 100% along with RCA. LAD 80-90% with EF 35%.  Sent for CABG     CARDIOVASCULAR SURGERY  1/2005    S/P 3 vess CABG, Dr Wilfredo SORIANO>LAD, SVG>OM1, SVG>RCA.  Post op pneumothorax with Chest Tube placement.     CARDIOVASCULAR SURGERY  11/2003    Acute Inferior MI, Had VT arrest resuscitated. Not given lytics and was sent to [facility].  IABP placed and had angiogram stented Circ x 5. EF 30%. Lad had 70%, % with contralateral collateral flow.     [facility] CHEST      left     HEART/LUNG RESUSCITATION (CPR)  11/2003    S/P Cardiac Arrest V-Tach, Out of hospital arrest with no bystaner cpr.  Has polymorphic VT on arrival.  Has a prolonged resuscitation which obviously was successful.  This was due to an acute Inferior MI at the time.     OPEN REDUCTION INTERNAL FIXATION HIP  1965    ORIF frac/dislocation L hip, Was a teenager and had orif done after fracture dislocation of left hip. Occurred playing softball.     PHACOEMULSIFICATION WITH STANDARD INTRAOCULAR LENS IMPLANT  3/10/2014    Procedure: PHACOEMULSIFICATION WITH STANDARD INTRAOCULAR LENS IMPLANT;  CATARACT EXTRACTION WITH INTRA OCULAR LENS LEFT(10% SERENE/POSSIBLE STRETCH);  Surgeon: Isael Acuna MD;  Location: [facility] OR     PHACOEMULSIFICATION WITH STANDARD INTRAOCULAR LENS IMPLANT  3/25/2014    Procedure: PHACOEMULSIFICATION WITH STANDARD INTRAOCULAR LENS IMPLANT;  CATARACT EXTRACTION WITH INTRA OCULAR LENS RIGHT/POSSIBLE PUPIL STRETCH;  Surgeon: Isael Acuna MD;  Location: [facility] OR       Medications:   Current Facility-Administered Medications   Medication     acetaminophen (TYLENOL) tablet 650 mg     apixaban ANTICOAGULANT (ELIQUIS) tablet 5 mg     aspirin (ASA) chewable tablet 81 mg     cefTRIAXone IN D5W (ROCEPHIN) intermittent infusion 2 g     famotidine (PEPCID) tablet 20 mg     finasteride (PROSCAR) tablet 5 mg     lidocaine (LMX4) kit     lidocaine 1 % 0.1-1 mL     lisinopril (ZESTRIL)  tablet 5 mg     melatonin tablet 3 mg     metoprolol succinate ER (TOPROL-XL) 24 hr tablet 75 mg     metroNIDAZOLE (FLAGYL) infusion 500 mg     ondansetron (ZOFRAN-ODT) ODT tab 4 mg    Or     ondansetron (ZOFRAN) injection 4 mg     Patient is already receiving anticoagulation with heparin, enoxaparin (LOVENOX), warfarin (COUMADIN)  or other anticoagulant medication     simvastatin (ZOCOR) tablet 20 mg     sodium chloride (PF) 0.9% PF flush 3 mL     sodium chloride (PF) 0.9% PF flush 3 mL     sodium chloride (PF) 0.9% PF flush 3 mL     tamsulosin (FLOMAX) capsule 0.4 mg       Weight Bearing Status:      Cognitive Status Examination:  Orientation: disoriented to times  Level of Consciousness: alert  Follows Commands and Answers Questions: 100% of the time  Personal Safety and Judgement: Intact  Memory: Impaired  Comments: pt had difficulties recalling information about PLOF    Pain:   Currently in pain? No  Pain Location? Did grimace with pain when wiping after being incontinent of bowel due to redness, bleeding. Nursing preset and aware    Occupational Therapy Evaluation:   Integumentary/Edema: Buttocks and perineum area is reddened with some breakdown/bleeding  Range of Motion: BUE's WFL   Strength: BUE's grossly 4-5/5   Hand Strength: Bilateral gross grasp good  Muscle Tone Assessment: No issues  Coordination: Normal    Mobility:   Transfer Skills: CGA sit<>stand from the chair  Bed to Chair/Chair to Bed: N/A as pt was OOB upon OT arrival  Toilet Transfer: CGA on/off bedside commode - pt was just sitting down on the commode upon OT arrival otherwise would have assessed a toilet transfer in the BR   Balance: good with support from the walker      ADLs:   Lower Body Dressing: Pt was able to don his pull ups over his feet to his ankles with 1 verbal cue; did not have pt pull up any further at that time due to needing to be cleaned after incontinence - once pt was cleaned up, nursing staff assisted pt with pulling them  up all the way  Toileting: Pt required totalA for herbert-cares after being incontinent of bowel in his pull up  Grooming: Pt too fatigued after being cleaned up to complete grooming tasks at the sink    IADLs:   Previous Responsibilities of the Patient: pt receives assistance with all IADLs from wife or children  Comments: pt was unable to state who completes the yard work, indicating that he does not     Activities of Daily Living Analysis:   Impairments Contributing to Impaired Activities of Daily Living: generalized weakness, decreased activity tolerance, decreased cognition    Occupational Therapy Interventions: ADL Retraining , cognition , strengthening , home program guidelines , progressive activity/exercise and risk factor education     Clinical Impressions:  Criteria for Skilled Therapeutic Intervention Met: Yes, treatment indicated  OT Diagnosis: Impaired ADLs  Influenced by the following impairments: generalized weakness, decreased activity tolerance, impaired cognitiion  Functional limitations due to impairment: toileting, dressing, bathing, grooming, functional mobility  Clinical presentation: Stable/Uncomplicated  Clinical presentation rationale: clinical judgement  Clinical Decision making (complexity): Low Complexity  Frequency: 5 times/week  Predicted Duration of Therapy Intervention (days/wks): 5 days    Anticipated Discharge Disposition: Home with Assist and Home with Home Therapy  Anticipated Equipment Needs at Discharge:   Risks and Benefits of therapy have been explained: Yes  Patient, Family & other staff in agreement with plan of care: Yes  Clinical Impression Comments: Pt presents with generalized weakness, decreased activity tolerance, and decreased cognition, which is limiting his maximal safety and independence in ADLs. Pt safe to return home with wife's/family assistance but would benefit from home OT to progress his strength, endurance, and overall independence in ADLs. Plan to treat pt  during his acute care stay.     Total Eval Time: 20

## 2021-03-25 NOTE — PROGRESS NOTES
Inpatient Physical Therapy Evaluation    Name: Kostas Cotto MRN# [id number]   Age: 75 year old YOB: 1945     Date of Consultation: 2021  Consultation is requested by:  Dr. Le  Specific Consultation Request:  Weakness, eval for home safety  Primary care provider: Halley Hidalgo    General Information:   Onset Date: 3/22/2021    History of Current Problem/Admission: Colitis [K52.9]    Prior Level of Function: Pt reports he was independent with all ADLs however per case management, pt's wife assists with bathing.  Pt reports he ambulates with a 4WW at home.  Pt does not drive.  Pt's family assists with rides as needed.  Pt does report he has had therapy in the past but does not recall how long ago or in what setting that was done.  Ambulation: 1 - Assistive Equipment   Transferrin - Assistive Equipment   Toiletin - Independent    Bathin - Assistive Person   Dressin - Independent   Eatin - Independent   Communication: 0 - Understands/communicates without difficulty  Swallowin - swallows foods/liquids without difficulty  Cognition: 1 - attention or memory deficits    Fall history within the last 6 months: No    Current Living Situation: Patient has 2 steps with single rail to enter home.  Pt reports no steps inside his house    Current Equipment Used at Home: 4WW     Patient & Family Goals: to go home with wife     Past Medical History:   Past Medical History:   Diagnosis Date     Hypoxic-ischemic encephalopathy (HIE) 2003     Rheumatoid arthritis(714.0) 2005     S/P inferior MI 2003       Past Surgical History:  Past Surgical History:   Procedure Laterality Date     AAA REPAIR       ANGIOGRAM  2004    Coronary Angiogram, LAD> long mid 40-50%, D2 90% ostial,Prox % with collateal flow,Circ had restenosisand was restented x 3 again.  EF now 55% with mild lateral wall hypokineseis and inferior also.     CARDIOVASCULAR SURGERY  2005    Syncopal  episode, Had repeat angiogam showing now circ 100% along with RCA. LAD 80-90% with EF 35%.  Sent for CABG     CARDIOVASCULAR SURGERY  1/2005    S/P 3 vess CABG, Dr Wilfredo SORIANO>LAD, SVG>OM1, SVG>RCA.  Post op pneumothorax with Chest Tube placement.     CARDIOVASCULAR SURGERY  11/2003    Acute Inferior MI, Had VT arrest resuscitated. Not given lytics and was sent to [facility].  IABP placed and had angiogram stented Circ x 5. EF 30%. Lad had 70%, % with contralateral collateral flow.     [facility] US CHEST      left     HEART/LUNG RESUSCITATION (CPR)  11/2003    S/P Cardiac Arrest V-Tach, Out of hospital arrest with no bystaner cpr.  Has polymorphic VT on arrival.  Has a prolonged resuscitation which obviously was successful.  This was due to an acute Inferior MI at the time.     OPEN REDUCTION INTERNAL FIXATION HIP  1965    ORIF frac/dislocation L hip, Was a teenager and had orif done after fracture dislocation of left hip. Occurred playing softball.     PHACOEMULSIFICATION WITH STANDARD INTRAOCULAR LENS IMPLANT  3/10/2014    Procedure: PHACOEMULSIFICATION WITH STANDARD INTRAOCULAR LENS IMPLANT;  CATARACT EXTRACTION WITH INTRA OCULAR LENS LEFT(10% SERENE/POSSIBLE STRETCH);  Surgeon: Isael Acuna MD;  Location: [facility]     PHACOEMULSIFICATION WITH STANDARD INTRAOCULAR LENS IMPLANT  3/25/2014    Procedure: PHACOEMULSIFICATION WITH STANDARD INTRAOCULAR LENS IMPLANT;  CATARACT EXTRACTION WITH INTRA OCULAR LENS RIGHT/POSSIBLE PUPIL STRETCH;  Surgeon: Isael Acuna MD;  Location: [facility]       Medications:   Current Facility-Administered Medications   Medication     acetaminophen (TYLENOL) tablet 650 mg     apixaban ANTICOAGULANT (ELIQUIS) tablet 5 mg     aspirin (ASA) chewable tablet 81 mg     cefTRIAXone IN D5W (ROCEPHIN) intermittent infusion 2 g     famotidine (PEPCID) tablet 20 mg     finasteride (PROSCAR) tablet 5 mg     lidocaine (LMX4) kit     lidocaine 1 % 0.1-1 mL     lisinopril (ZESTRIL) tablet 5 mg     melatonin  tablet 3 mg     metoprolol succinate ER (TOPROL-XL) 24 hr tablet 75 mg     metroNIDAZOLE (FLAGYL) infusion 500 mg     ondansetron (ZOFRAN-ODT) ODT tab 4 mg    Or     ondansetron (ZOFRAN) injection 4 mg     Patient is already receiving anticoagulation with heparin, enoxaparin (LOVENOX), warfarin (COUMADIN)  or other anticoagulant medication     polyethylene glycol (GoLYTELY) suspension 4,000 mL     simvastatin (ZOCOR) tablet 20 mg     sodium chloride (PF) 0.9% PF flush 3 mL     sodium chloride (PF) 0.9% PF flush 3 mL     sodium chloride (PF) 0.9% PF flush 3 mL     tamsulosin (FLOMAX) capsule 0.4 mg       Weight Bearing Status: FWB LEs     Cognitive Status Examination:  Orientation: oriented to self and place, not time  Level of Consciousness: alert  Follows Commands and Answers Questions: 75% of the time  Personal Safety and Judgement: Intact  Memory: impaired  Comments: has some difficulty providing some prior level of function history    Pain:   Currently in pain? No  Pain Location?   Pain Rating:     Physical Therapy Evaluation:   Integumentary/Edema: unremarkable  ROM: LE AROM WFL  Strength: LE strength grossly 4-/5 in hips, 4/5 in knees  Bed Mobility: NT, sitting at EOB upon PT arrival  Transfers: sit>stand CGA with several attempts required due to LE weakness and cues provided for hand placement to assist  Gait: ambulated 100' with FWW CGA, O2 sats 91% with activity, HR 130s  Balance: good with support from walker  Coordination: NT    Physical Therapy Interventions: Balance, Gait Training , Neuro-muscular re-education, Strengthening, Transfer Training, Risk Factor Education, Home Programming Guidelines and Progressive Activity/Exercise     Clinical Impressions:  Criteria for Skilled Therapeutic Intervention Met: Yes, treatment indicated  PT Diagnosis: weakness impairing safety with transfers and functional mobility  Influenced by the following impairments: weakness, decreased activity tolerance, impaired  cognition  Functional limitations due to impairment: decreased tolerance and safety with transfers, decreased tolerance for ambulation and stairs  Clinical presentation: Stable/Uncomplicated  Clinical presentation rationale: clinical judgement  Clinical Decision making (complexity): Low Complexity  Frequency: 6 times/week  Predicted Duration of Therapy Intervention (days/wks): 5 days  Anticipated Discharge Disposition: Home with Home Therapy and Home with Outpatient Therapy   Anticipated Equipment Needs at Discharge:   Risks and Benefits of therapy have been explained: Yes  Patient, Family & other staff in agreement with plan of care: Yes  Clinical Impression Comments: PT evaluation completed.  Pt demonstrates weakness and some difficulty with sit<>stand transfers due to LE weakness (5x sit to stand required 41 seconds and use of UEs, age related norm 14.8 sec).  Once pt up, is able to tolerate ambulation fairly well.  Pt would benefit from home vs outpatient PT to work on ongoing strengthening and activity tolerance to improve overall safety and independence at home.  Will see pt during acute care stay to progress strength and functional mobility.    Total Eval Time: 16

## 2021-03-25 NOTE — PLAN OF CARE
"Most recent vitals: /64   Pulse 76   Temp 99.9  F (37.7  C) (Tympanic)   Resp 18   Ht 1.829 m (6')   Wt 73.3 kg (161 lb 9.6 oz)   SpO2 (!) 91%   BMI 21.92 kg/m      Alert but confused to situation. VSS. Denies pain. Around 2130 patient complained of being cold and just feeling \"lousy\" Temp taken and was 100.4. Tylenol given with effective results. Other vital signs WNL. Remains on clear liquid diet tolerating well. Lungs clear and diminished at the bases bilaterally. Bowel sounds hyperactive. Abdomen is soft and nontender. Incontinent of stool and urine during the shift. Large, soft, BM. Buttocks and perineum area is reddened with some breakdown, barrier cream applied to buttocks during the shift. NS running at 125 ml/hr. Flagyl given per order. Alarms active. Standby assist.    Face to face report given with opportunity to observe patient.    Report given to Anastacia Parish RN   3/24/2021  11:20 PM      "

## 2021-03-25 NOTE — PLAN OF CARE
Most recent vitals: /60   Pulse 71   Temp 97.2  F (36.2  C) (Tympanic)   Resp 18   Ht 1.829 m (6')   Wt 73.3 kg (161 lb 9.6 oz)   SpO2 96%   BMI 21.92 kg/m      Alert but confused to situation and time. Does not call appropriately. Complained of a headache and finger pain to his left pointer finger, tylenol given x2 with effective relief. When asked about his finger the patient stated that he injured it a long time ago. VSS. Afebrile. Patient encouraged to drink go lightly but he states it is difficult to drink. Patient did have a large BM at the beginning of the shift that was hard and formed. Multiple watery stools during the shift with some formed pieces as well. Bowel sounds are hyperactive. Lungs clear and diminished, crackles to the LLL. Incontinent of stool once during the shift. Adequate urine output. Buttocks and perineum is red, excoriated, and bleeding. Barrier cream continues to be applied. IV SL. Flagyl infused per order. Remains on clear liquid diet, tolerating well. Standby assist. Alarms active.    Face to face report given with opportunity to observe patient.    Report given to Anastacia Parish RN   3/25/2021  11:27 PM

## 2021-03-25 NOTE — PLAN OF CARE
Reason for hospital stay:  Colitis   Living situation PTA: Home   Most recent vitals: /60   Pulse 85   Temp 96.4  F (35.8  C) (Tympanic)   Resp 18   Ht 1.829 m (6')   Wt 73.3 kg (161 lb 9.6 oz)   SpO2 97%   BMI 21.92 kg/m    Pt has been awake and alert in room this shift, slightly confused to time. VS and Assessments as charted, continued to deny pain throughout this shift. Has had Golytely this shift with results of multiple loose stools this shift, MD aware. Barrier cream and keeping pt's brief clean and dry to minimize skin breakdown.   IVF: SL  ABX:  Administered per MAR.   Nutrition: Clear Liq. Diet   Safety:  Call button within reach, reminded to utilize call button if needed.     Discharge care conference held.   Attendees: Nursing, Physical Therapy, and Physician  Comments:    Face to face report given with opportunity to observe patient.    Report given to LILY Benitez RN   3/25/2021  3:51 PM   3/25/2021  3:45 PM  Gina Nunn RN

## 2021-03-25 NOTE — PROGRESS NOTES
Checked in with Kostas's wife, Pari.  She advised that at baseline Kostas is confused sometimes.  Typically independent with a walker for mobility.  Will have PT/OT evaluate to ensure home is a safe discharge plan.  Writer requested consult from Dr. Le.

## 2021-03-25 NOTE — PROGRESS NOTES
Spoke with Pari.  Discussed recommendations from therapy about home care.  She will consider this.  States that Kostas had that after a hospitalization in September and did find it to be too helpful.  Discussed that she may also need to provide him assistance with toileting for a period of time.  She is willing to do this and has also been considering depends/briefs for him at home for awhile.

## 2021-03-25 NOTE — PROGRESS NOTES
[facility]    Hospitalist Progress Note    Date of Service (when I saw the patient): 03/25/2021    Assessment & Plan   Kostas Cotto is a 75 year old male who was admitted on 3/22/2021.    Rectal stool impaction: Repeat CT scan revealed persistent impaction despite multiple BMs and associated colitis.  -continue abx  -has gotten suppositories, milk of mag, and lactulose  -will give golytely prep     Associated stercoral colitis: empiric antibiotics, continue with ceftriaxone and flagyl.  Monitoring WBC, fever curve.    Possible cystitis: ,000 gram negative kenneth in urine culture.  Asymptomatic.  -patient is already on ceftriaxone for colitis  -follow speciation and sensitivities     Associated sepsis. Resolved now.  Sepsis criteria met (WBC 14.1 + ) on admission,  lactic acid was elevated (2.4), supporting the diagnosis.  Resolved now, WBC 8.0.  Blood cultures were not drawn on admission prior to abx.     Associated metabolic encephalopathy: has baseline dementia with occasional confusion.  Acute encephalopathy resolved.     Paroxysmal AFIB: continue with Eliquis     Chronic constipation: check TSH     Medically managed BPH: check post-void bladder scan prn    DVT Prophylaxis: Eliquis    Code Status: Full Code    Disposition: Expected discharge in 1-2 days once clinically improved.      Patrick Le MD    Interval History   Patient seen at bedside.  Some dementia, confusion, he denies having any pain, denies having any bowel movements, although many BMs reported and charted by RN staff.  No acute events overnight, no new symptoms.    -Data reviewed today: I reviewed all new labs and imaging results over the last 24 hours. I personally reviewed imaging reports.    Physical Exam   Temp: 99.8  F (37.7  C) Temp src: Tympanic BP: 109/56 Pulse: 70   Resp: 18 SpO2: 92 % O2 Device: None (Room air)    Vitals:    03/23/21 0239   Weight: 73.3 kg (161 lb 9.6 oz)     Vital Signs with Ranges  Temp:  [97.8  F  (36.6  C)-100.4  F (38  C)] 99.8  F (37.7  C)  Pulse:  [58-79] 70  Resp:  [18-27] 18  BP: (109-149)/(53-69) 109/56  SpO2:  [91 %-94 %] 92 %      Intake/Output Summary (Last 24 hours) at 3/25/2021 0959  Last data filed at 3/25/2021 0612  Gross per 24 hour   Intake 3811 ml   Output 500 ml   Net 3311 ml       Peripheral IV 03/22/21 Right Lower forearm (Active)   Site Assessment WDL 03/24/21 2340   Line Status Infusing 03/24/21 2340   Phlebitis Scale 0-->no symptoms 03/24/21 2340   Infiltration Scale 0 03/24/21 2340   Infiltration Site Treatment Method  None 03/24/21 2340   Number of days: 3       Incision/Surgical Site 03/10/14 Left Eye (Active)   Number of days: 2572       Incision/Surgical Site Right Eye (Active)   Number of days:      Line/device assessment(s) completed for medical necessity    Constitutional - awake, some confusion, dementia apparent  HEENT - atraumatic, normocephalic  Neck - supple, no masses, no JVD  CVS - S1 S2 RRR, no murmurs, rubs, gallops  Respiratory - CTA b/l  GI - soft, diffusely tender to palpation, ND, + bowel sounds  Musculoskeletal - no LE edema, no lesions  Neuro - oriented x 1, no gross focal deficits    Medications     - MEDICATION INSTRUCTIONS -         apixaban ANTICOAGULANT  5 mg Oral BID     aspirin  81 mg Oral Daily     cefTRIAXone  2 g Intravenous Q24H     famotidine  20 mg Oral BID     finasteride  5 mg Oral Daily     lisinopril  5 mg Oral Daily     metoprolol succinate ER  75 mg Oral Daily     metroNIDAZOLE  500 mg Intravenous Q12H     polyethylene glycol  4,000 mL Oral Once     simvastatin  20 mg Oral At Bedtime     sodium chloride (PF)  3 mL Intracatheter Q8H     tamsulosin  0.4 mg Oral Daily       Data   Recent Labs   Lab 03/25/21  0521 03/24/21  0533 03/23/21  0532 03/22/21 2002   WBC 8.4 8.0 8.8 14.1*   HGB 11.4* 11.6* 12.6* 14.5   MCV 97 99 99 97    168 206 241   INR  --   --   --  1.18*    135 133 135   POTASSIUM 3.6 4.1 4.2 4.0   CHLORIDE 108 107 104  101   CO2 23 23 27 28   BUN 11 15 20 23   CR 0.95 1.02 1.15 1.16   ANIONGAP 5 5 2* 6   KAYE 7.4* 7.9* 8.2* 9.3   GLC 97 89 108* 109*   ALBUMIN 2.2* 2.5* 2.8* 3.5   PROTTOTAL 5.7* 6.1* 6.8 8.3   BILITOTAL 0.2 0.5 0.6 0.3   ALKPHOS 65 76 86 116   ALT 8 9 8 10   AST 14 20 14 18   LIPASE  --   --   --  193   TROPI  --   --   --  <0.015     Lactic Acid   Date Value Ref Range Status   03/23/2021 1.2 0.7 - 2.0 mmol/L Final   03/22/2021 2.4 (H) 0.7 - 2.0 mmol/L Final     Comment:     Significant value called to and read back by  ANA LUISA GUNTER 2046 3/22/21 JS     09/04/2020 3.0 (H) 0.7 - 2.0 mmol/L Final     Comment:     Significant value called to and read back by  Anabel Bah at 1742 on 9/4/20 by NMA         Recent Results (from the past 24 hour(s))   CT Abdomen Pelvis w/o Contrast    Narrative    EXAMINATION: CT ABDOMEN PELVIS W/O CONTRAST, 3/25/2021 9:04 AM    TECHNIQUE:  Helical CT images from the lung bases through the  symphysis pubis were obtained  without IV contrast. Contrast dose:    COMPARISON: March 23, 2021    HISTORY: Abdominal pain, acute, nonlocalized    FINDINGS:    There is some interstitial thickening seen at the lung bases.    There are some small low-density lesions high in the dome of the liver  these are most likely cysts. There is no biliary ductal enlargement.  There is some radiopaque material seen in the fundus of the  gallbladder is most likely excreted contrast in the biliary system.    The the spleen and pancreas appear normal.    The adrenal glands are normal.    The right and left kidneys are free of masses or hydronephrosis. There  are no renal or ureteric calculi.    The periaortic lymph nodes are normal in caliber. There is a large  abdominal aortic aneurysm containing a stent graft. Embolic material  is seen in the lumen of the aorta and in the region of a right lumbar  artery. There is some calcification seen in the thrombus adjacent to  the stent graft.    No intraperitoneal masses  or inflammatory changes are noted.    There is fecal impaction with some thickening of the wall of the  rectum consistent with stercoral colitis. There is a left inguinal  hernia containing fat.    Degenerative changes are present in the lumbar spine      Impression    IMPRESSION: Fecal impaction with thickening of the rectal wall  suspicious for stercoral colitis    MD Patrick CAM MD

## 2021-03-25 NOTE — PROGRESS NOTES
03/25/21 1152   Signing Clinician's Name / Credentials   Signing clinician's name / credentials Umm Blue OTR/L   Quick Adds   Rehab Discipline OT   Therapeutic Activity   Minutes of Treatment 8 minutes   Symptoms Noted During/After Treatment Fatigue   Treatment Detail Pt required totalA for herbert-cares and modA for clothing management after using the commode. Pt was able to stand for a good length of time while this was being performed prior to c/o fatigue.   OT Discharge Planning    OT Discharge Recommendation (DC Rec) Transitional Care Facility   OT Rationale for DC Rec Pt presents with generalized weakness, decreased activity tolerance, and decreased cognition, which is limiting his maximal safety and independence in ADLs. Pt safe to return home with wife's/family assistance but would benefit from home OT to progress his strength, endurance, and overall independence in ADLs. Plan to treat pt during his acute care stay.   OT Brief overview of current status  Pt completed a commode transfer x2, chair transfer, and commode to chair transfer all with CGA. Pt was able to don his pull ups over his feet to his ankles with 1 verbal cue; did not have pt pull up any further at that time due to needing to be cleaned after incontinence - once pt was cleaned up, nursing staff assisted pt with pulling them up all the way. Pt required totalA for herbert-cares after being incontinent of bowel in his pull up.   Additional Documentation   Rehab Comments Pt will require assist from wife for some ADLs upon d/c home otherwise he was able to perform functional mobility with CGA   OT Plan Progress strength, endurance, and overall independence in ADLs   Total Session Time   Total Session Time (minutes) 8 minutes

## 2021-03-25 NOTE — PLAN OF CARE
"Most recent vitals: /56   Pulse 70   Temp 99.8  F (37.7  C) (Tympanic)   Resp 18   Ht 1.829 m (6')   Wt 73.3 kg (161 lb 9.6 oz)   SpO2 92%   BMI 21.92 kg/m       Alert, oriented to self and place, makes needs known, does use call light but require frequent reminders to call for help before trying to get out of bed, uses bedside commode to void, assist x2 with GB/walker. Incontinent of urine and stool. Afebrile. Denies pain. Lungs clear, dim in bases, oxygen 92% on RA. IV saline lock, IV rocephin q 24 hrs, IV flagyl q 12 hrs. Patient appears slightly more confused this shift. Patient states, \"I need to call my wife, she doesn't known that I'm here. I just got here today and she needs to know where I am.\" Patient required frequent reminders that he was admitted to the hospital on 03/22/2021 and that his wife knows that he is here and has visited him here. Patient also required frequent reminders of the time of day. Patient slept very little of this shift, patient given melatonin to help with sleep. Personal items and call light within reach. Alarms active and audible.     Face to face report given with opportunity to observe patient.    Report given to LILY Mckeon RN   3/25/2021  6:56 AM      "

## 2021-03-25 NOTE — PROGRESS NOTES
03/25/21 1152   Signing Clinician's Name / Credentials   Signing clinician's name / credentials Umm Blue OTR/L   Quick Adds   Rehab Discipline OT   Therapeutic Activity   Minutes of Treatment 8 minutes   Symptoms Noted During/After Treatment Fatigue   Treatment Detail Pt required totalA for herbert-cares and modA for clothing management after using the commode. Pt was able to stand for a good length of time while this was being performed prior to c/o fatigue.   OT Discharge Planning    OT Discharge Recommendation (DC Rec) Home with assist;home with home care occupational therapy   OT Rationale for DC Rec Pt presents with generalized weakness, decreased activity tolerance, and decreased cognition, which is limiting his maximal safety and independence in ADLs. Pt safe to return home with wife's/family assistance but would benefit from home OT to progress his strength, endurance, and overall independence in ADLs. Plan to treat pt during his acute care stay.   OT Brief overview of current status  Pt completed a commode transfer x2, chair transfer, and commode to chair transfer all with CGA. Pt was able to don his pull ups over his feet to his ankles with 1 verbal cue; did not have pt pull up any further at that time due to needing to be cleaned after incontinence - once pt was cleaned up, nursing staff assisted pt with pulling them up all the way. Pt required totalA for herbert-cares after being incontinent of bowel in his pull up.   Additional Documentation   Rehab Comments Pt will require assist from wife for some ADLs upon d/c home otherwise he was able to perform functional mobility with CGA   OT Plan Progress strength, endurance, and overall independence in ADLs   Total Session Time   Total Session Time (minutes) 8 minutes

## 2021-03-25 NOTE — PLAN OF CARE
Face to face report given with opportunity to observe patient.    Report given to Eli BRASHER   3/24/2021  8:02 PM

## 2021-03-26 ENCOUNTER — APPOINTMENT (OUTPATIENT)
Dept: GENERAL RADIOLOGY | Facility: HOSPITAL | Age: 76
DRG: 871 | End: 2021-03-26
Attending: NURSE PRACTITIONER
Payer: COMMERCIAL

## 2021-03-26 PROBLEM — N30.00 ACUTE CYSTITIS WITHOUT HEMATURIA: Status: ACTIVE | Noted: 2021-03-26

## 2021-03-26 PROBLEM — K56.41 FECAL IMPACTION (H): Status: ACTIVE | Noted: 2021-03-26

## 2021-03-26 PROBLEM — I48.0 PAROXYSMAL ATRIAL FIBRILLATION (H): Status: ACTIVE | Noted: 2018-05-18

## 2021-03-26 LAB
ALBUMIN SERPL-MCNC: 2.3 G/DL (ref 3.4–5)
ALP SERPL-CCNC: 62 U/L (ref 40–150)
ALT SERPL W P-5'-P-CCNC: 7 U/L (ref 0–70)
ANION GAP SERPL CALCULATED.3IONS-SCNC: 3 MMOL/L (ref 3–14)
AST SERPL W P-5'-P-CCNC: 17 U/L (ref 0–45)
BACTERIA SPEC CULT: ABNORMAL
BILIRUB SERPL-MCNC: 0.3 MG/DL (ref 0.2–1.3)
BUN SERPL-MCNC: 8 MG/DL (ref 7–30)
CALCIUM SERPL-MCNC: 8 MG/DL (ref 8.5–10.1)
CHLORIDE SERPL-SCNC: 108 MMOL/L (ref 94–109)
CO2 SERPL-SCNC: 27 MMOL/L (ref 20–32)
CREAT SERPL-MCNC: 1.01 MG/DL (ref 0.66–1.25)
ERYTHROCYTE [DISTWIDTH] IN BLOOD BY AUTOMATED COUNT: 14 % (ref 10–15)
GFR SERPL CREATININE-BSD FRML MDRD: 73 ML/MIN/{1.73_M2}
GLUCOSE BLDC GLUCOMTR-MCNC: 100 MG/DL (ref 70–99)
GLUCOSE BLDC GLUCOMTR-MCNC: 78 MG/DL (ref 70–99)
GLUCOSE SERPL-MCNC: 86 MG/DL (ref 70–99)
HCT VFR BLD AUTO: 34 % (ref 40–53)
HGB BLD-MCNC: 11.2 G/DL (ref 13.3–17.7)
LACTATE BLD-SCNC: 1 MMOL/L (ref 0.7–2)
MCH RBC QN AUTO: 31.8 PG (ref 26.5–33)
MCHC RBC AUTO-ENTMCNC: 32.9 G/DL (ref 31.5–36.5)
MCV RBC AUTO: 97 FL (ref 78–100)
PLATELET # BLD AUTO: 181 10E9/L (ref 150–450)
POTASSIUM SERPL-SCNC: 3.7 MMOL/L (ref 3.4–5.3)
PROT SERPL-MCNC: 5.9 G/DL (ref 6.8–8.8)
RBC # BLD AUTO: 3.52 10E12/L (ref 4.4–5.9)
SODIUM SERPL-SCNC: 138 MMOL/L (ref 133–144)
SPECIMEN SOURCE: ABNORMAL
WBC # BLD AUTO: 7.8 10E9/L (ref 4–11)

## 2021-03-26 PROCEDURE — 120N000001 HC R&B MED SURG/OB

## 2021-03-26 PROCEDURE — 36415 COLL VENOUS BLD VENIPUNCTURE: CPT | Performed by: NURSE PRACTITIONER

## 2021-03-26 PROCEDURE — 93005 ELECTROCARDIOGRAM TRACING: CPT

## 2021-03-26 PROCEDURE — 36415 COLL VENOUS BLD VENIPUNCTURE: CPT | Performed by: INTERNAL MEDICINE

## 2021-03-26 PROCEDURE — 999N001017 HC STATISTIC GLUCOSE BY METER IP

## 2021-03-26 PROCEDURE — 250N000011 HC RX IP 250 OP 636: Performed by: INTERNAL MEDICINE

## 2021-03-26 PROCEDURE — 250N000013 HC RX MED GY IP 250 OP 250 PS 637: Performed by: INTERNAL MEDICINE

## 2021-03-26 PROCEDURE — 99232 SBSQ HOSP IP/OBS MODERATE 35: CPT | Performed by: NURSE PRACTITIONER

## 2021-03-26 PROCEDURE — 85027 COMPLETE CBC AUTOMATED: CPT | Performed by: INTERNAL MEDICINE

## 2021-03-26 PROCEDURE — 74018 RADEX ABDOMEN 1 VIEW: CPT

## 2021-03-26 PROCEDURE — 80053 COMPREHEN METABOLIC PANEL: CPT | Performed by: INTERNAL MEDICINE

## 2021-03-26 PROCEDURE — 93010 ELECTROCARDIOGRAM REPORT: CPT | Performed by: INTERNAL MEDICINE

## 2021-03-26 PROCEDURE — 83605 ASSAY OF LACTIC ACID: CPT | Performed by: NURSE PRACTITIONER

## 2021-03-26 RX ORDER — METRONIDAZOLE 250 MG/1
250 TABLET ORAL 3 TIMES DAILY
Status: DISCONTINUED | OUTPATIENT
Start: 2021-03-27 | End: 2021-03-27 | Stop reason: HOSPADM

## 2021-03-26 RX ORDER — POLYETHYLENE GLYCOL 3350 17 G/17G
1 POWDER, FOR SOLUTION ORAL DAILY
Qty: 578 G | Refills: 0 | Status: SHIPPED | OUTPATIENT
Start: 2021-03-26

## 2021-03-26 RX ORDER — METOPROLOL SUCCINATE 25 MG/1
25 TABLET, EXTENDED RELEASE ORAL DAILY
Status: DISCONTINUED | OUTPATIENT
Start: 2021-03-27 | End: 2021-03-27 | Stop reason: HOSPADM

## 2021-03-26 RX ORDER — CEFDINIR 300 MG/1
300 CAPSULE ORAL EVERY 12 HOURS SCHEDULED
Status: DISCONTINUED | OUTPATIENT
Start: 2021-03-27 | End: 2021-03-27 | Stop reason: HOSPADM

## 2021-03-26 RX ADMIN — FAMOTIDINE 20 MG: 20 TABLET, FILM COATED ORAL at 09:21

## 2021-03-26 RX ADMIN — APIXABAN 5 MG: 2.5 TABLET, FILM COATED ORAL at 21:12

## 2021-03-26 RX ADMIN — METRONIDAZOLE 500 MG: 500 INJECTION, SOLUTION INTRAVENOUS at 21:16

## 2021-03-26 RX ADMIN — ASPIRIN 81 MG: 81 TABLET, CHEWABLE ORAL at 09:20

## 2021-03-26 RX ADMIN — LISINOPRIL 5 MG: 5 TABLET ORAL at 09:21

## 2021-03-26 RX ADMIN — CEFTRIAXONE SODIUM 2 G: 2 INJECTION, SOLUTION INTRAVENOUS at 09:08

## 2021-03-26 RX ADMIN — METOPROLOL SUCCINATE 75 MG: 50 TABLET, EXTENDED RELEASE ORAL at 09:20

## 2021-03-26 RX ADMIN — SIMVASTATIN 20 MG: 20 TABLET, FILM COATED ORAL at 21:12

## 2021-03-26 RX ADMIN — METRONIDAZOLE 500 MG: 500 INJECTION, SOLUTION INTRAVENOUS at 10:14

## 2021-03-26 RX ADMIN — APIXABAN 5 MG: 2.5 TABLET, FILM COATED ORAL at 09:20

## 2021-03-26 RX ADMIN — TAMSULOSIN HYDROCHLORIDE 0.4 MG: 0.4 CAPSULE ORAL at 09:20

## 2021-03-26 RX ADMIN — FINASTERIDE 5 MG: 5 TABLET, FILM COATED ORAL at 09:21

## 2021-03-26 RX ADMIN — FAMOTIDINE 20 MG: 20 TABLET, FILM COATED ORAL at 21:12

## 2021-03-26 ASSESSMENT — ACTIVITIES OF DAILY LIVING (ADL)
ADLS_ACUITY_SCORE: 21

## 2021-03-26 NOTE — PLAN OF CARE
OT did not see this AM due to possible discharge. Per chart review, patient is not discharging but now undergoing some further testing/assessment due to change in medical status. Will hold OT today and follow up Monday.

## 2021-03-26 NOTE — PLAN OF CARE
Most recent vitals: /69   Pulse 77   Temp 98.5  F (36.9  C) (Tympanic)   Resp 18   Ht 1.829 m (6')   Wt 73.3 kg (161 lb 9.6 oz)   SpO2 93%   BMI 21.92 kg/m      Alert but refuses to respond to any questions. Refused vitals, assessment, IV insertion for CT scan, and telemetry. Dr. Pereira did go in to talk to him and the patient would respond to his questions but not anyone else's. Patient was offered multiple times to help order supper but would not respond. Wife did come to visit during the shift but patient would not respond to her. Did get up into the chair for a few hours during the shift. Around 2000, patient started speaking again and ate a box lunch for dinner and allowed telemetry to be applied and vitals to be taken. Patient has not voided at all during the shift, bladder scan result of 260 mls. Patients original IV occluded, was able to insert an 18 gauge in the right AC which infiltrated, cold compress place on site. Standby assist with walker and gait belt. Alarms active.    Face to face report given with opportunity to observe patient.    Report given to Anastacia Parish RN   3/26/2021  11:11 PM

## 2021-03-26 NOTE — PROGRESS NOTES
Checked in with Kostas's wife, Pari.  Declined home care services at this time.  Encouraged her to reach out primary care if once home decides the additional support is needed.

## 2021-03-26 NOTE — PROGRESS NOTES
[facility]    Hospitalist Progress Note    Date of Service (when I saw the patient): 03/26/2021    Assessment & Plan       Fecal impaction (H): Resolved after golytely given. He has had multiple bowel movements, KUB shows clearing of stool out of colon. He denies any pain or nausea, however refusing to eat. Will encourage throughout the day.       Acute cystitis without hematuria: Klebsiella, he is currently on Rocephin (and flagyl for abdominal coverage), will plan on switching to oral augmentin on discharge.       Dementia: underlying behavioral and memory changes since his last cardiac event. Wife notes progression over the years. Today he has several episodes where he refused to communicate with the nurse and/or his wife. On my exam these were volitional as he would look around me to continue to look at his wife or initially resist head turn then would acquiesce. He is refusing telemetry and IV placement as well. Suspect this is a combination of underlying dementia worsening by being in a strange place and also very little sleep over the last 24 hours due to stooling.       Paroxysmal atrial fibrillation (H): Chronic, rates stable.       Essential hypertension: Blood pressures have been stable.       Chronic anticoagulation: For A-fib, continue eliquis.      DVT Prophylaxis: eliquis  Code Status: Full Code    Disposition: Expected discharge in 1-2 days once eating without increased abdominal pain.    Sarah Turner,  CNP    Interval History   Awake and  this morning looking forward to going home, however in the afternoon he became quiet, distant and refused to eat or get of the bed stating he just didn't feel well but still denying abdominal pain or nausea.     -Data reviewed today: I reviewed all new labs and imaging results over the last 24 hours.     Physical Exam   Temp: 97.6  F (36.4  C) Temp src: Tympanic BP: 124/69 Pulse: 59   Resp: 16 SpO2: 92 % O2 Device: None (Room air)     Vitals:    03/23/21 0239   Weight: 73.3 kg (161 lb 9.6 oz)     Vital Signs with Ranges  Temp:  [97.2  F (36.2  C)-98.1  F (36.7  C)] 97.6  F (36.4  C)  Pulse:  [59-73] 59  Resp:  [16-18] 16  BP: (100-137)/(49-71) 124/69  SpO2:  [92 %-97 %] 92 %  I/O last 3 completed shifts:  In: 840 [P.O.:840]  Out: 800 [Urine:800]    Peripheral IV 03/22/21 Right Lower forearm (Active)   Site Assessment WDL 03/26/21 0920   Line Status Infusing 03/26/21 0920   Phlebitis Scale 0-->no symptoms 03/26/21 0920   Infiltration Scale 0 03/26/21 0920   Infiltration Site Treatment Method  None 03/26/21 0920   Number of days: 4       Incision/Surgical Site 03/10/14 Left Eye (Active)   Number of days: 2573       Incision/Surgical Site Right Eye (Active)   Number of days:      Line/device assessment(s) completed for medical necessity    Constitutional: Awake,alert,no acute distress  Respiratory: Clear bilaterally, no wheezes, rhonchi, crackles.   Cardiovascular: HR irregular, no murmurs, rubs, thrills.   GI: Soft,nontender to palpation, bowel sounds are positive.   Skin/Integumen: No unusual rashes, open areas.        Medications     - MEDICATION INSTRUCTIONS -         apixaban ANTICOAGULANT  5 mg Oral BID     aspirin  81 mg Oral Daily     cefTRIAXone  2 g Intravenous Q24H     famotidine  20 mg Oral BID     finasteride  5 mg Oral Daily     lisinopril  5 mg Oral Daily     [START ON 3/27/2021] metoprolol succinate ER  25 mg Oral Daily     metroNIDAZOLE  500 mg Intravenous Q12H     simvastatin  20 mg Oral At Bedtime     sodium chloride (PF)  3 mL Intracatheter Q8H     tamsulosin  0.4 mg Oral Daily       Data   Recent Labs   Lab 03/26/21  0532 03/25/21  0521 03/24/21  0533 03/22/21 2002 03/22/21 2002   WBC 7.8 8.4 8.0   < > 14.1*   HGB 11.2* 11.4* 11.6*   < > 14.5   MCV 97 97 99   < > 97    173 168   < > 241   INR  --   --   --   --  1.18*    136 135   < > 135   POTASSIUM 3.7 3.6 4.1   < > 4.0   CHLORIDE 108 108 107   < > 101   CO2  27 23 23   < > 28   BUN 8 11 15   < > 23   CR 1.01 0.95 1.02   < > 1.16   ANIONGAP 3 5 5   < > 6   KAYE 8.0* 7.4* 7.9*   < > 9.3   GLC 86 97 89   < > 109*   ALBUMIN 2.3* 2.2* 2.5*   < > 3.5   PROTTOTAL 5.9* 5.7* 6.1*   < > 8.3   BILITOTAL 0.3 0.2 0.5   < > 0.3   ALKPHOS 62 65 76   < > 116   ALT 7 8 9   < > 10   AST 17 14 20   < > 18   LIPASE  --   --   --   --  193   TROPI  --   --   --   --  <0.015    < > = values in this interval not displayed.       Recent Results (from the past 24 hour(s))   XR Abdomen Port 1 View    Narrative    PROCEDURE: XR ABDOMEN PORT 1  3/26/2021 9:17 AM    HISTORY: fecal impaction    COMPARISONS: None.    TECHNIQUE: 1 view    FINDINGS: The fecal impaction appears to have been removed. The  intestinal gas pattern is normal. There is no extraluminal gas. An  aortic stent graft with embolization coils in the abdominal aortic  aneurysm are seen.         Impression    IMPRESSION: The fecal impaction seen previously appears to have been  relieved.    CHANELL MARCUS MD

## 2021-03-26 NOTE — PLAN OF CARE
Physical Therapy Discharge Summary    Reason for therapy discharge:    Discharged to home.    Progress towards therapy goal(s). See goals on Care Plan in [facility] electronic health record for goal details.  Goals partially met.  Barriers to achieving goals:   discharge from facility.    Therapy recommendation(s):    Continue home exercise program.Pt declines home care services at this time

## 2021-03-26 NOTE — PROVIDER NOTIFICATION
/61   Pulse 59   Temp 97.6  F (36.4  C) (Tympanic)   Resp 17   Ht 1.829 m (6')   Wt 73.3 kg (161 lb 9.6 oz)   SpO2 92%   BMI 21.92 kg/m    Pt was originally going to be d/c'd after eating, upon entering room patient is now refusing to eat, denies n/v, or pain at this time, and not wanting to get up to chair. CNP was text paged with VS and update. Blood glucose result 78 at 1323, pt drank 1x carton of milk. While drinking milk pt was sitting in bed and staring off with blank stare, would not respond. Initiated sternal stimulation and saying name, pt then was able to respond and visually track RN.  Neuros assessments complete and intact. Recheck glucose 13:51 was 100. CNP in room, new orders entered.

## 2021-03-26 NOTE — PHARMACY-MEDICATION REGIMEN REVIEW
Pharmacy Antimicrobial Stewardship Assessment     Current Antimicrobial Therapy:  Anti-infectives (From now, onward)    Start     Dose/Rate Route Frequency Ordered Stop    03/24/21 0800  cefTRIAXone IN D5W (ROCEPHIN) intermittent infusion 2 g      2 g  100 mL/hr over 30 Minutes Intravenous EVERY 24 HOURS 03/24/21 0624      03/23/21 2200  metroNIDAZOLE (FLAGYL) infusion 500 mg      500 mg  100 mL/hr over 60 Minutes Intravenous EVERY 12 HOURS 03/23/21 1551            Indication: Intra-abdominal Infection    Days of Therapy: 4     Pertinent Labs:  WBC   Date Value Ref Range Status   03/26/2021 7.8 4.0 - 11.0 10e9/L Final   03/25/2021 8.4 4.0 - 11.0 10e9/L Final   03/24/2021 8.0 4.0 - 11.0 10e9/L Final       CRP Inflammation   Date Value Ref Range Status   03/23/2021 11.3 (H) 0.0 - 8.0 mg/L Final   03/22/2021 <2.9 0.0 - 8.0 mg/L Final       Culture Results:   (3/23/21) Urine = 50-100K  Klebsiella sensitive to Rocephin     Recommendations/Interventions:  1.Transition to oral when able    Kannan Ann, [facility]  March 26, 2021

## 2021-03-26 NOTE — PLAN OF CARE
Patient refusing all vital signs, IV for CT scan, assessment, and telemetry at this time. Provider aware.

## 2021-03-26 NOTE — PLAN OF CARE
Most recent vitals: /60   Pulse 71   Temp 97.2  F (36.2  C) (Tympanic)   Resp 18   Ht 1.829 m (6')   Wt 73.3 kg (161 lb 9.6 oz)   SpO2 96%   BMI 21.92 kg/m       Alert, oriented to self and place, needs frequent reminders to use call light for assistance and not to get out of bed without help. Afebrile. Denies pain. Lungs clear, oxygen 96% on RA. Denies abdominal discomfort and N/V. IV saline locked, IV rocephin q 24hrs, IV flagyl q 12hrs. Clear liquid diet. Slept most of shift. Call light and personal items within reach. Alarms active and audible.    Face to face report given with opportunity to observe patient.    Report given to LILY Mckeon RN   3/26/2021  7:07 AM

## 2021-03-26 NOTE — PLAN OF CARE
Reason for hospital stay:  Fecal Impaction   Living situation PTA: Home   Most recent vitals: /61   Pulse 59   Temp 97.6  F (36.4  C) (Tympanic)   Resp 17   Ht 1.829 m (6')   Wt 73.3 kg (161 lb 9.6 oz)   SpO2 92%   BMI 21.92 kg/m    Pt has been awake for most of shift and in bed. Had shower today and has been in bed for this shift, refusing to get out of bed. See previous note. Did have a shower this AM, seemed to have exhausted pt, he has been in bed since. Pt ripped off tele-pads, and refusing IV for CT. CNP was text paged with update with update of refusal behavior.     IVF:  SL  Nutrition: Advanced per CNP; Soft   Safety:  Call button within reach, reminded to call before getting out of bed, bed alarms on and audible.     Discharge care conference held.   Attendees: Nursing, Physical Therapy, Occupational Therapy, and Physician  Comments:    Face to face report given with opportunity to observe patient.    Report given to LILY Benitez RN   3/26/2021  4:03 PM

## 2021-03-26 NOTE — PROGRESS NOTES
"CLINICAL NUTRITION SERVICES  -  ASSESSMENT NOTE    Kostas Cotto : NPO/Clear Liquids (Saturday)    75 yom admitted for colitis. Pt has a hx of chronic constipation and paroxysmal afib. NPO/clear liquid day 5 is tomorrow. Noted some weight loss in the past 2 months, does not meet criteria for malnutrition.     Diet Order: clear liquid   Intake: 25% x 1 meal    Height: 6' 0\"  Weight: 161 lbs 9.55 oz  Body mass index is 21.92 kg/m .  Weight Status:  Normal BMI  IBW: 77.6 kg (171 lb 1.2 oz)  Weight History:   Wt Readings from Last 10 Encounters:   03/23/21 73.3 kg (161 lb 9.6 oz)   01/13/20 78 kg (172 lb)   08/21/19 83 kg (183 lb)   04/15/19 78.9 kg (174 lb)   01/14/19 77.1 kg (170 lb)   05/18/18 80.3 kg (177 lb)   01/28/18 83.9 kg (185 lb)   01/24/18 83.9 kg (185 lb)   01/11/18 82.4 kg (181 lb 10.5 oz)   10/17/16 87.5 kg (193 lb)     Malnutrition Diagnosis: Patient does not meet two of thecriteria necessary for diagnosing malnutrition at this time    NUTRITION DIAGNOSIS:  Inadequate oral intake related to gi dysfunction as evidenced by NPO/clear liquids day 4    NUTRITION RECOMMENDATIONS  Advance diet as able to low fiber, suggest nutrition supplements with poor intake    MONITORING AND EVALUATION:  RD will monitor diet order, intake, weight, labs        "

## 2021-03-27 VITALS
WEIGHT: 161.6 LBS | OXYGEN SATURATION: 94 % | SYSTOLIC BLOOD PRESSURE: 120 MMHG | HEIGHT: 72 IN | TEMPERATURE: 98.1 F | RESPIRATION RATE: 18 BRPM | BODY MASS INDEX: 21.89 KG/M2 | HEART RATE: 68 BPM | DIASTOLIC BLOOD PRESSURE: 58 MMHG

## 2021-03-27 LAB
ALBUMIN SERPL-MCNC: 2.2 G/DL (ref 3.4–5)
ALP SERPL-CCNC: 60 U/L (ref 40–150)
ALT SERPL W P-5'-P-CCNC: 10 U/L (ref 0–70)
ANION GAP SERPL CALCULATED.3IONS-SCNC: 5 MMOL/L (ref 3–14)
AST SERPL W P-5'-P-CCNC: 31 U/L (ref 0–45)
BILIRUB SERPL-MCNC: 0.3 MG/DL (ref 0.2–1.3)
BUN SERPL-MCNC: 11 MG/DL (ref 7–30)
CALCIUM SERPL-MCNC: 7.6 MG/DL (ref 8.5–10.1)
CHLORIDE SERPL-SCNC: 108 MMOL/L (ref 94–109)
CO2 SERPL-SCNC: 25 MMOL/L (ref 20–32)
CREAT SERPL-MCNC: 0.99 MG/DL (ref 0.66–1.25)
ERYTHROCYTE [DISTWIDTH] IN BLOOD BY AUTOMATED COUNT: 13.8 % (ref 10–15)
GFR SERPL CREATININE-BSD FRML MDRD: 75 ML/MIN/{1.73_M2}
GLUCOSE SERPL-MCNC: 86 MG/DL (ref 70–99)
HCT VFR BLD AUTO: 34.1 % (ref 40–53)
HGB BLD-MCNC: 11.3 G/DL (ref 13.3–17.7)
LACTATE BLD-SCNC: 1 MMOL/L (ref 0.7–2)
MCH RBC QN AUTO: 31.9 PG (ref 26.5–33)
MCHC RBC AUTO-ENTMCNC: 33.1 G/DL (ref 31.5–36.5)
MCV RBC AUTO: 96 FL (ref 78–100)
PLATELET # BLD AUTO: 177 10E9/L (ref 150–450)
POTASSIUM SERPL-SCNC: 3.4 MMOL/L (ref 3.4–5.3)
PROT SERPL-MCNC: 5.6 G/DL (ref 6.8–8.8)
RBC # BLD AUTO: 3.54 10E12/L (ref 4.4–5.9)
SODIUM SERPL-SCNC: 138 MMOL/L (ref 133–144)
WBC # BLD AUTO: 8.7 10E9/L (ref 4–11)

## 2021-03-27 PROCEDURE — 250N000013 HC RX MED GY IP 250 OP 250 PS 637: Performed by: INTERNAL MEDICINE

## 2021-03-27 PROCEDURE — 80053 COMPREHEN METABOLIC PANEL: CPT | Performed by: INTERNAL MEDICINE

## 2021-03-27 PROCEDURE — 85027 COMPLETE CBC AUTOMATED: CPT | Performed by: INTERNAL MEDICINE

## 2021-03-27 PROCEDURE — 36415 COLL VENOUS BLD VENIPUNCTURE: CPT | Performed by: INTERNAL MEDICINE

## 2021-03-27 PROCEDURE — 83605 ASSAY OF LACTIC ACID: CPT | Performed by: NURSE PRACTITIONER

## 2021-03-27 PROCEDURE — 99239 HOSP IP/OBS DSCHRG MGMT >30: CPT | Performed by: NURSE PRACTITIONER

## 2021-03-27 PROCEDURE — 250N000013 HC RX MED GY IP 250 OP 250 PS 637: Performed by: NURSE PRACTITIONER

## 2021-03-27 RX ADMIN — METOPROLOL SUCCINATE 25 MG: 25 TABLET, EXTENDED RELEASE ORAL at 08:52

## 2021-03-27 RX ADMIN — APIXABAN 5 MG: 2.5 TABLET, FILM COATED ORAL at 08:52

## 2021-03-27 RX ADMIN — FAMOTIDINE 20 MG: 20 TABLET, FILM COATED ORAL at 08:52

## 2021-03-27 RX ADMIN — LISINOPRIL 5 MG: 5 TABLET ORAL at 08:52

## 2021-03-27 RX ADMIN — METRONIDAZOLE 250 MG: 250 TABLET ORAL at 09:02

## 2021-03-27 RX ADMIN — ASPIRIN 81 MG: 81 TABLET, CHEWABLE ORAL at 08:52

## 2021-03-27 RX ADMIN — CEFDINIR 300 MG: 300 CAPSULE ORAL at 08:52

## 2021-03-27 RX ADMIN — FINASTERIDE 5 MG: 5 TABLET, FILM COATED ORAL at 08:52

## 2021-03-27 ASSESSMENT — ACTIVITIES OF DAILY LIVING (ADL)
ADLS_ACUITY_SCORE: 21
ADLS_ACUITY_SCORE: 21
ADLS_ACUITY_SCORE: 17

## 2021-03-27 NOTE — DISCHARGE SUMMARY
[facility]    Discharge Summary  Hospitalist    Date of Admission:  3/22/2021  Date of Discharge:  3/27/2021 11:35 AM  Discharging Provider: Sarah Turner CNP  Date of Service (when I saw the patient): 03/27/21    Discharge Diagnoses   Principal Problem:    Fecal impaction (H)  Active Problems:    Acute cystitis without hematuria    Paroxysmal atrial fibrillation (H)    Essential hypertension    Chronic anticoagulation      History of Present Illness   From admission: Kostas Cotto is a 75 year old male who suffered a cardiac arrest in 2003. That event seemed to araceli the start of his progressive dementia. He then was found to have an aortic aneurism in 2013 with leakage this past fall. After the repair in 9/2020, he seemed to suffer a stepwise cognitive decline. He and his wife have managed to live independently and he has been doing well, relatively speaking. He seemed fine today; he enjoyed supper and then didn't seem himself: he started shaking, acting more confused and indicating that his stomach hurt.     ER Course: vitals showed low-grade fever (99.6) and borderline hypoxia (87-91%); he was in no distress. Lab diagnostics resulted an elevated WBC (14.1) with left shift.; the chemistry panel was unremarkable, with preserved stage 2 renal performance. Lactic acid was elevated (2.4) CT imagine showed stercoral colitis and large rectal stool; the aortic aneurism was stable. IV fluids and antibiotics were given    Hospital Course      Fecal impaction (H): Resolved after golytely given. He has had multiple bowel movements, KUB shows clearing of stool out of colon. He denies any pain or nausea, however still refusing to eat yesterday. However today, he is eating, drinking well and denying pain. He has been up to the chair and is eager to go home. Will discharge home and finish course Augmentin which will  Cover both intraabdominal infection and his UTI. He is also to be on daily Mirilax to prevent  further episodes of severe constipation.       Acute cystitis without hematuria: Klebsiella, he is currently on Rocephin (and flagyl for abdominal coverage), switch to Augmentin to finish course.        Dementia: underlying behavioral and memory changes since his last cardiac event. Wife notes progression over the years. Today he has several episodes where he refused to communicate with the nurse and/or his wife. On my exam 3/26 these were volitional as he would look around me to continue to look at his wife or initially resist head turn then would acquiesce. He was refusing telemetry and IV placement as well. Suspect this is a combination of underlying dementia worsening by being in a strange place and also very little sleep over the last 24 hours due to stooling. This morning he is bright, alert and cooperative. He is denying any concerns. Follow-up with PCP for further concerns.        Paroxysmal atrial fibrillation (H): Chronic, rates stable.        Essential hypertension: Blood pressures have been stable.        Chronic anticoagulation: For A-fib, continue eliquis.      Sarah Turner CNP      Pending Results     Unresulted Labs Ordered in the Past 30 Days of this Admission     No orders found from 2/20/2021 to 3/23/2021.          Code Status   Full Code       Primary Care Physician   Halley Hidalgo    Discharge Disposition   Discharged to home  Condition at discharge: Stable    Consultations This Hospital Stay   PHYSICAL THERAPY ADULT IP CONSULT  OCCUPATIONAL THERAPY ADULT IP CONSULT    Time Spent on this Encounter   ISarah NP, personally saw the patient today and spent greater than 30 minutes discharging this patient.    Discharge Orders      Reason for your hospital stay    Fecal impaction     Follow-up and recommended labs and tests     Follow up with primary care provider, Halley Hidalgo, within 7 days for hospital follow- up.  No follow up labs or test are needed.     Activity     Your activity upon discharge: activity as tolerated     When to contact your care team    Call your primary doctor if you have any of the following: fever, return of abdominal pain, nausea or vomiting.     Full Code     Diet    Follow this diet upon discharge: Orders Placed This Encounter      Low Fiber Diet for the next week then slowly increase fiber.     Discharge Medications   Current Discharge Medication List      START taking these medications    Details   amoxicillin-clavulanate (AUGMENTIN) 875-125 MG tablet Take 1 tablet by mouth 2 times daily  Qty: 10 tablet, Refills: 0    Associated Diagnoses: Acute cystitis without hematuria      polyethylene glycol (MIRALAX) 17 GM/Dose powder Take 17 g (1 capful) by mouth daily  Qty: 578 g, Refills: 0    Associated Diagnoses: Fecal impaction (H)         CONTINUE these medications which have NOT CHANGED    Details   aspirin 81 MG chewable tablet Take 1 tablet (81 mg) by mouth daily  Qty: 36 tablet, Refills: 11    Associated Diagnoses: New onset atrial fibrillation (H); History of coronary artery bypass graft x 3      calcium carbonate-vitamin D (CALCIUM 500 + D) 500-400 MG-UNIT TABS tablt Take 1 tablet by mouth 2 times daily       ELIQUIS ANTICOAGULANT 5 MG tablet TAKE 1 TABLET TWICE A DAY  Qty: 180 tablet, Refills: 3    Associated Diagnoses: New onset atrial fibrillation (H); Paroxysmal atrial fibrillation (H); Chronic anticoagulation; History of TIA (transient ischemic attack)      famotidine (PEPCID) 20 MG tablet Take 1 tablet (20 mg) by mouth 2 times daily  Qty: 180 tablet, Refills: 3    Associated Diagnoses: Gastroesophageal reflux disease without esophagitis      finasteride (PROSCAR) 5 MG tablet TAKE 1 TABLET BY MOUTH EVERY DAY  Qty: 90 tablet, Refills: 0    Comments: Overdue for an office appointment  Associated Diagnoses: Benign prostatic hyperplasia with urinary retention      folic acid (FOLVITE) 1 MG tablet Take 1 tablet (1,000 mcg) by mouth daily  Qty: 90  tablet, Refills: 0    Associated Diagnoses: Health care maintenance      lisinopril (ZESTRIL) 5 MG tablet TAKE 1 TABLET BY MOUTH EVERY DAY  Qty: 90 tablet, Refills: 0    Comments: Overdue for an office appointment  Associated Diagnoses: Essential hypertension with goal blood pressure less than 140/90      metoprolol succinate ER (TOPROL-XL) 25 MG 24 hr tablet TAKE 1 TABLET BY MOUTH EVERY DAY  Qty: 30 tablet, Refills: 1    Associated Diagnoses: Benign essential hypertension      simvastatin (ZOCOR) 20 MG tablet TAKE 1 TABLET BY MOUTH EVERY EVENING  Qty: 90 tablet, Refills: 0    Comments: Overdue for an office appointment  Associated Diagnoses: Hyperlipidemia with target LDL less than 100      tamsulosin (FLOMAX) 0.4 MG capsule TAKE 1 CAPSULE BY MOUTH EVERY DAY  Qty: 90 capsule, Refills: 0    Comments: Overdue for an office appointment  Associated Diagnoses: Benign prostatic hyperplasia with urinary retention           Allergies   Allergies   Allergen Reactions     Oxycodone      Caused him delirium   Caused him delirium      Data   Most Recent 3 CBC's:  Recent Labs   Lab Test 03/27/21 0514 03/26/21 0532 03/25/21 0521   WBC 8.7 7.8 8.4   HGB 11.3* 11.2* 11.4*   MCV 96 97 97    181 173      Most Recent 3 BMP's:  Recent Labs   Lab Test 03/27/21 0514 03/26/21 0532 03/25/21  0521    138 136   POTASSIUM 3.4 3.7 3.6   CHLORIDE 108 108 108   CO2 25 27 23   BUN 11 8 11   CR 0.99 1.01 0.95   ANIONGAP 5 3 5   KAYE 7.6* 8.0* 7.4*   GLC 86 86 97     Most Recent 2 LFT's:  Recent Labs   Lab Test 03/27/21 0514 03/26/21  0532   AST 31 17   ALT 10 7   ALKPHOS 60 62   BILITOTAL 0.3 0.3     Most Recent INR's and Anticoagulation Dosing History:  Anticoagulation Dose History     Recent Dosing and Labs Latest Ref Rng & Units 1/22/2018 1/28/2018 2/3/2018 2/7/2018 2/9/2018 8/17/2019 3/22/2021    INR 0.86 - 1.14 - 2.27(H) 3.08(H) - - 1.50(H) 1.18(H)    INR 0.86 - 1.14 3.7(A) - - 4.2(A) 1.4(A) - -        Most Recent 3  Troponin's:  Recent Labs   Lab Test 03/22/21 2002 09/02/20 1922 01/08/18  1033   TROPI <0.015 0.035 0.041     Most Recent Cholesterol Panel:  Recent Labs   Lab Test 01/09/18  0555   CHOL 109   LDL 52   HDL 41   TRIG 78     Most Recent 6 Bacteria Isolates From Any Culture (See EPIC Reports for Culture Details):  Recent Labs   Lab Test 03/23/21  0200 09/02/20 1922 08/22/19  1000 01/08/18  1558   CULT 50,000 to 100,000 colonies/mL  Klebsiella oxytoca  * 1 of 2 bottles  Coagulase negative Staphylococcus  No further identification  Susceptibility testing not routinely done  Possible contamination  *  Critical Value:  Gram stain result of bottle 1 called to and read back by Garth Garcia at 1732 on 9/3/20   by Crystal Johnson.   >100,000 colonies/mL  Mixed bacterial bev  No further identification or sensitivity done  * No MRSA isolated     Most Recent TSH, T4 and A1c Labs:  Recent Labs   Lab Test 03/22/21 2002 04/18/19  1120   TSH 6.56*  --    A1C  --  5.5     Results for orders placed or performed during the hospital encounter of 03/22/21   CT Head w/o Contrast    Narrative    PROCEDURE: CT HEAD W/O CONTRAST   3/22/2021 9:23 PM    HISTORY:Male, age,  75 years, , , Mental status change, unknown cause;  Patient with complaints of confusion.  Rule out intracranial bleed    COMPARISON: 8/16/2019    TECHNIQUE: CT of the brain without contrast.    FINDINGS: Ventricles and sulci are prominent in size and shape. Gray  and white matter demonstrate scattered areas of decreased density.    There is no evidence of mass, mass effect or midline shift. No  evidence of acute hemorrhage.    Bones of the skull are similar in appearance with polypoid mucosal  thickening again seen in the maxillary sinuses. No acute fracture.       Impression    IMPRESSION:   No acute intracranial abnormality.  No acute fracture.     Mild atrophy and white matter changes suggesting small vessel disease  are similar dating back to 8/16/2019.    KURT  MD SHWETA   CT Abdomen Pelvis w Contrast    Narrative    CT ABDOMEN PELVIS W CONTRAST    CLINICAL HISTORY: Male, age 75 years,  Patient with complaints of  abdominal pain.  History of AAA repair.  Has elevated white blood cell  count..  Rule out diverticulitis versus AAA;    Comparison:  None.    TECHNIQUE:  CT was performed of the abdomen and pelvis with IV  contrast. Sagittal, coronal, axial and MIP reconstructions were  reviewed.     FINDINGS:  Chronic changes are seen within the lung bases with peripheral areas  of atelectasis, early fibrosis and scarring. Coronary artery  calcifications are seen within the visualized portions of the heart.    There are number of low dense lesion seen within the dome the liver  that are too small to characterize.    Gallbladder is normal.    Stomach is moderately distended without acute abnormality. The  duodenum is normal.    Pancreas: No acute abnormality. Normal    Spleen: Normal.    Adrenal glands: Normal.    Kidneys: Normal. Ureters: Normal.    Urinary bladder: Heterogeneous wall thickening.    Large and small bowel bowel: Diverticulosis of the colon. No apparent  diverticulitis. The distal large volume of stool within the rectum  with stercoral colitis.    The appendix is normal.    Abdominal aortic stent graft appears grossly patent. Fusiform aneurysm  of the abdominal aorta is appreciated measuring 6.4 x 7.0 cm in size.    Small fat-containing left inguinal hernia is present. Bony structures  demonstrate postoperative changes in the left femur and no acute  abnormality.    Inguinal lymph nodes are normal.      Impression    IMPRESSION:   Stercoral colitis. Large volume of stool within the rectum.     Diverticulosis of the colon. No distinct evidence of diverticulitis.    Fat-containing left inguinal hernia without acute normality.    7.0 x 6.4 cm infrarenal abdominal aortic aneurysm with patent aortic  stent graft. If concern exists for endograft leak, CT angiogram  would  better characterize. No previous CT angiogram examination is available  for comparison.    Chronic changes within the lung bases. No evidence of focal  consolidation. Graft there are at least 2-3 low dense lesion seen  within the dome the liver that are too small to characterize. The  appearance suggests small hepatic cysts.    KURT ROCK MD   CTA Abdomen Pelvis with Contrast    Narrative    CTA ABDOMEN PELVIS WITH CONTRAST    CLINICAL HISTORY: Male, age 75 years,  Aneurysm, pelvis or lower  extremity; Patient with complaints of abdominal pain.  History of  aortic aneurysm repair.  Rule out leaking aneurysm;    Comparison:  CT scan abdomen and pelvis 3/22/2021    TECHNIQUE:  CT was performed of the abdomen and pelvis with IV  contrast. Sagittal, coronal, axial and MIP reconstructions were  reviewed.     FINDINGS:  CTA: Postoperative changes are again seen consistent with endovascular  stent. The left and right limbs are patent. Metallic densities along  the posterior aspect of the endograft are consistent with previous  endoleak repair. Aneurysm sac measures 7.2 x 6.6 cm on image 1:15 of  series 4. Internal components are heterogeneous in appearance without  evidence of active extravasation/leak. No evidence of apparent  retroperitoneal hematoma. No evidence of occlusion or significant  stenosis of the celiac artery, superior mesenteric, inferior  mesenteric or the renal arteries.    Emphysematous changes with scarring and atelectasis again seen at the  lung bases.    Low dense lesions again seen within the dome of liver. The stomach and  duodenum are unremarkable. The spleen: Normal.    Pancreas: Normal.    Adrenal glands: Normal.    Kidneys: There is a localized focus of decreased enhancement involving  the lower pole left kidney.    Ureters: Normal    Urinary bladder: Urinary bladder is distended with number of small  filling defects. Number of diverticula are seen within the walls of  the urinary  bladder. Heterogeneous wall thickening of the bladder is  not significantly changed compared to the earlier examination.    Large and small bowel are similar in appearance again demonstrating  diverticulosis of the colon. Large volume of stool is again seen  within the rectum.    Left inguinal fat-containing inguinal hernias similar in appearance.    Bony structures also unchanged with prominent degenerative changes  throughout the lumbar spine.    Inguinal lymph nodes are normal.      Impression    IMPRESSION:   No significant change in the overall appearance of the abdominal  aorta and stent graft when compared to the CT scan from the previous  day. No evidence of apparent contrast extravasation.    Localized focus of decreased enhancement involving the lower pole of  the left kidney consistent with vascular compromise, possibly  impending infarction.    The updated impression was discussed with Yas at 0750 hours  3/23/2021.    KURT ROCK MD   CT Abdomen Pelvis w/o Contrast    Narrative    EXAMINATION: CT ABDOMEN PELVIS W/O CONTRAST, 3/25/2021 9:04 AM    TECHNIQUE:  Helical CT images from the lung bases through the  symphysis pubis were obtained  without IV contrast. Contrast dose:    COMPARISON: March 23, 2021    HISTORY: Abdominal pain, acute, nonlocalized    FINDINGS:    There is some interstitial thickening seen at the lung bases.    There are some small low-density lesions high in the dome of the liver  these are most likely cysts. There is no biliary ductal enlargement.  There is some radiopaque material seen in the fundus of the  gallbladder is most likely excreted contrast in the biliary system.    The the spleen and pancreas appear normal.    The adrenal glands are normal.    The right and left kidneys are free of masses or hydronephrosis. There  are no renal or ureteric calculi.    The periaortic lymph nodes are normal in caliber. There is a large  abdominal aortic aneurysm containing a stent  graft. Embolic material  is seen in the lumen of the aorta and in the region of a right lumbar  artery. There is some calcification seen in the thrombus adjacent to  the stent graft.    No intraperitoneal masses or inflammatory changes are noted.    There is fecal impaction with some thickening of the wall of the  rectum consistent with stercoral colitis. There is a left inguinal  hernia containing fat.    Degenerative changes are present in the lumbar spine      Impression    IMPRESSION: Fecal impaction with thickening of the rectal wall  suspicious for stercoral colitis    CHANELL MARCUS MD   XR Abdomen Port 1 View    Narrative    PROCEDURE: XR ABDOMEN PORT 1  3/26/2021 9:17 AM    HISTORY: fecal impaction    COMPARISONS: None.    TECHNIQUE: 1 view    FINDINGS: The fecal impaction appears to have been removed. The  intestinal gas pattern is normal. There is no extraluminal gas. An  aortic stent graft with embolization coils in the abdominal aortic  aneurysm are seen.         Impression    IMPRESSION: The fecal impaction seen previously appears to have been  relieved.    CHANELL MARCUS MD

## 2021-03-27 NOTE — PLAN OF CARE
Most recent vitals: /61   Pulse 68   Temp 98.5  F (36.9  C) (Tympanic)   Resp 17   Ht 1.829 m (6')   Wt 73.3 kg (161 lb 9.6 oz)   SpO2 94%   BMI 21.92 kg/m       Alert, oriented to self and place, makes needs known, assist x 1 with GB/walker. Afebrile. Denies pain or abdominal discomfort. Lungs dim in bases, clear in upper lobes, oxygen 94% on RA. Tele: SR 60s, BBB, OCC PVCs . Bowel sounds hyperactive. Slept most of this shift, patient did wake during shift and remove tele monitor, patient did allow staff to place monitor back on. Call light and personal items within reach. Alarms active and audible.    Face to face report given with opportunity to observe patient.    Report given to LILY Mckeon RN   3/27/2021  7:56 AM

## 2021-03-27 NOTE — PROGRESS NOTES
Name: Kostas Cotto    MRN#: [id number]    Reason for Hospitalization: Colitis [K52.9]    LUIGI: Average    Discharge Date: March 27, 2021    Medicare IMM discharge letter was reviewed with pt's spouse/Pari on 3/25/2021 per documentation.    Patient / Family response to discharge plan: Pt et family were involved in discharge planning.    Follow-Up Appt: No future appointments.    Other Providers (Care Coordinator, County Services, PCA services etc): No    Discharge Disposition: home via Pari/spouse    Pt or health care agent verbalized understanding.         Kimberly Jade CM RN

## 2021-03-27 NOTE — PLAN OF CARE
Patient discharged at 11:35 AM via wheel chair accompanied by spouse and staff. Prescriptions sent to patients preferred pharmacy. All belongings sent with patient.     Discharge instructions reviewed with Bev. Listed belongings gathered and returned to patient. Yes    Patient discharged to home.   Report called to NA    Core Measures and Vaccines  Core Measures applicable during stay: No. If yes, state diagnosis: NA  Pneumonia and Influenza given prior to discharge, if indicated: N/A    Surgical Patient   Surgical Procedures during stay: NA  Did patient receive discharge instruction on wound care and recognition of infection symptoms? No    MISC  Follow up appointment made:  No  Home and hospital aquired medications returned to patient: N/A  Patient reports pain was well managed at discharge: Yes

## 2021-03-29 NOTE — PLAN OF CARE
Occupational Therapy Discharge Summary    Reason for therapy discharge:    Discharged to home.    Progress towards therapy goal(s). See goals on Care Plan in [facility] electronic health record for goal details.  Goals partially met.  Barriers to achieving goals:   discharge from facility.    Therapy recommendation(s):    Continue home exercise program. Pt/wife declining home care services at this time.

## 2021-03-31 ENCOUNTER — TELEPHONE (OUTPATIENT)
Dept: CASE MANAGEMENT | Facility: HOSPITAL | Age: 76
End: 2021-03-31

## 2021-05-19 DIAGNOSIS — Z00.00 HEALTH CARE MAINTENANCE: ICD-10-CM

## 2021-05-19 DIAGNOSIS — N40.1 BENIGN PROSTATIC HYPERPLASIA WITH URINARY RETENTION: ICD-10-CM

## 2021-05-19 DIAGNOSIS — E78.5 HYPERLIPIDEMIA WITH TARGET LDL LESS THAN 100: ICD-10-CM

## 2021-05-19 DIAGNOSIS — I10 ESSENTIAL HYPERTENSION WITH GOAL BLOOD PRESSURE LESS THAN 140/90: ICD-10-CM

## 2021-05-19 DIAGNOSIS — R33.8 BENIGN PROSTATIC HYPERPLASIA WITH URINARY RETENTION: ICD-10-CM

## 2021-05-19 RX ORDER — SIMVASTATIN 20 MG
TABLET ORAL
Qty: 90 TABLET | Refills: 3 | Status: SHIPPED | OUTPATIENT
Start: 2021-05-19 | End: 2022-05-23

## 2021-05-19 RX ORDER — FOLIC ACID 1 MG/1
1000 TABLET ORAL DAILY
Qty: 90 TABLET | Refills: 0 | Status: SHIPPED | OUTPATIENT
Start: 2021-05-19 | End: 2021-08-17

## 2021-05-19 RX ORDER — FINASTERIDE 5 MG/1
TABLET, FILM COATED ORAL
Qty: 90 TABLET | Refills: 3 | Status: SHIPPED | OUTPATIENT
Start: 2021-05-19 | End: 2022-05-23

## 2021-05-19 RX ORDER — TAMSULOSIN HYDROCHLORIDE 0.4 MG/1
0.4 CAPSULE ORAL AT BEDTIME
Qty: 90 CAPSULE | Refills: 3 | Status: SHIPPED | OUTPATIENT
Start: 2021-05-19 | End: 2022-05-23

## 2021-05-19 RX ORDER — LISINOPRIL 5 MG/1
TABLET ORAL
Qty: 90 TABLET | Refills: 3 | Status: SHIPPED | OUTPATIENT
Start: 2021-05-19 | End: 2022-06-17

## 2021-05-19 NOTE — TELEPHONE ENCOUNTER
Proscar      Last Written Prescription Date:  2.17.21  Last Fill Quantity: 90,   # refills: 0    Lisinopril      Last Written Prescription Date:  2.17.21  Last Fill Quantity: 90,   # refills: 0    Zocor      Last Written Prescription Date:  2.17.21  Last Fill Quantity: 90,   # refills: 0    Flomax      Last Written Prescription Date:  2.17.21  Last Fill Quantity: 90,   # refills: 0  Last Office Visit: 08.08.2020

## 2021-05-19 NOTE — TELEPHONE ENCOUNTER
Folvite 1mg      Last Written Prescription Date:  2/24/21  Last Fill Quantity: 90,   # refills: 0  Last Office Visit: 8/21/19  Future Office visit:       Routing refill request to provider for review/approval because:

## 2021-07-08 DIAGNOSIS — I10 BENIGN ESSENTIAL HYPERTENSION: ICD-10-CM

## 2021-07-08 NOTE — TELEPHONE ENCOUNTER
Metoprolol succinate ER 25 mg      Last Written Prescription Date:  3/16/21  Last Fill Quantity: 30,   # refills: 1  Last Office Visit: 3/25/21  Future Office visit:       Routing refill request to provider for review/approval because:  Drug not on the [facility], [facility] or [facility] refill protocol or controlled substance

## 2021-07-09 RX ORDER — METOPROLOL SUCCINATE 25 MG/1
25 TABLET, EXTENDED RELEASE ORAL DAILY
Qty: 30 TABLET | Refills: 11 | Status: SHIPPED | OUTPATIENT
Start: 2021-07-09 | End: 2022-05-20

## 2021-08-17 DIAGNOSIS — Z00.00 HEALTH CARE MAINTENANCE: ICD-10-CM

## 2021-08-17 RX ORDER — FOLIC ACID 1 MG/1
1000 TABLET ORAL DAILY
Qty: 30 TABLET | Refills: 0 | Status: SHIPPED | OUTPATIENT
Start: 2021-08-17 | End: 2021-11-12

## 2021-11-12 DIAGNOSIS — Z00.00 HEALTH CARE MAINTENANCE: ICD-10-CM

## 2021-11-12 RX ORDER — FOLIC ACID 1 MG/1
1000 TABLET ORAL DAILY
Qty: 30 TABLET | Refills: 0 | Status: SHIPPED | OUTPATIENT
Start: 2021-11-12 | End: 2021-12-27

## 2021-11-12 NOTE — TELEPHONE ENCOUNTER
Folic Acid  Last Written Prescription Date: 8/17/21  Last Fill Quantity: 30 # of Refills: 0  Last Office Visit: 8/21/19

## 2021-11-24 DIAGNOSIS — Z79.01 CHRONIC ANTICOAGULATION: ICD-10-CM

## 2021-11-24 DIAGNOSIS — I48.0 PAROXYSMAL ATRIAL FIBRILLATION (H): ICD-10-CM

## 2021-11-24 DIAGNOSIS — Z86.73 HISTORY OF CVA (CEREBROVASCULAR ACCIDENT): Primary | ICD-10-CM

## 2021-11-24 DIAGNOSIS — I48.91 NEW ONSET ATRIAL FIBRILLATION (H): ICD-10-CM

## 2021-11-24 DIAGNOSIS — Z86.73 HISTORY OF TIA (TRANSIENT ISCHEMIC ATTACK): ICD-10-CM

## 2021-11-24 NOTE — TELEPHONE ENCOUNTER
ELIQUIS ANTICOAGULANT 5 MG tablet      Last Written Prescription Date:  12-8-20  Last Fill Quantity: 180,   # refills: 3  Last Office Visit: 1-13-20  Future Office visit:       Routing refill request to provider for review/approval because:   Recent (6 mo) or future (30 days) visit within the authorizing provider's specialty

## 2021-12-27 DIAGNOSIS — Z00.00 HEALTH CARE MAINTENANCE: ICD-10-CM

## 2021-12-27 RX ORDER — FOLIC ACID 1 MG/1
1000 TABLET ORAL DAILY
Qty: 30 TABLET | Refills: 0 | Status: SHIPPED | OUTPATIENT
Start: 2021-12-27 | End: 2022-05-24

## 2021-12-27 NOTE — TELEPHONE ENCOUNTER
Folic Acid  Last Written Prescription Date: 11/12/21  Last Fill Quantity: 30 # of Refills: 0  Last Office Visit: 8/21/19

## 2022-05-20 DIAGNOSIS — I10 ESSENTIAL HYPERTENSION: ICD-10-CM

## 2022-05-20 DIAGNOSIS — E78.5 HYPERLIPIDEMIA WITH TARGET LDL LESS THAN 100: ICD-10-CM

## 2022-05-20 DIAGNOSIS — I10 BENIGN ESSENTIAL HYPERTENSION: ICD-10-CM

## 2022-05-20 DIAGNOSIS — I25.2 HISTORY OF MYOCARDIAL INFARCTION: ICD-10-CM

## 2022-05-20 DIAGNOSIS — Z95.1 HISTORY OF CORONARY ARTERY BYPASS GRAFT X 3: ICD-10-CM

## 2022-05-20 DIAGNOSIS — R33.8 BENIGN PROSTATIC HYPERPLASIA WITH URINARY RETENTION: ICD-10-CM

## 2022-05-20 DIAGNOSIS — Z86.73 HISTORY OF TIA (TRANSIENT ISCHEMIC ATTACK): ICD-10-CM

## 2022-05-20 DIAGNOSIS — Z86.73 HISTORY OF CVA (CEREBROVASCULAR ACCIDENT): ICD-10-CM

## 2022-05-20 DIAGNOSIS — Z98.890 HISTORY OF AAA (ABDOMINAL AORTIC ANEURYSM) REPAIR: ICD-10-CM

## 2022-05-20 DIAGNOSIS — E78.2 MIXED HYPERLIPIDEMIA: Primary | ICD-10-CM

## 2022-05-20 DIAGNOSIS — N40.1 BENIGN PROSTATIC HYPERPLASIA WITH URINARY RETENTION: ICD-10-CM

## 2022-05-20 RX ORDER — METOPROLOL SUCCINATE 25 MG/1
25 TABLET, EXTENDED RELEASE ORAL DAILY
Qty: 30 TABLET | Refills: 11 | Status: SHIPPED | OUTPATIENT
Start: 2022-05-20 | End: 2022-08-26

## 2022-05-20 NOTE — TELEPHONE ENCOUNTER
Metoprolol Succinate ER (Toprol-XL) 25 mg 24 hr tablet  Take 1 tablet (25 mg) by mouth daily  Last Written Prescription Date:  7-9-2021  Last Fill Quantity: 30 tablet,   # refills: 11  Last Office Visit: 8-  Future Office visit:

## 2022-05-23 DIAGNOSIS — Z00.00 HEALTH CARE MAINTENANCE: ICD-10-CM

## 2022-05-23 RX ORDER — TAMSULOSIN HYDROCHLORIDE 0.4 MG/1
0.4 CAPSULE ORAL AT BEDTIME
Qty: 90 CAPSULE | Refills: 3 | Status: SHIPPED | OUTPATIENT
Start: 2022-05-23 | End: 2023-05-16

## 2022-05-23 RX ORDER — SIMVASTATIN 20 MG
TABLET ORAL
Qty: 90 TABLET | Refills: 3 | Status: SHIPPED | OUTPATIENT
Start: 2022-05-23 | End: 2023-05-16

## 2022-05-23 RX ORDER — FINASTERIDE 5 MG/1
TABLET, FILM COATED ORAL
Qty: 90 TABLET | Refills: 3 | Status: SHIPPED | OUTPATIENT
Start: 2022-05-23 | End: 2023-05-16

## 2022-05-23 NOTE — TELEPHONE ENCOUNTER
simvastatin (ZOCOR) 20 MG tablet      Last Written Prescription Date:  5-19-21  Last Fill Quantity: 90,   # refills: 3  Last Office Visit: 1-13-20  Future Office visit:         finasteride (PROSCAR) 5 MG tablet      Last Written Prescription Date:  5-19-21  Last Fill Quantity: 90,   # refills: 3      tamsulosin (FLOMAX) 0.4 MG capsule      Last Written Prescription Date:  5-19-21  Last Fill Quantity: 90,   # refills: 3

## 2022-05-24 RX ORDER — FOLIC ACID 1 MG/1
1000 TABLET ORAL DAILY
Qty: 30 TABLET | Refills: 0 | Status: SHIPPED | OUTPATIENT
Start: 2022-05-24 | End: 2022-08-31

## 2022-05-24 NOTE — TELEPHONE ENCOUNTER
folic acid (FOLVITE) 1 MG tablet      Last Written Prescription Date:  12/27/2021  Last Fill Quantity: 30,   # refills: 0  Last Office Visit: 1/13/2022  Future Office visit:

## 2022-06-01 DIAGNOSIS — I10 ESSENTIAL HYPERTENSION WITH GOAL BLOOD PRESSURE LESS THAN 140/90: ICD-10-CM

## 2022-06-01 RX ORDER — LISINOPRIL 5 MG/1
TABLET ORAL
Qty: 90 TABLET | Refills: 3 | OUTPATIENT
Start: 2022-06-01

## 2022-06-17 DIAGNOSIS — I10 ESSENTIAL HYPERTENSION WITH GOAL BLOOD PRESSURE LESS THAN 140/90: ICD-10-CM

## 2022-06-17 RX ORDER — LISINOPRIL 5 MG/1
5 TABLET ORAL DAILY
Qty: 27 TABLET | Refills: 0 | Status: SHIPPED | OUTPATIENT
Start: 2022-06-17 | End: 2022-07-25

## 2022-06-17 NOTE — TELEPHONE ENCOUNTER
Pt's LOV with PCP on 8/21/19  Writer scheduled med review.   Pending short term supply. Pt's wife updated on the importance of seeing PCP a minimum of once a year in order to fill meds. Pt's wife verbalizes understanding.     Lisinopril       Last Written Prescription Date:  5/19/21  Last Fill Quantity: 90,   # refills: 3  Last Office Visit: 8/21/19  Future Office visit:    Next 5 appointments (look out 90 days)    Jul 14, 2022 11:15 AM  (Arrive by 11:00 AM)  Office Visit with NAVA De León  [facility] ([facility] [place] ) [address]  [place] [address]  [phone]           Routing refill request to provider for review/approval because:  Overdue to be seen pt scheduled.

## 2022-06-21 DIAGNOSIS — I10 ESSENTIAL HYPERTENSION WITH GOAL BLOOD PRESSURE LESS THAN 140/90: ICD-10-CM

## 2022-06-21 RX ORDER — LISINOPRIL 5 MG/1
5 TABLET ORAL DAILY
Qty: 90 TABLET | Refills: 0 | OUTPATIENT
Start: 2022-06-21

## 2022-07-21 DIAGNOSIS — I10 ESSENTIAL HYPERTENSION WITH GOAL BLOOD PRESSURE LESS THAN 140/90: ICD-10-CM

## 2022-07-25 RX ORDER — LISINOPRIL 5 MG/1
5 TABLET ORAL DAILY
Qty: 27 TABLET | Refills: 0 | Status: SHIPPED | OUTPATIENT
Start: 2022-07-25 | End: 2022-08-26

## 2022-07-25 NOTE — TELEPHONE ENCOUNTER
Lisinopril       Last Written Prescription Date:  6/17/22  Last Fill Quantity: 27,   # refills: 0  Last Office Visit: 8/21/19  Future Office visit:    Next 5 appointments (look out 90 days)    Aug 26, 2022  2:30 PM  (Arrive by 2:15 PM)  SHORT with NAVA De León  [facility] [place] ([facility] [place] ) [address]  [place] [address]  [phone]

## 2022-08-26 ENCOUNTER — OFFICE VISIT (OUTPATIENT)
Dept: FAMILY MEDICINE | Facility: OTHER | Age: 77
End: 2022-08-26
Attending: PHYSICIAN ASSISTANT
Payer: COMMERCIAL

## 2022-08-26 VITALS
OXYGEN SATURATION: 93 % | DIASTOLIC BLOOD PRESSURE: 70 MMHG | HEART RATE: 70 BPM | WEIGHT: 170 LBS | HEIGHT: 72 IN | SYSTOLIC BLOOD PRESSURE: 100 MMHG | BODY MASS INDEX: 23.03 KG/M2

## 2022-08-26 DIAGNOSIS — E78.2 MIXED HYPERLIPIDEMIA: Primary | ICD-10-CM

## 2022-08-26 DIAGNOSIS — I10 BENIGN ESSENTIAL HYPERTENSION: ICD-10-CM

## 2022-08-26 DIAGNOSIS — I48.91 NEW ONSET ATRIAL FIBRILLATION (H): ICD-10-CM

## 2022-08-26 DIAGNOSIS — Z86.73 HISTORY OF CVA (CEREBROVASCULAR ACCIDENT): ICD-10-CM

## 2022-08-26 DIAGNOSIS — I10 ESSENTIAL HYPERTENSION WITH GOAL BLOOD PRESSURE LESS THAN 140/90: ICD-10-CM

## 2022-08-26 DIAGNOSIS — Z98.890 HISTORY OF AAA (ABDOMINAL AORTIC ANEURYSM) REPAIR: ICD-10-CM

## 2022-08-26 LAB
ALBUMIN SERPL-MCNC: 3.3 G/DL (ref 3.4–5)
ALP SERPL-CCNC: 100 U/L (ref 40–150)
ALT SERPL W P-5'-P-CCNC: 7 U/L (ref 0–70)
ANION GAP SERPL CALCULATED.3IONS-SCNC: 4 MMOL/L (ref 3–14)
AST SERPL W P-5'-P-CCNC: 15 U/L (ref 0–45)
BASOPHILS # BLD AUTO: 0.1 10E3/UL (ref 0–0.2)
BASOPHILS NFR BLD AUTO: 1 %
BILIRUB SERPL-MCNC: 0.4 MG/DL (ref 0.2–1.3)
BUN SERPL-MCNC: 29 MG/DL (ref 7–30)
CALCIUM SERPL-MCNC: 9.2 MG/DL (ref 8.5–10.1)
CHLORIDE BLD-SCNC: 103 MMOL/L (ref 94–109)
CO2 SERPL-SCNC: 28 MMOL/L (ref 20–32)
CREAT SERPL-MCNC: 1.12 MG/DL (ref 0.66–1.25)
EOSINOPHIL # BLD AUTO: 0.2 10E3/UL (ref 0–0.7)
EOSINOPHIL NFR BLD AUTO: 2 %
ERYTHROCYTE [DISTWIDTH] IN BLOOD BY AUTOMATED COUNT: 13 % (ref 10–15)
GFR SERPL CREATININE-BSD FRML MDRD: 69 ML/MIN/1.73M2
GLUCOSE BLD-MCNC: 103 MG/DL (ref 70–99)
HCT VFR BLD AUTO: 44.3 % (ref 40–53)
HGB BLD-MCNC: 14.6 G/DL (ref 13.3–17.7)
IMM GRANULOCYTES # BLD: 0 10E3/UL
IMM GRANULOCYTES NFR BLD: 0 %
LYMPHOCYTES # BLD AUTO: 1.9 10E3/UL (ref 0.8–5.3)
LYMPHOCYTES NFR BLD AUTO: 23 %
MCH RBC QN AUTO: 31.9 PG (ref 26.5–33)
MCHC RBC AUTO-ENTMCNC: 33 G/DL (ref 31.5–36.5)
MCV RBC AUTO: 97 FL (ref 78–100)
MONOCYTES # BLD AUTO: 0.8 10E3/UL (ref 0–1.3)
MONOCYTES NFR BLD AUTO: 9 %
NEUTROPHILS # BLD AUTO: 5.3 10E3/UL (ref 1.6–8.3)
NEUTROPHILS NFR BLD AUTO: 65 %
NRBC # BLD AUTO: 0 10E3/UL
NRBC BLD AUTO-RTO: 0 /100
PLATELET # BLD AUTO: 211 10E3/UL (ref 150–450)
POTASSIUM BLD-SCNC: 4.5 MMOL/L (ref 3.4–5.3)
PROT SERPL-MCNC: 7.9 G/DL (ref 6.8–8.8)
RBC # BLD AUTO: 4.58 10E6/UL (ref 4.4–5.9)
SODIUM SERPL-SCNC: 135 MMOL/L (ref 133–144)
T4 FREE SERPL-MCNC: 1.05 NG/DL (ref 0.76–1.46)
TSH SERPL DL<=0.005 MIU/L-ACNC: 5.82 MU/L (ref 0.4–4)
WBC # BLD AUTO: 8.3 10E3/UL (ref 4–11)

## 2022-08-26 PROCEDURE — 83721 ASSAY OF BLOOD LIPOPROTEIN: CPT | Mod: ZL | Performed by: PHYSICIAN ASSISTANT

## 2022-08-26 PROCEDURE — 80053 COMPREHEN METABOLIC PANEL: CPT | Mod: ZL | Performed by: PHYSICIAN ASSISTANT

## 2022-08-26 PROCEDURE — 85025 COMPLETE CBC W/AUTO DIFF WBC: CPT | Mod: ZL | Performed by: PHYSICIAN ASSISTANT

## 2022-08-26 PROCEDURE — G0463 HOSPITAL OUTPT CLINIC VISIT: HCPCS | Mod: 25

## 2022-08-26 PROCEDURE — 84443 ASSAY THYROID STIM HORMONE: CPT | Mod: ZL | Performed by: PHYSICIAN ASSISTANT

## 2022-08-26 PROCEDURE — 99214 OFFICE O/P EST MOD 30 MIN: CPT | Mod: 25 | Performed by: PHYSICIAN ASSISTANT

## 2022-08-26 PROCEDURE — 84439 ASSAY OF FREE THYROXINE: CPT | Mod: ZL | Performed by: PHYSICIAN ASSISTANT

## 2022-08-26 PROCEDURE — 93010 ELECTROCARDIOGRAM REPORT: CPT | Performed by: INTERNAL MEDICINE

## 2022-08-26 PROCEDURE — 36415 COLL VENOUS BLD VENIPUNCTURE: CPT | Mod: ZL | Performed by: PHYSICIAN ASSISTANT

## 2022-08-26 PROCEDURE — 93005 ELECTROCARDIOGRAM TRACING: CPT

## 2022-08-26 RX ORDER — LISINOPRIL 5 MG/1
5 TABLET ORAL DAILY
Qty: 90 TABLET | Refills: 3 | Status: SHIPPED | OUTPATIENT
Start: 2022-08-26 | End: 2023-08-18

## 2022-08-26 RX ORDER — METOPROLOL SUCCINATE 25 MG/1
25 TABLET, EXTENDED RELEASE ORAL DAILY
Qty: 90 TABLET | Refills: 3 | Status: SHIPPED | OUTPATIENT
Start: 2022-08-26 | End: 2023-08-18

## 2022-08-26 ASSESSMENT — PAIN SCALES - GENERAL: PAINLEVEL: NO PAIN (0)

## 2022-08-26 NOTE — NURSING NOTE
Chief Complaint   Patient presents with     Hypertension     Recheck Medication       Initial /70 (BP Location: Left arm, Patient Position: Chair, Cuff Size: Adult Regular)   Pulse 70   Ht 1.829 m (6')   Wt 77.1 kg (170 lb)   SpO2 93%   BMI 23.06 kg/m   Estimated body mass index is 23.06 kg/m  as calculated from the following:    Height as of this encounter: 1.829 m (6').    Weight as of this encounter: 77.1 kg (170 lb).  Medication Reconciliation: complete  Laura Barber LPN

## 2022-08-26 NOTE — PROGRESS NOTES
Assessment & Plan     Essential hypertension with goal blood pressure less than 140/90  He is given a refill.   - lisinopril (ZESTRIL) 5 MG tablet; Take 1 tablet (5 mg) by mouth daily    Benign essential hypertension  Pressure is low, taking flomax at night. He states he never gets dizzy.  He feels good and wife is his caretaker.    - metoprolol succinate ER (TOPROL XL) 25 MG 24 hr tablet; Take 1 tablet (25 mg) by mouth daily  - CBC with platelets and differential; Future  - Comprehensive metabolic panel (BMP + Alb, Alk Phos, ALT, AST, Total. Bili, TP); Future    Mixed hyperlipidemia  Due for a recheck , not fasting and hard to get Kostas here so we will do LDL direct  - LDL cholesterol direct; Future  - TSH with free T4 reflex; Future    History of CVA (cerebrovascular accident)  Has had a few mini strokes and large CVA .  He does quiet well for him limited ability.     History of AAA (abdominal aortic aneurysm) repair  He has had a leaking of his graft 18 months ago. Last time he was in.  He is living at home with his wilfe and doing well. Quiet interactive today.  Happy, wife states his answers are not always correct.     New onset atrial fibrillation on 1/8/18  On Eliquis and active with Dr. Ortega in Cardiology.   - EKG 12-lead complete w/read - (Clinic Performed)    Review of external notes as documented elsewhere in note  Ordering of each unique test  Prescription drug management  10 minutes spent on the date of the encounter doing chart review, history and exam, documentation and further activities per the note       See Patient Instructions    No follow-ups on file.    NAVA Gates  [facility] - YAAKOV Kenyon is a 77 year old, presenting for the following health issues:  Hypertension and Recheck Medication      HPI     Hypertension Follow-up      Do you check your blood pressure regularly outside of the clinic? No     Are you following a low salt diet? No    Are your  blood pressures ever more than 140 on the top number (systolic) OR more   than 90 on the bottom number (diastolic), for example 140/90? Not checking     Hyperlipidemia Follow-Up      Are you regularly taking any medication or supplement to lower your cholesterol?   Yes- Simvastatin 20 mg     Are you having muscle aches or other side effects that you think could be caused by your cholesterol lowering medication?  No      Review of Systems   CONSTITUTIONAL: NEGATIVE for fever, chills, change in weight  INTEGUMENTARY/SKIN: NEGATIVE for worrisome rashes, moles or lesions  ENT/MOUTH: NEGATIVE for ear, mouth and throat problems  RESP: NEGATIVE for significant cough or SOB  CV: NEGATIVE for chest pain, palpitations or peripheral edema, IS ON BLOOD THINNERS DUE TO AFIB   MUSCULOSKELETAL: NEGATIVE for significant arthralgias or myalgia  ENDOCRINE: NEGATIVE for temperature intolerance, skin/hair changes  PSYCHIATRIC: NEGATIVE for changes in mood or affect      Objective    /70 (BP Location: Left arm, Patient Position: Chair, Cuff Size: Adult Regular)   Pulse 70   Ht 1.829 m (6')   Wt 77.1 kg (170 lb)   SpO2 93%   BMI 23.06 kg/m    Body mass index is 23.06 kg/m .  Physical Exam   GENERAL: healthy, alert and no distress  EYES: Eyes grossly normal to inspection, PERRL and conjunctivae and sclerae normal  HENT: ear canals and TM's normal, nose and mouth without ulcers or lesions  NECK: no adenopathy, no asymmetry, masses, or scars and thyroid normal to palpation  RESP: lungs clear to auscultation - no rales, rhonchi or wheezes  BREAST: normal without masses, tenderness or nipple discharge and no palpable axillary masses or adenopathy  CV: irregular rate and rhythm, normal S1 S2, no S3 or S4, no murmur, click or rub, no peripheral edema and peripheral pulses strong, rate is well controlled. No carotid bruit and has even pulses upper arms.  No ankle swelling.    ABDOMEN: soft, nontender, no hepatosplenomegaly, no masses  and bowel sounds normal  MS: no gross musculoskeletal defects noted, no edema  SKIN: no suspicious lesions or rashes  NEURO: Normal strength and tone, mentation intact and speech normal  PSYCH: mentation appears normal, affect normal/bright  LYMPH: no cervical, supraclavicular, axillary, or inguinal adenopathy                    .  ..

## 2022-08-27 LAB — LDLC SERPL DIRECT ASSAY-MCNC: 59 MG/DL

## 2022-08-30 DIAGNOSIS — E03.9 ACQUIRED HYPOTHYROIDISM: Primary | ICD-10-CM

## 2022-08-30 RX ORDER — LEVOTHYROXINE SODIUM 25 UG/1
25 TABLET ORAL DAILY
Qty: 90 TABLET | Refills: 0 | Status: SHIPPED | OUTPATIENT
Start: 2022-08-30 | End: 2022-11-15

## 2022-08-31 DIAGNOSIS — Z00.00 HEALTH CARE MAINTENANCE: ICD-10-CM

## 2022-08-31 RX ORDER — FOLIC ACID 1 MG/1
1000 TABLET ORAL DAILY
Qty: 30 TABLET | Refills: 0 | Status: SHIPPED | OUTPATIENT
Start: 2022-08-31 | End: 2022-11-25

## 2022-11-12 DIAGNOSIS — Z86.73 HISTORY OF CVA (CEREBROVASCULAR ACCIDENT): ICD-10-CM

## 2022-11-12 DIAGNOSIS — I48.0 PAROXYSMAL ATRIAL FIBRILLATION (H): ICD-10-CM

## 2022-11-12 DIAGNOSIS — I48.91 NEW ONSET ATRIAL FIBRILLATION (H): ICD-10-CM

## 2022-11-12 DIAGNOSIS — Z79.01 CHRONIC ANTICOAGULATION: ICD-10-CM

## 2022-11-12 DIAGNOSIS — Z86.73 HISTORY OF TIA (TRANSIENT ISCHEMIC ATTACK): ICD-10-CM

## 2022-11-13 DIAGNOSIS — E03.9 ACQUIRED HYPOTHYROIDISM: ICD-10-CM

## 2022-11-14 RX ORDER — APIXABAN 5 MG/1
TABLET, FILM COATED ORAL
Qty: 180 TABLET | Refills: 3 | Status: SHIPPED | OUTPATIENT
Start: 2022-11-14 | End: 2023-10-31

## 2022-11-14 NOTE — TELEPHONE ENCOUNTER
apixaban ANTICOAGULANT (ELIQUIS ANTICOAGULANT) 5 MG tablet      Last Written Prescription Date:  11-24-21  Last Fill Quantity: 180,   # refills: 3  Last Office Visit: 1-13-20  Future Office visit:       Routing refill request to provider for review/approval because:   Recent (6 mo) or future (30 days) visit within the authorizing provider's specialty

## 2022-11-15 RX ORDER — LEVOTHYROXINE SODIUM 25 UG/1
25 TABLET ORAL DAILY
Qty: 90 TABLET | Refills: 0 | Status: SHIPPED | OUTPATIENT
Start: 2022-11-15 | End: 2023-02-14

## 2022-11-15 NOTE — TELEPHONE ENCOUNTER
Synthroid      Last Written Prescription Date:  8.31.22  Last Fill Quantity: #90,   # refills: 0  Last Office Visit: 8.26.22  Future Office visit:       Routing refill request to provider for review/approval because:

## 2022-11-25 DIAGNOSIS — Z00.00 HEALTH CARE MAINTENANCE: ICD-10-CM

## 2022-11-25 RX ORDER — FOLIC ACID 1 MG/1
1000 TABLET ORAL DAILY
Qty: 30 TABLET | Refills: 0 | Status: SHIPPED | OUTPATIENT
Start: 2022-11-25 | End: 2023-02-23

## 2023-02-11 DIAGNOSIS — E03.9 ACQUIRED HYPOTHYROIDISM: ICD-10-CM

## 2023-02-14 RX ORDER — LEVOTHYROXINE SODIUM 25 UG/1
25 TABLET ORAL DAILY
Qty: 90 TABLET | Refills: 0 | Status: SHIPPED | OUTPATIENT
Start: 2023-02-14 | End: 2023-05-22

## 2023-02-14 NOTE — TELEPHONE ENCOUNTER
levothyroxine (SYNTHROID/LEVOTHROID) 25 MCG tablet      Last Written Prescription Date:  11-15-22  Last Fill Quantity: 90,   # refills: 0  Last Office Visit: 8-26-22  Future Office visit:       Routing refill request to provider for review/approval because:    Thyroid Protocol Failed 02/11/2023 01:01 AM   Protocol Details  Normal TSH on file in past 12 months     TSH   Date Value Ref Range Status   08/26/2022 5.82 (H) 0.40 - 4.00 mU/L Final   03/22/2021 6.56 (H) 0.40 - 4.00 mU/L Final

## 2023-02-23 DIAGNOSIS — Z00.00 HEALTH CARE MAINTENANCE: ICD-10-CM

## 2023-02-23 RX ORDER — FOLIC ACID 1 MG/1
1000 TABLET ORAL DAILY
Qty: 30 TABLET | Refills: 0 | Status: SHIPPED | OUTPATIENT
Start: 2023-02-23 | End: 2023-06-08

## 2023-03-16 NOTE — TELEPHONE ENCOUNTER
Attempt # 3  Outcome: Talked with Patient    Comment: talked with spouse left message with her to have him call

## 2023-05-14 DIAGNOSIS — Z95.1 HISTORY OF CORONARY ARTERY BYPASS GRAFT X 3: ICD-10-CM

## 2023-05-14 DIAGNOSIS — I10 ESSENTIAL HYPERTENSION: ICD-10-CM

## 2023-05-14 DIAGNOSIS — Z86.73 HISTORY OF CVA (CEREBROVASCULAR ACCIDENT): ICD-10-CM

## 2023-05-14 DIAGNOSIS — E78.2 MIXED HYPERLIPIDEMIA: ICD-10-CM

## 2023-05-14 DIAGNOSIS — E78.5 HYPERLIPIDEMIA WITH TARGET LDL LESS THAN 100: ICD-10-CM

## 2023-05-14 DIAGNOSIS — N40.1 BENIGN PROSTATIC HYPERPLASIA WITH URINARY RETENTION: ICD-10-CM

## 2023-05-14 DIAGNOSIS — I25.2 HISTORY OF MYOCARDIAL INFARCTION: ICD-10-CM

## 2023-05-14 DIAGNOSIS — Z98.890 HISTORY OF AAA (ABDOMINAL AORTIC ANEURYSM) REPAIR: ICD-10-CM

## 2023-05-14 DIAGNOSIS — R33.8 BENIGN PROSTATIC HYPERPLASIA WITH URINARY RETENTION: ICD-10-CM

## 2023-05-14 DIAGNOSIS — Z86.73 HISTORY OF TIA (TRANSIENT ISCHEMIC ATTACK): ICD-10-CM

## 2023-05-16 RX ORDER — SIMVASTATIN 20 MG
TABLET ORAL
Qty: 90 TABLET | Refills: 3 | Status: SHIPPED | OUTPATIENT
Start: 2023-05-16 | End: 2024-05-09

## 2023-05-16 RX ORDER — FINASTERIDE 5 MG/1
TABLET, FILM COATED ORAL
Qty: 90 TABLET | Refills: 3 | Status: SHIPPED | OUTPATIENT
Start: 2023-05-16 | End: 2024-05-09

## 2023-05-16 RX ORDER — TAMSULOSIN HYDROCHLORIDE 0.4 MG/1
0.4 CAPSULE ORAL AT BEDTIME
Qty: 90 CAPSULE | Refills: 3 | Status: SHIPPED | OUTPATIENT
Start: 2023-05-16 | End: 2024-05-09

## 2023-05-16 NOTE — TELEPHONE ENCOUNTER
Flomax, Proscar, Zocor      Last Written Prescription Date:  2.17.23  Last Fill Quantity: #90, #90, #90,   # refills: 0  Last Office Visit: 1.13.20  Future Office visit:       Routing refill request to provider for review/approval because:

## 2023-05-18 DIAGNOSIS — E03.9 ACQUIRED HYPOTHYROIDISM: ICD-10-CM

## 2023-05-22 RX ORDER — LEVOTHYROXINE SODIUM 25 UG/1
25 TABLET ORAL DAILY
Qty: 90 TABLET | Refills: 0 | Status: SHIPPED | OUTPATIENT
Start: 2023-05-22 | End: 2023-08-22

## 2023-05-22 NOTE — TELEPHONE ENCOUNTER
levothyroxine (SYNTHROID/LEVOTHROID) 25 MCG tablet  Last Written Prescription Date:  2/14/2023  Last Fill Quantity: 90,   # refills: 0  Last Office Visit: 8/26/2022  Future Office visit:       Routing refill request to provider for review/approval because:

## 2023-06-05 DIAGNOSIS — Z00.00 HEALTH CARE MAINTENANCE: ICD-10-CM

## 2023-06-07 NOTE — TELEPHONE ENCOUNTER
Folic acid (Folvite) 1 MG tablet    Last Written Prescription Date:  02/23/2023  Last Fill Quantity: 30,   # refills: 0  Last Office Visit: 08/26/2022

## 2023-06-08 RX ORDER — FOLIC ACID 1 MG/1
1000 TABLET ORAL DAILY
Qty: 30 TABLET | Refills: 3 | Status: SHIPPED | OUTPATIENT
Start: 2023-06-08 | End: 2023-12-22

## 2023-08-17 DIAGNOSIS — I10 ESSENTIAL HYPERTENSION WITH GOAL BLOOD PRESSURE LESS THAN 140/90: ICD-10-CM

## 2023-08-17 DIAGNOSIS — I10 BENIGN ESSENTIAL HYPERTENSION: ICD-10-CM

## 2023-08-18 RX ORDER — LISINOPRIL 5 MG/1
5 TABLET ORAL DAILY
Qty: 90 TABLET | Refills: 0 | Status: SHIPPED | OUTPATIENT
Start: 2023-08-18 | End: 2023-11-21

## 2023-08-18 RX ORDER — METOPROLOL SUCCINATE 25 MG/1
25 TABLET, EXTENDED RELEASE ORAL DAILY
Qty: 90 TABLET | Refills: 0 | Status: SHIPPED | OUTPATIENT
Start: 2023-08-18 | End: 2023-11-21

## 2023-08-21 DIAGNOSIS — E03.9 ACQUIRED HYPOTHYROIDISM: ICD-10-CM

## 2023-08-22 RX ORDER — LEVOTHYROXINE SODIUM 25 UG/1
25 TABLET ORAL DAILY
Qty: 90 TABLET | Refills: 0 | Status: SHIPPED | OUTPATIENT
Start: 2023-08-22 | End: 2023-11-21

## 2023-08-22 NOTE — TELEPHONE ENCOUNTER
Synthroid      Last Written Prescription Date:  6.7.23  Last Fill Quantity: #90,   # refills: 0  Last Office Visit: 8.26.22  Future Office visit:       Routing refill request to provider for review/approval because:

## 2023-08-23 NOTE — TELEPHONE ENCOUNTER
Attempt # 1  Outcome: No Answer/No Voicemail/Busy   Comment: no VM - called to schedule med review with PCP.

## 2023-08-29 NOTE — TELEPHONE ENCOUNTER
Attempt # 2  Outcome: Left Message   Comment: left message w/ person who answered to have pt call to sched med review w/ PCP

## 2023-10-31 DIAGNOSIS — Z79.01 CHRONIC ANTICOAGULATION: ICD-10-CM

## 2023-10-31 DIAGNOSIS — I48.91 NEW ONSET ATRIAL FIBRILLATION (H): ICD-10-CM

## 2023-10-31 DIAGNOSIS — I48.0 PAROXYSMAL ATRIAL FIBRILLATION (H): ICD-10-CM

## 2023-10-31 DIAGNOSIS — Z86.73 HISTORY OF CVA (CEREBROVASCULAR ACCIDENT): ICD-10-CM

## 2023-10-31 DIAGNOSIS — Z86.73 HISTORY OF TIA (TRANSIENT ISCHEMIC ATTACK): ICD-10-CM

## 2023-10-31 RX ORDER — APIXABAN 5 MG/1
TABLET, FILM COATED ORAL
Qty: 180 TABLET | Refills: 3 | Status: SHIPPED | OUTPATIENT
Start: 2023-10-31

## 2023-10-31 NOTE — TELEPHONE ENCOUNTER
Requested Prescriptions   Pending Prescriptions Disp Refills    ELIQUIS ANTICOAGULANT 5 MG tablet [Pharmacy Med Name: ELIQUIS TAB 5MG] 180 tablet 3     Sig: TAKE 1 TABLET TWICE A DAY       Direct Oral Anticoagulant Agents Failed - 10/31/2023  7:18 AM        Failed - Normal Platelets on file in past 12 months     Recent Labs   Lab Test 08/26/22  1509              Failed - Serum creatinine less than or equal to 1.4 on file in past 12 mos     Recent Labs   Lab Test 08/26/22  1509   CR 1.12   Ok to refill medication if creatinine is low        Failed - Weight is greater than 60 kg for the past year     Wt Readings from Last 3 Encounters:   08/26/22 77.1 kg (170 lb)   03/23/21 73.3 kg (161 lb 9.6 oz)   01/13/20 78 kg (172 lb)           Failed - Recent (6 mo) or future (30 days) visit within the authorizing provider's specialty        Passed - Medication is active on med list        Passed - Patient is 18-79 years of age     Last Written Prescription Date:  11/14/22  Last Fill Quantity: 180,   # refills: 3  Last Office Visit: 1/13/2020  Future Office visit:   none    Routing refill request to provider for review/approval because:  Overdue for lab, over weight, overdue to be seen    Unable to complete prescription refill per RN Medication Refill Policy.   Gardenia Pederson RN ....................  10/31/2023   2:16 PM

## 2023-11-20 DIAGNOSIS — E03.9 ACQUIRED HYPOTHYROIDISM: ICD-10-CM

## 2023-11-20 DIAGNOSIS — I10 ESSENTIAL HYPERTENSION WITH GOAL BLOOD PRESSURE LESS THAN 140/90: ICD-10-CM

## 2023-11-20 DIAGNOSIS — I10 BENIGN ESSENTIAL HYPERTENSION: ICD-10-CM

## 2023-11-20 NOTE — TELEPHONE ENCOUNTER
Lisinopril      Last Written Prescription Date:  8/18/23  Last Fill Quantity: 90,   # refills: 0  Last Office Visit: 8/26/22  Future Office visit:       Routing refill request to provider for review/approval because:      Toprol      Last Written Prescription Date:  8/18/23  Last Fill Quantity: 90,   # refills: 0  Last Office Visit: 8/26/22  Future Office visit:       Routing refill request to provider for review/approval because:      Synthroid      Last Written Prescription Date:  8/22/23  Last Fill Quantity: 90,   # refills: 0  Last Office Visit: 8/26/22  Future Office visit:       Routing refill request to provider for review/approval because:

## 2023-11-21 RX ORDER — METOPROLOL SUCCINATE 25 MG/1
25 TABLET, EXTENDED RELEASE ORAL DAILY
Qty: 90 TABLET | Refills: 0 | Status: SHIPPED | OUTPATIENT
Start: 2023-11-21 | End: 2024-02-19

## 2023-11-21 RX ORDER — LISINOPRIL 5 MG/1
5 TABLET ORAL DAILY
Qty: 90 TABLET | Refills: 0 | Status: SHIPPED | OUTPATIENT
Start: 2023-11-21 | End: 2024-02-19

## 2023-11-21 RX ORDER — LEVOTHYROXINE SODIUM 25 UG/1
25 TABLET ORAL DAILY
Qty: 90 TABLET | Refills: 0 | Status: SHIPPED | OUTPATIENT
Start: 2023-11-21 | End: 2024-02-19

## 2023-12-22 DIAGNOSIS — Z00.00 HEALTH CARE MAINTENANCE: ICD-10-CM

## 2023-12-22 RX ORDER — FOLIC ACID 1 MG/1
1000 TABLET ORAL DAILY
Qty: 30 TABLET | Refills: 3 | Status: SHIPPED | OUTPATIENT
Start: 2023-12-22 | End: 2024-05-20

## 2023-12-22 NOTE — TELEPHONE ENCOUNTER
Folic Acid       Last Written Prescription Date:  6/08/2023  Last Fill Quantity: 30,   # refills: 3  Last Office Visit: 8/26/2022  Future Office visit:

## 2024-01-01 NOTE — RESULT ENCOUNTER NOTE
Ldl is good, tsh up should go on low dose synthroid, and recheck end of November
Birth Sex: Male      Prenatal Complications:     Admitted From: labor/delivery    Place of Birth:     Resuscitation:     APGAR Scores:   1min:9                                                          5min: 9     10 min: --

## 2024-02-18 DIAGNOSIS — E03.9 ACQUIRED HYPOTHYROIDISM: ICD-10-CM

## 2024-02-18 DIAGNOSIS — I10 BENIGN ESSENTIAL HYPERTENSION: ICD-10-CM

## 2024-02-18 DIAGNOSIS — I10 ESSENTIAL HYPERTENSION WITH GOAL BLOOD PRESSURE LESS THAN 140/90: ICD-10-CM

## 2024-02-19 ENCOUNTER — TELEPHONE (OUTPATIENT)
Dept: FAMILY MEDICINE | Facility: OTHER | Age: 79
End: 2024-02-19

## 2024-02-19 RX ORDER — LISINOPRIL 5 MG/1
5 TABLET ORAL DAILY
Qty: 90 TABLET | Refills: 0 | Status: SHIPPED | OUTPATIENT
Start: 2024-02-19 | End: 2024-02-28

## 2024-02-19 RX ORDER — METOPROLOL SUCCINATE 25 MG/1
25 TABLET, EXTENDED RELEASE ORAL DAILY
Qty: 90 TABLET | Refills: 0 | Status: SHIPPED | OUTPATIENT
Start: 2024-02-19 | End: 2024-02-28

## 2024-02-19 RX ORDER — LEVOTHYROXINE SODIUM 25 UG/1
25 TABLET ORAL DAILY
Qty: 90 TABLET | Refills: 0 | Status: SHIPPED | OUTPATIENT
Start: 2024-02-19 | End: 2024-02-28

## 2024-02-19 NOTE — TELEPHONE ENCOUNTER
Patient overdue for visit and labs.  Note sent to [facility] to please call patient to get him scheduled as has no upcoming visits scheduled.   Last visit and labs 8/26/22

## 2024-02-19 NOTE — TELEPHONE ENCOUNTER
February 19, 2024    Left message to schedule HTN follow up appointment with PCP.    -Ernestine Johnson on 2/19/2024 at 9:48 AM

## 2024-02-26 DIAGNOSIS — E03.9 ACQUIRED HYPOTHYROIDISM: ICD-10-CM

## 2024-02-26 DIAGNOSIS — I10 BENIGN ESSENTIAL HYPERTENSION: ICD-10-CM

## 2024-02-26 DIAGNOSIS — I10 ESSENTIAL HYPERTENSION WITH GOAL BLOOD PRESSURE LESS THAN 140/90: ICD-10-CM

## 2024-02-26 NOTE — TELEPHONE ENCOUNTER
Lisinopril  Last Written Prescription Date: 2/19/24  Last Fill Quantity: 90 # of Refills: 0  Last Office Visit: 8/26/22    Metoprolol  Last Written Prescription Date: 2/19/24  Last Fill Quantity: 90 # of Refills: 0  Last Office Visit: 8/26/22    Levothyroxine  Last Written Prescription Date: 2/19/24  Last Fill Quantity: 90 # of Refills: 0  Last Office Visit: 8/26/22

## 2024-02-28 RX ORDER — LEVOTHYROXINE SODIUM 25 UG/1
25 TABLET ORAL DAILY
Qty: 90 TABLET | Refills: 0 | Status: SHIPPED | OUTPATIENT
Start: 2024-02-28 | End: 2024-08-21

## 2024-02-28 RX ORDER — LISINOPRIL 5 MG/1
5 TABLET ORAL DAILY
Qty: 90 TABLET | Refills: 0 | Status: SHIPPED | OUTPATIENT
Start: 2024-02-28 | End: 2024-08-21

## 2024-02-28 RX ORDER — METOPROLOL SUCCINATE 25 MG/1
25 TABLET, EXTENDED RELEASE ORAL DAILY
Qty: 90 TABLET | Refills: 0 | Status: SHIPPED | OUTPATIENT
Start: 2024-02-28 | End: 2024-08-21

## 2024-04-29 ENCOUNTER — TELEPHONE (OUTPATIENT)
Dept: FAMILY MEDICINE | Facility: OTHER | Age: 79
End: 2024-04-29

## 2024-05-08 DIAGNOSIS — E78.2 MIXED HYPERLIPIDEMIA: ICD-10-CM

## 2024-05-08 DIAGNOSIS — Z86.73 HISTORY OF TIA (TRANSIENT ISCHEMIC ATTACK): ICD-10-CM

## 2024-05-08 DIAGNOSIS — R33.8 BENIGN PROSTATIC HYPERPLASIA WITH URINARY RETENTION: ICD-10-CM

## 2024-05-08 DIAGNOSIS — N40.1 BENIGN PROSTATIC HYPERPLASIA WITH URINARY RETENTION: ICD-10-CM

## 2024-05-08 DIAGNOSIS — I10 ESSENTIAL HYPERTENSION: ICD-10-CM

## 2024-05-08 DIAGNOSIS — I25.2 HISTORY OF MYOCARDIAL INFARCTION: ICD-10-CM

## 2024-05-08 DIAGNOSIS — E78.5 HYPERLIPIDEMIA WITH TARGET LDL LESS THAN 100: ICD-10-CM

## 2024-05-08 DIAGNOSIS — Z98.890 HISTORY OF AAA (ABDOMINAL AORTIC ANEURYSM) REPAIR: ICD-10-CM

## 2024-05-08 DIAGNOSIS — Z86.73 HISTORY OF CVA (CEREBROVASCULAR ACCIDENT): ICD-10-CM

## 2024-05-08 DIAGNOSIS — Z95.1 HISTORY OF CORONARY ARTERY BYPASS GRAFT X 3: ICD-10-CM

## 2024-05-08 NOTE — TELEPHONE ENCOUNTER
Disp Refills Start End [place]   finasteride (PROSCAR) 5 MG tablet 90 tablet 3 5/16/2023 -- No      Disp Refills Start End [place]   simvastatin (ZOCOR) 20 MG tablet 90 tablet 3 5/16/2023 -- No      Disp Refills Start End [place]   tamsulosin (FLOMAX) 0.4 MG capsule 90 capsule 3 5/16/2023 -- No     Last Office Visit: 01/13/2020  Future Office visit:       Routing refill request to provider for review/approval because:

## 2024-05-09 DIAGNOSIS — I10 ESSENTIAL HYPERTENSION: ICD-10-CM

## 2024-05-09 DIAGNOSIS — N40.1 BENIGN PROSTATIC HYPERPLASIA WITH URINARY RETENTION: ICD-10-CM

## 2024-05-09 DIAGNOSIS — R33.8 BENIGN PROSTATIC HYPERPLASIA WITH URINARY RETENTION: ICD-10-CM

## 2024-05-09 RX ORDER — FINASTERIDE 5 MG/1
1 TABLET, FILM COATED ORAL DAILY
Qty: 90 TABLET | Refills: 3 | Status: SHIPPED | OUTPATIENT
Start: 2024-05-09

## 2024-05-09 RX ORDER — TAMSULOSIN HYDROCHLORIDE 0.4 MG/1
0.4 CAPSULE ORAL AT BEDTIME
Qty: 90 CAPSULE | Refills: 3 | OUTPATIENT
Start: 2024-05-09

## 2024-05-09 RX ORDER — TAMSULOSIN HYDROCHLORIDE 0.4 MG/1
0.4 CAPSULE ORAL AT BEDTIME
Qty: 90 CAPSULE | Refills: 3 | Status: SHIPPED | OUTPATIENT
Start: 2024-05-09

## 2024-05-09 RX ORDER — SIMVASTATIN 20 MG
TABLET ORAL
Qty: 90 TABLET | Refills: 3 | Status: SHIPPED | OUTPATIENT
Start: 2024-05-09

## 2024-05-09 RX ORDER — FINASTERIDE 5 MG/1
TABLET, FILM COATED ORAL
Qty: 90 TABLET | Refills: 3 | OUTPATIENT
Start: 2024-05-09

## 2024-05-09 NOTE — TELEPHONE ENCOUNTER
Attempt # 1  Outcome: No Answer/Busy   Comment: Patient did not answer call and recording states the has no voicemail.

## 2024-05-09 NOTE — TELEPHONE ENCOUNTER
finasteride (PROSCAR) 5 MG tablet 90 tablet 3 5/16/2023 -- No     tamsulosin (FLOMAX) 0.4 MG capsule 90 capsule 3 5/16/2023 --   Last Office Visit: 8/26/22   Future Office visit:       Routing refill request to provider for review/approval because:

## 2024-05-09 NOTE — TELEPHONE ENCOUNTER
BPH Agents [id number]/09/2024 11:07 AM   Protocol Details Recent (12 mo) or future (30 days) visit within the authorizing provider's department          Alpha Blockers [id number]/09/2024 11:07 AM   Protocol Details Blood pressure under 140/90 in past 12 months    Recent (12 mo) or future (30 days) visit within the authorizing provider's specialty

## 2024-05-09 NOTE — TELEPHONE ENCOUNTER
Due for cardiology follow up. Please assist patient in scheduling.    9 year old male presents s/p TA w/ POD8 bleed. AFVSS, no active bleeding on exam.  - Admit for observation  - nebulized TXA x1  - No NSAIDS  - tyl as needed for pain  - cold CLD diet  - IVF 9 year old male presents s/p TA w/ POD8 bleed. AFVSS, no active bleeding on exam.  - Admit for observation  - nebulized TXA x1  - No NSAIDS  - tyl as needed for pain  - complete amoxicillin course  - cold CLD diet  - IVF

## 2024-05-10 NOTE — TELEPHONE ENCOUNTER
Attempt # 2  Outcome: No Answer/Busy   Comment: No answer again and no voicemail. Sending letter.

## 2024-05-20 DIAGNOSIS — Z00.00 HEALTH CARE MAINTENANCE: ICD-10-CM

## 2024-05-20 RX ORDER — FOLIC ACID 1 MG/1
1000 TABLET ORAL DAILY
Qty: 30 TABLET | Refills: 3 | Status: SHIPPED | OUTPATIENT
Start: 2024-05-20

## 2024-05-20 NOTE — TELEPHONE ENCOUNTER
Vitamin Supplements Protocol [id number]/20/2024 01:15 AM   Protocol Details Medication indicated for associated diagnosis    Recent (12 mo) or future (90 days) visit within the authorizing provider's specialty

## 2024-05-20 NOTE — TELEPHONE ENCOUNTER
Karina      Last Written Prescription Date:  12/22/23  Last Fill Quantity: 30,   # refills: 3  Last Office Visit: 8/26/22  Future Office visit:       Routing refill request to provider for review/approval because:

## 2024-08-17 DIAGNOSIS — E03.9 ACQUIRED HYPOTHYROIDISM: ICD-10-CM

## 2024-08-17 DIAGNOSIS — I10 BENIGN ESSENTIAL HYPERTENSION: ICD-10-CM

## 2024-08-17 DIAGNOSIS — I10 ESSENTIAL HYPERTENSION WITH GOAL BLOOD PRESSURE LESS THAN 140/90: ICD-10-CM

## 2024-08-19 NOTE — TELEPHONE ENCOUNTER
Beta-Blockers Protocol Failed08/17/2024 01:01 AM   Protocol Details Blood pressure under 140/90 in past 12 months    Recent (12 mo) or future (90 days) visit within the authorizing provider's specialty       ACE Inhibitors (Including Combos) Protocol Failed08/19/2024 12:47 PM   Protocol Details Blood pressure under 140/90 in past 12 months- Clinicial or Patient Reported    Recent (12 mo) or future (90 days) visit within the authorizing provider's specialty    Most recent GFR on file in the past 12 months >30    Normal serum potassium on file in past 12 months          Thyroid Protocol Failed08/17/2024 01:01 AM   Protocol Details Recent (12 mo) or future (30 days) visit within the authorizing provider's specialty    Normal TSH on file in past 12 months

## 2024-08-19 NOTE — TELEPHONE ENCOUNTER
Levothyroxine       Last Written Prescription Date:  2/28/2024  Last Fill Quantity: 90,   # refills: 0  Last Office Visit: 8/26/2022  Future Office visit:       Lisinopril      Last Written Prescription Date:  2/28/2024  Last Fill Quantity: 90,   # refills: 0  Last Office Visit: 8/26/2022  Future Office visit:         Metoprolol       Last Written Prescription Date:  2/28/2024  Last Fill Quantity: 90,   # refills: 0  Last Office Visit: 8/26/2022  Future Office visit:

## 2024-08-21 RX ORDER — LEVOTHYROXINE SODIUM 25 UG/1
25 TABLET ORAL DAILY
Qty: 90 TABLET | Refills: 0 | Status: SHIPPED | OUTPATIENT
Start: 2024-08-21

## 2024-08-21 RX ORDER — METOPROLOL SUCCINATE 25 MG/1
25 TABLET, EXTENDED RELEASE ORAL DAILY
Qty: 90 TABLET | Refills: 0 | Status: SHIPPED | OUTPATIENT
Start: 2024-08-21

## 2024-08-21 RX ORDER — LISINOPRIL 5 MG/1
5 TABLET ORAL DAILY
Qty: 90 TABLET | Refills: 0 | Status: SHIPPED | OUTPATIENT
Start: 2024-08-21

## 2024-09-02 ENCOUNTER — HOSPITAL ENCOUNTER (EMERGENCY)
Facility: HOSPITAL | Age: 79
Discharge: ANOTHER HOSPITAL | End: 2024-09-02
Attending: PHYSICIAN ASSISTANT | Admitting: PHYSICIAN ASSISTANT
Payer: COMMERCIAL

## 2024-09-02 ENCOUNTER — APPOINTMENT (OUTPATIENT)
Dept: GENERAL RADIOLOGY | Facility: HOSPITAL | Age: 79
End: 2024-09-02
Attending: PHYSICIAN ASSISTANT
Payer: COMMERCIAL

## 2024-09-02 DIAGNOSIS — I46.9 CARDIAC ARREST (H): ICD-10-CM

## 2024-09-02 LAB
ALBUMIN SERPL BCG-MCNC: 3.9 G/DL (ref 3.5–5.2)
ALBUMIN UR-MCNC: 100 MG/DL
ALP SERPL-CCNC: 103 U/L (ref 40–150)
ALT SERPL W P-5'-P-CCNC: 66 U/L (ref 0–70)
ANION GAP SERPL CALCULATED.3IONS-SCNC: 19 MMOL/L (ref 7–15)
APPEARANCE UR: ABNORMAL
AST SERPL W P-5'-P-CCNC: 142 U/L (ref 0–45)
BASE EXCESS BLDV CALC-SCNC: -5.9 MMOL/L (ref -3–3)
BASOPHILS # BLD AUTO: 0.1 10E3/UL (ref 0–0.2)
BASOPHILS NFR BLD AUTO: 0 %
BILIRUB SERPL-MCNC: 0.4 MG/DL
BILIRUB UR QL STRIP: NEGATIVE
BUN SERPL-MCNC: 29 MG/DL (ref 8–23)
CALCIUM SERPL-MCNC: 9.9 MG/DL (ref 8.8–10.4)
CHLORIDE SERPL-SCNC: 98 MMOL/L (ref 98–107)
COLOR UR AUTO: YELLOW
CREAT SERPL-MCNC: 1.26 MG/DL (ref 0.67–1.17)
D DIMER PPP FEU-MCNC: 3.71 UG/ML FEU (ref 0–0.5)
EGFRCR SERPLBLD CKD-EPI 2021: 59 ML/MIN/1.73M2
EOSINOPHIL # BLD AUTO: 0.1 10E3/UL (ref 0–0.7)
EOSINOPHIL NFR BLD AUTO: 1 %
ERYTHROCYTE [DISTWIDTH] IN BLOOD BY AUTOMATED COUNT: 13.2 % (ref 10–15)
GLUCOSE SERPL-MCNC: 184 MG/DL (ref 70–99)
GLUCOSE UR STRIP-MCNC: NEGATIVE MG/DL
GRANULAR CAST: 2 /LPF
HCO3 BLDV-SCNC: 21 MMOL/L (ref 21–28)
HCO3 SERPL-SCNC: 18 MMOL/L (ref 22–29)
HCT VFR BLD AUTO: 42.8 % (ref 40–53)
HGB BLD-MCNC: 13.9 G/DL (ref 13.3–17.7)
HGB UR QL STRIP: ABNORMAL
HOLD SPECIMEN: NORMAL
HYALINE CASTS: 30 /LPF
IMM GRANULOCYTES # BLD: 0.2 10E3/UL
IMM GRANULOCYTES NFR BLD: 1 %
KETONES UR STRIP-MCNC: ABNORMAL MG/DL
LEUKOCYTE ESTERASE UR QL STRIP: ABNORMAL
LYMPHOCYTES # BLD AUTO: 3.9 10E3/UL (ref 0.8–5.3)
LYMPHOCYTES NFR BLD AUTO: 29 %
MAGNESIUM SERPL-MCNC: 2.3 MG/DL (ref 1.7–2.3)
MCH RBC QN AUTO: 32.3 PG (ref 26.5–33)
MCHC RBC AUTO-ENTMCNC: 32.5 G/DL (ref 31.5–36.5)
MCV RBC AUTO: 100 FL (ref 78–100)
MONOCYTES # BLD AUTO: 0.9 10E3/UL (ref 0–1.3)
MONOCYTES NFR BLD AUTO: 6 %
MUCOUS THREADS #/AREA URNS LPF: PRESENT /LPF
NEUTROPHILS # BLD AUTO: 8.7 10E3/UL (ref 1.6–8.3)
NEUTROPHILS NFR BLD AUTO: 63 %
NITRATE UR QL: NEGATIVE
NRBC # BLD AUTO: 0 10E3/UL
NRBC BLD AUTO-RTO: 0 /100
O2/TOTAL GAS SETTING VFR VENT: 21 %
OXYHGB MFR BLDV: 73 % (ref 70–75)
PCO2 BLDV: 46 MM HG (ref 40–50)
PH BLDV: 7.27 [PH] (ref 7.32–7.43)
PH UR STRIP: 5.5 [PH] (ref 4.7–8)
PLATELET # BLD AUTO: 205 10E3/UL (ref 150–450)
PO2 BLDV: 46 MM HG (ref 25–47)
POTASSIUM SERPL-SCNC: 2.9 MMOL/L (ref 3.4–5.3)
PROT SERPL-MCNC: 7.1 G/DL (ref 6.4–8.3)
RBC # BLD AUTO: 4.3 10E6/UL (ref 4.4–5.9)
RBC URINE: 7 /HPF
SAO2 % BLDV: 74.6 % (ref 70–75)
SODIUM SERPL-SCNC: 135 MMOL/L (ref 135–145)
SP GR UR STRIP: 1.02 (ref 1–1.03)
SPERM #/AREA URNS HPF: PRESENT /HPF
SQUAMOUS EPITHELIAL: 2 /HPF
TROPONIN T SERPL HS-MCNC: 20 NG/L
UROBILINOGEN UR STRIP-MCNC: NORMAL MG/DL
WBC # BLD AUTO: 13.8 10E3/UL (ref 4–11)
WBC URINE: 37 /HPF

## 2024-09-02 PROCEDURE — 36415 COLL VENOUS BLD VENIPUNCTURE: CPT | Performed by: PHYSICIAN ASSISTANT

## 2024-09-02 PROCEDURE — 84075 ASSAY ALKALINE PHOSPHATASE: CPT | Performed by: PHYSICIAN ASSISTANT

## 2024-09-02 PROCEDURE — 999N000065 XR CHEST PORT 1 VIEW

## 2024-09-02 PROCEDURE — 93010 ELECTROCARDIOGRAM REPORT: CPT | Performed by: INTERNAL MEDICINE

## 2024-09-02 PROCEDURE — 82805 BLOOD GASES W/O2 SATURATION: CPT | Performed by: PHYSICIAN ASSISTANT

## 2024-09-02 PROCEDURE — 250N000011 HC RX IP 250 OP 636

## 2024-09-02 PROCEDURE — 31500 INSERT EMERGENCY AIRWAY: CPT | Performed by: PHYSICIAN ASSISTANT

## 2024-09-02 PROCEDURE — 81003 URINALYSIS AUTO W/O SCOPE: CPT | Performed by: PHYSICIAN ASSISTANT

## 2024-09-02 PROCEDURE — 99291 CRITICAL CARE FIRST HOUR: CPT | Mod: 25

## 2024-09-02 PROCEDURE — 99291 CRITICAL CARE FIRST HOUR: CPT | Mod: 25 | Performed by: PHYSICIAN ASSISTANT

## 2024-09-02 PROCEDURE — 36680 INSERT NEEDLE BONE CAVITY: CPT

## 2024-09-02 PROCEDURE — 85379 FIBRIN DEGRADATION QUANT: CPT | Performed by: PHYSICIAN ASSISTANT

## 2024-09-02 PROCEDURE — 96375 TX/PRO/DX INJ NEW DRUG ADDON: CPT | Mod: XU

## 2024-09-02 PROCEDURE — 83735 ASSAY OF MAGNESIUM: CPT | Performed by: PHYSICIAN ASSISTANT

## 2024-09-02 PROCEDURE — 92950 HEART/LUNG RESUSCITATION CPR: CPT | Performed by: PHYSICIAN ASSISTANT

## 2024-09-02 PROCEDURE — 51702 INSERT TEMP BLADDER CATH: CPT | Mod: XU

## 2024-09-02 PROCEDURE — 999N000157 HC STATISTIC RCP TIME EA 10 MIN

## 2024-09-02 PROCEDURE — 96374 THER/PROPH/DIAG INJ IV PUSH: CPT | Mod: XU

## 2024-09-02 PROCEDURE — 31500 INSERT EMERGENCY AIRWAY: CPT

## 2024-09-02 PROCEDURE — 93005 ELECTROCARDIOGRAM TRACING: CPT | Mod: XU

## 2024-09-02 PROCEDURE — 84484 ASSAY OF TROPONIN QUANT: CPT | Performed by: PHYSICIAN ASSISTANT

## 2024-09-02 PROCEDURE — 85025 COMPLETE CBC W/AUTO DIFF WBC: CPT | Performed by: PHYSICIAN ASSISTANT

## 2024-09-02 PROCEDURE — 92950 HEART/LUNG RESUSCITATION CPR: CPT

## 2024-09-02 PROCEDURE — 258N000003 HC RX IP 258 OP 636

## 2024-09-02 RX ORDER — PROPOFOL 10 MG/ML
INJECTION, EMULSION INTRAVENOUS
Status: DISCONTINUED
Start: 2024-09-02 | End: 2024-09-02 | Stop reason: HOSPADM

## 2024-09-02 RX ORDER — POTASSIUM CHLORIDE 7.45 MG/ML
10 INJECTION INTRAVENOUS CONTINUOUS
Status: DISCONTINUED | OUTPATIENT
Start: 2024-09-02 | End: 2024-09-02 | Stop reason: HOSPADM

## 2024-09-02 ASSESSMENT — ACTIVITIES OF DAILY LIVING (ADL)
ADLS_ACUITY_SCORE: 39

## 2024-09-02 NOTE — ED NOTES
[facility] Number  [facility] [id number]  [phone]

## 2024-09-02 NOTE — PLAN OF CARE
Wasted versed 9mg with house supervisor Macrina in ED med room.    Jeny Pederson RN on 9/2/2024 at 4:23 PM

## 2024-09-02 NOTE — ED PROVIDER NOTES
History     Chief Complaint   Patient presents with    Penis/Scrotum Problem     HPI  Kostas Cotto is a 79 year old male who here with check and complaints of scrotal swelling and a rash.  Patient went to cardiac arrest out in our waiting room, he was brought back onto the rapid response was called and chest compressions were immediately started. He has a history of coronary artery disease, CABG in 2005, stroke, paroxysmal A-fib, AAA repair    Allergies:  Allergies   Allergen Reactions    Oxycodone      Caused him delirium   Caused him delirium        Problem List:    Patient Active Problem List    Diagnosis Date Noted    Fecal impaction (H) 03/26/2021     Priority: Medium    Acute cystitis without hematuria 03/26/2021     Priority: Medium    Colitis 03/23/2021     Priority: Medium    Gram-positive bacteremia 09/04/2020     Priority: Medium    Hypoxemia 09/04/2020     Priority: Medium    2019 novel coronavirus disease (COVID-19) 09/02/2020     Priority: Medium    GERD 01/13/2020     Priority: Medium    New onset atrial fibrillation on 1/8/18 01/14/2019     Priority: Medium    Need for prophylactic vaccination and inoculation against influenza 01/14/2019     Priority: Medium    History of CVA (cerebrovascular accident) 01/14/2019     Priority: Medium    Essential hypertension 01/14/2019     Priority: Medium    Bifascicular block 01/14/2019     Priority: Medium    History of AAA (abdominal aortic aneurysm) repair 01/14/2019     Priority: Medium    Chronic anticoagulation 01/14/2019     Priority: Medium    History of TIA on 1/8/18 01/14/2019     Priority: Medium    Paroxysmal atrial fibrillation (H) 05/18/2018     Priority: Medium    Mixed hyperlipidemia 05/18/2018     Priority: Medium    H/O ventricular fibrillation in 2003 with cardiac arrest 05/18/2018     Priority: Medium    History of myocardial infarction in 2003 05/18/2018     Priority: Medium    History of coronary artery bypass graft x 3 in 2005 01/24/2018      Priority: Medium    H/O cardiac arrest in 2003 01/24/2018     Priority: Medium    History of tobacco abuse quitting in 2003 01/24/2018     Priority: Medium    Examination of participant in clinical trial 04/16/2015     Priority: Medium     Overview:   Research Study: [facility]  : Dr. Grey  Primary Research Nurse: Codey Bianchi, RN, CCRC    Please page primary research nurse with any questions. Please post nurse if patient is admitted to the hospital for any reason.  Pager Number: [phone]  -OR- Contact the Clinical Research , Martha Leone at [phone] or mobile [phone].      CAD 03/05/2014     Priority: Medium        Past Medical History:    Past Medical History:   Diagnosis Date    Hypoxic-ischemic encephalopathy (HIE) 11/25/2003    Rheumatoid arthritis(714.0) 05/03/2005    S/P inferior MI 11/25/2003       Past Surgical History:    Past Surgical History:   Procedure Laterality Date    AAA REPAIR      ANGIOGRAM  1/2004    Coronary Angiogram, LAD> long mid 40-50%, D2 90% ostial,Prox % with collateal flow,Circ had restenosisand was restented x 3 again.  EF now 55% with mild lateral wall hypokineseis and inferior also.    CARDIOVASCULAR SURGERY  1/2005    Syncopal episode, Had repeat angiogam showing now circ 100% along with RCA. LAD 80-90% with EF 35%.  Sent for CABG    CARDIOVASCULAR SURGERY  1/2005    S/P 3 vess CABG, Dr Wilfredo SORIANO>LAD, SVG>OM1, SVG>RCA.  Post op pneumothorax with Chest Tube placement.    CARDIOVASCULAR SURGERY  11/2003    Acute Inferior MI, Had VT arrest resuscitated. Not given lytics and was sent to [facility].  IABP placed and had angiogram stented Circ x 5. EF 30%. Lad had 70%, % with contralateral collateral flow.    HC US CHEST      left    HEART/LUNG RESUSCITATION (CPR)  11/2003    S/P Cardiac Arrest V-Tach, Out of hospital arrest with no bystaner cpr.  Has polymorphic VT on arrival.  Has a prolonged resuscitation which  obviously was successful.  This was due to an acute Inferior MI at the time.    OPEN REDUCTION INTERNAL FIXATION HIP  1965    ORIF frac/dislocation L hip, Was a teenager and had orif done after fracture dislocation of left hip. Occurred playing softball.    PHACOEMULSIFICATION WITH STANDARD INTRAOCULAR LENS IMPLANT  3/10/2014    Procedure: PHACOEMULSIFICATION WITH STANDARD INTRAOCULAR LENS IMPLANT;  CATARACT EXTRACTION WITH INTRA OCULAR LENS LEFT(10% SERENE/POSSIBLE STRETCH);  Surgeon: Isael Acuna MD;  Location: [facility] OR    PHACOEMULSIFICATION WITH STANDARD INTRAOCULAR LENS IMPLANT  3/25/2014    Procedure: PHACOEMULSIFICATION WITH STANDARD INTRAOCULAR LENS IMPLANT;  CATARACT EXTRACTION WITH INTRA OCULAR LENS RIGHT/POSSIBLE PUPIL STRETCH;  Surgeon: Isael Acuna MD;  Location: [facility] OR       Family History:    Family History   Problem Relation Age of Onset    Cancer Father         leukemia    Diabetes Mother     Cerebrovascular Disease Mother         cause of death       Social History:  Marital Status:   [2]  Social History     Tobacco Use    Smoking status: Former    Smokeless tobacco: Never   Substance Use Topics    Alcohol use: No    Drug use: No        Medications:    apixaban ANTICOAGULANT (ELIQUIS ANTICOAGULANT) 5 MG tablet  aspirin 81 MG chewable tablet  calcium carbonate-vitamin D (OS-KAYE) 500-400 MG-UNIT tablet  famotidine (PEPCID) 20 MG tablet  finasteride (PROSCAR) 5 MG tablet  folic acid (FOLVITE) 1 MG tablet  levothyroxine (SYNTHROID/LEVOTHROID) 25 MCG tablet  lisinopril (ZESTRIL) 5 MG tablet  metoprolol succinate ER (TOPROL XL) 25 MG 24 hr tablet  polyethylene glycol (MIRALAX) 17 GM/Dose powder  simvastatin (ZOCOR) 20 MG tablet  tamsulosin (FLOMAX) 0.4 MG capsule          Review of Systems   Unable to perform ROS: Acuity of condition       Physical Exam   BP: 116/66  Pulse: 76  Temp: (!) 96  F (35.6  C)  Resp: 18  SpO2: 99 %      Physical Exam  Vitals and nursing note reviewed.   Constitutional:        Comments: Patient intubated and sedated   Cardiovascular:      Rate and Rhythm: Normal rate. Rhythm irregular.   Pulmonary:      Breath sounds: Normal breath sounds. No wheezing, rhonchi or rales.   Skin:     Coloration: Skin is pale.      Comments: Petechial looking rash around the inguinal ligaments   Psychiatric:         Mood and Affect: Mood normal.         Behavior: Behavior normal.         ED Course        -Intubation    Date/Time: 9/2/2024 5:06 PM    Performed by: Thierno Ramirez PA-C  Authorized by: Thierno Ramirez PA-C    Emergent condition/consent implied    Pre-procedure details:     Indications: cardio/pulmonary arrest      Patient status:  Unresponsive    Look externally: no concerns      Mallampati score:  II    Obstruction: none      Induction agents:  None    Paralytics:  None  Procedure details:     Preoxygenation:  Bag valve mask    CPR in progress: yes      Number of attempts:  1  Successful intubation attempt details:     Intubation method:  Oral    Intubation technique: video assisted      Laryngoscope blade:  Hypercurved    Bougie used: no      Grade view: II      Tube size (mm):  7.0    Tube type:  Cuffed    Tube visualized through cords: yes    Placement assessment:     ETT at teeth/gumline (cm):  27    Tube secured with:  ETT odom    Breath sounds:  Equal    Placement verification: chest rise, colorimetric ETCO2, CXR verification and equal breath sounds      CXR findings:  Appropriate position    PROCEDURE    Patient complications:  Signficant hypotension  Circulation Interventions: IV fluid bolus administered and use of vasoactive medications                    EKG Interpretation:      Interpreted by Thierno Ramirez PA-C    EKG shows atrial fibrillation at a controlled rate, potentially some ST depression but there are quite a few premature beats.            Critical Care time:  was 45 minutes for this patient excluding procedures.           Results for orders placed or performed  during the hospital encounter of 09/02/24 (from the past 24 hour(s))   CBC with platelets differential    Narrative    The following orders were created for panel order CBC with platelets differential.  Procedure                               Abnormality         Status                     ---------                               -----------         ------                     CBC with platelets and d...[[id number]]  Abnormal            Final result                 Please view results for these tests on the individual orders.   Comprehensive metabolic panel   Result Value Ref Range    Sodium 135 135 - 145 mmol/L    Potassium 2.9 (L) 3.4 - 5.3 mmol/L    Carbon Dioxide (CO2) 18 (L) 22 - 29 mmol/L    Anion Gap 19 (H) 7 - 15 mmol/L    Urea Nitrogen 29.0 (H) 8.0 - 23.0 mg/dL    Creatinine 1.26 (H) 0.67 - 1.17 mg/dL    GFR Estimate 59 (L) >60 mL/min/1.73m2    Calcium 9.9 8.8 - 10.4 mg/dL    Chloride 98 98 - 107 mmol/L    Glucose 184 (H) 70 - 99 mg/dL    Alkaline Phosphatase 103 40 - 150 U/L     (H) 0 - 45 U/L    ALT 66 0 - 70 U/L    Protein Total 7.1 6.4 - 8.3 g/dL    Albumin 3.9 3.5 - 5.2 g/dL    Bilirubin Total 0.4 <=1.2 mg/dL   Troponin T, High Sensitivity   Result Value Ref Range    Troponin T, High Sensitivity 20 <=22 ng/L   Blood gas venous   Result Value Ref Range    pH Venous 7.27 (L) 7.32 - 7.43    pCO2 Venous 46 40 - 50 mm Hg    pO2 Venous 46 25 - 47 mm Hg    Bicarbonate Venous 21 21 - 28 mmol/L    Base Excess/Deficit Venous -5.9 (L) -3.0 - 3.0 mmol/L    FIO2 21     Oxyhemoglobin Venous 73 70 - 75 %    O2 Sat, Venous 74.6 70.0 - 75.0 %    Narrative    In healthy individuals, oxyhemoglobin (O2Hb) and oxygen saturation (SO2) are approximately equal. In the presence of dyshemoglobins, oxyhemoglobin can be considerably lower than oxygen saturation.   Magnesium   Result Value Ref Range    Magnesium 2.3 1.7 - 2.3 mg/dL   D dimer quantitative   Result Value Ref Range    D-Dimer Quantitative 3.71 (H) 0.00 - 0.50  ug/mL FEU    Narrative    This D-dimer assay is intended for use in conjunction with a clinical pretest probability assessment model to exclude pulmonary embolism (PE) and deep venous thrombosis (DVT) in outpatients suspected of PE or DVT. The cut-off value is 0.50 ug/mL FEU.    For patients 50 years of age or older, the application of age-adjusted cut-off values for D-Dimer may increase the specificity without significant effect on sensitivity. The literature suggested calculation age adjusted cut-off in ug/L = age in years x 10 ug/L. The results in this laboratory are reported as ug/mL rather than ug/L. The calculation for age adjusted cut off in ug/mL= age in years x 0.01 ug/mL. For example, the cut off for a 76 year old male is 78 x 0.01 ug/mL = 0.76 ug/mL (760 ug/L).    M Bin et al. Age adjusted D-dimer cut-off levels to rule out pulmonary embolism: The ADJUST-PE Study. KAYLA 2014;311:5972-0301.; HJ Martin et al. Diagnostic accuracy of conventional or age adjusted D-dimer cutoff values in older patients with suspected venous thromboembolism. Systemic review and meta-analysis. BMJ 2013:346:f2492.   CBC with platelets and differential   Result Value Ref Range    WBC Count 13.8 (H) 4.0 - 11.0 10e3/uL    RBC Count 4.30 (L) 4.40 - 5.90 10e6/uL    Hemoglobin 13.9 13.3 - 17.7 g/dL    Hematocrit 42.8 40.0 - 53.0 %     78 - 100 fL    MCH 32.3 26.5 - 33.0 pg    MCHC 32.5 31.5 - 36.5 g/dL    RDW 13.2 10.0 - 15.0 %    Platelet Count 205 150 - 450 10e3/uL    % Neutrophils 63 %    % Lymphocytes 29 %    % Monocytes 6 %    % Eosinophils 1 %    % Basophils 0 %    % Immature Granulocytes 1 %    NRBCs per 100 WBC 0 <1 /100    Absolute Neutrophils 8.7 (H) 1.6 - 8.3 10e3/uL    Absolute Lymphocytes 3.9 0.8 - 5.3 10e3/uL    Absolute Monocytes 0.9 0.0 - 1.3 10e3/uL    Absolute Eosinophils 0.1 0.0 - 0.7 10e3/uL    Absolute Basophils 0.1 0.0 - 0.2 10e3/uL    Absolute Immature Granulocytes 0.2 <=0.4 10e3/uL    Absolute NRBCs  0.0 10e3/uL   Extra Tube    Narrative    The following orders were created for panel order Extra Tube.  Procedure                               Abnormality         Status                     ---------                               -----------         ------                     Extra Red Top Tube[[id number]]                               Final result                 Please view results for these tests on the individual orders.   Extra Red Top Tube   Result Value Ref Range    Hold Specimen JIC    XR Chest Port 1 View    Narrative    XR CHEST PORT 1 VIEW    HISTORY: 79 yearsMale post intubation here with check and complaints  of scrotal swelling and a rash. Patient went to cardiac arrest out in  our waiting room, he was brought back onto the rapid response was  called and chest compressions were immediately started.     TECHNIQUE: A single view of the chest was performed    COMPARISON: 9/4/2020    FINDINGS: Patient is status post median sternotomy and CABG. An  endotracheal tube is present. The tip is 4.5 cm above the rashard.  There is mild left basilar atelectasis or scarring. Similar findings  were present previously. Lungs are otherwise clear.          Impression    IMPRESSION: Appropriate position of endotracheal tube. No evidence of  complication.    Mild atelectasis or scarring at the left lung base. Lungs otherwise  clear.    RADHA ZHOU MD         SYSTEM ID:  [id number]   Urine Macroscopic with reflex to Microscopic   Result Value Ref Range    Color Urine Yellow Colorless, Straw, Light Yellow, Yellow    Appearance Urine Slightly Cloudy (A) Clear    Glucose Urine Negative Negative mg/dL    Bilirubin Urine Negative Negative    Ketones Urine Trace (A) Negative mg/dL    Specific Gravity Urine 1.024 1.003 - 1.035    Blood Urine Small (A) Negative    pH Urine 5.5 4.7 - 8.0    Protein Albumin Urine 100 (A) Negative mg/dL    Urobilinogen Urine Normal Normal, 2.0 mg/dL    Nitrite Urine Negative Negative     Leukocyte Esterase Urine Moderate (A) Negative    RBC Urine 7 (H) <=2 /HPF    WBC Urine 37 (H) <=5 /HPF    Squamous Epithelials Urine 2 (H) <=1 /HPF    Mucus Urine Present (A) None Seen /LPF    Sperm Urine Present (A) None Seen /HPF    Hyaline Casts Urine 30 (H) <=2 /LPF    Granular Casts Urine 2 (H) None Seen /LPF       Medications   potassium chloride 10 mEq in 100 mL sterile water infusion (has no administration in time range)       Assessments & Plan (with Medical Decision Making)     I have reviewed the nursing notes.    I have reviewed the findings, diagnosis, plan and need for follow up with the patient.  Once the rapid response was called patient was brought back immediately, noted to be in V-fib, chest compressions were started immediately.  We hooked him up to the cardiac monitor and got venous access.  Patient underwent multiple rounds of epinephrine and multiple defibrillations, first 1 was at 150 J, next few are at 200 J.  Patient now has a sustained rhythm of A-fib at 72 beats a minute, blood pressures immediately post ROSC were in the 1 teens systolic, I performed intubation with the help of RT, tube placement was verified with chest x-ray.  Patient started fighting the tube so we have given him on a propofol drip and 2 mg of Versed.  Patient's been hypotensive in the 85/50 range post propofol, he is getting a liter of fluid bolus currently and Levophed drip is being started.  Garzon catheter has been placed, patient will get ketamine for some added sedation while in route.  Patient is high-sensitivity troponin came back normal, D-dimer is elevated at 3.5 potassium low at 2.9, I have sent 10 mequivalents with them on the helicopter that will be infused and route.  Once stable he will be transferred over to [facility] ICU via helicopter.  My attending Dr. Robins did assist with all aspects of this patient's care.        Medical Decision Making  The patient's presentation was of high complexity (an  acute health issue posing potential threat to life or bodily function).    The patient's evaluation involved:      The patient's management necessitated high risk (a decision regarding emergency major procedure (intubation)).        New Prescriptions    No medications on file       Final diagnoses:   Cardiac arrest (H)       9/2/2024   [facility]       Thierno Ramirez PA-C  09/02/24 2176       Thierno Ramirez PA-C  09/02/24 0504

## 2024-09-02 NOTE — ED TRIAGE NOTES
Patient brought in by his wife and son w/ concerns for a rash in his groin and testicle swelling.   Patient reports no pain.      Triage Assessment (Adult)       Row Name 09/02/24 1219          Triage Assessment    Airway WDL WDL        Respiratory WDL    Respiratory WDL WDL;rhythm/pattern;expansion/retractions     Rhythm/Pattern, Respiratory unlabored;pattern regular;depth regular;no shortness of breath reported     Expansion/Accessory Muscles/Retractions no use of accessory muscles;no retractions;expansion symmetric

## 2024-09-02 NOTE — PROGRESS NOTES
Called to ER waiting room for RRT. Followed team to room with pt. Shortly after, pt coded. CPR was started and BVM was used. Pt was then intubated after. Tube was placed 23 at the lip and secured. Placement verified via chest xray. Continued to bag pt until flight crew arrived. Assisted flight team outside.        Haydee Araiza, RT

## 2024-09-03 VITALS
DIASTOLIC BLOOD PRESSURE: 67 MMHG | HEART RATE: 88 BPM | SYSTOLIC BLOOD PRESSURE: 81 MMHG | TEMPERATURE: 96.2 F | OXYGEN SATURATION: 99 % | RESPIRATION RATE: 19 BRPM

## 2024-09-03 LAB
ATRIAL RATE - MUSE: 67 BPM
DIASTOLIC BLOOD PRESSURE - MUSE: NORMAL MMHG
INTERPRETATION ECG - MUSE: NORMAL
P AXIS - MUSE: NORMAL DEGREES
PR INTERVAL - MUSE: NORMAL MS
QRS DURATION - MUSE: 154 MS
QT - MUSE: 428 MS
QTC - MUSE: 468 MS
R AXIS - MUSE: 109 DEGREES
SYSTOLIC BLOOD PRESSURE - MUSE: NORMAL MMHG
T AXIS - MUSE: -50 DEGREES
VENTRICULAR RATE- MUSE: 72 BPM

## 2024-09-03 NOTE — ED NOTES
Patient presents today accompanied by his son for a rash that his wife noticed last evening while changing his brief. This rash is located around the creases of his groin and back to his buttocks cheeks. Patient's scrotum is slightly swollen. Patient was sitting in the wheelchair with his son in the waiting room when he suddenly became unable to hold himself upright. He did not complain of any pain or SOB before his top half fell forward. A rapid response was called at 1459 to the waiting room. Patient was immediately brought back to the room a sternal rub and pulse check done in the wheelchair with no response noted.  and he was transferred to the cot for a rapid assessment. Immediately, it was noted that the patient did not have a pulse. The code blue button was pushed and at 1504, a code blue was called overhead. Chest compressions were started immediately upon transfer to the bed and no pulse was felt. Crash cart also brought to the room, defib pads placed and attached to Zoll and defib mode turned on. Kd also placed while CPR being done. The Code sequence went as follows:   1459 Rapid Response Called to Waiting Room  1501 Compressions Started, CPR board placed under patient  1503 Code Blue Button Pressed  1504 Code Blue Called Overhead, Kd Placed  1506:Stop CPR, Rhythm/Pulse Check- V-Fib is rhythm           Shock V-Fib 150 Joules            No pulse, Resume CPR           1507 18g IV placed in left AC.  1508 Stop CPR, Rhythm and Pulse Check  1509 Epinephrine Given, Shock 200 Joules           No Pulse, Resume CPR  1511 Stop CPR, Rhythm and Pulse Check           Shock V-Fib- 200 joules, 300mg Amiodarone   1513 Stop CPR, Rhythm and Pulse Check           Patient has a pulse. Moved to room 10 for better availability to provide care.   1516 Pulse and Rhythm check-.           No Pulse, Resume CPR  1517 Epinephrine Dose Given IV  1520 Pulse check- Patient has a pulse, remains unresponsive           IO placed by  CASH Pa and LILY Barlow in Left Leg, flushes with ease,            NS pressure bag applied to keep line patent.  1522 Patient intubated with 7.5 tube by CASH Pa at 23cm at the lip  1524 First EKG done  1525 Propofol started  1527 X-Ray done to confirm placement  1531 2 mg Versed given to help with movement/ fighting tube  1534 BP 89/56  1535 BP 87/75  1536 Amiodarone drop 1mg/min started  1538 /64  1540 BP 85/71  1541 [facility] called with ETA of 12 minutes  1542 BP 85/67   1548 BP 83/52 Family brought to room to see patient   1554 [facility] Arrival, receive report and assume care.   1601 Temperature Sensing Garzon Catheter placed. Urine sample sent for UA/C.            Potassium ordered. Sent with [employer] to administer.   1610 Family brought back to see patient again and say goodbye.   1617 Patient left via [facility] to [place]   Report Called to LILY Newton.   We received an update from [facility] that patient did well with an uneventful ride to [place] and that he received his potassium enroute.

## 2024-09-04 ENCOUNTER — TRANSFERRED RECORDS (OUTPATIENT)
Dept: HEALTH INFORMATION MANAGEMENT | Facility: CLINIC | Age: 79
End: 2024-09-04

## 2024-09-10 ENCOUNTER — TRANSFERRED RECORDS (OUTPATIENT)
Dept: HEALTH INFORMATION MANAGEMENT | Facility: CLINIC | Age: 79
End: 2024-09-10

## 2024-09-19 ENCOUNTER — TRANSFERRED RECORDS (OUTPATIENT)
Dept: HEALTH INFORMATION MANAGEMENT | Facility: CLINIC | Age: 79
End: 2024-09-19

## 2024-09-19 NOTE — PROGRESS NOTES
Assessment & Plan     Hospital discharge follow-up  Kostas is here with his family (wife and son) post discharge after being critically ill in the hospital.   He had a de fib placed. And family is very concerned about end of life now.  Kostas has had a really tough 10 years.  He has had multiple cardiac issues and a AAA repair with a leak and repaired again.  He has been home bound for many years. Has been showing signs of wavering ability to be independent.  He has always been very quiet since his first heart attack. But after this bout of his fatal dysrhythmia he has been much more verbal.  He is talking more today than I have heard in 15 years.  He states he feels well. He has his wife make his medical decisions and her son.  They want to adjust his polst and make him a DNR.  They never want to go through what they went through.  The decisions were very difficult for them.  We did go over his Polst and his son felt they needed to go over this with the entire family.  So they are going to bring this home after our discussion and sign where appropriate.  Kostas is in agreement .     S/P implantation of automatic cardioverter/defibrillator (AICD)  Long discussion on this device. How to shut it off and they now have a number they can call if they decide to stop the defib.     Acquired hypothyroidism  Check his tsh.   - TSH WITH FREE T4 REFLEX; Future    Mixed hyperlipidemia  Recheck in the future fasting. His home care can do this.   - Lipid Profile; Future    Chronic anticoagulation  CHADVAS 7 . Remains on this.  He is quiet deep purple. Very bruised .     Paroxysmal atrial fibrillation (H)  Chronic and recurrent.     Essential hypertension  He is very low today 98/64.  No sign of ankle edema or swelling.  He can't stand for weights. Less than 30 seconds he is so weak.     Acute on chronic systolic congestive heart failure (H)  Kostas and his family are out of resources and his condition is such, he is weak and very near  the end of life.  We are going to have palliative care see him.  He is just out of the hospital and sleeping a lot. It WAS VERY difficult for him to be in clinic.  He has a catheter and saw urology yesterday. They decided to keep this in.  Family wasn't give a bag to place at the bedside but wife states not really putting out much urine now so doesn't feel a bag other than the one he has is needed.  Will have nursing also come and make assessments. They need the support with his new catheter and his de fib and limited ability to move . We will try some Pt to see if we can get him out of bed and moving a little more.    - Adult Palliative Care  Referral    Ventricular tachycardia (H)  Not on any antiarrythmic's .  His ECHO shows EF 20 ARAM  - Adult Palliative Care  Referral        MED REC REQUIREDPost Medication Reconciliation Status:  Discharge medications reconciled, continue medications without change    See Patient Instructions    No follow-ups on file.    Mima Kenyon is a 79 year old, presenting for the following health issues:  Hospital F/U        9/20/2024     3:02 PM   Additional Questions   Roomed by Laura Ponce   Accompanied by son and wife         9/20/2024     3:02 PM   Patient Reported Additional Medications   Patient reports taking the following new medications none     HPI       Hospital Follow-up Visit:    Hospital/Nursing Home/IP Rehab Facility:  [facility]  Date of Admission: 9/2  Date of Discharge: 9/12  Reason(s) for Admission: Cardiac Arrest  Was the patient in the ICU or did the patient experience delirium during hospitalization?  No  Do you have any other stressors you would like to discuss with your provider? Health Concerns    Problems taking medications regularly:  None  Medication changes since discharge: pacerone 200mg, lasix 20mg, nitro 0.4mg, aldactone 25 mg  Problems adhering to non-medication therapy:  None    Summary of hospitalization:  [facility]  information obtained and reviewed  Diagnostic Tests/Treatments reviewed.  Follow up needed: needing labs and home care   Other Healthcare Providers Involved in Patient s Care:         None  Update since discharge: fluctuating course.         Plan of care communicated with patient and family                   Review of Systems  Constitutional, neuro, ENT, endocrine, pulmonary, cardiac, gastrointestinal, genitourinary, musculoskeletal, integument and psychiatric systems are negative, except as otherwise noted.      Objective    BP 98/64 (BP Location: Right arm, Patient Position: Sitting, Cuff Size: Adult Regular)   Pulse 65   Temp 96.8  F (36  C) (Tympanic)   Resp 16   Ht 1.829 m (6')   Wt 61.2 kg (135 lb)   SpO2 94%   BMI 18.31 kg/m    Body mass index is 18.31 kg/m .  Physical Exam   GENERAL: alert, mild  distress, pale, frail, elderly, fatigued, and holding his head in his hands.   NECK: no adenopathy, no asymmetry, masses, or scars  RESP: lungs clear to auscultation - no rales, rhonchi or wheezes  CV: regular rate and rhythm, normal S1 S2, no S3 or S4, no murmur, click or rub, no peripheral edema  ABDOMEN: soft, nontender, no hepatosplenomegaly, no masses and bowel sounds normal  MS: no gross musculoskeletal defects noted, no edema  SKIN: upper arms are very bruised from his IV and defib on his chest is back and blue. Very thin skin. No open sores today;   PSYCH: he will smile, talk when talked to.   LYMPH: no cervical, supraclavicular, axillary, or inguinal adenopathy    He has labs from [facility] that are too numerous to show or file.  He is needing some labs in follow up when we have home care.         Signed Electronically by: NAVA Gates

## 2024-09-20 ENCOUNTER — OFFICE VISIT (OUTPATIENT)
Dept: FAMILY MEDICINE | Facility: OTHER | Age: 79
End: 2024-09-20
Attending: PHYSICIAN ASSISTANT
Payer: COMMERCIAL

## 2024-09-20 VITALS
SYSTOLIC BLOOD PRESSURE: 98 MMHG | DIASTOLIC BLOOD PRESSURE: 64 MMHG | TEMPERATURE: 96.8 F | HEIGHT: 72 IN | HEART RATE: 65 BPM | BODY MASS INDEX: 18.28 KG/M2 | OXYGEN SATURATION: 94 % | RESPIRATION RATE: 16 BRPM | WEIGHT: 135 LBS

## 2024-09-20 DIAGNOSIS — I10 ESSENTIAL HYPERTENSION: ICD-10-CM

## 2024-09-20 DIAGNOSIS — I50.23 ACUTE ON CHRONIC SYSTOLIC CONGESTIVE HEART FAILURE (H): ICD-10-CM

## 2024-09-20 DIAGNOSIS — E03.9 ACQUIRED HYPOTHYROIDISM: ICD-10-CM

## 2024-09-20 DIAGNOSIS — I47.20 VENTRICULAR TACHYCARDIA (H): ICD-10-CM

## 2024-09-20 DIAGNOSIS — Z79.01 CHRONIC ANTICOAGULATION: ICD-10-CM

## 2024-09-20 DIAGNOSIS — Z09 HOSPITAL DISCHARGE FOLLOW-UP: Primary | ICD-10-CM

## 2024-09-20 DIAGNOSIS — Z95.810 S/P IMPLANTATION OF AUTOMATIC CARDIOVERTER/DEFIBRILLATOR (AICD): ICD-10-CM

## 2024-09-20 DIAGNOSIS — E78.2 MIXED HYPERLIPIDEMIA: ICD-10-CM

## 2024-09-20 DIAGNOSIS — I48.0 PAROXYSMAL ATRIAL FIBRILLATION (H): ICD-10-CM

## 2024-09-20 PROCEDURE — 99496 TRANSJ CARE MGMT HIGH F2F 7D: CPT | Performed by: PHYSICIAN ASSISTANT

## 2024-09-20 PROCEDURE — G0463 HOSPITAL OUTPT CLINIC VISIT: HCPCS

## 2024-09-20 RX ORDER — AMIODARONE HYDROCHLORIDE 200 MG/1
200 TABLET ORAL DAILY
COMMUNITY
Start: 2024-09-12 | End: 2024-12-11

## 2024-09-20 RX ORDER — FUROSEMIDE 20 MG
1 TABLET ORAL DAILY
COMMUNITY
Start: 2024-09-12

## 2024-09-20 RX ORDER — SPIRONOLACTONE 25 MG/1
1 TABLET ORAL DAILY
COMMUNITY
Start: 2024-09-12

## 2024-09-20 RX ORDER — NITROGLYCERIN 0.4 MG/1
0.4 TABLET SUBLINGUAL EVERY 5 MIN PRN
COMMUNITY
Start: 2024-09-12

## 2024-09-20 ASSESSMENT — PAIN SCALES - GENERAL: PAINLEVEL: NO PAIN (0)

## 2024-09-22 PROBLEM — Z95.810 S/P IMPLANTATION OF AUTOMATIC CARDIOVERTER/DEFIBRILLATOR (AICD): Status: ACTIVE | Noted: 2024-09-22

## 2024-09-22 PROBLEM — I50.23 ACUTE ON CHRONIC SYSTOLIC CONGESTIVE HEART FAILURE (H): Status: ACTIVE | Noted: 2024-09-22

## 2024-09-22 PROBLEM — I47.20 VENTRICULAR TACHYCARDIA (H): Status: ACTIVE | Noted: 2024-09-22

## 2024-09-24 ENCOUNTER — DOCUMENTATION ONLY (OUTPATIENT)
Dept: CARE COORDINATION | Facility: CLINIC | Age: 79
End: 2024-09-24

## 2024-09-24 NOTE — PROGRESS NOTES
Received referral for Palliative Care.  Spoke with wife.  Provided information on Palliative Maintenance program, Palliative skilled services and hospice services.  Offered in home hospice consult.  Wife declines skilled and hospice services at this time.  She was in agreement with the Palliative Maintenance program with in home provider visits approximately every 3 months.  She was given intake contact information and states she will call if things change and she feels other services are needed.  Client will be scheduled with Davon Liriano CNP in home.  She is aware scheduling will call within the week to arrange this visit.   Thank you for this referral.

## 2024-10-02 ENCOUNTER — OFFICE VISIT (OUTPATIENT)
Dept: FAMILY MEDICINE | Facility: OTHER | Age: 79
End: 2024-10-02
Attending: NURSE PRACTITIONER
Payer: COMMERCIAL

## 2024-10-02 ENCOUNTER — DOCUMENTATION ONLY (OUTPATIENT)
Dept: OTHER | Facility: CLINIC | Age: 79
End: 2024-10-02

## 2024-10-02 DIAGNOSIS — Z95.810 S/P IMPLANTATION OF AUTOMATIC CARDIOVERTER/DEFIBRILLATOR (AICD): ICD-10-CM

## 2024-10-02 DIAGNOSIS — I48.0 PAROXYSMAL ATRIAL FIBRILLATION (H): ICD-10-CM

## 2024-10-02 DIAGNOSIS — Z51.5 PALLIATIVE CARE PATIENT: Primary | ICD-10-CM

## 2024-10-02 PROCEDURE — 99350 HOME/RES VST EST HIGH MDM 60: CPT | Performed by: NURSE PRACTITIONER

## 2024-10-02 PROCEDURE — 99417 PROLNG OP E/M EACH 15 MIN: CPT | Performed by: NURSE PRACTITIONER

## 2024-10-02 NOTE — PROGRESS NOTES
"        Palliative Care Consultation    Subjective     Reason for Consultation:  Kostas Cotto, 79 year old, male is Eva Cotto, 79 year old, male is seen for an initial palliative care provider visit.  He is seen in his home with his wife present. Kostas has dementia due to an anoxic brain injury in 2003.  His wife cares for him and makes his medical decisions. His wife states that his mental condition has deteriorated in the past 5 years. He is also much weaker due to his recent hospitalization and cannot stand for more than a minute or so.   Kostas saw his PCP [facility] Hidalgo on 9/20/24.  A palliative care referral was made.  We called Pari and she refused all services at first, but then decided to let me come for the palliative maintenance program.  We discussed this at length and prior to my leaving she decided that it would be ok for a nurse to come.      Advanced Directives:  DNR    Palliative Care Goals:  To keep Kostas comfortable at home.     History of Present Illness:  Cardiology history: (Per note by Lian Viera on 9/19/24- cardiology service)  \"Kostas has a history of Heart Failure with Reduced EF and Heart Failure with Preserved EF.  Patient has known history of coronary artery disease and polymorphic VT suffering a cardiac arrest in the setting of inferior lateral wall in 2003 status post PCI with 5 stents placed to left circumflex. In 2005 he underwent a CABG x 3. He has known ischemic cardiomyopathy with chronic HFrEF and HFpEF. He also has paroxysmal atrial fibrillation and is on chronic Eliquis. He was admitted after suffering an out-of-hospital arrest requiring defibrillation with multiple rounds of epinephrine with ROSC after 7 minutes. Found to have severely reduced left ventricular systolic function, with akinetic to severely hypokinetic lateral and inferior lateral wall suggesting scar/substrate for ventricular arrhythmias. I 9/10/2040 underwent successful implantation of a CRT-D. \"  " "  9/2/24-9/12/24- Hospitalized after cardiac arrest.  Was intubated. 9/10/24- defibrillator implanted. He was started on amiodarone.  Mcgee catheter placed  9/18/24- Urology appt with Dr. Gomes.  Patient is on tamsulosin and finasteride now.  Recommended continue with mcgee for one month, then trial void and cystoscopy if he fails trial void.   9/19/24- Cardiology and heart failure program appt with Lian Viera.      Review Of Systems:    Performance status:  ECOG 4    Skin: negative  Eyes: negative  Ears/Nose/Throat: negative  Respiratory: Shortness of breath- with exertion  Cardiovascular: exercise intolerance and implanted defibrillator  Gastrointestinal: constipation  Genitourinary: currently has an indwelling mcgee catheter  Musculoskeletal: muscular weakness  Neurologic: memory problems  Psychiatric: negative  Hematologic/Lymphatic/Immunologic: negative  Endocrine: thyroid problem    Concerns/Worries: Pari would like more information about his catheter    Objective     VS: O2 sat on room air is 95%, AP 64  GENERAL: Well groomed, well developed, well nourished male in no acute distress.  HEENT: extra ocular movements intact, atraumatic,  normocephalic.  SKIN: Warm to touch, dry.  Multiple small bruises on his arm from anticoagulation.   RESP: No increased respiratory effort. Lungs clear to ausculation bilateral.  CV: Rate and rhythm regular, no murmurs/rubs/gallops.  LYMPH: No lower extremity edema.  ABD: Soft, non tender, non distended, no palpable masses.  Normoactive bowel sounds.  No CVA tenderness bilaterally.  MS: Not able to evaluate, he was in his chair and did not get up  NEURO: Alert, not oriented.    PSYCH: Normal mood and affect, pleasant and agreeable during interview and exam.  He did smile at me and respond appropriately to simple social questions such as \"how are you?\", etc.        Laboratory/Imaging:  Reviewed      Assessment   (Z51.5) Palliative care patient  (primary encounter " diagnosis)  Comment: Pari was not keen on home care services at first, but did change her mind before I left.  She is willing to begin with nursing and provider visits.  She did not know how to care for the mcgee catheter and does not have any additional supplies.  She is also not keen on bringing Kostas to [place] to see a urologist.   Plan: Per Pari's request I assisted her to make an appointment to see Dr. Patton, urology at [facility] in [place] on October 15th.  She will cancel his appointment in [place] with Dr. Gomes. We discussed the catheter, how to care for it and what procedures he may need in the future.  Nursing can continue to help her with this.  Nursing can also help her with medications, he is on 4 new medications: amiodarone, furosemide, spironolactone and an increased dose of metoprolol.    I communicated the home care request to [facility] , who will contact [facility] Hidal[facility] for a skilled home care order.  We will continue to evaluate Kostas and see if they are willing to accept therapy in the future.     (I48.0) Paroxysmal atrial fibrillation (H)  Comment: Diagnosis adds to medical complexity.    Plan: He is on apixaban, bruising noted on arms.     (Z95.810) S/P implantation of automatic cardioverter/defibrillator (AICD)  Comment: This is new for Kostas.    Plan: Per Pari's request I put in an order for a DNR.  She has already dropped off paperwork at the clinic indicating this, but would like it reflected in the chart now.     Time spent on today's visit was 80 min including review of outside records, labs, face to face with patient discussing options and after visit ordering tests and making an appointment to see specialty. Time also spent documenting and communicating with the palliative care team.   All time involved was spent on the day of service for the patient.

## 2024-10-15 ENCOUNTER — OFFICE VISIT (OUTPATIENT)
Dept: UROLOGY | Facility: OTHER | Age: 79
End: 2024-10-15
Attending: UROLOGY
Payer: COMMERCIAL

## 2024-10-15 VITALS
SYSTOLIC BLOOD PRESSURE: 90 MMHG | DIASTOLIC BLOOD PRESSURE: 56 MMHG | OXYGEN SATURATION: 95 % | HEART RATE: 60 BPM | RESPIRATION RATE: 18 BRPM

## 2024-10-15 DIAGNOSIS — R33.8 ACUTE URINARY RETENTION: Primary | ICD-10-CM

## 2024-10-15 PROCEDURE — 99203 OFFICE O/P NEW LOW 30 MIN: CPT | Performed by: UROLOGY

## 2024-10-15 PROCEDURE — G0463 HOSPITAL OUTPT CLINIC VISIT: HCPCS

## 2024-10-15 RX ORDER — METOPROLOL SUCCINATE 50 MG/1
TABLET, EXTENDED RELEASE ORAL
COMMUNITY
Start: 2024-09-12

## 2024-10-16 ENCOUNTER — ALLIED HEALTH/NURSE VISIT (OUTPATIENT)
Dept: UROLOGY | Facility: OTHER | Age: 79
End: 2024-10-16
Payer: COMMERCIAL

## 2024-10-16 VITALS — SYSTOLIC BLOOD PRESSURE: 90 MMHG | DIASTOLIC BLOOD PRESSURE: 56 MMHG | HEART RATE: 60 BPM | OXYGEN SATURATION: 95 %

## 2024-10-16 DIAGNOSIS — R33.8 ACUTE URINARY RETENTION: Primary | ICD-10-CM

## 2024-10-16 PROCEDURE — G0463 HOSPITAL OUTPT CLINIC VISIT: HCPCS

## 2024-10-16 PROCEDURE — 51702 INSERT TEMP BLADDER CATH: CPT

## 2024-10-16 PROCEDURE — 51702 INSERT TEMP BLADDER CATH: CPT | Performed by: UROLOGY

## 2024-10-16 NOTE — PROGRESS NOTES
Catheter insertion documentation on 10/16/2024:    Kostas Cotto presents to the clinic for catheter insertion.  Reason for insertion: incontinence, leakage around the current catheter, and void trial fail.  Order has been verified. yes  Catheter successfully inserted into the urethral meatus in the usual sterile fashion without immediate complication.  Type of catheter placed: 16f coude  indwelling catheter  Urine is yellow in color.  15 cc's of urine output returned.  Balloon was filled with 10cc's of normal saline.  Securement device placed for the catheter.  The patient tolerated the procedure and was instructed to monitor for catheter dysfunction, return or call for pain, fever, leakage or decreased urine flow, and follow up one month for reinsertion of new catheter.     Sravanthi Drew RN

## 2024-10-18 DIAGNOSIS — Z79.01 CHRONIC ANTICOAGULATION: ICD-10-CM

## 2024-10-18 DIAGNOSIS — I48.0 PAROXYSMAL ATRIAL FIBRILLATION (H): ICD-10-CM

## 2024-10-18 DIAGNOSIS — Z86.73 HISTORY OF CVA (CEREBROVASCULAR ACCIDENT): ICD-10-CM

## 2024-10-18 DIAGNOSIS — Z86.73 HISTORY OF TIA (TRANSIENT ISCHEMIC ATTACK): ICD-10-CM

## 2024-10-18 DIAGNOSIS — I48.91 NEW ONSET ATRIAL FIBRILLATION (H): ICD-10-CM

## 2024-10-21 NOTE — TELEPHONE ENCOUNTER
apixaban ANTICOAGULANT (ELIQUIS ANTICOAGULANT) 5 MG tablet 180 tablet 3 10/31/2023       Last Office Visit: appears to follow cardiology with Berna. Will send to PCP for refill. Hasn't seen Dr Ortega since 2020.  Future Office visit:    Next 5 appointments (look out 90 days)      Nov 14, 2024 10:30 AM  (Arrive by 10:15 AM)  Nurse Only with [facility] UROLOGY NURSE  [facility] ([facility] ) [address]  [place] [address]  [phone]             Routing refill request to provider for review/approval because:

## 2024-10-22 RX ORDER — APIXABAN 5 MG/1
TABLET, FILM COATED ORAL
Qty: 180 TABLET | Refills: 3 | Status: SHIPPED | OUTPATIENT
Start: 2024-10-22

## 2024-10-28 DIAGNOSIS — I10 BENIGN ESSENTIAL HYPERTENSION: ICD-10-CM

## 2024-10-28 RX ORDER — METOPROLOL SUCCINATE 25 MG/1
25 TABLET, EXTENDED RELEASE ORAL DAILY
Qty: 90 TABLET | Refills: 0 | Status: SHIPPED | OUTPATIENT
Start: 2024-10-28

## 2024-11-02 ENCOUNTER — HOSPITAL ENCOUNTER (EMERGENCY)
Facility: HOSPITAL | Age: 79
Discharge: HOME | End: 2024-11-02
Attending: NURSE PRACTITIONER | Admitting: NURSE PRACTITIONER
Payer: COMMERCIAL

## 2024-11-02 ENCOUNTER — APPOINTMENT (OUTPATIENT)
Dept: ULTRASOUND IMAGING | Facility: HOSPITAL | Age: 79
End: 2024-11-02
Attending: NURSE PRACTITIONER
Payer: COMMERCIAL

## 2024-11-02 VITALS
OXYGEN SATURATION: 95 % | RESPIRATION RATE: 16 BRPM | TEMPERATURE: 97.8 F | DIASTOLIC BLOOD PRESSURE: 68 MMHG | HEART RATE: 60 BPM | SYSTOLIC BLOOD PRESSURE: 122 MMHG

## 2024-11-02 DIAGNOSIS — T83.9XXA FOLEY CATHETER PROBLEM, INITIAL ENCOUNTER (H): ICD-10-CM

## 2024-11-02 PROCEDURE — 99283 EMERGENCY DEPT VISIT LOW MDM: CPT | Mod: 25 | Performed by: NURSE PRACTITIONER

## 2024-11-02 PROCEDURE — 99284 EMERGENCY DEPT VISIT MOD MDM: CPT | Mod: 25

## 2024-11-02 PROCEDURE — 76705 ECHO EXAM OF ABDOMEN: CPT | Mod: 26 | Performed by: NURSE PRACTITIONER

## 2024-11-02 PROCEDURE — 76705 ECHO EXAM OF ABDOMEN: CPT | Mod: TC

## 2024-11-02 PROCEDURE — 51702 INSERT TEMP BLADDER CATH: CPT | Mod: XU

## 2024-11-02 ASSESSMENT — ENCOUNTER SYMPTOMS
CONSTITUTIONAL NEGATIVE: 1
HEMATOLOGIC/LYMPHATIC NEGATIVE: 1
MUSCULOSKELETAL NEGATIVE: 1
RESPIRATORY NEGATIVE: 1
CARDIOVASCULAR NEGATIVE: 1
ENDOCRINE NEGATIVE: 1
EYES NEGATIVE: 1
GASTROINTESTINAL NEGATIVE: 1
NEUROLOGICAL NEGATIVE: 1
PSYCHIATRIC NEGATIVE: 1
ALLERGIC/IMMUNOLOGIC NEGATIVE: 1

## 2024-11-02 ASSESSMENT — COLUMBIA-SUICIDE SEVERITY RATING SCALE - C-SSRS
6. HAVE YOU EVER DONE ANYTHING, STARTED TO DO ANYTHING, OR PREPARED TO DO ANYTHING TO END YOUR LIFE?: NO
2. HAVE YOU ACTUALLY HAD ANY THOUGHTS OF KILLING YOURSELF IN THE PAST MONTH?: NO
1. IN THE PAST MONTH, HAVE YOU WISHED YOU WERE DEAD OR WISHED YOU COULD GO TO SLEEP AND NOT WAKE UP?: NO

## 2024-11-02 ASSESSMENT — ACTIVITIES OF DAILY LIVING (ADL): ADLS_ACUITY_SCORE: 0

## 2024-11-02 NOTE — DISCHARGE INSTRUCTIONS
Follow-up with your primary care provider for reevaluation.  Contact your primary care provider if you have any questions or concerns.  Do not hesitate to return to the ER if any new or worsening symptoms.     Please read the attached instructions (if any).  They highlight more specific treatments and interventions for you at home.              Thank you for letting me participate in your care and wish you a fast and uneventful recovery,    Maxwell MARION CNP    Do not hesitate to contact me with questions or concerns.  [email or web address]

## 2024-11-02 NOTE — ED NOTES
Unable to hand irrigate mcgee catheter as instructed by JOSHUA Salinas CNP.  Will replace mcgee catheter as instructed by JOSHUA Salinas CNP.

## 2024-11-02 NOTE — ED TRIAGE NOTES
Patient has decreasing urine output into the bag, sediment in bag, leaking around the catheter. New catheter was placed a couple of weeks ago for a similar problem. Was started on Jardiance at the same time. Was told it might cause a UTI

## 2024-11-02 NOTE — ED NOTES
Garzon catheter was irrigated with 180 ml of sterile water with urine with sediment returned.  Urine output minus irrigation was 370 ml total.    JOSHUA Salinas has seen urine prior to discharge.

## 2024-11-02 NOTE — ED PROVIDER NOTES
History     Chief Complaint   Patient presents with    Catheter Problem     HPI  Kostas Cotto is a 79 year old individual with history of coronary artery bypass x 3, paroxysmal atrial fibrillation on anticoagulation with apixaban, CVA, hypertension, AAA with repair, systolic congestive heart failure, is brought in for evaluation of decreased urine output by catheter.  Patient had catheter change a couple weeks ago but now is having decreased urine output with some drainage around catheter tip and sediment in the bag.  Brought in for evaluation.  No fever or chills.  No abdominal pain or flank pain reported.  Acting normal self.    Allergies:  Allergies   Allergen Reactions    Oxycodone      Caused him delirium   Caused him delirium        Problem List:    Patient Active Problem List    Diagnosis Date Noted    S/P implantation of automatic cardioverter/defibrillator (AICD) 09/22/2024     Priority: Medium    Ventricular tachycardia (H) 09/22/2024     Priority: Medium    Acute on chronic systolic congestive heart failure (H) 09/22/2024     Priority: Medium    Fecal impaction (H) 03/26/2021     Priority: Medium    Acute cystitis without hematuria 03/26/2021     Priority: Medium    Colitis 03/23/2021     Priority: Medium    Gram-positive bacteremia 09/04/2020     Priority: Medium    Hypoxemia 09/04/2020     Priority: Medium    2019 novel coronavirus disease (COVID-19) 09/02/2020     Priority: Medium    GERD 01/13/2020     Priority: Medium    New onset atrial fibrillation on 1/8/18 01/14/2019     Priority: Medium    Need for prophylactic vaccination and inoculation against influenza 01/14/2019     Priority: Medium    History of CVA (cerebrovascular accident) 01/14/2019     Priority: Medium    Essential hypertension 01/14/2019     Priority: Medium    Bifascicular block 01/14/2019     Priority: Medium    History of AAA (abdominal aortic aneurysm) repair 01/14/2019     Priority: Medium    Chronic anticoagulation 01/14/2019      Priority: Medium    History of TIA on 1/8/18 01/14/2019     Priority: Medium    Paroxysmal atrial fibrillation (H) 05/18/2018     Priority: Medium    Mixed hyperlipidemia 05/18/2018     Priority: Medium    H/O ventricular fibrillation in 2003 with cardiac arrest 05/18/2018     Priority: Medium    History of myocardial infarction in 2003 05/18/2018     Priority: Medium    History of coronary artery bypass graft x 3 in 2005 01/24/2018     Priority: Medium    H/O cardiac arrest in 2003 01/24/2018     Priority: Medium    History of tobacco abuse quitting in 2003 01/24/2018     Priority: Medium    Examination of participant in clinical trial 04/16/2015     Priority: Medium     Overview:   Research Study: [facility]  : Dr. Grey  Primary Research Nurse: Codey Bianchi, RN, CCRC    Please page primary research nurse with any questions. Please post nurse if patient is admitted to the hospital for any reason.  Pager Number: [phone]  -OR- Contact the Clinical Research , Martha Doug at [phone] or mobile [phone].      CAD 03/05/2014     Priority: Medium        Past Medical History:    Past Medical History:   Diagnosis Date    Hypoxic-ischemic encephalopathy (HIE) 11/25/2003    Rheumatoid arthritis(714.0) 05/03/2005    S/P inferior MI 11/25/2003       Past Surgical History:    Past Surgical History:   Procedure Laterality Date    AAA REPAIR      ANGIOGRAM  1/2004    Coronary Angiogram, LAD> long mid 40-50%, D2 90% ostial,Prox % with collateal flow,Circ had restenosisand was restented x 3 again.  EF now 55% with mild lateral wall hypokineseis and inferior also.    CARDIOVASCULAR SURGERY  1/2005    Syncopal episode, Had repeat angiogam showing now circ 100% along with RCA. LAD 80-90% with EF 35%.  Sent for CABG    CARDIOVASCULAR SURGERY  1/2005    S/P 3 vess CABG, Dr Wilfredo SORIANO>LAD, SVG>OM1, SVG>RCA.  Post op pneumothorax with Chest Tube placement.     CARDIOVASCULAR SURGERY  11/2003    Acute Inferior MI, Had VT arrest resuscitated. Not given lytics and was sent to [facility].  IABP placed and had angiogram stented Circ x 5. EF 30%. Lad had 70%, % with contralateral collateral flow.    [facility] CHEST      left    HEART/LUNG RESUSCITATION (CPR)  11/2003    S/P Cardiac Arrest V-Tach, Out of hospital arrest with no bystaner cpr.  Has polymorphic VT on arrival.  Has a prolonged resuscitation which obviously was successful.  This was due to an acute Inferior MI at the time.    OPEN REDUCTION INTERNAL FIXATION HIP  1965    ORIF frac/dislocation L hip, Was a teenager and had orif done after fracture dislocation of left hip. Occurred playing softball.    PHACOEMULSIFICATION WITH STANDARD INTRAOCULAR LENS IMPLANT  3/10/2014    Procedure: PHACOEMULSIFICATION WITH STANDARD INTRAOCULAR LENS IMPLANT;  CATARACT EXTRACTION WITH INTRA OCULAR LENS LEFT(10% SERENE/POSSIBLE STRETCH);  Surgeon: Isael Acuna MD;  Location: [facility] OR    PHACOEMULSIFICATION WITH STANDARD INTRAOCULAR LENS IMPLANT  3/25/2014    Procedure: PHACOEMULSIFICATION WITH STANDARD INTRAOCULAR LENS IMPLANT;  CATARACT EXTRACTION WITH INTRA OCULAR LENS RIGHT/POSSIBLE PUPIL STRETCH;  Surgeon: Isael Acuna MD;  Location: [facility] OR       Family History:    Family History   Problem Relation Age of Onset    Cancer Father         leukemia    Diabetes Mother     Cerebrovascular Disease Mother         cause of death       Social History:  Marital Status:   [2]  Social History     Tobacco Use    Smoking status: Former    Smokeless tobacco: Never   Vaping Use    Vaping status: Never Used   Substance Use Topics    Alcohol use: No    Drug use: No        Medications:    amiodarone (PACERONE) 200 MG tablet  aspirin 81 MG chewable tablet  calcium carbonate-vitamin D (OS-KAYE) 500-400 MG-UNIT tablet  ELIQUIS ANTICOAGULANT 5 MG tablet  famotidine (PEPCID) 20 MG tablet  FIBER PO  finasteride (PROSCAR) 5 MG tablet  folic acid  (FOLVITE) 1 MG tablet  furosemide (LASIX) 20 MG tablet  levothyroxine (SYNTHROID/LEVOTHROID) 25 MCG tablet  lisinopril (ZESTRIL) 5 MG tablet  metoprolol succinate ER (TOPROL XL) 25 MG 24 hr tablet  metoprolol succinate ER (TOPROL XL) 50 MG 24 hr tablet  nitroGLYcerin (NITROSTAT) 0.4 MG sublingual tablet  polyethylene glycol (MIRALAX) 17 GM/Dose powder  simvastatin (ZOCOR) 20 MG tablet  spironolactone (ALDACTONE) 25 MG tablet  tamsulosin (FLOMAX) 0.4 MG capsule          Review of Systems   Constitutional: Negative.    HENT: Negative.     Eyes: Negative.    Respiratory: Negative.     Cardiovascular: Negative.    Gastrointestinal: Negative.    Endocrine: Negative.    Genitourinary:         Garzon catheter issues   Musculoskeletal: Negative.    Skin: Negative.    Allergic/Immunologic: Negative.    Neurological: Negative.    Hematological: Negative.    Psychiatric/Behavioral: Negative.         Physical Exam   BP: 106/72  Pulse: 60  Temp: (!) 96.4  F (35.8  C)  Resp: 16  SpO2: 97 %      GENERAL APPEARANCE:  The patient is a 79 year old well-developed, well-nourished individual in no acute distress that appears as stated age.  ABDOMEN:  Soft.  No mass, tenderness, guarding, or rebound.  No organomegaly or hernia.  Bowel sounds are present.  No CVA tenderness or flank mass.  No abdominal bruits or thrills present upon auscultation/palpation.  GENITOURINARY:  Assessment completed with chaperone Sapna Montgomery RN present.  The phallus is circumcised.  There are no penile plaques or genital skin lesions.  The glans is normal.  The meatus is orthotropic, patent, and clear.  The testicles are descended bilaterally without masses or tenderness.  The epididymis and cords are normal.  The perineum is normal.  Urine is leaking around urethral tip at catheter.  Sediment noted in catheter bag.  SKIN:  Warm, dry, and well perfused.  Good turgor.  No lesions, nodules, or rashes are noted.  No bruising noted.      ED Course     ED Course as  of 11/02/24 1223   Sat Nov 02, 2024   1112 In to see patient and history/physical completed.    1118 POCUS bladder ultrasound conducted showing 200 cc of fluid in the bladder with sediment likely.  Garzon catheter intact with balloon inflated.     POC US ABDOMEN LIMITED    Date/Time: 11/2/2024 11:18 AM    Performed by: Maxwell Salinas APRN CNP  Authorized by: Maxwell Salinas APRN CNP    Procedure details:     Indications: decreased urinary output      Bladder:  Visualized  Bladder findings:     Free pelvic fluid: not identified      Volume:  200  Comments:      Sediment noted in bladder with Garzon catheter intact.         No results found for this or any previous visit (from the past 24 hours).    Medications - No data to display    Assessments & Plan (with Medical Decision Making)     I have reviewed the nursing notes.    I have reviewed the findings, diagnosis, plan and need for follow up with the patient.    Summary:  Patient presents to the ER today for catheter dysfunction.  Potential diagnosis which have been considered and evaluated include plugged Garzon catheter, misplaced Garzon catheter, as well as others. Many of these have been excluded using the various modalities and assessment as noted on the chart. At the present time, the diagnosis given seems to be the most likely blocked Garzon catheter.  Upon arrival, vitals signs are normal.  The patient is alert and in no distress.  Catheter in place with urine coming around urethral edge.  No abdominal tenderness noted upon palpation.  POCUS pelvic ultrasound conducted showing bladder with sediment intact.  Garzon catheter with balloon inflated also noted.  Does have 200 cc of fluid in the bladder.  Bladder irrigated by nursing with inability to conduct.  New Garzon catheter was placed and irrigation conducted for sediment with returns of cloudy yellow urine.  At this time patient has no fever or abdominal abnormalities.  For this reason UA was not conducted.   Will discharge patient home to continue follow-up with PCP as needed.  Return to ER for new or worse symptoms.  Patient and family verbalized understand agree with plan of care.  Patient discharged home.      Critical Care Time: None    Impression and plan discussed with patient. Questions answered, concerns addressed, indications for urgent re-evaluation reviewed, and  given. Patient/Parent/Caregiver agree with treatment plan and have no further questions at this time.  AVS provided at discharge.    This note was created by the Dragon Voice Dictation System. Inadvertent typographical errors, due to software recognition problems, may still exist.             New Prescriptions    No medications on file       Final diagnoses:   Garzon catheter problem, initial encounter (H)       11/2/2024   [facility]       Maxwell Salinas, DONI CNP  11/02/24 [id number]

## 2024-11-11 DIAGNOSIS — Z00.00 HEALTH CARE MAINTENANCE: ICD-10-CM

## 2024-11-11 RX ORDER — FOLIC ACID 1 MG/1
1000 TABLET ORAL DAILY
Qty: 30 TABLET | Refills: 3 | Status: SHIPPED | OUTPATIENT
Start: 2024-11-11

## 2024-11-15 DIAGNOSIS — E03.9 ACQUIRED HYPOTHYROIDISM: ICD-10-CM

## 2024-11-15 DIAGNOSIS — I10 ESSENTIAL HYPERTENSION WITH GOAL BLOOD PRESSURE LESS THAN 140/90: ICD-10-CM

## 2024-11-15 RX ORDER — LEVOTHYROXINE SODIUM 25 UG/1
25 TABLET ORAL DAILY
Qty: 90 TABLET | Refills: 0 | Status: SHIPPED | OUTPATIENT
Start: 2024-11-15

## 2024-11-15 RX ORDER — LISINOPRIL 5 MG/1
5 TABLET ORAL DAILY
Qty: 90 TABLET | Refills: 0 | Status: SHIPPED | OUTPATIENT
Start: 2024-11-15

## 2024-11-15 NOTE — TELEPHONE ENCOUNTER
LISINOPRIL 5MG TABLET       Last Written Prescription Date:  08/21/2024  Last Fill Quantity: 90,   # refills: 0  Last Office Visit: 09/20/2024  Future Office visit:    Next 5 appointments (look out 90 days)      Dec 02, 2024 11:00 AM  (Arrive by 10:45 AM)  Nurse Only with  UROLOGY NURSE  [facility] [address] [facility] [address]  [phone]             Routing refill request to provider for review/approval because:    ACE Inhibitors (Including Combos) Protocol Aafvrt87/15/2024 01:16 AM   Protocol Details Normal serum potassium on file in past 12 months          LEVOTHYROXINE 25MCG TABLET       Last Written Prescription Date:  08/21/2024  Last Fill Quantity: 90,   # refills: 0  Last Office Visit: 09/20/2024  Future Office visit:    Next 5 appointments (look out 90 days)      Dec 02, 2024 11:00 AM  (Arrive by 10:45 AM)  Nurse Only with  UROLOGY NURSE  [facility] ([facility] ) [address]  [place] [address]  [phone]             Routing refill request to provider for review/approval because:    Thyroid Protocol Wcyuvk71/15/2024 01:16 AM   Protocol Details Normal TSH on file in past 12 months            Kimberly Boecker, RN

## 2024-11-25 DIAGNOSIS — I10 BENIGN ESSENTIAL HYPERTENSION: ICD-10-CM

## 2024-11-25 NOTE — PROGRESS NOTES
Please advise if you would like these medications filled by [facility]?  They have pended Metoprolol 25 mg and state patient is supposed to be on 50 mg.  Please see note below.    Last Cardio visit 11/21 states:  ASSESSMENT/PLAN:  1. Chronic Ischemic Cardiomyopathy with Heart Failure with Reduced EF and Heart Failure with Preserved EF, Primarily: left. Ejection Fraction: 29%, NYHA Class: III, currently compensated. ACC/AHA Stage: C.     On Lisinopril 5 mg daily.  On metoprolol succinate 50 mg daily.  On spironolactone 12.5 mg daily.  On Jardiance 10 mg daily.    Discussed UTI, genital yearst infection potential side effects. We discussed signs of somnolence, lethargy, change in mentation, all signs of UTI nd the need to test his urine for UTI to prevent sepsis.    Has indwelling urinary catheter.   Following Dr. Pepe urology [place].    Renal function decline.  Lasix 20 mg daily to prn weight gain parameters.  Unfortunately, he is unable to stand on scale for any length of time to get accurate weights. May consider stopping Jardiance and stating low dose daily Lasix, however thsi was changed due to his worsening renal trends.    Kusum Lr, RN  Care Coordination

## 2024-11-26 RX ORDER — METOPROLOL SUCCINATE 25 MG/1
25 TABLET, EXTENDED RELEASE ORAL DAILY
Qty: 90 TABLET | Refills: 0 | Status: SHIPPED | OUTPATIENT
Start: 2024-11-26

## 2024-12-02 ENCOUNTER — ALLIED HEALTH/NURSE VISIT (OUTPATIENT)
Dept: UROLOGY | Facility: OTHER | Age: 79
End: 2024-12-02
Attending: PHYSICIAN ASSISTANT
Payer: COMMERCIAL

## 2024-12-02 VITALS — OXYGEN SATURATION: 95 % | SYSTOLIC BLOOD PRESSURE: 96 MMHG | HEART RATE: 67 BPM | DIASTOLIC BLOOD PRESSURE: 66 MMHG

## 2024-12-02 DIAGNOSIS — R33.8 ACUTE URINARY RETENTION: Primary | ICD-10-CM

## 2024-12-02 PROCEDURE — 51702 INSERT TEMP BLADDER CATH: CPT | Performed by: UROLOGY

## 2024-12-02 NOTE — PROGRESS NOTES
Catheter insertion documentation on 12/2/2024:    Kostas Cotto presents to the clinic for catheter insertion.  Reason for insertion: replacement  Order has been verified. yes  Catheter successfully inserted into the urethral meatus in the usual sterile fashion without immediate complication.  Type of catheter placed: 16 [place] indwelling catheter  Urine is yellow in color.  10 cc's of urine output returned.  Balloon was filled with 10cc's of normal saline.  Securement device placed for the catheter.  The patient tolerated the procedure and was instructed to monitor for catheter dysfunction, monitor for pain or discomfort, return or call for pain, fever, leakage or decreased urine flow, watch for signs of infection, and return in one month for catheter change in clinic.     Sravanthi Drew RN

## 2025-01-02 ENCOUNTER — ALLIED HEALTH/NURSE VISIT (OUTPATIENT)
Dept: UROLOGY | Facility: OTHER | Age: 80
End: 2025-01-02
Attending: UROLOGY
Payer: COMMERCIAL

## 2025-01-02 VITALS — DIASTOLIC BLOOD PRESSURE: 60 MMHG | SYSTOLIC BLOOD PRESSURE: 93 MMHG | OXYGEN SATURATION: 92 % | HEART RATE: 54 BPM

## 2025-01-02 DIAGNOSIS — R33.8 ACUTE URINARY RETENTION: Primary | ICD-10-CM

## 2025-01-02 PROCEDURE — G0463 HOSPITAL OUTPT CLINIC VISIT: HCPCS

## 2025-01-02 ASSESSMENT — PAIN SCALES - GENERAL: PAINLEVEL_OUTOF10: NO PAIN (0)

## 2025-01-02 NOTE — NURSING NOTE
Catheter insertion documentation on 1/2/2025:    Kostas Cotto presents to the clinic for catheter insertion.  Reason for insertion: urinary retention  Order has been verified. Yes  Catheter successfully inserted into the urethral meatus in the usual sterile fashion without immediate complication.  Type of catheter placed: 16 Citizen of [place] indwelling coude catheter  Urine is sandie in color.  10 cc's of urine output returned.  Balloon was filled with 10cc's of normal saline.  Securement device placed for the catheter.  The patient tolerated the procedure and was instructed to follow up with their PCP or consultant as planned, monitor for catheter dysfunction, monitor for pain or discomfort, return or call for pain, fever, leakage or decreased urine flow, and watch for signs of infection.     Discussed utilizing home care for cath changes. Pt wife would like a referral for homecare. Discussed having provider call to get a face to face and then a referral at that time.     Sravanthi Drew RN

## 2025-01-14 ENCOUNTER — TELEPHONE (OUTPATIENT)
Dept: FAMILY MEDICINE | Facility: OTHER | Age: 80
End: 2025-01-14

## 2025-01-14 NOTE — TELEPHONE ENCOUNTER
Verbal permission to speak to spouse given. Patient scheduled for face to face on 1/20/2025.  Ambrose Higgins LPN

## 2025-01-14 NOTE — TELEPHONE ENCOUNTER
Client's wife called home care regarding setting up Skilled Nursing to change change/manage catheter.  Client needs face to face visit with primary.  She reports video appointment set up 1/17/25.  Home Care regulations no longer allow video appointments.  The extension ended 12/31/2024.  She would like this appointment cancelled and new in person office visit set up so home care can be ordered.  If someone could reach out to make this change, she would very much appreciate.  Thank you very much,  Sravanthi Shelby RN Home Care Intake

## 2025-01-28 ENCOUNTER — TELEPHONE (OUTPATIENT)
Dept: UROLOGY | Facility: OTHER | Age: 80
End: 2025-01-28

## 2025-01-28 NOTE — TELEPHONE ENCOUNTER
Pt's wife called, he is due for a nurse only appointment for a cath change. I have canceled the appointment I made.

## 2025-02-05 ENCOUNTER — OFFICE VISIT (OUTPATIENT)
Dept: FAMILY MEDICINE | Facility: OTHER | Age: 80
End: 2025-02-05
Attending: PHYSICIAN ASSISTANT
Payer: COMMERCIAL

## 2025-02-05 VITALS
SYSTOLIC BLOOD PRESSURE: 102 MMHG | BODY MASS INDEX: 18.31 KG/M2 | TEMPERATURE: 94.8 F | HEIGHT: 72 IN | DIASTOLIC BLOOD PRESSURE: 66 MMHG | RESPIRATION RATE: 16 BRPM | HEART RATE: 65 BPM | OXYGEN SATURATION: 95 %

## 2025-02-05 DIAGNOSIS — E53.8 VITAMIN B12 DEFICIENCY (NON ANEMIC): ICD-10-CM

## 2025-02-05 DIAGNOSIS — D75.89 MACROCYTOSIS: ICD-10-CM

## 2025-02-05 DIAGNOSIS — Z23 NEED FOR PROPHYLACTIC VACCINATION AND INOCULATION AGAINST INFLUENZA: ICD-10-CM

## 2025-02-05 DIAGNOSIS — R04.0 BLOODY NOSE: ICD-10-CM

## 2025-02-05 DIAGNOSIS — Z71.89 ENCOUNTER FOR COUNSELING FOR CARE MANAGEMENT OF PATIENT WITH CHRONIC CONDITIONS AND COMPLEX HEALTH NEEDS USING NURSE-BASED MODEL: ICD-10-CM

## 2025-02-05 DIAGNOSIS — T83.89XD: Primary | ICD-10-CM

## 2025-02-05 LAB
BASOPHILS # BLD AUTO: 0 10E3/UL (ref 0–0.2)
BASOPHILS NFR BLD AUTO: 0 %
EOSINOPHIL # BLD AUTO: 0.1 10E3/UL (ref 0–0.7)
EOSINOPHIL NFR BLD AUTO: 2 %
ERYTHROCYTE [DISTWIDTH] IN BLOOD BY AUTOMATED COUNT: 13.2 % (ref 10–15)
HCT VFR BLD AUTO: 41 % (ref 40–53)
HGB BLD-MCNC: 13.5 G/DL (ref 13.3–17.7)
IMM GRANULOCYTES # BLD: 0 10E3/UL
IMM GRANULOCYTES NFR BLD: 0 %
LYMPHOCYTES # BLD AUTO: 1.7 10E3/UL (ref 0.8–5.3)
LYMPHOCYTES NFR BLD AUTO: 21 %
MCH RBC QN AUTO: 32.5 PG (ref 26.5–33)
MCHC RBC AUTO-ENTMCNC: 32.9 G/DL (ref 31.5–36.5)
MCV RBC AUTO: 99 FL (ref 78–100)
MONOCYTES # BLD AUTO: 0.8 10E3/UL (ref 0–1.3)
MONOCYTES NFR BLD AUTO: 9 %
NEUTROPHILS # BLD AUTO: 5.7 10E3/UL (ref 1.6–8.3)
NEUTROPHILS NFR BLD AUTO: 68 %
NRBC # BLD AUTO: 0 10E3/UL
NRBC BLD AUTO-RTO: 0 /100
PLATELET # BLD AUTO: 229 10E3/UL (ref 150–450)
RBC # BLD AUTO: 4.16 10E6/UL (ref 4.4–5.9)
WBC # BLD AUTO: 8.4 10E3/UL (ref 4–11)

## 2025-02-05 PROCEDURE — 82607 VITAMIN B-12: CPT | Mod: ZL | Performed by: PHYSICIAN ASSISTANT

## 2025-02-05 PROCEDURE — G0463 HOSPITAL OUTPT CLINIC VISIT: HCPCS | Mod: 25

## 2025-02-05 PROCEDURE — 85004 AUTOMATED DIFF WBC COUNT: CPT | Mod: ZL | Performed by: PHYSICIAN ASSISTANT

## 2025-02-05 PROCEDURE — G0008 ADMIN INFLUENZA VIRUS VAC: HCPCS

## 2025-02-05 PROCEDURE — 36415 COLL VENOUS BLD VENIPUNCTURE: CPT | Mod: ZL | Performed by: PHYSICIAN ASSISTANT

## 2025-02-05 PROCEDURE — 99214 OFFICE O/P EST MOD 30 MIN: CPT | Performed by: PHYSICIAN ASSISTANT

## 2025-02-05 PROCEDURE — 90662 IIV NO PRSV INCREASED AG IM: CPT

## 2025-02-05 ASSESSMENT — PATIENT HEALTH QUESTIONNAIRE - PHQ9
10. IF YOU CHECKED OFF ANY PROBLEMS, HOW DIFFICULT HAVE THESE PROBLEMS MADE IT FOR YOU TO DO YOUR WORK, TAKE CARE OF THINGS AT HOME, OR GET ALONG WITH OTHER PEOPLE: SOMEWHAT DIFFICULT
SUM OF ALL RESPONSES TO PHQ QUESTIONS 1-9: 4
SUM OF ALL RESPONSES TO PHQ QUESTIONS 1-9: 4

## 2025-02-05 NOTE — PROGRESS NOTES
Assessment & Plan     Calcification of indwelling Garzon catheter, subsequent encounter  ***  - Home Care Referral    Vitamin B12 deficiency (non anemic)  ***  - Vitamin B12; Future  - CBC with platelets and differential; Future    Macrocytosis  ***  - Vitamin B12; Future  - CBC with platelets and differential; Future    Bloody nose  ***  - CBC with platelets and differential; Future    Encounter for counseling for care management of patient with chronic conditions and complex health needs using nurse-based model  ***  - Home Care Referral    Need for prophylactic vaccination and inoculation against influenza  ***            See Patient Instructions    No follow-ups on file.    Mima Kenyon is a 79 year old, presenting for the following health issues:  Home Care/Hospice        2/5/2025     1:14 PM   Additional Questions   Roomed by gege veronica   Accompanied by family         2/5/2025   Declines Weight   Did patient decline having their weight taken? Yes         2/5/2025     1:14 PM   Patient Reported Additional Medications   Patient reports taking the following new medications none     HPI     ***pt is here today with family looking for a referral for homecare***    {SUPERLIST (Optional):988433}  {additonal problems for provider to add (Optional):788719}    {ROS Picklists (Optional):083917}      Objective    /66 (BP Location: Left arm, Patient Position: Sitting, Cuff Size: Adult Regular)   Pulse 65   Temp (!) 94.8  F (34.9  C) (Tympanic)   Resp 16   Ht 1.829 m (6')   SpO2 95%   BMI 18.31 kg/m    Body mass index is 18.31 kg/m .  Physical Exam   {Exam List (Optional):634949}    {Diagnostic Test Results (Optional):732197}        Signed Electronically by: NAVA Gates  {Email feedback regarding this note to [email or web address]   :117048}   16   Ht 1.829 m (6')   SpO2 95%   BMI 18.31 kg/m    Body mass index is 18.31 kg/m .  Physical Exam   GENERAL: alert and no distress  GENERAL: Kostas is talking when spoke to. This is a new thing since his last hosptial stay and he was cardioversion.   EYES: Eyes grossly normal to inspection, PERRL and conjunctivae and sclerae normal  HENT: ear canals and TM's normal, nose and mouth without ulcers or lesions  NECK: no adenopathy, no asymmetry, masses, or scars  RESP: lungs clear to auscultation - no rales, rhonchi or wheezes  CV: regular rate and rhythm, normal S1 S2, no S3 or S4, no murmur, click or rub, no peripheral edema  ABDOMEN: soft, nontender, no hepatosplenomegaly, no masses and bowel sounds normal  MS: he is unable to walk down the samuel. He has a weakness and can get in and out of chair with help of one. Needing to be wheeled down the samuel. He can't wheel himself.   SKIN: scattered bruises.   NEURO: weakness of both his legs. , decreased tone all muscle in arms and legs. Some wasting, tremor of both hands ar rest. , sensory exam grossly normal, abnormal mental status - he speaks when spoken to. , speech is understandable. , and gait abnormal: as he needs assistnace to walk.   PSYCH: affect flat, fatigued, and judgement and insight impaired    Results for orders placed or performed in visit on 02/05/25   Vitamin B12     Status: Normal   Result Value Ref Range    Vitamin B12 853 232 - 1,245 pg/mL   CBC with platelets and differential     Status: Abnormal   Result Value Ref Range    WBC Count 8.4 4.0 - 11.0 10e3/uL    RBC Count 4.16 (L) 4.40 - 5.90 10e6/uL    Hemoglobin 13.5 13.3 - 17.7 g/dL    Hematocrit 41.0 40.0 - 53.0 %    MCV 99 78 - 100 fL    MCH 32.5 26.5 - 33.0 pg    MCHC 32.9 31.5 - 36.5 g/dL    RDW 13.2 10.0 - 15.0 %    Platelet Count 229 150 - 450 10e3/uL    % Neutrophils 68 %    % Lymphocytes 21 %    % Monocytes 9 %    % Eosinophils 2 %    % Basophils 0 %    % Immature Granulocytes 0 %    NRBCs  per 100 WBC 0 <1 /100    Absolute Neutrophils 5.7 1.6 - 8.3 10e3/uL    Absolute Lymphocytes 1.7 0.8 - 5.3 10e3/uL    Absolute Monocytes 0.8 0.0 - 1.3 10e3/uL    Absolute Eosinophils 0.1 0.0 - 0.7 10e3/uL    Absolute Basophils 0.0 0.0 - 0.2 10e3/uL    Absolute Immature Granulocytes 0.0 <=0.4 10e3/uL    Absolute NRBCs 0.0 10e3/uL   CBC with platelets and differential     Status: Abnormal    Narrative    The following orders were created for panel order CBC with platelets and differential.  Procedure                               Abnormality         Status                     ---------                               -----------         ------                     CBC with platelets and d...[[id number]]  Abnormal            Final result                 Please view results for these tests on the individual orders.           Signed Electronically by: NAVA Gates

## 2025-02-06 LAB — VIT B12 SERPL-MCNC: 853 PG/ML (ref 232–1245)

## 2025-02-10 ENCOUNTER — TELEPHONE (OUTPATIENT)
Dept: FAMILY MEDICINE | Facility: OTHER | Age: 80
End: 2025-02-10

## 2025-02-10 NOTE — TELEPHONE ENCOUNTER
We have ability to admit client for SN on 2/13/2025 at which time catheter will be changed.  OK to admit client on 2/13/2025?       Thanks much, Sravanthi Shelby RN

## 2025-02-13 DIAGNOSIS — I10 ESSENTIAL HYPERTENSION WITH GOAL BLOOD PRESSURE LESS THAN 140/90: ICD-10-CM

## 2025-02-13 DIAGNOSIS — Z53.9 PERSONS ENCOUNTERING HEALTH SERVICES FOR SPECIFIC PROCEDURES, NOT CARRIED OUT: Primary | ICD-10-CM

## 2025-02-13 DIAGNOSIS — E03.9 ACQUIRED HYPOTHYROIDISM: ICD-10-CM

## 2025-02-13 PROCEDURE — G0180 MD CERTIFICATION HHA PATIENT: HCPCS | Performed by: PHYSICIAN ASSISTANT

## 2025-02-13 RX ORDER — LEVOTHYROXINE SODIUM 25 UG/1
25 TABLET ORAL DAILY
Qty: 90 TABLET | Refills: 3 | Status: SHIPPED | OUTPATIENT
Start: 2025-02-13

## 2025-02-13 RX ORDER — LISINOPRIL 5 MG/1
5 TABLET ORAL DAILY
Qty: 90 TABLET | Refills: 0 | Status: SHIPPED | OUTPATIENT
Start: 2025-02-13

## 2025-02-13 NOTE — TELEPHONE ENCOUNTER
LISINOPRIL 5MG TABLET       Last Written Prescription Date:  11/15/2024  Last Fill Quantity: 90,   # refills: 0  Last Office Visit: 02/25/2025  Future Office visit:       Routing refill request to provider for review/approval because:    ACE Inhibitors (Including Combos) Protocol Ffppdl1902/13/2025 01:15 AM   Protocol Details Normal serum potassium on file in past 12 months          LEVOTHYROXINE 25MCG TABLET       Last Written Prescription Date:  11/15/2024  Last Fill Quantity: 90,   # refills: 0  Last Office Visit: 02/25/2025  Future Office visit:       Routing refill request to provider for review/approval because:    Thyroid Protocol Cogszj7502/13/2025 01:15 AM   Protocol Details Normal TSH on file in past 12 months          Kimberly Boecker, RN

## 2025-02-24 ENCOUNTER — MEDICAL CORRESPONDENCE (OUTPATIENT)
Dept: HEALTH INFORMATION MANAGEMENT | Facility: HOSPITAL | Age: 80
End: 2025-02-24

## 2025-03-03 NOTE — PROGRESS NOTES
Pallitive Care Skilled Initial Visit      Reason for Consultation:  Kostas Cotto, 79 year old, male is seen for evaluation of *** admission to Home Based Palliative Care Skilled services.  *** is seen at *** residence as leaving the home requires a considerable and taxing effort.  Family is present.    Primary Care Provider:  Halley Hidalgo    :  ***    Advanced Directives:  POLST ***      History of Present Illness:  *** has a past medical history significant for, but not limited to, ***.    ***    Nutritional Status:  ***    Performance Status:      Esteban Scale:  ***      BIMS:  ***      Fall Risk Assessment:  ***      PHQ2/PHQ9:  ***      DME    Wound Care:       ***      Devices:       ***      Supplies:       ***        Oxygen       ***    The patient's current medications and allergies are reviewed in detail.  *** is on chronic opioid therapy and has been prescribed an appropriate bowel regimen to prevent opioid-induced constipation.       Current Outpatient Medications:     aspirin 81 MG chewable tablet, Take 1 tablet (81 mg) by mouth daily, Disp: 36 tablet, Rfl: 11    calcium carbonate-vitamin D (OS-KAYE) 500-400 MG-UNIT tablet, Take 1 tablet by mouth 2 times daily , Disp: , Rfl:     ELIQUIS ANTICOAGULANT 5 MG tablet, TAKE 1 TABLET TWICE A DAY, Disp: 180 tablet, Rfl: 3    empagliflozin (JARDIANCE) 10 MG TABS tablet, Take 10 mg by mouth daily., Disp: , Rfl:     famotidine (PEPCID) 20 MG tablet, Take 1 tablet (20 mg) by mouth 2 times daily, Disp: 180 tablet, Rfl: 3    FIBER PO, Take 1 capsule by mouth daily as needed (one time a day as needed for constipation)., Disp: , Rfl:     finasteride (PROSCAR) 5 MG tablet, Take 1 tablet (5 mg) by mouth daily, Disp: 90 tablet, Rfl: 3    folic acid (FOLVITE) 1 MG tablet, TAKE ONE TABLET BY MOUTH DAILY, Disp: 30 tablet, Rfl: 3    furosemide (LASIX) 20 MG tablet, Take 1 tablet by mouth daily., Disp: , Rfl:     levothyroxine (SYNTHROID/LEVOTHROID) 25 MCG tablet,  TAKE 1 TABLET (25 MCG) BY MOUTH DAILY, Disp: 90 tablet, Rfl: 3    lisinopril (ZESTRIL) 5 MG tablet, TAKE 1 TABLET (5 MG) BY MOUTH DAILY, Disp: 90 tablet, Rfl: 0    metoprolol succinate ER (TOPROL XL) 25 MG 24 hr tablet, TAKE 1 TABLET (25 MG) BY MOUTH DAILY, Disp: 90 tablet, Rfl: 0    metoprolol succinate ER (TOPROL XL) 50 MG 24 hr tablet, , Disp: , Rfl:     nitroGLYcerin (NITROSTAT) 0.4 MG sublingual tablet, Place 0.4 mg under the tongue every 5 minutes as needed for chest pain., Disp: , Rfl:     polyethylene glycol (MIRALAX) 17 GM/Dose powder, Take 17 g (1 capful) by mouth daily, Disp: 578 g, Rfl: 0    simvastatin (ZOCOR) 20 MG tablet, TAKE 1 TABLET BY MOUTH EVERY EVENING, Disp: 90 tablet, Rfl: 3    spironolactone (ALDACTONE) 25 MG tablet, Take 1 tablet by mouth daily., Disp: , Rfl:     tamsulosin (FLOMAX) 0.4 MG capsule, Take 1 capsule (0.4 mg) by mouth at bedtime, Disp: 90 capsule, Rfl: 3      Allergies   Allergen Reactions    Oxycodone      Caused him delirium   Caused him delirium        Past Medical History:  Past Medical History:   Diagnosis Date    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  EF now 55% with mild lateral wall hypokineseis and inferior also.    CARDIOVASCULAR SURGERY  1/2005    Syncopal episode, Had repeat angiogam showing now circ 100% along with RCA. LAD 80-90% with EF 35%.  Sent for CABG    CARDIOVASCULAR SURGERY  1/2005    S/P 3 vess CABG, Dr Wilfredo SORIANO>LAD, SVG>OM1, SVG>RCA.  Post op pneumothorax with Chest Tube placement.    CARDIOVASCULAR SURGERY  11/2003    Acute Inferior MI, Had VT arrest resuscitated. Not given lytics and was sent to [facility].  IABP placed and had angiogram  stented Circ x 5. EF 30%. Lad had 70%, % with contralateral collateral flow.    HC US CHEST      left    HEART/LUNG RESUSCITATION (CPR)  11/2003    S/P Cardiac Arrest V-Tach, Out of hospital arrest with no bystaner cpr.  Has polymorphic VT on arrival.  Has a prolonged resuscitation which obviously was successful.  This was due to an acute Inferior MI at the time.    OPEN REDUCTION INTERNAL FIXATION HIP  1965    ORIF frac/dislocation L hip, Was a teenager and had orif done after fracture dislocation of left hip. Occurred playing softball.    PHACOEMULSIFICATION WITH STANDARD INTRAOCULAR LENS IMPLANT  3/10/2014    Procedure: PHACOEMULSIFICATION WITH STANDARD INTRAOCULAR LENS IMPLANT;  CATARACT EXTRACTION WITH INTRA OCULAR LENS LEFT(10% SERENE/POSSIBLE STRETCH);  Surgeon: Isael Acuna MD;  Location: [facility] OR    PHACOEMULSIFICATION WITH STANDARD INTRAOCULAR LENS IMPLANT  3/25/2014    Procedure: PHACOEMULSIFICATION WITH STANDARD INTRAOCULAR LENS IMPLANT;  CATARACT EXTRACTION WITH INTRA OCULAR LENS RIGHT/POSSIBLE PUPIL STRETCH;  Surgeon: Isael Acuna MD;  Location: [facility] OR         Family History:  Family History   Problem Relation Age of Onset    Cancer Father         leukemia    Diabetes Mother     Cerebrovascular Disease Mother         cause of death       Social History:   reports that he has quit smoking. He has never used smokeless tobacco. He reports that he does not drink alcohol and does not use drugs.      Review of Systems:  Review of Systems - Oncology    The remainder of a 10 point review of systems is negative.    Physical Examination:  GENERAL:  Chronically ill appearing  HEENT:  Oral cavity ***  NECK:  No palpable lymphadenopathy.  No JVD  CV:  ***rate and rhythm.  *** murmur appreciated.  No clicks or rubs.  LUNGS:  *** to auscultation.  ABDOMEN:  Soft and nontender.  *** palpable masses.  Bowel sounds ***.  EXTREM:  *** edema.  NEURO:  ***  PSYCH:  ***     Laboratory/Imaging:  Reviewed in  Epic      Assessment/Plan:  ***    Recommendations:  Discussed in detail the [facility] Home Based Palliative Care Skilled program and services offered.  The clients overall medical care will be managed by *** Primary Care Provider with the assistance of Palliative Care Team members Jose Monge MD.  This will include quarterly scheduled provider visits in the home setting with communication to the clients Primary Care Provider.  Other visits will be scheduled should circumstances arise at the request of *** Primary Care Provider. This is reviewed with client and family and are agreeable with this plan of care.      Total time spent on the day of encounter including review of pertinent clinical records, evaluation and interpretation of laboratory and imaging studies, patient visit, documentation, as well as coordination of care:  *** minutes.      Jose Monge MD, FAAFP, [place] CMD  Hospice and Palliative Care  Geriatrics      caregiver.    The remainder of a 10 point review of systems is negative.    Physical Examination:  GENERAL:  Chronically ill appearing  HEENT:  Oral cavity moist  NECK:  No palpable lymphadenopathy.  No JVD  CV:  Regular rate and rhythm.  No murmur appreciated.  No clicks or rubs.  LUNGS:  Diminished breath sounds  to auscultation. No rhonchi or rales appreciated.  EXTREM:  1+ edema.  NEURO:  Alert, confused, affect bright  PSYCH:  As above     Laboratory/Imaging:  Reviewed in Epic      Assessment/Plan:  Palliative care patient  He will be admitted to the Palliative Care maintance program.  Follow up home visit 3 months.    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  Will follow.    Paroxysmal atrial fibrillation (H)  On beta blockade.  Rate controlled    S/P implantation of automatic cardioverter/defibrillator (AICD)  Reviewed.    Acute on chronic systolic congestive heart failure (H)  On Spironolactone, lisinopril, lasix, as well as metoprolol    Benign essential hypertension  As above.      Recommendations:  Discussed in detail the [facility] Based Palliative Care Skilled program and services offered.  The clients overall medical care will be managed by his Primary Care Provider with the assistance of Palliative Care Team members Jose Monge MD.  This will include quarterly scheduled provider visits in the home setting with communication to the clients Primary Care Provider.  Other visits will be scheduled should circumstances arise at the request of his Primary Care Provider. This is reviewed with client and family and are agreeable with this plan of care.      Total time spent on the day of encounter including review of pertinent clinical records, evaluation and interpretation of laboratory and imaging studies, patient visit, documentation, as well as coordination of care:  46 minutes.      Jose Monge MD, FAAFP, [facility] CMD  Hospice and Palliative  [facility]  Geriatrics

## 2025-03-04 ENCOUNTER — OFFICE VISIT (OUTPATIENT)
Dept: FAMILY MEDICINE | Facility: OTHER | Age: 80
End: 2025-03-04
Attending: FAMILY MEDICINE
Payer: COMMERCIAL

## 2025-03-04 DIAGNOSIS — I10 BENIGN ESSENTIAL HYPERTENSION: ICD-10-CM

## 2025-03-04 DIAGNOSIS — Z95.810 S/P IMPLANTATION OF AUTOMATIC CARDIOVERTER/DEFIBRILLATOR (AICD): ICD-10-CM

## 2025-03-04 DIAGNOSIS — I50.23 ACUTE ON CHRONIC SYSTOLIC CONGESTIVE HEART FAILURE (H): ICD-10-CM

## 2025-03-04 DIAGNOSIS — F02.80 DEMENTIA DUE TO MEDICAL CONDITION WITHOUT BEHAVIORAL DISTURBANCE (H): ICD-10-CM

## 2025-03-04 DIAGNOSIS — I48.0 PAROXYSMAL ATRIAL FIBRILLATION (H): ICD-10-CM

## 2025-03-04 DIAGNOSIS — Z51.5 PALLIATIVE CARE PATIENT: Primary | ICD-10-CM

## 2025-03-04 PROCEDURE — 99349 HOME/RES VST EST MOD MDM 40: CPT | Performed by: FAMILY MEDICINE

## 2025-03-11 DIAGNOSIS — Z00.00 HEALTH CARE MAINTENANCE: ICD-10-CM

## 2025-03-11 RX ORDER — FOLIC ACID 1 MG/1
1000 TABLET ORAL DAILY
Qty: 30 TABLET | Refills: 4 | Status: SHIPPED | OUTPATIENT
Start: 2025-03-11

## 2025-03-17 PROBLEM — Z51.5 PALLIATIVE CARE PATIENT: Status: ACTIVE | Noted: 2025-03-17

## 2025-03-17 ASSESSMENT — ENCOUNTER SYMPTOMS
ABDOMINAL PAIN: 0
DIARRHEA: 0
SEIZURES: 0
APPETITE CHANGE: 1
SHORTNESS OF BREATH: 1
MYALGIAS: 0
DYSURIA: 0
HEMATURIA: 0
EYE PROBLEMS: 0
NERVOUS/ANXIOUS: 0
SLEEP DISTURBANCE: 0
COUGH: 1
TROUBLE SWALLOWING: 0
VOICE CHANGE: 0
UNEXPECTED WEIGHT CHANGE: 1
VOMITING: 0
FREQUENCY: 1
NAUSEA: 0
NECK PAIN: 0
SPEECH DIFFICULTY: 0
DEPRESSION: 0
DIZZINESS: 0
CONFUSION: 1
NUMBNESS: 0
HEMOPTYSIS: 0
WHEEZING: 0
LIGHT-HEADEDNESS: 0
LEG SWELLING: 1
BLOOD IN STOOL: 0
WOUND: 0
DIFFICULTY URINATING: 1
EXTREMITY WEAKNESS: 0
ADENOPATHY: 0
CONSTIPATION: 1
FATIGUE: 1
CHEST TIGHTNESS: 0
PALPITATIONS: 0
ARTHRALGIAS: 0
BACK PAIN: 1

## 2025-04-14 DIAGNOSIS — Z53.9 PERSONS ENCOUNTERING HEALTH SERVICES FOR SPECIFIC PROCEDURES, NOT CARRIED OUT: Primary | ICD-10-CM

## 2025-04-14 PROCEDURE — G0179 MD RECERTIFICATION HHA PT: HCPCS | Performed by: FAMILY MEDICINE

## 2025-04-23 ENCOUNTER — TELEPHONE (OUTPATIENT)
Dept: FAMILY MEDICINE | Facility: OTHER | Age: 80
End: 2025-04-23

## 2025-04-23 NOTE — TELEPHONE ENCOUNTER
Attempt # 1  Outcome: Talked with family member    Comment: Pt's wife took message. Pt in no rush for appt. Fine with rescheduling. Will call back at a later time.

## 2025-04-25 DIAGNOSIS — Z95.1 HISTORY OF CORONARY ARTERY BYPASS GRAFT X 3: ICD-10-CM

## 2025-04-25 DIAGNOSIS — Z86.73 HISTORY OF CVA (CEREBROVASCULAR ACCIDENT): ICD-10-CM

## 2025-04-25 DIAGNOSIS — Z98.890 HISTORY OF AAA (ABDOMINAL AORTIC ANEURYSM) REPAIR: ICD-10-CM

## 2025-04-25 DIAGNOSIS — I25.2 HISTORY OF MYOCARDIAL INFARCTION: ICD-10-CM

## 2025-04-25 DIAGNOSIS — E78.5 HYPERLIPIDEMIA WITH TARGET LDL LESS THAN 100: ICD-10-CM

## 2025-04-25 DIAGNOSIS — E78.2 MIXED HYPERLIPIDEMIA: ICD-10-CM

## 2025-04-25 DIAGNOSIS — Z86.73 HISTORY OF TIA (TRANSIENT ISCHEMIC ATTACK): ICD-10-CM

## 2025-04-25 NOTE — TELEPHONE ENCOUNTER
SIMVASTATIN      Last Written Prescription Date:  5-9-24  Last Fill Quantity: 90,   # refills: 3  Last Office Visit: 3-4-25  Future Office visit:       Routing refill request to provider for review/approval because:  Drug not on the G, P or [facility] refill protocol or controlled substance

## 2025-04-25 NOTE — TELEPHONE ENCOUNTER
Antihyperlipidemic agents [id number]/25/2025 12:05 PM   Protocol Details LDL on file in the past 12 months

## 2025-04-28 RX ORDER — SIMVASTATIN 20 MG
TABLET ORAL
Qty: 90 TABLET | Refills: 3 | Status: SHIPPED | OUTPATIENT
Start: 2025-04-28

## 2025-05-21 ENCOUNTER — TELEPHONE (OUTPATIENT)
Dept: CARE COORDINATION | Facility: OTHER | Age: 80
End: 2025-05-21

## 2025-05-28 ENCOUNTER — OFFICE VISIT (OUTPATIENT)
Dept: FAMILY MEDICINE | Facility: OTHER | Age: 80
End: 2025-05-28
Attending: FAMILY MEDICINE
Payer: COMMERCIAL

## 2025-05-28 VITALS
BODY MASS INDEX: 20.65 KG/M2 | SYSTOLIC BLOOD PRESSURE: 110 MMHG | OXYGEN SATURATION: 94 % | DIASTOLIC BLOOD PRESSURE: 70 MMHG | HEART RATE: 61 BPM | WEIGHT: 152.5 LBS | HEIGHT: 72 IN | TEMPERATURE: 96.8 F | RESPIRATION RATE: 15 BRPM

## 2025-05-28 DIAGNOSIS — I25.5 ISCHEMIC CARDIOMYOPATHY: ICD-10-CM

## 2025-05-28 DIAGNOSIS — E03.9 ACQUIRED HYPOTHYROIDISM: Primary | ICD-10-CM

## 2025-05-28 DIAGNOSIS — I10 BENIGN ESSENTIAL HYPERTENSION: ICD-10-CM

## 2025-05-28 DIAGNOSIS — I10 ESSENTIAL HYPERTENSION: ICD-10-CM

## 2025-05-28 DIAGNOSIS — I48.0 PAROXYSMAL ATRIAL FIBRILLATION (H): ICD-10-CM

## 2025-05-28 DIAGNOSIS — E78.2 MIXED HYPERLIPIDEMIA: ICD-10-CM

## 2025-05-28 DIAGNOSIS — R33.8 BENIGN PROSTATIC HYPERPLASIA WITH URINARY RETENTION: ICD-10-CM

## 2025-05-28 DIAGNOSIS — I10 ESSENTIAL HYPERTENSION WITH GOAL BLOOD PRESSURE LESS THAN 140/90: ICD-10-CM

## 2025-05-28 DIAGNOSIS — N40.1 BENIGN PROSTATIC HYPERPLASIA WITH URINARY RETENTION: ICD-10-CM

## 2025-05-28 PROBLEM — N17.9 AKI (ACUTE KIDNEY INJURY): Status: ACTIVE | Noted: 2024-09-02

## 2025-05-28 PROBLEM — Z95.810 ICD (IMPLANTABLE CARDIOVERTER-DEFIBRILLATOR) IN PLACE: Status: ACTIVE | Noted: 2024-09-11

## 2025-05-28 PROBLEM — I50.22 CHRONIC SYSTOLIC HEART FAILURE (H): Status: ACTIVE | Noted: 2024-09-02

## 2025-05-28 PROBLEM — Z99.11 ON MECHANICALLY ASSISTED VENTILATION (H): Status: ACTIVE | Noted: 2024-09-02

## 2025-05-28 PROBLEM — R74.01 ELEVATED TRANSAMINASE LEVEL: Status: ACTIVE | Noted: 2024-09-03

## 2025-05-28 PROBLEM — E87.6 HYPOKALEMIA: Status: ACTIVE | Noted: 2024-09-02

## 2025-05-28 PROBLEM — I44.0 FIRST DEGREE AV BLOCK: Status: ACTIVE | Noted: 2024-09-02

## 2025-05-28 PROBLEM — D68.69 HYPERCOAGULABLE STATE DUE TO PAROXYSMAL ATRIAL FIBRILLATION (H): Status: ACTIVE | Noted: 2024-10-17

## 2025-05-28 PROBLEM — I46.2 CARDIAC ARREST DUE TO UNDERLYING CARDIAC CONDITION (H): Status: ACTIVE | Noted: 2024-09-02

## 2025-05-28 LAB
ALBUMIN SERPL BCG-MCNC: 3.9 G/DL (ref 3.5–5.2)
ALP SERPL-CCNC: 108 U/L (ref 40–150)
ALT SERPL W P-5'-P-CCNC: <5 U/L (ref 0–70)
ANION GAP SERPL CALCULATED.3IONS-SCNC: 12 MMOL/L (ref 7–15)
AST SERPL W P-5'-P-CCNC: 21 U/L (ref 0–45)
BILIRUB SERPL-MCNC: 0.4 MG/DL
BUN SERPL-MCNC: 33 MG/DL (ref 8–23)
CALCIUM SERPL-MCNC: 9.7 MG/DL (ref 8.8–10.4)
CHLORIDE SERPL-SCNC: 100 MMOL/L (ref 98–107)
CHOLEST SERPL-MCNC: 135 MG/DL
CREAT SERPL-MCNC: 1.24 MG/DL (ref 0.67–1.17)
EGFRCR SERPLBLD CKD-EPI 2021: 60 ML/MIN/1.73M2
FASTING STATUS PATIENT QL REPORTED: YES
FASTING STATUS PATIENT QL REPORTED: YES
GLUCOSE SERPL-MCNC: 98 MG/DL (ref 70–99)
HCO3 SERPL-SCNC: 24 MMOL/L (ref 22–29)
HDLC SERPL-MCNC: 59 MG/DL
LDLC SERPL CALC-MCNC: 64 MG/DL
MAGNESIUM SERPL-MCNC: 2.1 MG/DL (ref 1.7–2.3)
NONHDLC SERPL-MCNC: 76 MG/DL
POTASSIUM SERPL-SCNC: 4.5 MMOL/L (ref 3.4–5.3)
PROT SERPL-MCNC: 8 G/DL (ref 6.4–8.3)
SODIUM SERPL-SCNC: 136 MMOL/L (ref 135–145)
T4 FREE SERPL-MCNC: 1.14 NG/DL (ref 0.9–1.7)
TRIGL SERPL-MCNC: 61 MG/DL
TSH SERPL DL<=0.005 MIU/L-ACNC: 15.3 UIU/ML (ref 0.3–4.2)

## 2025-05-28 PROCEDURE — 84439 ASSAY OF FREE THYROXINE: CPT | Mod: ZL | Performed by: FAMILY MEDICINE

## 2025-05-28 PROCEDURE — 82465 ASSAY BLD/SERUM CHOLESTEROL: CPT | Mod: ZL | Performed by: FAMILY MEDICINE

## 2025-05-28 PROCEDURE — G0463 HOSPITAL OUTPT CLINIC VISIT: HCPCS

## 2025-05-28 PROCEDURE — 84443 ASSAY THYROID STIM HORMONE: CPT | Mod: ZL | Performed by: FAMILY MEDICINE

## 2025-05-28 PROCEDURE — 80053 COMPREHEN METABOLIC PANEL: CPT | Mod: ZL | Performed by: FAMILY MEDICINE

## 2025-05-28 PROCEDURE — 36415 COLL VENOUS BLD VENIPUNCTURE: CPT | Mod: ZL | Performed by: FAMILY MEDICINE

## 2025-05-28 PROCEDURE — 83735 ASSAY OF MAGNESIUM: CPT | Mod: ZL | Performed by: FAMILY MEDICINE

## 2025-05-28 RX ORDER — SPIRONOLACTONE 25 MG/1
25 TABLET ORAL
Qty: 90 TABLET | Refills: 1 | Status: SHIPPED | OUTPATIENT
Start: 2025-05-28

## 2025-05-28 RX ORDER — FINASTERIDE 5 MG/1
1 TABLET, FILM COATED ORAL DAILY
Qty: 90 TABLET | Refills: 1 | Status: SHIPPED | OUTPATIENT
Start: 2025-05-28

## 2025-05-28 RX ORDER — LISINOPRIL 5 MG/1
5 TABLET ORAL DAILY
Qty: 90 TABLET | Refills: 1 | Status: SHIPPED | OUTPATIENT
Start: 2025-05-28

## 2025-05-28 ASSESSMENT — PAIN SCALES - GENERAL: PAINLEVEL_OUTOF10: NO PAIN (0)

## 2025-05-28 NOTE — PROGRESS NOTES
{PROVIDER CHARTING PREFERENCE:691462}    Mima Kenyon is a 80 year old, presenting for the following health issues:  Heart Failure, Lipids, Hypertension, and Atrial Fib  {(!) Visit Details have not yet been documented.  Please enter Visit Details and then use this list to pull in documentation. (Optional):311162}  History of Present Illness       Reason for visit:  New docter   He is taking medications regularly.          Home health changes catheter monthly, next visitJune 12 upcoming  Sees cardio      Hyperlipidemia Follow-Up    Are you regularly taking any medication or supplement to lower your cholesterol?   Yes- Simvastatin  Are you having muscle aches or other side effects that you think could be caused by your cholesterol lowering medication?  No    Hypertension Follow-up    Do you check your blood pressure regularly outside of the clinic? No   Are you following a low salt diet? Yes  Are your blood pressures ever more than 140 on the top number (systolic) OR more   than 90 on the bottom number (diastolic), for example 140/90? No    BP Readings from Last 2 Encounters:   05/28/25 110/70   02/05/25 102/66     Atrial Fibrillation Follow-up    Symptoms: no recent chest pain, significant palpitations, dizziness/lightheadedness, dyspnea, or increased peripheral edema.  Stroke prevention: DOAC (Eliquis, Xarelto, Pradaxa)        11/2/2024    12:25 PM 12/2/2024    11:11 AM 1/2/2025    11:11 AM 2/5/2025     1:15 PM 5/28/2025    10:53 AM   Date   Pulse 60 67 54 65 61     Current CAL8ND2-SFJz Score: 10 points - A score of 5 or greater represents a 7.2 - 12.2% annual risk of major embolic event, without anti-coagulation or an LAAO device. {Provider  [id number] Calculator to review specific risk factors :301670}      Heart Failure Follow-up {Provider  Link to Heart Failure SmartSet :464812}  Are you experiencing any shortness of breath? No  Are you experiencing any swelling in your legs or feet?  Stable  Are you  using more pillows than usual? No  Do you cough at night?  No  Do you check your weight daily?  No  Have you had a weight change recently?  { :807020}  Are you having any of the following side effects from your medications? (Select all that apply)  The patient does not report symptoms of dizziness, fatigue, cough, swelling, or slow heart beat.  Since your last visit, how many times have you gone to the cardiologist, urgent care, emergency room, or hospital because of your heart failure?   None  Last Echo: No results found.    {additonal problems for provider to add (Optional):285338}    {ROS Picklists (Optional):749464}      Objective    /70   Pulse 61   Temp 96.8  F (36  C) (Tympanic)   Resp 15   Ht 1.829 m (6')   Wt 69.2 kg (152 lb 8 oz)   SpO2 94%   BMI 20.68 kg/m    Body mass index is 20.68 kg/m .  Physical Exam   {Exam List (Optional):817151}    {Diagnostic Test Results (Optional):541585}        Signed Electronically by: Maxwell Ricardo DO  {Email feedback regarding this note to [email or web address]   :867941}   DO

## 2025-05-29 ENCOUNTER — RESULTS FOLLOW-UP (OUTPATIENT)
Dept: FAMILY MEDICINE | Facility: OTHER | Age: 80
End: 2025-05-29

## 2025-05-29 DIAGNOSIS — E03.9 ACQUIRED HYPOTHYROIDISM: Primary | ICD-10-CM

## 2025-06-13 DIAGNOSIS — Z53.9 PERSONS ENCOUNTERING HEALTH SERVICES FOR SPECIFIC PROCEDURES, NOT CARRIED OUT: Primary | ICD-10-CM

## 2025-08-02 DIAGNOSIS — N40.1 BENIGN PROSTATIC HYPERPLASIA WITH URINARY RETENTION: ICD-10-CM

## 2025-08-02 DIAGNOSIS — I10 ESSENTIAL HYPERTENSION: ICD-10-CM

## 2025-08-02 DIAGNOSIS — Z00.00 HEALTH CARE MAINTENANCE: ICD-10-CM

## 2025-08-02 DIAGNOSIS — R33.8 BENIGN PROSTATIC HYPERPLASIA WITH URINARY RETENTION: ICD-10-CM

## 2025-08-05 RX ORDER — FINASTERIDE 5 MG/1
TABLET, FILM COATED ORAL
Qty: 90 TABLET | Refills: 1 | OUTPATIENT
Start: 2025-08-05

## 2025-08-05 RX ORDER — LISINOPRIL 5 MG/1
TABLET ORAL
Qty: 90 TABLET | Refills: 1 | OUTPATIENT
Start: 2025-08-05

## 2025-08-07 RX ORDER — FOLIC ACID 1 MG/1
1000 TABLET ORAL DAILY
Qty: 30 TABLET | Refills: 4 | Status: SHIPPED | OUTPATIENT
Start: 2025-08-07

## 2025-08-12 DIAGNOSIS — Z53.9 PERSONS ENCOUNTERING HEALTH SERVICES FOR SPECIFIC PROCEDURES, NOT CARRIED OUT: Primary | ICD-10-CM

## 2025-08-12 PROCEDURE — G0179 MD RECERTIFICATION HHA PT: HCPCS | Performed by: FAMILY MEDICINE

## 2025-08-27 ENCOUNTER — OFFICE VISIT (OUTPATIENT)
Dept: FAMILY MEDICINE | Facility: OTHER | Age: 80
End: 2025-08-27
Attending: FAMILY MEDICINE
Payer: COMMERCIAL

## 2025-08-27 DIAGNOSIS — Z51.5 PALLIATIVE CARE PATIENT: Primary | ICD-10-CM

## 2025-08-27 ASSESSMENT — ENCOUNTER SYMPTOMS
BACK PAIN: 1
HEMOPTYSIS: 0
EYE PROBLEMS: 0
SHORTNESS OF BREATH: 1
LEG SWELLING: 1
UNEXPECTED WEIGHT CHANGE: 1
EXTREMITY WEAKNESS: 0
ADENOPATHY: 0
COUGH: 1
CHEST TIGHTNESS: 0
CONSTIPATION: 1
WOUND: 0
HEMATURIA: 0
APPETITE CHANGE: 1
PALPITATIONS: 0
NAUSEA: 0
DIARRHEA: 0
FREQUENCY: 1
DIFFICULTY URINATING: 1
DYSURIA: 0
FATIGUE: 1
SEIZURES: 0
BLOOD IN STOOL: 0
CONFUSION: 1
MYALGIAS: 0
SLEEP DISTURBANCE: 0
ABDOMINAL PAIN: 0
DIZZINESS: 0
NECK PAIN: 0
LIGHT-HEADEDNESS: 0
WHEEZING: 0
ARTHRALGIAS: 0
DEPRESSION: 0
NERVOUS/ANXIOUS: 0
TROUBLE SWALLOWING: 0
VOICE CHANGE: 0
VOMITING: 0
NUMBNESS: 0
SPEECH DIFFICULTY: 0